# Patient Record
Sex: MALE | Race: WHITE | Employment: FULL TIME | URBAN - NONMETROPOLITAN AREA
[De-identification: names, ages, dates, MRNs, and addresses within clinical notes are randomized per-mention and may not be internally consistent; named-entity substitution may affect disease eponyms.]

---

## 2019-07-22 ENCOUNTER — HOSPITAL ENCOUNTER (OUTPATIENT)
Dept: GENERAL RADIOLOGY | Age: 49
Discharge: HOME | DRG: 188 | End: 2019-07-22

## 2019-07-22 ENCOUNTER — HOSPITAL ENCOUNTER (INPATIENT)
Age: 49
LOS: 1 days | Discharge: HOME | DRG: 188 | End: 2019-07-23
Attending: EMERGENCY MEDICINE | Admitting: INTERNAL MEDICINE
Payer: COMMERCIAL

## 2019-07-22 ENCOUNTER — APPOINTMENT (OUTPATIENT)
Dept: GENERAL RADIOLOGY | Age: 49
DRG: 188 | End: 2019-07-22

## 2019-07-22 ENCOUNTER — APPOINTMENT (OUTPATIENT)
Dept: CT IMAGING | Age: 49
DRG: 188 | End: 2019-07-22

## 2019-07-22 ENCOUNTER — APPOINTMENT (OUTPATIENT)
Dept: ULTRASOUND IMAGING | Age: 49
DRG: 188 | End: 2019-07-22

## 2019-07-22 ENCOUNTER — HOSPITAL ENCOUNTER (OUTPATIENT)
Age: 49
Discharge: HOME | DRG: 188 | End: 2019-07-22

## 2019-07-22 DIAGNOSIS — N28.89 RENAL MASS: Primary | ICD-10-CM

## 2019-07-22 DIAGNOSIS — J40 BRONCHITIS, NOT SPECIFIED AS ACUTE OR CHRONIC: ICD-10-CM

## 2019-07-22 DIAGNOSIS — J90 PLEURAL EFFUSION: ICD-10-CM

## 2019-07-22 PROBLEM — R31.9 HEMATURIA: Status: ACTIVE | Noted: 2019-07-22

## 2019-07-22 PROBLEM — R91.8 PULMONARY MASS: Status: ACTIVE | Noted: 2019-07-22

## 2019-07-22 LAB
ALBUMIN SERPL-MCNC: 4 G/DL (ref 3.5–5.1)
ALP BLD-CCNC: 71 U/L (ref 38–126)
ALT SERPL-CCNC: 58 U/L (ref 11–66)
ANION GAP SERPL CALCULATED.3IONS-SCNC: 14 MEQ/L (ref 8–16)
AST SERPL-CCNC: 23 U/L (ref 5–40)
AVERAGE GLUCOSE: 153 MG/DL (ref 70–126)
BACTERIA: ABNORMAL /HPF
BASOPHILS # BLD: 0.2 %
BASOPHILS ABSOLUTE: 0 THOU/MM3 (ref 0–0.1)
BILIRUB SERPL-MCNC: 1.4 MG/DL (ref 0.3–1.2)
BILIRUBIN URINE: NEGATIVE
BLOOD, URINE: ABNORMAL
BUN BLDV-MCNC: 16 MG/DL (ref 7–22)
CALCIUM SERPL-MCNC: 9.3 MG/DL (ref 8.5–10.5)
CASTS 2: ABNORMAL /LPF
CASTS UA: ABNORMAL /LPF
CHARACTER, URINE: CLEAR
CHLORIDE BLD-SCNC: 96 MEQ/L (ref 98–111)
CO2: 26 MEQ/L (ref 23–33)
COLOR: YELLOW
CREAT SERPL-MCNC: 0.8 MG/DL (ref 0.4–1.2)
CRYSTALS, UA: ABNORMAL
D-DIMER QUANTITATIVE: 1888 NG/ML FEU (ref 0–500)
EKG ATRIAL RATE: 97 BPM
EKG P AXIS: 49 DEGREES
EKG P-R INTERVAL: 130 MS
EKG Q-T INTERVAL: 340 MS
EKG QRS DURATION: 82 MS
EKG QTC CALCULATION (BAZETT): 431 MS
EKG R AXIS: 21 DEGREES
EKG T AXIS: 42 DEGREES
EKG VENTRICULAR RATE: 97 BPM
EOSINOPHIL # BLD: 1.3 %
EOSINOPHILS ABSOLUTE: 0.2 THOU/MM3 (ref 0–0.4)
EPITHELIAL CELLS, UA: ABNORMAL /HPF
ERYTHROCYTE [DISTWIDTH] IN BLOOD BY AUTOMATED COUNT: 13.2 % (ref 11.5–14.5)
ERYTHROCYTE [DISTWIDTH] IN BLOOD BY AUTOMATED COUNT: 44.8 FL (ref 35–45)
GFR SERPL CREATININE-BSD FRML MDRD: > 90 ML/MIN/1.73M2
GLUCOSE BLD-MCNC: 136 MG/DL (ref 70–108)
GLUCOSE BLD-MCNC: 179 MG/DL (ref 70–108)
GLUCOSE URINE: NEGATIVE MG/DL
GLUCOSE, FLUID: 154 MG/DL
HBA1C MFR BLD: 7.1 % (ref 4.4–6.4)
HCT VFR BLD CALC: 44.3 % (ref 42–52)
HEMOGLOBIN: 14.5 GM/DL (ref 14–18)
IMMATURE GRANS (ABS): 0.1 THOU/MM3 (ref 0–0.07)
IMMATURE GRANULOCYTES: 0.8 %
KETONES, URINE: 15
LD, FLUID: 295 U/L
LEUKOCYTE ESTERASE, URINE: NEGATIVE
LIPASE: 28.5 U/L (ref 5.6–51.3)
LYMPHOCYTES # BLD: 13.1 %
LYMPHOCYTES ABSOLUTE: 1.7 THOU/MM3 (ref 1–4.8)
MCH RBC QN AUTO: 30.5 PG (ref 26–33)
MCHC RBC AUTO-ENTMCNC: 32.7 GM/DL (ref 32.2–35.5)
MCV RBC AUTO: 93.1 FL (ref 80–94)
MISCELLANEOUS 2: ABNORMAL
MONOCYTES # BLD: 8.2 %
MONOCYTES ABSOLUTE: 1 THOU/MM3 (ref 0.4–1.3)
MUCUS: ABNORMAL
NITRITE, URINE: NEGATIVE
NUCLEATED RED BLOOD CELLS: 0 /100 WBC
OSMOLALITY CALCULATION: 275.2 MOSMOL/KG (ref 275–300)
PH FLUID: 7.55
PH UA: 5.5 (ref 5–9)
PLATELET # BLD: 200 THOU/MM3 (ref 130–400)
PMV BLD AUTO: 10.7 FL (ref 9.4–12.4)
POTASSIUM SERPL-SCNC: 4.6 MEQ/L (ref 3.5–5.2)
PRO-BNP: 145.4 PG/ML (ref 0–900)
PROTEIN FLUID: 3.7 GM/DL
PROTEIN UA: NEGATIVE
RBC # BLD: 4.76 MILL/MM3 (ref 4.7–6.1)
RBC URINE: ABNORMAL /HPF
RENAL EPITHELIAL, UA: ABNORMAL
SEG NEUTROPHILS: 76.4 %
SEGMENTED NEUTROPHILS ABSOLUTE COUNT: 9.6 THOU/MM3 (ref 1.8–7.7)
SODIUM BLD-SCNC: 136 MEQ/L (ref 135–145)
SPECIFIC GRAVITY, URINE: 1.02 (ref 1–1.03)
TOTAL PROTEIN: 7 G/DL (ref 6.1–8)
TROPONIN T: < 0.01 NG/ML
UROBILINOGEN, URINE: 0.2 EU/DL (ref 0–1)
WBC # BLD: 12.6 THOU/MM3 (ref 4.8–10.8)
WBC UA: ABNORMAL /HPF
YEAST: ABNORMAL

## 2019-07-22 PROCEDURE — 87070 CULTURE OTHR SPECIMN AEROBIC: CPT

## 2019-07-22 PROCEDURE — 83615 LACTATE (LD) (LDH) ENZYME: CPT

## 2019-07-22 PROCEDURE — 99223 1ST HOSP IP/OBS HIGH 75: CPT | Performed by: INTERNAL MEDICINE

## 2019-07-22 PROCEDURE — 32554 ASPIRATE PLEURA W/O IMAGING: CPT

## 2019-07-22 PROCEDURE — 93005 ELECTROCARDIOGRAM TRACING: CPT | Performed by: EMERGENCY MEDICINE

## 2019-07-22 PROCEDURE — 94640 AIRWAY INHALATION TREATMENT: CPT

## 2019-07-22 PROCEDURE — 74177 CT ABD & PELVIS W/CONTRAST: CPT

## 2019-07-22 PROCEDURE — 88305 TISSUE EXAM BY PATHOLOGIST: CPT

## 2019-07-22 PROCEDURE — 81001 URINALYSIS AUTO W/SCOPE: CPT

## 2019-07-22 PROCEDURE — 32555 ASPIRATE PLEURA W/ IMAGING: CPT

## 2019-07-22 PROCEDURE — 84484 ASSAY OF TROPONIN QUANT: CPT

## 2019-07-22 PROCEDURE — 6370000000 HC RX 637 (ALT 250 FOR IP): Performed by: INTERNAL MEDICINE

## 2019-07-22 PROCEDURE — 71275 CT ANGIOGRAPHY CHEST: CPT

## 2019-07-22 PROCEDURE — 2580000003 HC RX 258: Performed by: INTERNAL MEDICINE

## 2019-07-22 PROCEDURE — 83986 ASSAY PH BODY FLUID NOS: CPT

## 2019-07-22 PROCEDURE — 83036 HEMOGLOBIN GLYCOSYLATED A1C: CPT

## 2019-07-22 PROCEDURE — 2709999900 HC NON-CHARGEABLE SUPPLY

## 2019-07-22 PROCEDURE — 84157 ASSAY OF PROTEIN OTHER: CPT

## 2019-07-22 PROCEDURE — 0W9B3ZX DRAINAGE OF LEFT PLEURAL CAVITY, PERCUTANEOUS APPROACH, DIAGNOSTIC: ICD-10-PCS | Performed by: RADIOLOGY

## 2019-07-22 PROCEDURE — 1200000003 HC TELEMETRY R&B

## 2019-07-22 PROCEDURE — 80053 COMPREHEN METABOLIC PANEL: CPT

## 2019-07-22 PROCEDURE — 85379 FIBRIN DEGRADATION QUANT: CPT

## 2019-07-22 PROCEDURE — 83880 ASSAY OF NATRIURETIC PEPTIDE: CPT

## 2019-07-22 PROCEDURE — 71046 X-RAY EXAM CHEST 2 VIEWS: CPT

## 2019-07-22 PROCEDURE — 85025 COMPLETE CBC W/AUTO DIFF WBC: CPT

## 2019-07-22 PROCEDURE — 36415 COLL VENOUS BLD VENIPUNCTURE: CPT

## 2019-07-22 PROCEDURE — 87205 SMEAR GRAM STAIN: CPT

## 2019-07-22 PROCEDURE — 82948 REAGENT STRIP/BLOOD GLUCOSE: CPT

## 2019-07-22 PROCEDURE — 2580000003 HC RX 258: Performed by: PHYSICIAN ASSISTANT

## 2019-07-22 PROCEDURE — 89050 BODY FLUID CELL COUNT: CPT

## 2019-07-22 PROCEDURE — 87102 FUNGUS ISOLATION CULTURE: CPT

## 2019-07-22 PROCEDURE — 82945 GLUCOSE OTHER FLUID: CPT

## 2019-07-22 PROCEDURE — 6360000004 HC RX CONTRAST MEDICATION: Performed by: EMERGENCY MEDICINE

## 2019-07-22 PROCEDURE — 87116 MYCOBACTERIA CULTURE: CPT

## 2019-07-22 PROCEDURE — 71045 X-RAY EXAM CHEST 1 VIEW: CPT

## 2019-07-22 PROCEDURE — 83690 ASSAY OF LIPASE: CPT

## 2019-07-22 PROCEDURE — 88112 CYTOPATH CELL ENHANCE TECH: CPT

## 2019-07-22 PROCEDURE — 87075 CULTR BACTERIA EXCEPT BLOOD: CPT

## 2019-07-22 PROCEDURE — 99285 EMERGENCY DEPT VISIT HI MDM: CPT

## 2019-07-22 PROCEDURE — 6370000000 HC RX 637 (ALT 250 FOR IP): Performed by: PHYSICIAN ASSISTANT

## 2019-07-22 RX ORDER — IPRATROPIUM BROMIDE AND ALBUTEROL SULFATE 2.5; .5 MG/3ML; MG/3ML
1 SOLUTION RESPIRATORY (INHALATION) EVERY 4 HOURS PRN
Status: DISCONTINUED | OUTPATIENT
Start: 2019-07-22 | End: 2019-07-24 | Stop reason: HOSPADM

## 2019-07-22 RX ORDER — NICOTINE POLACRILEX 4 MG
15 LOZENGE BUCCAL PRN
Status: DISCONTINUED | OUTPATIENT
Start: 2019-07-22 | End: 2019-07-24 | Stop reason: HOSPADM

## 2019-07-22 RX ORDER — POLYETHYLENE GLYCOL 3350 17 G/17G
17 POWDER, FOR SOLUTION ORAL DAILY
Status: DISCONTINUED | OUTPATIENT
Start: 2019-07-22 | End: 2019-07-24 | Stop reason: HOSPADM

## 2019-07-22 RX ORDER — ONDANSETRON 2 MG/ML
4 INJECTION INTRAMUSCULAR; INTRAVENOUS EVERY 6 HOURS PRN
Status: DISCONTINUED | OUTPATIENT
Start: 2019-07-22 | End: 2019-07-24 | Stop reason: HOSPADM

## 2019-07-22 RX ORDER — 0.9 % SODIUM CHLORIDE 0.9 %
500 INTRAVENOUS SOLUTION INTRAVENOUS ONCE
Status: COMPLETED | OUTPATIENT
Start: 2019-07-22 | End: 2019-07-22

## 2019-07-22 RX ORDER — POTASSIUM CHLORIDE 7.45 MG/ML
10 INJECTION INTRAVENOUS PRN
Status: DISCONTINUED | OUTPATIENT
Start: 2019-07-22 | End: 2019-07-24 | Stop reason: HOSPADM

## 2019-07-22 RX ORDER — DEXTROSE MONOHYDRATE 50 MG/ML
100 INJECTION, SOLUTION INTRAVENOUS PRN
Status: DISCONTINUED | OUTPATIENT
Start: 2019-07-22 | End: 2019-07-24 | Stop reason: HOSPADM

## 2019-07-22 RX ORDER — POTASSIUM CHLORIDE 20 MEQ/1
40 TABLET, EXTENDED RELEASE ORAL PRN
Status: DISCONTINUED | OUTPATIENT
Start: 2019-07-22 | End: 2019-07-24 | Stop reason: HOSPADM

## 2019-07-22 RX ORDER — FAMOTIDINE 20 MG/1
20 TABLET, FILM COATED ORAL 2 TIMES DAILY
Status: DISCONTINUED | OUTPATIENT
Start: 2019-07-22 | End: 2019-07-24 | Stop reason: HOSPADM

## 2019-07-22 RX ORDER — IPRATROPIUM BROMIDE AND ALBUTEROL SULFATE 2.5; .5 MG/3ML; MG/3ML
1 SOLUTION RESPIRATORY (INHALATION) ONCE
Status: COMPLETED | OUTPATIENT
Start: 2019-07-22 | End: 2019-07-22

## 2019-07-22 RX ORDER — DEXTROSE MONOHYDRATE 25 G/50ML
12.5 INJECTION, SOLUTION INTRAVENOUS PRN
Status: DISCONTINUED | OUTPATIENT
Start: 2019-07-22 | End: 2019-07-24 | Stop reason: HOSPADM

## 2019-07-22 RX ORDER — SODIUM CHLORIDE 0.9 % (FLUSH) 0.9 %
10 SYRINGE (ML) INJECTION EVERY 12 HOURS SCHEDULED
Status: DISCONTINUED | OUTPATIENT
Start: 2019-07-22 | End: 2019-07-24 | Stop reason: HOSPADM

## 2019-07-22 RX ORDER — SODIUM CHLORIDE 0.9 % (FLUSH) 0.9 %
10 SYRINGE (ML) INJECTION PRN
Status: DISCONTINUED | OUTPATIENT
Start: 2019-07-22 | End: 2019-07-24 | Stop reason: HOSPADM

## 2019-07-22 RX ORDER — ACETAMINOPHEN 325 MG/1
650 TABLET ORAL EVERY 4 HOURS PRN
Status: DISCONTINUED | OUTPATIENT
Start: 2019-07-22 | End: 2019-07-24 | Stop reason: HOSPADM

## 2019-07-22 RX ADMIN — Medication 10 ML: at 20:09

## 2019-07-22 RX ADMIN — SODIUM CHLORIDE 500 ML: 9 INJECTION, SOLUTION INTRAVENOUS at 13:11

## 2019-07-22 RX ADMIN — POLYETHYLENE GLYCOL 3350 17 G: 17 POWDER, FOR SOLUTION ORAL at 20:17

## 2019-07-22 RX ADMIN — IOPAMIDOL 80 ML: 755 INJECTION, SOLUTION INTRAVENOUS at 14:00

## 2019-07-22 RX ADMIN — IPRATROPIUM BROMIDE AND ALBUTEROL SULFATE 1 AMPULE: .5; 3 SOLUTION RESPIRATORY (INHALATION) at 13:32

## 2019-07-22 ASSESSMENT — ENCOUNTER SYMPTOMS
VOMITING: 0
RHINORRHEA: 0
BACK PAIN: 0
WHEEZING: 0
BLOOD IN STOOL: 0
CONSTIPATION: 1
SHORTNESS OF BREATH: 1
DIARRHEA: 0
ABDOMINAL DISTENTION: 1
EYE DISCHARGE: 0
NAUSEA: 0
CHEST TIGHTNESS: 1
ABDOMINAL PAIN: 0
COUGH: 1
EYE REDNESS: 0
SORE THROAT: 0

## 2019-07-22 ASSESSMENT — PAIN SCALES - GENERAL
PAINLEVEL_OUTOF10: 0

## 2019-07-22 NOTE — H&P
History & Physical        Patient:  Hudson Ann  YOB: 1970    MRN: [id number]     Acct: [de-identified]    PCP: CAITY Robbins CNP    Date of Admission: 7/22/2019    Date of Service: Pt seen/examined on 7/22/2019 and Admitted to Inpatient with expected LOS greater than two midnights due to medical therapy. Chief Complaint:    Chief Complaint   Patient presents with    Shortness of Breath         History Of Present Illness:      46 y.o. male who presented to [facility] with \"evaluation of shortness of breath onset 3 weeks. Patient saw Babak Frances (PCP) today and got labs and a chest xray. Chest xray showed a near complete whiteout of the left lung field which be on the basis large pleural effusion, consolidation, atelectasis or other etiology. D-dimer was elevated at 1888.0. Patient was sent here for further evaluation. The patient states he was taking Robitussin 3 weeks ago but states it didn't alleviate his symptoms. He then went to urgent care where he had a breathing treatment which he stated provided him with minimal relief. Patient was also given a steroid shot and was sent home with steroids as well as Doxycycline. Patient denies getting imaging at that time. Patient states he was on a cruise last week and states his symptoms got worse. He states he gets both short of breath while stationary and with exertion. Patient also reports associated chest tightness and cough. He states he produces yellow phlegm when he coughs. The patient reports progressively decrease in bowel movements and increase belching without flatulence and persistent abdominal distension and bloating that doesn't improve with bowel clean outs with OTC medications. Patient states he used to have 2-3 bowel movements per day and states he has tried Miralax and milk of magnesium.  He denies chest pain, wheezing, fever, chills, lightheadedness, dizziness, syncope, leg swelling, leg pain, weight

## 2019-07-22 NOTE — ED TRIAGE NOTES
Pt reports to the ED with complaints of SOB that has been occurring for about 3 weeks. Had an X-ray of the chest today and was referred to the hospital due to SOB. Pt has abdominal distention that is nontender also believes this has been occurring for 3 weeks. Pt reports of bloating and tightness in the stomach. He believes he may be \"backed up. \" Pt reports also having a lot of burping. Pt went on a cruise recently but reports these symptoms occurred before the cruise.

## 2019-07-22 NOTE — ED PROVIDER NOTES
ATTENDING ATTESTATION  Evaluation of Orlando Dhaliwal. Patient seen and examined by me. Case discussed and care plan developed with physician assistant. I agree with the note and plan as documented by her, except if my documentation differs. I reviewed the medical, surgical, family and social history, medications and allergies. I have reviewed the nursing documentation. I have reviewed the patient's vital signs and are abnormal from Hypertension tachypnea per my interpretation. Pulsoxymetry is normal per my interpretation. Patient c/o worsening shortness of breath for the last 2 weeks, very mild at first, patient went to an urgent care and was prescribed steroids and has been taking Robitussin with little improvement. Patient went on a cruise ship for vacation and returned yesterday and was so short of breath he had to come to the emergency department. Physical exam is notable for well appearing, wheeze breath sounds on left chest.  Slightly distended but soft with ascitic fluid wave. MDM: Large pleural effusion, ascites, possible kidney neoplasm. Plan: IV line, labs, imaging, observation, admission. Please see the physician assistant completed note for final disposition except as documented on this attestation. Diagnosis, treatment and disposition plans were discussed and agreed upon by patient. This transcription was electronically signed. It was dictated by use of voice recognition software and electronically transcribed. The transcription may contain errors not detected in proofreading.        Electronically signed by Jakub Baldwin MD on 7/22/19 at 3:13 PM         Jakub Baldwin MD  07/22/19 [id number]
mood and affect. His behavior is normal. Judgment and thought content normal.   Nursing note and vitals reviewed. DIFFERENTIAL DIAGNOSIS:   Discussed with patient at bedside    DIAGNOSTIC RESULTS     EKG: All EKG's are interpreted by [facility] who either signs or Co-signs this chart in the absence of a cardiologist.    Vaibhav Colmenares. Rate: 91 bpm  NM interval: 730 ms  QRS duration: 82 ms  QTc: 431 ms  P-R-T axes: 49, 21, 42  Normal sinus rhythm  Low voltage QRS  No STEMI    RADIOLOGY: non-plain film images(s) such as CT, Ultrasound and MRI are read by theradiologist.    CTA Chest W WO Contrast   Final Result         1. Large left pleural effusion with near complete atelectasis of the left lung with mass effect and narrowing of the left mainstem bronchus and pulmonary vessels. 2. No central pulmonary embolus. 3. Multiple peripheral predominantly pleural-based nodules in the left lung and multiple right lung pulmonary nodules. Highly suspicious for malignancy. Further evaluation is advised. Enlarged adrenal glands bilaterally with nodularity and enhancement. These results were communicated directly with Ayaan Vera on 7/22/2019 2:35 PM,  [ ]   **This report has been created using voice recognition software. It may contain minor errors which are inherent in voice recognition technology. **      Final report electronically signed by Dr. Herbert Goodpasture on 7/22/2019 2:36 PM      CT ABDOMEN PELVIS W IV CONTRAST   Final Result      1. 6.9 x 6.2 cm enhancing right renal mass concerning for malignancy. 2. Bilateral adrenal gland nodularity and an abnormal enhancement concerning for neoplasm. 3. 2.2 cm right retroperitoneal possibly partially necrotic lymph node. 4. Partially imaged is large left pleural effusion with mass effect on the spleen and left upper quadrant abdominal organs. Multiple pulmonary nodules concerning for metastatic disease.          These results were communicated directly

## 2019-07-23 ENCOUNTER — APPOINTMENT (OUTPATIENT)
Dept: NUCLEAR MEDICINE | Age: 49
DRG: 188 | End: 2019-07-23

## 2019-07-23 ENCOUNTER — APPOINTMENT (OUTPATIENT)
Dept: MRI IMAGING | Age: 49
DRG: 188 | End: 2019-07-23

## 2019-07-23 ENCOUNTER — APPOINTMENT (OUTPATIENT)
Dept: GENERAL RADIOLOGY | Age: 49
DRG: 188 | End: 2019-07-23

## 2019-07-23 ENCOUNTER — APPOINTMENT (OUTPATIENT)
Dept: ULTRASOUND IMAGING | Age: 49
DRG: 188 | End: 2019-07-23

## 2019-07-23 VITALS
TEMPERATURE: 98.9 F | WEIGHT: 224 LBS | BODY MASS INDEX: 29.69 KG/M2 | RESPIRATION RATE: 18 BRPM | SYSTOLIC BLOOD PRESSURE: 123 MMHG | HEART RATE: 111 BPM | HEIGHT: 73 IN | DIASTOLIC BLOOD PRESSURE: 81 MMHG | OXYGEN SATURATION: 94 %

## 2019-07-23 LAB
ALBUMIN SERPL-MCNC: 3 G/DL (ref 3.5–5.1)
ALP BLD-CCNC: 56 U/L (ref 38–126)
ALT SERPL-CCNC: 36 U/L (ref 11–66)
ANION GAP SERPL CALCULATED.3IONS-SCNC: 13 MEQ/L (ref 8–16)
APTT: 27.4 SECONDS (ref 22–38)
AST SERPL-CCNC: 14 U/L (ref 5–40)
BASOPHILS # BLD: 0.3 %
BASOPHILS ABSOLUTE: 0 THOU/MM3 (ref 0–0.1)
BILIRUB SERPL-MCNC: 1.2 MG/DL (ref 0.3–1.2)
BILIRUBIN DIRECT: 0.3 MG/DL (ref 0–0.3)
BODY FLUID RBC: NORMAL /CUMM
BUN BLDV-MCNC: 14 MG/DL (ref 7–22)
CALCIUM SERPL-MCNC: 8.8 MG/DL (ref 8.5–10.5)
CHARACTER, BODY FLUID: NORMAL
CHLORIDE BLD-SCNC: 101 MEQ/L (ref 98–111)
CO2: 25 MEQ/L (ref 23–33)
COLOR: NORMAL
CREAT SERPL-MCNC: 1 MG/DL (ref 0.4–1.2)
EOSINOPHIL # BLD: 1.9 %
EOSINOPHILS ABSOLUTE: 0.2 THOU/MM3 (ref 0–0.4)
ERYTHROCYTE [DISTWIDTH] IN BLOOD BY AUTOMATED COUNT: 13.2 % (ref 11.5–14.5)
ERYTHROCYTE [DISTWIDTH] IN BLOOD BY AUTOMATED COUNT: 44.8 FL (ref 35–45)
GFR SERPL CREATININE-BSD FRML MDRD: 79 ML/MIN/1.73M2
GLUCOSE BLD-MCNC: 120 MG/DL (ref 70–108)
GLUCOSE BLD-MCNC: 126 MG/DL (ref 70–108)
GLUCOSE BLD-MCNC: 130 MG/DL (ref 70–108)
GLUCOSE BLD-MCNC: 140 MG/DL (ref 70–108)
HCT VFR BLD CALC: 40.4 % (ref 42–52)
HEMOGLOBIN: 13.1 GM/DL (ref 14–18)
IMMATURE GRANS (ABS): 0.06 THOU/MM3 (ref 0–0.07)
IMMATURE GRANULOCYTES: 0.7 %
INR BLD: 1.09 (ref 0.85–1.13)
LYMPHOCYTES # BLD: 16.8 %
LYMPHOCYTES ABSOLUTE: 1.5 THOU/MM3 (ref 1–4.8)
MAGNESIUM: 1.8 MG/DL (ref 1.6–2.4)
MCH RBC QN AUTO: 30 PG (ref 26–33)
MCHC RBC AUTO-ENTMCNC: 32.4 GM/DL (ref 32.2–35.5)
MCV RBC AUTO: 92.7 FL (ref 80–94)
MONOCYTES # BLD: 9 %
MONOCYTES ABSOLUTE: 0.8 THOU/MM3 (ref 0.4–1.3)
MONONUCLEAR CELLS BODY FLUID: 90.6 %
NUCLEATED RED BLOOD CELLS: 0 /100 WBC
PATHOLOGIST REVIEW: NORMAL
PLATELET # BLD: 161 THOU/MM3 (ref 130–400)
PMV BLD AUTO: 10.8 FL (ref 9.4–12.4)
POLYMORPHONUCLEAR CELLS BODY FLUID: 9.4 %
POTASSIUM REFLEX MAGNESIUM: 4.6 MEQ/L (ref 3.5–5.2)
RBC # BLD: 4.36 MILL/MM3 (ref 4.7–6.1)
SEG NEUTROPHILS: 71.3 %
SEGMENTED NEUTROPHILS ABSOLUTE COUNT: 6.5 THOU/MM3 (ref 1.8–7.7)
SODIUM BLD-SCNC: 139 MEQ/L (ref 135–145)
SPECIMEN: NORMAL
TOTAL NUCLEATED CELLS BODY FLUID: 476 /CUMM (ref 0–500)
TOTAL PROTEIN: 5.6 G/DL (ref 6.1–8)
TOTAL VOLUME RECEIVED BODY FLUID: 83 ML
WBC # BLD: 9.1 THOU/MM3 (ref 4.8–10.8)

## 2019-07-23 PROCEDURE — 93010 ELECTROCARDIOGRAM REPORT: CPT | Performed by: INTERNAL MEDICINE

## 2019-07-23 PROCEDURE — 3430000000 HC RX DIAGNOSTIC RADIOPHARMACEUTICAL: Performed by: NURSE PRACTITIONER

## 2019-07-23 PROCEDURE — 99254 IP/OBS CNSLTJ NEW/EST MOD 60: CPT | Performed by: NURSE PRACTITIONER

## 2019-07-23 PROCEDURE — 88112 CYTOPATH CELL ENHANCE TECH: CPT

## 2019-07-23 PROCEDURE — 2580000003 HC RX 258: Performed by: INTERNAL MEDICINE

## 2019-07-23 PROCEDURE — 78306 BONE IMAGING WHOLE BODY: CPT

## 2019-07-23 PROCEDURE — 88305 TISSUE EXAM BY PATHOLOGIST: CPT

## 2019-07-23 PROCEDURE — 0W9B3ZX DRAINAGE OF LEFT PLEURAL CAVITY, PERCUTANEOUS APPROACH, DIAGNOSTIC: ICD-10-PCS | Performed by: RADIOLOGY

## 2019-07-23 PROCEDURE — 94760 N-INVAS EAR/PLS OXIMETRY 1: CPT

## 2019-07-23 PROCEDURE — 6370000000 HC RX 637 (ALT 250 FOR IP): Performed by: INTERNAL MEDICINE

## 2019-07-23 PROCEDURE — 82948 REAGENT STRIP/BLOOD GLUCOSE: CPT

## 2019-07-23 PROCEDURE — 99239 HOSP IP/OBS DSCHRG MGMT >30: CPT | Performed by: FAMILY MEDICINE

## 2019-07-23 PROCEDURE — 80076 HEPATIC FUNCTION PANEL: CPT

## 2019-07-23 PROCEDURE — 85610 PROTHROMBIN TIME: CPT

## 2019-07-23 PROCEDURE — 70553 MRI BRAIN STEM W/O & W/DYE: CPT

## 2019-07-23 PROCEDURE — 83735 ASSAY OF MAGNESIUM: CPT

## 2019-07-23 PROCEDURE — 80048 BASIC METABOLIC PNL TOTAL CA: CPT

## 2019-07-23 PROCEDURE — 85730 THROMBOPLASTIN TIME PARTIAL: CPT

## 2019-07-23 PROCEDURE — 99223 1ST HOSP IP/OBS HIGH 75: CPT | Performed by: INTERNAL MEDICINE

## 2019-07-23 PROCEDURE — 32555 ASPIRATE PLEURA W/ IMAGING: CPT

## 2019-07-23 PROCEDURE — 85025 COMPLETE CBC W/AUTO DIFF WBC: CPT

## 2019-07-23 PROCEDURE — 99253 IP/OBS CNSLTJ NEW/EST LOW 45: CPT | Performed by: NURSE PRACTITIONER

## 2019-07-23 PROCEDURE — A9503 TC99M MEDRONATE: HCPCS | Performed by: NURSE PRACTITIONER

## 2019-07-23 PROCEDURE — 71045 X-RAY EXAM CHEST 1 VIEW: CPT

## 2019-07-23 PROCEDURE — 71046 X-RAY EXAM CHEST 2 VIEWS: CPT

## 2019-07-23 PROCEDURE — 6360000004 HC RX CONTRAST MEDICATION: Performed by: NURSE PRACTITIONER

## 2019-07-23 PROCEDURE — A9579 GAD-BASE MR CONTRAST NOS,1ML: HCPCS | Performed by: NURSE PRACTITIONER

## 2019-07-23 PROCEDURE — 36415 COLL VENOUS BLD VENIPUNCTURE: CPT

## 2019-07-23 RX ORDER — TC 99M MEDRONATE 20 MG/10ML
24.6 INJECTION, POWDER, LYOPHILIZED, FOR SOLUTION INTRAVENOUS
Status: COMPLETED | OUTPATIENT
Start: 2019-07-23 | End: 2019-07-23

## 2019-07-23 RX ADMIN — Medication 10 ML: at 08:19

## 2019-07-23 RX ADMIN — ACETAMINOPHEN 650 MG: 325 TABLET ORAL at 10:37

## 2019-07-23 RX ADMIN — GADOTERIDOL 20 ML: 279.3 INJECTION, SOLUTION INTRAVENOUS at 11:38

## 2019-07-23 RX ADMIN — TC 99M MEDRONATE 24.6 MILLICURIE: 20 INJECTION, POWDER, LYOPHILIZED, FOR SOLUTION INTRAVENOUS at 10:59

## 2019-07-23 ASSESSMENT — PAIN SCALES - GENERAL
PAINLEVEL_OUTOF10: 0
PAINLEVEL_OUTOF10: 1

## 2019-07-23 NOTE — PROGRESS NOTES
A Ultrasound guided left thoracentesis was performed. Patient had severe coughing at the end of the procedure and drainage was stopped. Please see PACS for full report.

## 2019-07-23 NOTE — CARE COORDINATION
7/23/19, 11:11 AM  Will Dhaliwal       Admitted from: ED 7/22/2019/ [facility] day: 1   Location: [facility]- Reason for admit: Renal mass [N28.89] Status: inpt  Admit order signed?: yes  PMH:  has a past medical history of Chronic kidney disease, unspecified, IDDM (insulin dependent diabetes mellitus) ([address] Sinusitis, and Unspecified essential hypertension. Medications:  Scheduled Meds:   sodium chloride flush  10 mL Intravenous 2 times per day    enoxaparin  40 mg Subcutaneous Daily    polyethylene glycol  17 g Oral Daily    famotidine  20 mg Oral BID    insulin lispro  0-6 Units Subcutaneous TID WC    insulin lispro  0-3 Units Subcutaneous Nightly     Continuous Infusions:   dextrose        Pertinent Info/Orders/Treatment Plan: Pt admitted with SOB, CXR showed large left pleural effusion. Last evening an US guided thoracentesis was done. CT abd shows right renal mass. Oncology, urology and pulmonology consulted. Diet: DIET CARB CONTROL;   Vital Signs: /77   Pulse 83   Temp 98.2 °F (36.8 °C) (Oral)   Resp 16   Ht 6' 1\" (1.854 m)   Wt 224 lb (101.6 kg)   SpO2 92%   BMI 29.55 kg/m²   PCP: Bucky Jeans, APRN - CNP  Readmission: no  Readmission Risk Score: 8%    Discharge Planning  Current Residence:  Private Residence  Living Arrangements:  Spouse/Significant Other, Children   Support Systems:  Parent, Spouse/Significant Other, Children, Family Members  Current Services PTA:     Potential Assistance Needed:  N/A  Potential Assistance Purchasing Medications:  No  Does patient want to participate in local refill/ meds to beds program?  No  Type of Home Care Services:  None  Patient expects to be discharged to:  home with [facility]  Expected Discharge date:  07/25/19  Follow Up Appointment: Best Day/ Time: Tuesday AM(has an appointment on 8/6 at 3pm)    Discharge Plan: Pt from home with S.O. Pt has no insurance, and financial counselor has seen. Pt denies any other discharge needs.

## 2019-07-23 NOTE — CONSULTS
HCO3, O2SAT  No results found for: IFIO2, MODE, SETTIDVOL, SETPEEP  CBC  Recent Labs     07/22/19  1214 07/23/19  0614   WBC 12.6* 9.1   RBC 4.76 4.36*   HGB 14.5 13.1*   HCT 44.3 40.4*   MCV 93.1 92.7   MCH 30.5 30.0   MCHC 32.7 32.4    161   MPV 10.7 10.8      BMP  Recent Labs     07/22/19  1230 07/23/19  0614    139   K 4.6 4.6   CL 96* 101   CO2 26 25   BUN 16 14   CREATININE 0.8 1.0   GLUCOSE 136* 126*   MG  --  1.8   CALCIUM 9.3 8.8     LFT  Recent Labs     07/22/19  1230 07/23/19  0614   AST 23 14   ALT 58 36   BILITOT 1.4* 1.2   ALKPHOS 71 56   LIPASE 28.5  --      TROP  Lab Results   Component Value Date    TROPONINT < 0.010 07/22/2019     BNP  Lab Results   Component Value Date    PROBNP 145.4 07/22/2019     D-Dimer  Lab Results   Component Value Date    DDIMER 1888.00 07/22/2019     Lactic Acid  No results for input(s): LACTA in the last 72 hours. INR  Recent Labs     07/23/19  0614   INR 1.09     PTT  Recent Labs     07/23/19  0614   APTT 27.4     Glucose  Recent Labs     07/22/19  1954 07/23/19  0838 07/23/19  1221   POCGLU 179* 130* 140*     UA   Recent Labs     07/22/19  1405 07/22/19  1720   PHUR 5.5  --    COLORU YELLOW ORANGE   MUCUS THREADS  --    PROTEINU NEGATIVE  --    BLOODU LARGE*  --    RBCUA   --    WBCUA 0-2  --    BACTERIA NONE  --    NITRU NEGATIVE  --    GLUCOSEU NEGATIVE  --    BILIRUBINUR NEGATIVE  --    UROBILINOGEN 0.2  --    KETUA 15*  --    . PFTs   N/A  Echo    N/A  Cultures    Procalcitonin  No results found for: [facility]     Radiology    CXR  7/23    FINDINGS:    There is persistent near complete opacification of the left hemithorax. There is no significant shift of midline structures.       The right lung remains clear.  No pneumothorax.       The cardiac silhouette and pulmonary vasculature are within normal limits.       There is no significant pleural effusion or pneumothorax.       Visualized portions of the upper abdomen are within normal limits.

## 2019-07-23 NOTE — CONSULTS
pain, constipation/diarrhea. Oncology History    New diagnosis  Meds    Current Medications    sodium chloride flush  10 mL Intravenous 2 times per day    enoxaparin  40 mg Subcutaneous Daily    polyethylene glycol  17 g Oral Daily    famotidine  20 mg Oral BID    insulin lispro  0-6 Units Subcutaneous TID WC    insulin lispro  0-3 Units Subcutaneous Nightly     sodium chloride flush, ondansetron, glucose, dextrose, glucagon (rDNA), dextrose, ipratropium-albuterol, magnesium sulfate, potassium chloride **OR** potassium alternative oral replacement **OR** potassium chloride, acetaminophen  IV Drips/Infusions   dextrose       Past Medical History         Diagnosis Date    Chronic kidney disease, unspecified     IDDM (insulin dependent diabetes mellitus) ([facility] [address] [facility]     Sinusitis     Unspecified essential hypertension       Past Surgical History           Procedure Laterality Date    BACK SURGERY      CHOLECYSTECTOMY       Diet    DIET CARB CONTROL; Allergies    Patient has no known allergies. Social History     Social History     Socioeconomic History    Marital status:      Spouse name: Not on file    Number of children: Not on file    Years of education: Not on file    Highest education level: Not on file   Occupational History    Not on file   Social Needs    Financial resource strain: Not on file    Food insecurity:     Worry: Not on file     Inability: Not on file    Transportation needs:     Medical: Not on file     Non-medical: Not on file   Tobacco Use    Smoking status: Never Smoker    Smokeless tobacco: Never Used   Substance and Sexual Activity    Alcohol use:  Yes     Alcohol/week: 1.0 standard drinks     Types: 1 Cans of beer per week    Drug use: Never    Sexual activity: Not on file   Lifestyle    Physical activity:     Days per week: Not on file     Minutes per session: Not on file    Stress: Not on file   Relationships    Social connections:     Talks on phone: Not on file     Gets together: Not on file     Attends Anabaptist service: Not on file     Active member of club or organization: Not on file     Attends meetings of clubs or organizations: Not on file     Relationship status: Not on file    Intimate partner violence:     Fear of current or ex partner: Not on file     Emotionally abused: Not on file     Physically abused: Not on file     Forced sexual activity: Not on file   Other Topics Concern    Not on file   Social History Narrative    Not on file     Family History    History reviewed. No pertinent family history. ROS     Review of Systems   Pertinent review of systems noted in HPI, all other ROS negative. Vitals     height is 6' 1\" (1.854 m) and weight is 224 lb (101.6 kg). His oral temperature is 98.2 °F (36.8 °C). His blood pressure is 129/77 and his pulse is 83. His respiration is 16 and oxygen saturation is 92%. O2 Flow Rate (L/min): 2 L/min    Exam   Physical Exam   General appearance: No apparent distress, tearful and cooperative. HEENT: Pupils equal, round, and reactive to light. Conjunctivae/corneas clear. Oral mucosa moist  Neck: Supple, with full range of motion. Trachea midline. Respiratory:  Normal respiratory effort. Clear to auscultation, bilaterally without Rales/Wheezes/Rhonchi. Oxygen sat 94% on RA  Cardiovascular: Regular rate and rhythm with normal S1/S2 without murmurs, rubs or gallops. CDI dressing over thoracentesis site. Abdomen: Soft, non-tender, distended with active bowel sounds. Musculoskeletal: No clubbing, cyanosis or edema bilaterally. Full range of motion without deformity. Skin: Skin color, texture, turgor normal.  No rashes or lesions. Neurologic:  Neurovascularly intact without any focal sensory/motor deficits.  Cranial nerves: II-XII intact, grossly non-focal.  Psychiatric: Alert and oriented, thought content appropriate, normal insight  Capillary Refill: Brisk,< 3 seconds   Peripheral Pulses: +2 palpable, equal complete whiteout of the left lung field which could be on the basis pleural effusion, atelectasis or other etiology. Trachea appears to be midline. No definite acute osseous findings. Chronic silhouette is obscured. Right costophrenic angle and right lung field appears to be clear. There is near complete whiteout of the left lung field which could be on the basis large pleural effusion, consolidation, atelectasis or other etiology. Correlation with prior imaging, clinical history and/or chest CT is advised for further evaluation. **This report has been created using voice recognition software. It may contain minor errors which are inherent in voice recognition technology. ** Final report electronically signed by Dr. Safia Elliott on 7/22/2019 12:11 PM    Cta Chest W Wo Contrast    Result Date: 7/22/2019  PROCEDURE: CTA CHEST W WO CONTRAST CLINICAL INFORMATION: SOB, elevated D-dimer. COMPARISON: No prior study. TECHNIQUE: 1.5 mm axial images were obtained through the chest after the administration of IV contrast.  A non-contrast localizer was obtained. 3D reconstructions were performed on the scanner to include sagittal and bilateral oblique images through the  chest. 80 mL Isovue-370 were administered intravenously. All CT scans at this facility use dose modulation, iterative reconstruction, and/or weight-based dosing when appropriate to reduce radiation dose to as low as reasonably achievable. FINDINGS: Possible left thyroid lobe nodule. There is a large left pleural effusion occupying nearly the entire left hemithorax. There are multiple nodular densities along the pleura and mediastinum with narrowing and near complete occlusion of the left bronchus as well as pulmonary arterial vessels. There is extremely tight atelectasis of the left lung. Multiple right lower lobe nodules ranging from 3 mm to 1.4 cm concerning for malignancy.  Upper abdominal images demonstrate enhancement and nodularity of the both adrenal glands. Fatty infiltration of the liver. Please see dedicated CT abdomen pelvis. No acute osseous findings. There is no cardiomegaly or pericardial effusion. There is shift of mediastinal structures towards the right due to large left pleural effusion. 1. Large left pleural effusion with near complete atelectasis of the left lung with mass effect and narrowing of the left mainstem bronchus and pulmonary vessels. 2. No central pulmonary embolus. 3. Multiple peripheral predominantly pleural-based nodules in the left lung and multiple right lung pulmonary nodules. Highly suspicious for malignancy. Further evaluation is advised. Enlarged adrenal glands bilaterally with nodularity and enhancement. These results were communicated directly with Samina Vera on 7/22/2019 2:35 PM,  [ ] **This report has been created using voice recognition software. It may contain minor errors which are inherent in voice recognition technology. ** Final report electronically signed by Dr. Anibal Pena on 7/22/2019 2:36 PM    Ct Abdomen Pelvis W Iv Contrast    Result Date: 7/22/2019  PROCEDURE: CT ABDOMEN PELVIS W IV CONTRAST CLINICAL INFORMATION: weight changes, bloating, belching . COMPARISON: None. TECHNIQUE: 5 mm axial CT images were obtained through the abdomen and pelvis after the administration of intravenous and oral contrast. Coronal and sagittal reconstructions were obtained. All CT scans at this facility use dose modulation, iterative reconstruction, and/or weight-based dosing when appropriate to reduce radiation dose to as low as reasonably achievable. FINDINGS: Large left pleural effusion and multiple pulmonary nodules as described on dedicated chest CT. Lobulated, enlarged and enhancing adrenal glands 3.6 cm on the right and 4.7 cm on the left. There is an abnormal retroperitoneal 2.2 cm lymph node with central hypoattenuation possible necrosis.  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 Cholecystectomy. Moderate scattered stool in the colon with diverticulosis No bowel wall thickening or evidence for obstruction. Normal appendix. Partially decompressed bladder. Normal caliber abdominal aorta with atherosclerotic changes. No acute osseous findings. Degenerative changes greatest at L5-S1 with mild retrolisthesis and disc space narrowing. 1. 6.9 x 6.2 cm enhancing right renal mass concerning for malignancy. 2. Bilateral adrenal gland nodularity and an abnormal enhancement concerning for neoplasm. 3. 2.2 cm right retroperitoneal possibly partially necrotic lymph node. 4. Partially imaged is large left pleural effusion with mass effect on the spleen and left upper quadrant abdominal organs. Multiple pulmonary nodules concerning for metastatic disease. These results were communicated directly with Emani Miranda [facility] on 7/22/2019 2:42 PM,  [ ] **This report has been created using voice recognition software. It may contain minor errors which are inherent in voice recognition technology. ** Final report electronically signed by Dr. Nick Guan on 7/22/2019 2:42 PM    Us Thoracentesis    Result Date: 7/22/2019  PROCEDURE: US THORACENTESIS CLINICAL INFORMATION: left pleural effusion, SOB . COMPARISON: CT scan 7/22/2019 PROCEDURE: The benefits and the risk of the procedure were explained to the patient. The patient was given an opportunity to ask questions. Signed consent was obtained. Limited ultrasound exam of the left chest showed  large  amount of pleural fluid. Using ultrasound guidance, a site was marked on the skin. The left side of the posterior chest was prepped and draped using the usual sterile technique and the skin was anesthetized with 2 percent lidocaine. A 5 Maltese one-step catheter was introduced to the left pleural space. 1.8 L of blood tinged fluid drained. The catheter was then removed.  The patient tolerated the procedure well with no abnormality is identified. No evidence to suggest intracranial metastatic disease. 2. There is a stable appearance of a multiloculated cystic structure within the right cerebellar hemisphere which contains thin septations and appears to communicate with the fourth ventricle. This may correspond to a neuroenteric or neuroglial cyst versus a developmental malformation such as a enmanuel cisterna magna versus a complex arachnoid cyst. **This report has been created using voice recognition software. It may contain minor errors which are inherent in voice recognition technology. ** Final report electronically signed by Dr. Suzanna Rinne on 7/23/2019 12:01 PM    Xr Chest Pa Inspiration 1 Vw    Result Date: 7/23/2019  PROCEDURE: XR CHEST PA INSPIRATION 1 VW CLINICAL INFORMATION: left thoracentesis. COMPARISON: 7/23/2019 TECHNIQUE: AP upright view of the chest. FINDINGS: Severe collapse of the left lung persists. Centimeters lessened from recent CT. There is now a large interface between the pleura and the chest wall measuring up to 8.3 cm. There is a large air-fluid level within the pleural space on the left. Minimal midline shift toward the right. It is significantly improved from the previous CT 7/22/2019. Large ex vacuo hydropneumothorax on the left. Results were phoned to Dr. Chester Navarro at 1600 hours 7/23/2019. **This report has been created using voice recognition software. It may contain minor errors which are inherent in voice recognition technology. ** Final report electronically signed by Dr. Flor Rae on 7/23/2019 4:04 PM    Xr Chest Pa Inspiration 1 Vw    Result Date: 7/22/2019  PROCEDURE: XR CHEST PA INSPIRATION 1 VW CLINICAL INFORMATION: left thoracentesis. COMPARISON: 7/22/2019 at 1133 hours TECHNIQUE: AP upright view of the chest. FINDINGS: There is still near complete opacification of the left hemithorax. Shift of the midline toward the right has nearly completely resolved however.  There is no

## 2019-07-23 NOTE — CONSULTS
Inpatient consult to Urology  Consult performed by: CAITY Bardales - CNP  Consult ordered by: Annabel Cardenas DO            [address] [address]  Dept: [phone]  Loc: [phone]  Visit Date: 7/22/2019    Urology Consult Note    Reason for Consult:  Renal mass  Requesting Physician:  Annabel Cardenas DO    History Obtained From:  patient, electronic medical record    Chief Complaint: trouble breathing    HISTORY OF PRESENT ILLNESS:                The patient is a 46 y.o. male with significant past medical history as listed below who presented with shortness of breath that he first noticed around 2-3 weeks ago and became progressively worse. He was on a cruise vacation a week ago and states he \"just couldn't keep up\" with breathing. He states this is a new problem that was constant and denies any previous trouble prior to this. In the ER, a left thoracentesis was performed with reported 1.8 liters of fluid drained which he states helped relieve his shortness of breath. He is a rather healthy individual despite Type2 diabetes history. He states he lost 80 lbs over the last few years trying to lose weight and since that time his blood glucose levels have been running normal around 115-130. Urology was asked to come evaluate the patient for a new renal mass incidentally found on CTA chest.  CTA chest revealed large left pleural effusion with near complete atelectasis of the left lung with mass effect and narrowing of the left mainstem bronchus and pulmonary vessels. Additionally, Multiple peripheral predominantly pleural-based nodules in the left lung and multiple right lung pulmonary nodules were noted. Highly suspicious for malignancy.     Past Medical History:        Diagnosis Date    Chronic kidney disease, unspecified     IDDM (insulin dependent diabetes mellitus) ([facility] [address]     Sinusitis     Unspecified essential achievable.       FINDINGS:   Possible left thyroid lobe nodule. There is a large left pleural effusion occupying nearly the entire left hemithorax. There are multiple nodular densities along the pleura and mediastinum with narrowing and near complete occlusion of the left bronchus    as well as pulmonary arterial vessels. There is extremely tight atelectasis of the left lung. Multiple right lower lobe nodules ranging from 3 mm to 1.4 cm concerning for malignancy. Upper abdominal images demonstrate enhancement and nodularity of the both adrenal glands. Fatty infiltration of the liver. Please see dedicated CT abdomen pelvis. No acute osseous findings. There is no cardiomegaly or pericardial effusion. There is shift of mediastinal structures towards the right due to large left pleural effusion.               Impression           1. Large left pleural effusion with near complete atelectasis of the left lung with mass effect and narrowing of the left mainstem bronchus and pulmonary vessels. 2. No central pulmonary embolus. 3. Multiple peripheral predominantly pleural-based nodules in the left lung and multiple right lung pulmonary nodules. Highly suspicious for malignancy. Further evaluation is advised. Enlarged adrenal glands bilaterally with nodularity and enhancement.       These results were communicated directly with FIDE Tapia on 7/22/2019 2:35 PM,  [ ]   **This report has been created using voice recognition software. It may contain minor errors which are inherent in voice recognition technology. **       Final report electronically signed by Dr. Celine Councilman on 7/22/2019 2:36 PM       IMPRESSION:     Right renal mass  Adrenal gland nodule  ?  Metastatic disease with lymph involvement  Bilateral lung nodules    Plan:      Further imaging for suspected malignant neoplasm and possible staging:  - Bone scan, whole body  - MRI brain w/contrast    Urine cytology to assess possible urothelial

## 2019-07-23 NOTE — DISCHARGE SUMMARY
Hospitalist Discharge Summary     Patient Identification:  Ashanti Molina  : 1970  MRN: [id number]   Account: [de-identified]     Admit date: 2019  Discharge date: 19  Attending provider: No att. providers found        Primary care provider: CAITY Kincaid - CNP     Discharge Diagnoses:   1. Dyspnea -- due to #2,3  2. Large exudative left pleural effusion suspect malignant  3. Multiple bilateral pleuro-parenchymal  soft tissue lung nodules  4. 6.9 x 6.2 cm right renal mass - concerning for malignancy  5. Large ex vacuo hydropneumothorax on the left s/p second thoracentesis on 2019  6. Horseshoe kidney  7. leukocytosis  8. Microscopic hematuria  9. Lobulated, enlarged and enhancing adrenal glands 3.6 cm on the right and 4.7 cm on the left. 10. 2.2 cm retroperitoneal lymph node with possible necrosis  11. Hyperbilirubinemia  12. Type 2 DM, uncontrolled  13. Essential hypertension  14. Diverticulosis  15. Complex cerebellar cystic lesion     Hospital Course: Ashanti Molina is a 46 y.o. male admitted to [facility] on 2019 for sob - found to have large left pleural effusion. See H&P by Rebecca Diehl DO on 19 for admitting details. Rebekah Shepherd had an outpatient chest x-ray done for progressive shortness of breath by his PCP. Chest x-ray showed a whiteout of the left lung thus was sent in for further evaluation. In ER he had CTA of the chest 19 which showed large left pleural effusion with near complete atelectasis of the left lung with mass-effect and narrowing of the left mainstem bronchus and pulmonary vessels, no PE, multiple peripheral predominantly pleural-based nodules in the left lung and multiple right lung pulmonary nodules highly suspicious for malignancy.   He also had a CT of the abdomen and pelvis 2019 which showed a 6.9 x 6.2 cm enhancing right renal mass concerning for malignancy, bilateral adrenal gland nodularity and 1613    General appearance - alert, well appearing, and in no distress and oriented to person, place, and time, non-toxic  Chest -diminished in the left base to mid with air movement to apex, right diminished in right base only, no wheeze, rales, rhonchi  Heart - slightly tachy, regular, no m/r/g  Abdomen - soft, nontender, nondistended, no masses or organomegaly  bowel sounds normal  Obese: No; Protuberant: No   Neurological - alert, oriented, normal speech, no focal findings or movement disorder noted, cranial nerves II through XII intact  Extremities - peripheral pulses normal, no pedal edema, no clubbing or cyanosis  Skin - normal coloration and turgor, no rashes, no suspicious skin lesions noted    Significant Diagnostics:   Radiology:   US THORACENTESIS   Final Result      Ultrasound-guided thoracentesis with  2.6 L of fluid drained. **This report has been created using voice recognition software. It may contain minor errors which are inherent in voice recognition technology. **      Final report electronically signed by Dr. Perri Rosa on 7/23/2019 4:30 PM      XR CHEST PA INSPIRATION 1 VW   Final Result   Large ex vacuo hydropneumothorax on the left. Results were phoned to Dr. Vamsi Goodwin at 1600 hours 7/23/2019. **This report has been created using voice recognition software. It may contain minor errors which are inherent in voice recognition technology. **      Final report electronically signed by Dr. Perri Rosa on 7/23/2019 4:04 PM      MRI C/ [address]   Final Result       1. No acute intracranial abnormality is identified. No evidence to suggest intracranial metastatic disease. 2. There is a stable appearance of a multiloculated cystic structure within the right cerebellar hemisphere which contains thin septations and appears to communicate with the fourth ventricle.  This may correspond to a neuroenteric or neuroglial cyst    versus a developmental malformation such 139 07/23/2019    K 4.6 07/23/2019     07/23/2019    CO2 25 07/23/2019    BUN 14 07/23/2019    CREATININE 1.0 07/23/2019    GLUCOSE 126 07/23/2019    CALCIUM 8.8 07/23/2019      Lab Results   Component Value Date    ALT 36 07/23/2019    AST 14 07/23/2019    ALKPHOS 56 07/23/2019    BILITOT 1.2 07/23/2019     Lab Results   Component Value Date    WBC 9.1 07/23/2019    HGB 13.1 (L) 07/23/2019    HCT 40.4 (L) 07/23/2019    MCV 92.7 07/23/2019     07/23/2019    LABLYMP 16.8 07/23/2019    RBC 4.36 (L) 07/23/2019    MCH 30.0 07/23/2019    MCHC 32.4 07/23/2019    RDW 13.7 02/02/2015               Discharge condition: stable  Disposition: [facility]  Time spent on discharge: 60 min spent coordinating care, evaluation of pt          Electronically signed by Shad Jackson MD on 7/23/2019 at 1:13 PM

## 2019-07-24 NOTE — PROGRESS NOTES
RN attempted to call [facility] for report on patient. RN spoke with Corrinne Hue in attempt to give report to [facility]. RN left number for them to call back.

## 2019-07-27 LAB
ANAEROBIC CULTURE: NORMAL
BODY FLUID CULTURE, STERILE: NORMAL
GRAM STAIN RESULT: NORMAL

## 2019-08-04 PROBLEM — J94.8 HYDROPNEUMOTHORAX: Status: ACTIVE | Noted: 2019-07-22

## 2019-08-10 ENCOUNTER — HOSPITAL ENCOUNTER (EMERGENCY)
Age: 49
Discharge: HOME | End: 2019-08-10
Attending: FAMILY MEDICINE
Payer: COMMERCIAL

## 2019-08-10 ENCOUNTER — APPOINTMENT (OUTPATIENT)
Dept: GENERAL RADIOLOGY | Age: 49
End: 2019-08-10
Payer: COMMERCIAL

## 2019-08-10 ENCOUNTER — APPOINTMENT (OUTPATIENT)
Dept: CT IMAGING | Age: 49
End: 2019-08-10
Payer: COMMERCIAL

## 2019-08-10 VITALS
WEIGHT: 224 LBS | HEIGHT: 73 IN | BODY MASS INDEX: 29.69 KG/M2 | TEMPERATURE: 98.2 F | OXYGEN SATURATION: 96 % | HEART RATE: 71 BPM | DIASTOLIC BLOOD PRESSURE: 82 MMHG | RESPIRATION RATE: 18 BRPM | SYSTOLIC BLOOD PRESSURE: 122 MMHG

## 2019-08-10 DIAGNOSIS — J90 PLEURAL EFFUSION, LEFT: Primary | ICD-10-CM

## 2019-08-10 LAB
ANION GAP SERPL CALCULATED.3IONS-SCNC: 14 MEQ/L (ref 8–16)
BASOPHILS # BLD: 0.8 %
BASOPHILS ABSOLUTE: 0.1 THOU/MM3 (ref 0–0.1)
BUN BLDV-MCNC: 13 MG/DL (ref 7–22)
CALCIUM SERPL-MCNC: 9.5 MG/DL (ref 8.5–10.5)
CHLORIDE BLD-SCNC: 99 MEQ/L (ref 98–111)
CO2: 26 MEQ/L (ref 23–33)
CREAT SERPL-MCNC: 0.9 MG/DL (ref 0.4–1.2)
EOSINOPHIL # BLD: 3.9 %
EOSINOPHILS ABSOLUTE: 0.3 THOU/MM3 (ref 0–0.4)
ERYTHROCYTE [DISTWIDTH] IN BLOOD BY AUTOMATED COUNT: 12.5 % (ref 11.5–14.5)
ERYTHROCYTE [DISTWIDTH] IN BLOOD BY AUTOMATED COUNT: 40.9 FL (ref 35–45)
GFR SERPL CREATININE-BSD FRML MDRD: 89 ML/MIN/1.73M2
GLUCOSE BLD-MCNC: 88 MG/DL (ref 70–108)
HCT VFR BLD CALC: 34.8 % (ref 42–52)
HEMOGLOBIN: 11.5 GM/DL (ref 14–18)
IMMATURE GRANS (ABS): 0.05 THOU/MM3 (ref 0–0.07)
IMMATURE GRANULOCYTES: 0.7 %
LYMPHOCYTES # BLD: 23.1 %
LYMPHOCYTES ABSOLUTE: 1.7 THOU/MM3 (ref 1–4.8)
MCH RBC QN AUTO: 29.4 PG (ref 26–33)
MCHC RBC AUTO-ENTMCNC: 33 GM/DL (ref 32.2–35.5)
MCV RBC AUTO: 89 FL (ref 80–94)
MONOCYTES # BLD: 13 %
MONOCYTES ABSOLUTE: 1 THOU/MM3 (ref 0.4–1.3)
NUCLEATED RED BLOOD CELLS: 0 /100 WBC
OSMOLALITY CALCULATION: 277.1 MOSMOL/KG (ref 275–300)
PLATELET # BLD: 351 THOU/MM3 (ref 130–400)
PMV BLD AUTO: 9.7 FL (ref 9.4–12.4)
POTASSIUM SERPL-SCNC: 4.4 MEQ/L (ref 3.5–5.2)
RBC # BLD: 3.91 MILL/MM3 (ref 4.7–6.1)
SEG NEUTROPHILS: 58.5 %
SEGMENTED NEUTROPHILS ABSOLUTE COUNT: 4.4 THOU/MM3 (ref 1.8–7.7)
SODIUM BLD-SCNC: 139 MEQ/L (ref 135–145)
WBC # BLD: 7.5 THOU/MM3 (ref 4.8–10.8)

## 2019-08-10 PROCEDURE — 36415 COLL VENOUS BLD VENIPUNCTURE: CPT

## 2019-08-10 PROCEDURE — 99284 EMERGENCY DEPT VISIT MOD MDM: CPT

## 2019-08-10 PROCEDURE — 6360000004 HC RX CONTRAST MEDICATION: Performed by: FAMILY MEDICINE

## 2019-08-10 PROCEDURE — 71046 X-RAY EXAM CHEST 2 VIEWS: CPT

## 2019-08-10 PROCEDURE — 80048 BASIC METABOLIC PNL TOTAL CA: CPT

## 2019-08-10 PROCEDURE — 71260 CT THORAX DX C+: CPT

## 2019-08-10 PROCEDURE — 2709999900 HC NON-CHARGEABLE SUPPLY

## 2019-08-10 PROCEDURE — 85025 COMPLETE CBC W/AUTO DIFF WBC: CPT

## 2019-08-10 RX ADMIN — IOPAMIDOL 80 ML: 755 INJECTION, SOLUTION INTRAVENOUS at 15:52

## 2019-08-10 ASSESSMENT — ENCOUNTER SYMPTOMS
VOICE CHANGE: 0
CHEST TIGHTNESS: 0
SHORTNESS OF BREATH: 1

## 2019-08-10 NOTE — ED PROVIDER NOTES
[facility]  eMERGENCY dEPARTMENT eNCOUnter          CHIEF COMPLAINT       Chief Complaint   Patient presents with    Other     pleurex tube clogged       Nurses Notes reviewed and I agree except as noted in the HPI. HISTORY OF PRESENT ILLNESS    Kolton Gonzalez is a 46 y.o. male who presents to the Emergency Department for the evaluation of pleurX catheter complications. Patient was admitted to [address] [place] [address] for shortness of breath on 07/22/2019 to 07/23/2019. Patient had a CTA of the chest 7/22/19 which showed large left pleural effusion with near complete atelectasis of the left lung with mass-effect and narrowing of the left mainstem bronchus and pulmonary vessels, no PE, multiple peripheral predominantly pleural-based nodules in the left lung and multiple right lung pulmonary nodules highly suspicious for malignancy. He also had a CT of the abdomen and pelvis 7/22/2019 which showed a 6.9 x 6.2 cm enhancing right renal mass concerning for malignancy, bilateral adrenal gland nodularity and abnormal enhancement concerning for neoplasm, 2.2 cm right retroperitoneal partially necrotic lymph node. Patient had a thoracentesis of the left pleural effusion on 07/22 which removed 1.8L of blood tinged fluid. He had another thoracentesis on 07/23 that removed 2.6L of fluid. A chest x-ray then showed a large hydropneumothorax on the left. Patient was sent to the [facility] where he had a biopsy and pleurX catheter placed. Patient reports that he drains the catheter every three days and has been getting 11 units of fluid. He called the [facility] who told him that he should be draining more fluid and due to the raspy sound of his voice and persistent shortness of breath sent him to the department. The [facility] advised the patient to get a chest x-ray before draining the catheter again. He has not drained the catheter yesterday or today. Patient denies pain or other symptoms.  He has a past oncologist/cardiothoracic clinic. I discussed all lab work and imaging results findings with the patient in addition to plan of care. Patient was comfortable with discharge home. He will attempt to drain the pleural fluid at home. He was also advised to monitor for fever, chills, worsening shortness of breath to come back immediately to the emergency. Patient verbalized understanding agrees to plan of care and was discharged home in stable condition to follow-up in few days with [facility] outpatient clinic. CRITICAL CARE:   none     CONSULTS:  Dr. Dorina Goodell:  none     FINAL IMPRESSION      1. Pleural effusion, left          DISPOSITION/PLAN   discharge    PATIENT REFERRED TO:  CAITY Banerjee - CNP  [address] [id number] [phone]    Schedule an appointment as soon as possible for a visit in 3 days        DISCHARGE MEDICATIONS:  Discharge Medication List as of 8/10/2019  5:04 PM          (Please note that portions of this note were completed with a voice recognition program.  Efforts were made to edit the dictations but occasionally words are mis-transcribed.)    Scribe:  Preston Andrea 8/10/19 1:24 PM Scribing for and in the presence of Letty Abbasi MD.    Signed by: Bill Corona, 08/10/19 6:32 PM    Provider:  I personally performed the services described in the documentation, reviewed and edited the documentation which was dictated to the scribe in my presence, and it accurately records my words and actions.     Letty Abbasi MD 8/10/19 6:32 PM        Letty Abbasi MD  08/10/19 [id number]

## 2019-08-10 NOTE — ED NOTES
Dr. Camryn Luo at bedside for update on chest xray results and POC.       Yeimy Whitaker, RN  08/10/19 [id number]

## 2019-08-26 LAB
FUNGUS IDENTIFIED: NORMAL
FUNGUS SMEAR: NORMAL

## 2019-08-29 ENCOUNTER — APPOINTMENT (OUTPATIENT)
Dept: GENERAL RADIOLOGY | Age: 49
End: 2019-08-29
Payer: COMMERCIAL

## 2019-08-29 ENCOUNTER — HOSPITAL ENCOUNTER (EMERGENCY)
Age: 49
Discharge: HOME | End: 2019-08-29
Attending: EMERGENCY MEDICINE
Payer: COMMERCIAL

## 2019-08-29 VITALS
BODY MASS INDEX: 27.09 KG/M2 | OXYGEN SATURATION: 96 % | HEIGHT: 72 IN | SYSTOLIC BLOOD PRESSURE: 149 MMHG | WEIGHT: 200 LBS | HEART RATE: 92 BPM | RESPIRATION RATE: 18 BRPM | DIASTOLIC BLOOD PRESSURE: 101 MMHG | TEMPERATURE: 98 F

## 2019-08-29 DIAGNOSIS — M10.00 IDIOPATHIC GOUT, UNSPECIFIED CHRONICITY, UNSPECIFIED SITE: Primary | ICD-10-CM

## 2019-08-29 LAB
ALBUMIN SERPL-MCNC: 3.8 G/DL (ref 3.5–5.1)
ALP BLD-CCNC: 73 U/L (ref 38–126)
ALT SERPL-CCNC: 16 U/L (ref 11–66)
ANION GAP SERPL CALCULATED.3IONS-SCNC: 16 MEQ/L (ref 8–16)
AST SERPL-CCNC: 24 U/L (ref 5–40)
BASOPHILS # BLD: 0.5 %
BASOPHILS ABSOLUTE: 0 THOU/MM3 (ref 0–0.1)
BILIRUB SERPL-MCNC: 0.4 MG/DL (ref 0.3–1.2)
BILIRUBIN DIRECT: < 0.2 MG/DL (ref 0–0.3)
BUN BLDV-MCNC: 15 MG/DL (ref 7–22)
CALCIUM SERPL-MCNC: 10 MG/DL (ref 8.5–10.5)
CHLORIDE BLD-SCNC: 100 MEQ/L (ref 98–111)
CO2: 22 MEQ/L (ref 23–33)
CREAT SERPL-MCNC: 0.9 MG/DL (ref 0.4–1.2)
EOSINOPHIL # BLD: 1.3 %
EOSINOPHILS ABSOLUTE: 0.1 THOU/MM3 (ref 0–0.4)
ERYTHROCYTE [DISTWIDTH] IN BLOOD BY AUTOMATED COUNT: 12.7 % (ref 11.5–14.5)
ERYTHROCYTE [DISTWIDTH] IN BLOOD BY AUTOMATED COUNT: 38.5 FL (ref 35–45)
GFR SERPL CREATININE-BSD FRML MDRD: 89 ML/MIN/1.73M2
GLUCOSE BLD-MCNC: 121 MG/DL (ref 70–108)
HCT VFR BLD CALC: 35.5 % (ref 42–52)
HEMOGLOBIN: 11.6 GM/DL (ref 14–18)
IMMATURE GRANS (ABS): 0.02 THOU/MM3 (ref 0–0.07)
IMMATURE GRANULOCYTES: 0 %
LIPASE: 30.4 U/L (ref 5.6–51.3)
LYMPHOCYTES # BLD: 14 %
LYMPHOCYTES ABSOLUTE: 1.2 THOU/MM3 (ref 1–4.8)
MAGNESIUM: 1.5 MG/DL (ref 1.6–2.4)
MCH RBC QN AUTO: 27.7 PG (ref 26–33)
MCHC RBC AUTO-ENTMCNC: 32.7 GM/DL (ref 32.2–35.5)
MCV RBC AUTO: 84.7 FL (ref 80–94)
MONOCYTES # BLD: 9.2 %
MONOCYTES ABSOLUTE: 0.8 THOU/MM3 (ref 0.4–1.3)
NUCLEATED RED BLOOD CELLS: 0 /100 WBC
OSMOLALITY CALCULATION: 277.8 MOSMOL/KG (ref 275–300)
PLATELET # BLD: 343 THOU/MM3 (ref 130–400)
PMV BLD AUTO: 9.9 FL (ref 9.4–12.4)
POTASSIUM SERPL-SCNC: 4.2 MEQ/L (ref 3.5–5.2)
RBC # BLD: 4.19 MILL/MM3 (ref 4.7–6.1)
SEG NEUTROPHILS: 74.8 %
SEGMENTED NEUTROPHILS ABSOLUTE COUNT: 6.2 THOU/MM3 (ref 1.8–7.7)
SODIUM BLD-SCNC: 138 MEQ/L (ref 135–145)
TOTAL PROTEIN: 7.7 G/DL (ref 6.1–8)
TROPONIN T: < 0.01 NG/ML
URIC ACID: 7.8 MG/DL (ref 3.7–7)
WBC # BLD: 8.3 THOU/MM3 (ref 4.8–10.8)

## 2019-08-29 PROCEDURE — 83690 ASSAY OF LIPASE: CPT

## 2019-08-29 PROCEDURE — 85025 COMPLETE CBC W/AUTO DIFF WBC: CPT

## 2019-08-29 PROCEDURE — 99284 EMERGENCY DEPT VISIT MOD MDM: CPT

## 2019-08-29 PROCEDURE — 83735 ASSAY OF MAGNESIUM: CPT

## 2019-08-29 PROCEDURE — 6360000002 HC RX W HCPCS: Performed by: EMERGENCY MEDICINE

## 2019-08-29 PROCEDURE — 82248 BILIRUBIN DIRECT: CPT

## 2019-08-29 PROCEDURE — 96374 THER/PROPH/DIAG INJ IV PUSH: CPT

## 2019-08-29 PROCEDURE — 84484 ASSAY OF TROPONIN QUANT: CPT

## 2019-08-29 PROCEDURE — 84550 ASSAY OF BLOOD/URIC ACID: CPT

## 2019-08-29 PROCEDURE — 6370000000 HC RX 637 (ALT 250 FOR IP): Performed by: EMERGENCY MEDICINE

## 2019-08-29 PROCEDURE — 80053 COMPREHEN METABOLIC PANEL: CPT

## 2019-08-29 PROCEDURE — 36415 COLL VENOUS BLD VENIPUNCTURE: CPT

## 2019-08-29 PROCEDURE — 73564 X-RAY EXAM KNEE 4 OR MORE: CPT

## 2019-08-29 RX ORDER — HYDROCODONE BITARTRATE AND ACETAMINOPHEN 5; 325 MG/1; MG/1
1 TABLET ORAL EVERY 6 HOURS PRN
Qty: 12 TABLET | Refills: 0 | Status: SHIPPED | OUTPATIENT
Start: 2019-08-29 | End: 2019-09-01

## 2019-08-29 RX ORDER — COLCHICINE 0.6 MG/1
1.2 TABLET ORAL ONCE
Status: COMPLETED | OUTPATIENT
Start: 2019-08-29 | End: 2019-08-29

## 2019-08-29 RX ORDER — OXYCODONE HYDROCHLORIDE AND ACETAMINOPHEN 5; 325 MG/1; MG/1
1 TABLET ORAL ONCE
Status: COMPLETED | OUTPATIENT
Start: 2019-08-29 | End: 2019-08-29

## 2019-08-29 RX ORDER — PREDNISONE 10 MG/1
20 TABLET ORAL DAILY
Qty: 20 TABLET | Refills: 0 | Status: SHIPPED | OUTPATIENT
Start: 2019-08-29 | End: 2019-09-08

## 2019-08-29 RX ORDER — COLCHICINE 0.6 MG/1
0.6 TABLET ORAL DAILY
Qty: 3 TABLET | Refills: 0 | Status: ON HOLD | OUTPATIENT
Start: 2019-08-29 | End: 2021-01-01 | Stop reason: HOSPADM

## 2019-08-29 RX ADMIN — OXYCODONE HYDROCHLORIDE AND ACETAMINOPHEN 1 TABLET: 5; 325 TABLET ORAL at 04:33

## 2019-08-29 RX ADMIN — COLCHICINE 1.2 MG: 0.6 TABLET, FILM COATED ORAL at 03:46

## 2019-08-29 RX ADMIN — HYDROMORPHONE HYDROCHLORIDE 1 MG: 1 INJECTION, SOLUTION INTRAMUSCULAR; INTRAVENOUS; SUBCUTANEOUS at 03:42

## 2019-08-29 ASSESSMENT — ENCOUNTER SYMPTOMS
BACK PAIN: 0
ABDOMINAL PAIN: 0
RHINORRHEA: 0
VOICE CHANGE: 0
CHEST TIGHTNESS: 0
EYE PAIN: 0
BLOOD IN STOOL: 0
WHEEZING: 0
NAUSEA: 0
SINUS PRESSURE: 0
CONSTIPATION: 0
TROUBLE SWALLOWING: 0
PHOTOPHOBIA: 0
COUGH: 0
EYE REDNESS: 0
VOMITING: 0
ABDOMINAL DISTENTION: 0
EYE ITCHING: 0
CHOKING: 0
EYE DISCHARGE: 0
DIARRHEA: 0
SORE THROAT: 0
SHORTNESS OF BREATH: 0

## 2019-08-29 ASSESSMENT — PAIN DESCRIPTION - ORIENTATION
ORIENTATION: LEFT
ORIENTATION: LEFT

## 2019-08-29 ASSESSMENT — PAIN DESCRIPTION - LOCATION
LOCATION: KNEE
LOCATION: KNEE

## 2019-08-29 ASSESSMENT — PAIN SCALES - GENERAL
PAINLEVEL_OUTOF10: 7
PAINLEVEL_OUTOF10: 5
PAINLEVEL_OUTOF10: 7
PAINLEVEL_OUTOF10: 1

## 2019-08-29 ASSESSMENT — PAIN DESCRIPTION - DESCRIPTORS: DESCRIPTORS: ACHING

## 2019-08-29 ASSESSMENT — PAIN DESCRIPTION - PAIN TYPE
TYPE: ACUTE PAIN
TYPE: ACUTE PAIN

## 2019-09-09 LAB — AFB CULTURE & SMEAR: NORMAL

## 2020-01-01 ENCOUNTER — HOSPITAL ENCOUNTER (OUTPATIENT)
Dept: CT IMAGING | Age: 50
Discharge: HOME | End: 2020-12-11
Payer: COMMERCIAL

## 2020-01-01 ENCOUNTER — HOSPITAL ENCOUNTER (OUTPATIENT)
Dept: CT IMAGING | Age: 50
Discharge: HOME | End: 2020-09-22
Payer: COMMERCIAL

## 2020-01-01 ENCOUNTER — HOSPITAL ENCOUNTER (OUTPATIENT)
Dept: CT IMAGING | Age: 50
Discharge: HOME | End: 2020-06-11
Payer: COMMERCIAL

## 2020-01-01 PROCEDURE — 74160 CT ABDOMEN W/CONTRAST: CPT

## 2020-01-01 PROCEDURE — 71260 CT THORAX DX C+: CPT

## 2020-01-01 PROCEDURE — 6360000004 HC RX CONTRAST MEDICATION: Performed by: SPECIALIST

## 2020-01-01 RX ADMIN — IOHEXOL 50 ML: 240 INJECTION, SOLUTION INTRATHECAL; INTRAVASCULAR; INTRAVENOUS; ORAL at 07:30

## 2020-01-01 RX ADMIN — IOPAMIDOL 85 ML: 755 INJECTION, SOLUTION INTRAVENOUS at 07:30

## 2020-01-01 RX ADMIN — IOPAMIDOL 85 ML: 755 INJECTION, SOLUTION INTRAVENOUS at 07:55

## 2020-01-01 RX ADMIN — IOPAMIDOL 85 ML: 755 INJECTION, SOLUTION INTRAVENOUS at 07:42

## 2020-01-01 RX ADMIN — IOHEXOL 50 ML: 240 INJECTION, SOLUTION INTRATHECAL; INTRAVASCULAR; INTRAVENOUS; ORAL at 07:42

## 2020-01-01 RX ADMIN — IOHEXOL 50 ML: 240 INJECTION, SOLUTION INTRATHECAL; INTRAVASCULAR; INTRAVENOUS; ORAL at 07:55

## 2020-02-28 ENCOUNTER — HOSPITAL ENCOUNTER (OUTPATIENT)
Dept: CT IMAGING | Age: 50
Discharge: HOME | End: 2020-02-28
Payer: COMMERCIAL

## 2020-02-28 PROCEDURE — 71260 CT THORAX DX C+: CPT

## 2020-02-28 PROCEDURE — 6360000004 HC RX CONTRAST MEDICATION: Performed by: SPECIALIST

## 2020-02-28 RX ADMIN — IOPAMIDOL 85 ML: 755 INJECTION, SOLUTION INTRAVENOUS at 14:28

## 2021-01-01 ENCOUNTER — APPOINTMENT (OUTPATIENT)
Dept: GENERAL RADIOLOGY | Age: 51
DRG: 871 | End: 2021-01-01
Payer: COMMERCIAL

## 2021-01-01 ENCOUNTER — HOSPITAL ENCOUNTER (INPATIENT)
Age: 51
LOS: 15 days | Discharge: HOSPICE | DRG: 374 | End: 2021-05-24
Attending: INTERNAL MEDICINE | Admitting: INTERNAL MEDICINE
Payer: COMMERCIAL

## 2021-01-01 ENCOUNTER — APPOINTMENT (OUTPATIENT)
Dept: CT IMAGING | Age: 51
DRG: 374 | End: 2021-01-01
Payer: COMMERCIAL

## 2021-01-01 ENCOUNTER — APPOINTMENT (OUTPATIENT)
Dept: ULTRASOUND IMAGING | Age: 51
DRG: 374 | End: 2021-01-01
Payer: COMMERCIAL

## 2021-01-01 ENCOUNTER — APPOINTMENT (OUTPATIENT)
Dept: CT IMAGING | Age: 51
End: 2021-01-01
Payer: COMMERCIAL

## 2021-01-01 ENCOUNTER — APPOINTMENT (OUTPATIENT)
Dept: GENERAL RADIOLOGY | Age: 51
DRG: 374 | End: 2021-01-01
Payer: COMMERCIAL

## 2021-01-01 ENCOUNTER — APPOINTMENT (OUTPATIENT)
Dept: ULTRASOUND IMAGING | Age: 51
DRG: 871 | End: 2021-01-01
Payer: COMMERCIAL

## 2021-01-01 ENCOUNTER — APPOINTMENT (OUTPATIENT)
Dept: CT IMAGING | Age: 51
DRG: 871 | End: 2021-01-01
Payer: COMMERCIAL

## 2021-01-01 ENCOUNTER — HOSPITAL ENCOUNTER (EMERGENCY)
Age: 51
Discharge: HOME | End: 2021-03-23
Payer: COMMERCIAL

## 2021-01-01 ENCOUNTER — HOSPITAL ENCOUNTER (OUTPATIENT)
Dept: CT IMAGING | Age: 51
Discharge: HOME | End: 2021-02-01
Payer: COMMERCIAL

## 2021-01-01 ENCOUNTER — HOSPITAL ENCOUNTER (OUTPATIENT)
Age: 51
Setting detail: SPECIMEN
Discharge: HOME | End: 2021-01-20
Payer: COMMERCIAL

## 2021-01-01 ENCOUNTER — HOSPITAL ENCOUNTER (INPATIENT)
Age: 51
LOS: 1 days | DRG: 951 | End: 2021-05-25
Attending: INTERNAL MEDICINE | Admitting: INTERNAL MEDICINE
Payer: COMMERCIAL

## 2021-01-01 ENCOUNTER — APPOINTMENT (OUTPATIENT)
Dept: GENERAL RADIOLOGY | Age: 51
End: 2021-01-01
Payer: COMMERCIAL

## 2021-01-01 ENCOUNTER — HOSPITAL ENCOUNTER (EMERGENCY)
Age: 51
Discharge: HOME | End: 2021-02-15
Attending: EMERGENCY MEDICINE
Payer: COMMERCIAL

## 2021-01-01 ENCOUNTER — CARE COORDINATION (OUTPATIENT)
Dept: CASE MANAGEMENT | Age: 51
End: 2021-01-01

## 2021-01-01 ENCOUNTER — APPOINTMENT (OUTPATIENT)
Dept: INTERVENTIONAL RADIOLOGY/VASCULAR | Age: 51
DRG: 871 | End: 2021-01-01
Payer: COMMERCIAL

## 2021-01-01 ENCOUNTER — APPOINTMENT (OUTPATIENT)
Dept: INTERVENTIONAL RADIOLOGY/VASCULAR | Age: 51
DRG: 374 | End: 2021-01-01
Payer: COMMERCIAL

## 2021-01-01 ENCOUNTER — HOSPITAL ENCOUNTER (INPATIENT)
Age: 51
LOS: 11 days | Discharge: HOME | DRG: 871 | End: 2021-04-24
Attending: FAMILY MEDICINE | Admitting: INTERNAL MEDICINE
Payer: COMMERCIAL

## 2021-01-01 VITALS
HEART RATE: 98 BPM | SYSTOLIC BLOOD PRESSURE: 115 MMHG | HEIGHT: 72 IN | RESPIRATION RATE: 17 BRPM | WEIGHT: 203.1 LBS | OXYGEN SATURATION: 98 % | BODY MASS INDEX: 27.51 KG/M2 | TEMPERATURE: 97.6 F | DIASTOLIC BLOOD PRESSURE: 76 MMHG

## 2021-01-01 VITALS
HEIGHT: 72 IN | HEART RATE: 81 BPM | OXYGEN SATURATION: 99 % | SYSTOLIC BLOOD PRESSURE: 94 MMHG | RESPIRATION RATE: 18 BRPM | TEMPERATURE: 98.1 F | DIASTOLIC BLOOD PRESSURE: 49 MMHG | WEIGHT: 202 LBS | BODY MASS INDEX: 27.36 KG/M2

## 2021-01-01 VITALS — RESPIRATION RATE: 12 BRPM | SYSTOLIC BLOOD PRESSURE: 33 MMHG | HEART RATE: 50 BPM | DIASTOLIC BLOOD PRESSURE: 17 MMHG

## 2021-01-01 VITALS
RESPIRATION RATE: 16 BRPM | SYSTOLIC BLOOD PRESSURE: 101 MMHG | BODY MASS INDEX: 26.06 KG/M2 | OXYGEN SATURATION: 99 % | DIASTOLIC BLOOD PRESSURE: 74 MMHG | HEART RATE: 95 BPM | HEIGHT: 72 IN | TEMPERATURE: 97.3 F | WEIGHT: 192.4 LBS

## 2021-01-01 VITALS
BODY MASS INDEX: 26.51 KG/M2 | HEART RATE: 106 BPM | OXYGEN SATURATION: 96 % | DIASTOLIC BLOOD PRESSURE: 91 MMHG | WEIGHT: 200 LBS | HEIGHT: 73 IN | RESPIRATION RATE: 26 BRPM | TEMPERATURE: 97.9 F | SYSTOLIC BLOOD PRESSURE: 132 MMHG

## 2021-01-01 DIAGNOSIS — C64.1 RENAL CELL CARCINOMA OF RIGHT KIDNEY METASTATIC TO OTHER SITE (HCC): ICD-10-CM

## 2021-01-01 DIAGNOSIS — R77.8 ELEVATED TROPONIN: ICD-10-CM

## 2021-01-01 DIAGNOSIS — R06.00 DYSPNEA AND RESPIRATORY ABNORMALITIES: ICD-10-CM

## 2021-01-01 DIAGNOSIS — R05.9 COUGH: ICD-10-CM

## 2021-01-01 DIAGNOSIS — J18.9 PNEUMONIA DUE TO ORGANISM: Primary | ICD-10-CM

## 2021-01-01 DIAGNOSIS — R06.89 DYSPNEA AND RESPIRATORY ABNORMALITIES: ICD-10-CM

## 2021-01-01 DIAGNOSIS — R10.9 RIGHT FLANK PAIN: Primary | ICD-10-CM

## 2021-01-01 DIAGNOSIS — J90 RECURRENT RIGHT PLEURAL EFFUSION: ICD-10-CM

## 2021-01-01 DIAGNOSIS — R09.02 HYPOXIA: ICD-10-CM

## 2021-01-01 DIAGNOSIS — D64.9 ANEMIA, UNSPECIFIED TYPE: ICD-10-CM

## 2021-01-01 DIAGNOSIS — C64.1 RENAL CANCER, RIGHT (HCC): ICD-10-CM

## 2021-01-01 DIAGNOSIS — C64.9 RENAL CELL CANCER, UNSPECIFIED LATERALITY (HCC): ICD-10-CM

## 2021-01-01 DIAGNOSIS — I10 ESSENTIAL HYPERTENSION: Primary | ICD-10-CM

## 2021-01-01 DIAGNOSIS — N18.9 CHRONIC KIDNEY DISEASE, UNSPECIFIED CKD STAGE: Primary | ICD-10-CM

## 2021-01-01 DIAGNOSIS — E87.5 HYPERKALEMIA: ICD-10-CM

## 2021-01-01 DIAGNOSIS — J90 PLEURAL EFFUSION ON RIGHT: Primary | ICD-10-CM

## 2021-01-01 LAB
ABO: NORMAL
ALBUMIN FLUID: 1.4 GM/DL
ALBUMIN SERPL-MCNC: 2.2 G/DL (ref 3.5–5.1)
ALBUMIN SERPL-MCNC: 2.8 G/DL (ref 3.5–5.1)
ALBUMIN SERPL-MCNC: 2.8 G/DL (ref 3.5–5.1)
ALBUMIN SERPL-MCNC: 3.1 G/DL (ref 3.5–5.1)
ALBUMIN SERPL-MCNC: 3.4 G/DL (ref 3.5–5.1)
ALBUMIN SERPL-MCNC: 3.9 G/DL (ref 3.5–5.1)
ALLEN TEST: POSITIVE
ALLEN TEST: POSITIVE
ALP BLD-CCNC: 44 U/L (ref 38–126)
ALP BLD-CCNC: 62 U/L (ref 38–126)
ALP BLD-CCNC: 71 U/L (ref 38–126)
ALP BLD-CCNC: 81 U/L (ref 38–126)
ALP BLD-CCNC: 85 U/L (ref 38–126)
ALT SERPL-CCNC: 13 U/L (ref 11–66)
ALT SERPL-CCNC: 14 U/L (ref 11–66)
ALT SERPL-CCNC: 8 U/L (ref 11–66)
AMORPHOUS: ABNORMAL
AMYLASE FLUID: 15 U/L
AMYLASE: 25 U/L (ref 20–104)
ANAEROBIC CULTURE: ABNORMAL
ANAEROBIC CULTURE: NORMAL
ANION GAP SERPL CALCULATED.3IONS-SCNC: 10 MEQ/L (ref 8–16)
ANION GAP SERPL CALCULATED.3IONS-SCNC: 11 MEQ/L (ref 8–16)
ANION GAP SERPL CALCULATED.3IONS-SCNC: 12 MEQ/L (ref 8–16)
ANION GAP SERPL CALCULATED.3IONS-SCNC: 12 MEQ/L (ref 8–16)
ANION GAP SERPL CALCULATED.3IONS-SCNC: 13 MEQ/L (ref 8–16)
ANION GAP SERPL CALCULATED.3IONS-SCNC: 14 MEQ/L (ref 8–16)
ANION GAP SERPL CALCULATED.3IONS-SCNC: 15 MEQ/L (ref 8–16)
ANION GAP SERPL CALCULATED.3IONS-SCNC: 16 MEQ/L (ref 8–16)
ANION GAP SERPL CALCULATED.3IONS-SCNC: 8 MEQ/L (ref 8–16)
ANION GAP SERPL CALCULATED.3IONS-SCNC: 9 MEQ/L (ref 8–16)
ANISOCYTOSIS: PRESENT
ANTIBODY SCREEN: NORMAL
AST SERPL-CCNC: 14 U/L (ref 5–40)
AST SERPL-CCNC: 17 U/L (ref 5–40)
AST SERPL-CCNC: 22 U/L (ref 5–40)
AST SERPL-CCNC: 27 U/L (ref 5–40)
AST SERPL-CCNC: 31 U/L (ref 5–40)
BACTERIA: ABNORMAL
BACTERIA: ABNORMAL
BACTERIA: NORMAL
BASE EXCESS (CALCULATED): -0.6 MMOL/L (ref -2.5–2.5)
BASE EXCESS (CALCULATED): -1.6 MMOL/L (ref -2.5–2.5)
BASOPHILS # BLD: 0.1 %
BASOPHILS # BLD: 0.2 %
BASOPHILS # BLD: 0.3 %
BASOPHILS # BLD: 0.6 %
BASOPHILS # BLD: 0.6 %
BASOPHILS # BLD: 0.7 %
BASOPHILS ABSOLUTE: 0 THOU/MM3 (ref 0–0.1)
BASOPHILS ABSOLUTE: 0.1 THOU/MM3 (ref 0–0.1)
BILIRUB SERPL-MCNC: 0.3 MG/DL (ref 0.3–1.2)
BILIRUB SERPL-MCNC: 0.4 MG/DL (ref 0.3–1.2)
BILIRUB SERPL-MCNC: 0.5 MG/DL (ref 0.3–1.2)
BILIRUBIN DIRECT: < 0.2 MG/DL (ref 0–0.3)
BILIRUBIN DIRECT: < 0.2 MG/DL (ref 0–0.3)
BILIRUBIN URINE: NEGATIVE
BLOOD CULTURE, ROUTINE: NORMAL
BLOOD CULTURE, ROUTINE: NORMAL
BLOOD, URINE: NEGATIVE
BODY FLUID CULTURE, STERILE: ABNORMAL
BODY FLUID CULTURE, STERILE: ABNORMAL
BODY FLUID CULTURE, STERILE: NORMAL
BODY FLUID RBC: 7000 /CUMM
BUN BLDV-MCNC: 106 MG/DL (ref 7–22)
BUN BLDV-MCNC: 11 MG/DL (ref 7–22)
BUN BLDV-MCNC: 117 MG/DL (ref 7–22)
BUN BLDV-MCNC: 15 MG/DL (ref 7–22)
BUN BLDV-MCNC: 16 MG/DL (ref 7–22)
BUN BLDV-MCNC: 17 MG/DL (ref 7–22)
BUN BLDV-MCNC: 18 MG/DL (ref 7–22)
BUN BLDV-MCNC: 19 MG/DL (ref 7–22)
BUN BLDV-MCNC: 21 MG/DL (ref 7–22)
BUN BLDV-MCNC: 26 MG/DL (ref 7–22)
BUN BLDV-MCNC: 27 MG/DL (ref 7–22)
BUN BLDV-MCNC: 29 MG/DL (ref 7–22)
BUN BLDV-MCNC: 35 MG/DL (ref 7–22)
BUN BLDV-MCNC: 43 MG/DL (ref 7–22)
BUN BLDV-MCNC: 46 MG/DL (ref 7–22)
BUN BLDV-MCNC: 51 MG/DL (ref 7–22)
BUN BLDV-MCNC: 51 MG/DL (ref 7–22)
BUN BLDV-MCNC: 52 MG/DL (ref 7–22)
BUN BLDV-MCNC: 53 MG/DL (ref 7–22)
BUN BLDV-MCNC: 56 MG/DL (ref 7–22)
BUN BLDV-MCNC: 58 MG/DL (ref 7–22)
BUN BLDV-MCNC: 61 MG/DL (ref 7–22)
BUN BLDV-MCNC: 61 MG/DL (ref 7–22)
BUN BLDV-MCNC: 68 MG/DL (ref 7–22)
BUN BLDV-MCNC: 70 MG/DL (ref 7–22)
BUN BLDV-MCNC: 71 MG/DL (ref 7–22)
BUN BLDV-MCNC: 71 MG/DL (ref 7–22)
BUN BLDV-MCNC: 77 MG/DL (ref 7–22)
BUN BLDV-MCNC: 77 MG/DL (ref 7–22)
BUN BLDV-MCNC: 81 MG/DL (ref 7–22)
BUN BLDV-MCNC: 9 MG/DL (ref 7–22)
BUN BLDV-MCNC: 92 MG/DL (ref 7–22)
BUN BLDV-MCNC: 94 MG/DL (ref 7–22)
CALCIUM IONIZED: 0.99 MMOL/L (ref 1.12–1.32)
CALCIUM SERPL-MCNC: 10 MG/DL (ref 8.5–10.5)
CALCIUM SERPL-MCNC: 6.8 MG/DL (ref 8.5–10.5)
CALCIUM SERPL-MCNC: 7.2 MG/DL (ref 8.5–10.5)
CALCIUM SERPL-MCNC: 7.4 MG/DL (ref 8.5–10.5)
CALCIUM SERPL-MCNC: 7.5 MG/DL (ref 8.5–10.5)
CALCIUM SERPL-MCNC: 7.6 MG/DL (ref 8.5–10.5)
CALCIUM SERPL-MCNC: 7.8 MG/DL (ref 8.5–10.5)
CALCIUM SERPL-MCNC: 7.9 MG/DL (ref 8.5–10.5)
CALCIUM SERPL-MCNC: 8.1 MG/DL (ref 8.5–10.5)
CALCIUM SERPL-MCNC: 8.3 MG/DL (ref 8.5–10.5)
CALCIUM SERPL-MCNC: 8.3 MG/DL (ref 8.5–10.5)
CALCIUM SERPL-MCNC: 8.4 MG/DL (ref 8.5–10.5)
CALCIUM SERPL-MCNC: 8.6 MG/DL (ref 8.5–10.5)
CALCIUM SERPL-MCNC: 8.7 MG/DL (ref 8.5–10.5)
CALCIUM SERPL-MCNC: 8.7 MG/DL (ref 8.5–10.5)
CALCIUM SERPL-MCNC: 8.8 MG/DL (ref 8.5–10.5)
CALCIUM SERPL-MCNC: 8.9 MG/DL (ref 8.5–10.5)
CALCIUM SERPL-MCNC: 9 MG/DL (ref 8.5–10.5)
CALCIUM SERPL-MCNC: 9 MG/DL (ref 8.5–10.5)
CALCIUM SERPL-MCNC: 9.1 MG/DL (ref 8.5–10.5)
CALCIUM SERPL-MCNC: 9.1 MG/DL (ref 8.5–10.5)
CALCIUM SERPL-MCNC: 9.2 MG/DL (ref 8.5–10.5)
CALCIUM SERPL-MCNC: 9.2 MG/DL (ref 8.5–10.5)
CALCIUM SERPL-MCNC: 9.3 MG/DL (ref 8.5–10.5)
CALCIUM SERPL-MCNC: 9.4 MG/DL (ref 8.5–10.5)
CALCIUM SERPL-MCNC: 9.6 MG/DL (ref 8.5–10.5)
CASTS: ABNORMAL /LPF
CASTS: NORMAL /LPF
CHARACTER, BODY FLUID: NORMAL
CHARACTER, URINE: CLEAR
CHLORIDE BLD-SCNC: 101 MEQ/L (ref 98–111)
CHLORIDE BLD-SCNC: 101 MEQ/L (ref 98–111)
CHLORIDE BLD-SCNC: 104 MEQ/L (ref 98–111)
CHLORIDE BLD-SCNC: 106 MEQ/L (ref 98–111)
CHLORIDE BLD-SCNC: 84 MEQ/L (ref 98–111)
CHLORIDE BLD-SCNC: 84 MEQ/L (ref 98–111)
CHLORIDE BLD-SCNC: 85 MEQ/L (ref 98–111)
CHLORIDE BLD-SCNC: 86 MEQ/L (ref 98–111)
CHLORIDE BLD-SCNC: 86 MEQ/L (ref 98–111)
CHLORIDE BLD-SCNC: 88 MEQ/L (ref 98–111)
CHLORIDE BLD-SCNC: 89 MEQ/L (ref 98–111)
CHLORIDE BLD-SCNC: 90 MEQ/L (ref 98–111)
CHLORIDE BLD-SCNC: 91 MEQ/L (ref 98–111)
CHLORIDE BLD-SCNC: 93 MEQ/L (ref 98–111)
CHLORIDE BLD-SCNC: 93 MEQ/L (ref 98–111)
CHLORIDE BLD-SCNC: 94 MEQ/L (ref 98–111)
CHLORIDE BLD-SCNC: 94 MEQ/L (ref 98–111)
CHLORIDE BLD-SCNC: 95 MEQ/L (ref 98–111)
CHLORIDE BLD-SCNC: 95 MEQ/L (ref 98–111)
CHLORIDE BLD-SCNC: 96 MEQ/L (ref 98–111)
CHLORIDE BLD-SCNC: 97 MEQ/L (ref 98–111)
CHLORIDE BLD-SCNC: 99 MEQ/L (ref 98–111)
CO2: 20 MEQ/L (ref 23–33)
CO2: 21 MEQ/L (ref 23–33)
CO2: 22 MEQ/L (ref 23–33)
CO2: 22 MEQ/L (ref 23–33)
CO2: 23 MEQ/L (ref 23–33)
CO2: 23 MEQ/L (ref 23–33)
CO2: 24 MEQ/L (ref 23–33)
CO2: 25 MEQ/L (ref 23–33)
CO2: 26 MEQ/L (ref 23–33)
CO2: 27 MEQ/L (ref 23–33)
CO2: 28 MEQ/L (ref 23–33)
CO2: 29 MEQ/L (ref 23–33)
CO2: 31 MEQ/L (ref 23–33)
CO2: 31 MEQ/L (ref 23–33)
CO2: 32 MEQ/L (ref 23–33)
CO2: 33 MEQ/L (ref 23–33)
COLLECTED BY:: ABNORMAL
COLLECTED BY:: NORMAL
COLOR: NORMAL
COLOR: YELLOW
CORTISOL COLLECTION INFO: NORMAL
CORTISOL: 12.74 UG/DL
CORTISOL: 15.46 UG/DL
CORTISOL: 17.83 UG/DL
CREAT SERPL-MCNC: 0.8 MG/DL (ref 0.4–1.2)
CREAT SERPL-MCNC: 0.8 MG/DL (ref 0.4–1.2)
CREAT SERPL-MCNC: 0.9 MG/DL (ref 0.4–1.2)
CREAT SERPL-MCNC: 1.1 MG/DL (ref 0.4–1.2)
CREAT SERPL-MCNC: 1.4 MG/DL (ref 0.4–1.2)
CREAT SERPL-MCNC: 1.5 MG/DL (ref 0.4–1.2)
CREAT SERPL-MCNC: 1.6 MG/DL (ref 0.4–1.2)
CREAT SERPL-MCNC: 1.7 MG/DL (ref 0.4–1.2)
CREAT SERPL-MCNC: 1.9 MG/DL (ref 0.4–1.2)
CREAT SERPL-MCNC: 1.9 MG/DL (ref 0.4–1.2)
CREAT SERPL-MCNC: 2 MG/DL (ref 0.4–1.2)
CREAT SERPL-MCNC: 2 MG/DL (ref 0.4–1.2)
CREAT SERPL-MCNC: 2.1 MG/DL (ref 0.4–1.2)
CREAT SERPL-MCNC: 2.1 MG/DL (ref 0.4–1.2)
CREAT SERPL-MCNC: 2.2 MG/DL (ref 0.4–1.2)
CREAT SERPL-MCNC: 2.3 MG/DL (ref 0.4–1.2)
CREAT SERPL-MCNC: 2.4 MG/DL (ref 0.4–1.2)
CREAT SERPL-MCNC: 2.5 MG/DL (ref 0.4–1.2)
CREAT SERPL-MCNC: 2.5 MG/DL (ref 0.4–1.2)
CREAT SERPL-MCNC: 2.6 MG/DL (ref 0.4–1.2)
CREAT SERPL-MCNC: 2.6 MG/DL (ref 0.4–1.2)
CREAT SERPL-MCNC: 2.7 MG/DL (ref 0.4–1.2)
CREAT SERPL-MCNC: 3.1 MG/DL (ref 0.4–1.2)
CREAT SERPL-MCNC: 3.2 MG/DL (ref 0.4–1.2)
CREAT SERPL-MCNC: 3.2 MG/DL (ref 0.4–1.2)
CREAT SERPL-MCNC: 3.5 MG/DL (ref 0.4–1.2)
CREATININE URINE: 153.5 MG/DL
CRYSTALS: ABNORMAL
CRYSTALS: ABNORMAL
D-DIMER QUANTITATIVE: ABNORMAL NG/ML FEU (ref 0–500)
DEVICE: ABNORMAL
DEVICE: NORMAL
EKG ATRIAL RATE: 110 BPM
EKG ATRIAL RATE: 119 BPM
EKG ATRIAL RATE: 129 BPM
EKG ATRIAL RATE: 87 BPM
EKG P AXIS: 34 DEGREES
EKG P AXIS: 36 DEGREES
EKG P AXIS: 51 DEGREES
EKG P-R INTERVAL: 122 MS
EKG P-R INTERVAL: 128 MS
EKG P-R INTERVAL: 132 MS
EKG P-R INTERVAL: 146 MS
EKG Q-T INTERVAL: 302 MS
EKG Q-T INTERVAL: 336 MS
EKG Q-T INTERVAL: 360 MS
EKG Q-T INTERVAL: 394 MS
EKG QRS DURATION: 78 MS
EKG QRS DURATION: 86 MS
EKG QRS DURATION: 90 MS
EKG QRS DURATION: 90 MS
EKG QTC CALCULATION (BAZETT): 424 MS
EKG QTC CALCULATION (BAZETT): 433 MS
EKG QTC CALCULATION (BAZETT): 454 MS
EKG QTC CALCULATION (BAZETT): 575 MS
EKG R AXIS: 21 DEGREES
EKG R AXIS: 29 DEGREES
EKG R AXIS: 36 DEGREES
EKG R AXIS: 44 DEGREES
EKG T AXIS: 40 DEGREES
EKG T AXIS: 42 DEGREES
EKG T AXIS: 48 DEGREES
EKG T AXIS: 51 DEGREES
EKG VENTRICULAR RATE: 110 BPM
EKG VENTRICULAR RATE: 119 BPM
EKG VENTRICULAR RATE: 128 BPM
EKG VENTRICULAR RATE: 87 BPM
EOSINOPHIL # BLD: 0 %
EOSINOPHIL # BLD: 0.1 %
EOSINOPHIL # BLD: 0.1 %
EOSINOPHIL # BLD: 1.5 %
EOSINOPHIL # BLD: 2.7 %
EOSINOPHIL # BLD: 3.5 %
EOSINOPHIL # BLD: 3.8 %
EOSINOPHIL # BLD: 3.9 %
EOSINOPHIL # BLD: 8.4 %
EOSINOPHIL SMEAR: NORMAL
EOSINOPHIL SMEAR: NORMAL
EOSINOPHILS ABSOLUTE: 0 THOU/MM3 (ref 0–0.4)
EOSINOPHILS ABSOLUTE: 0.2 THOU/MM3 (ref 0–0.4)
EOSINOPHILS ABSOLUTE: 0.3 THOU/MM3 (ref 0–0.4)
EPITHELIAL CELLS, UA: ABNORMAL /HPF
EPITHELIAL CELLS, UA: ABNORMAL /HPF
EPITHELIAL CELLS, UA: NORMAL /HPF
ERYTHROCYTE [DISTWIDTH] IN BLOOD BY AUTOMATED COUNT: 16.4 % (ref 11.5–14.5)
ERYTHROCYTE [DISTWIDTH] IN BLOOD BY AUTOMATED COUNT: 16.6 % (ref 11.5–14.5)
ERYTHROCYTE [DISTWIDTH] IN BLOOD BY AUTOMATED COUNT: 16.8 % (ref 11.5–14.5)
ERYTHROCYTE [DISTWIDTH] IN BLOOD BY AUTOMATED COUNT: 17.3 % (ref 11.5–14.5)
ERYTHROCYTE [DISTWIDTH] IN BLOOD BY AUTOMATED COUNT: 17.5 % (ref 11.5–14.5)
ERYTHROCYTE [DISTWIDTH] IN BLOOD BY AUTOMATED COUNT: 17.5 % (ref 11.5–14.5)
ERYTHROCYTE [DISTWIDTH] IN BLOOD BY AUTOMATED COUNT: 17.8 % (ref 11.5–14.5)
ERYTHROCYTE [DISTWIDTH] IN BLOOD BY AUTOMATED COUNT: 17.9 % (ref 11.5–14.5)
ERYTHROCYTE [DISTWIDTH] IN BLOOD BY AUTOMATED COUNT: 18 % (ref 11.5–14.5)
ERYTHROCYTE [DISTWIDTH] IN BLOOD BY AUTOMATED COUNT: 18 % (ref 11.5–14.5)
ERYTHROCYTE [DISTWIDTH] IN BLOOD BY AUTOMATED COUNT: 18.1 % (ref 11.5–14.5)
ERYTHROCYTE [DISTWIDTH] IN BLOOD BY AUTOMATED COUNT: 18.1 % (ref 11.5–14.5)
ERYTHROCYTE [DISTWIDTH] IN BLOOD BY AUTOMATED COUNT: 18.2 % (ref 11.5–14.5)
ERYTHROCYTE [DISTWIDTH] IN BLOOD BY AUTOMATED COUNT: 18.5 % (ref 11.5–14.5)
ERYTHROCYTE [DISTWIDTH] IN BLOOD BY AUTOMATED COUNT: 18.5 % (ref 11.5–14.5)
ERYTHROCYTE [DISTWIDTH] IN BLOOD BY AUTOMATED COUNT: 19.2 % (ref 11.5–14.5)
ERYTHROCYTE [DISTWIDTH] IN BLOOD BY AUTOMATED COUNT: 19.3 % (ref 11.5–14.5)
ERYTHROCYTE [DISTWIDTH] IN BLOOD BY AUTOMATED COUNT: 19.6 % (ref 11.5–14.5)
ERYTHROCYTE [DISTWIDTH] IN BLOOD BY AUTOMATED COUNT: 19.8 % (ref 11.5–14.5)
ERYTHROCYTE [DISTWIDTH] IN BLOOD BY AUTOMATED COUNT: 51.8 FL (ref 35–45)
ERYTHROCYTE [DISTWIDTH] IN BLOOD BY AUTOMATED COUNT: 54.3 FL (ref 35–45)
ERYTHROCYTE [DISTWIDTH] IN BLOOD BY AUTOMATED COUNT: 54.6 FL (ref 35–45)
ERYTHROCYTE [DISTWIDTH] IN BLOOD BY AUTOMATED COUNT: 55.1 FL (ref 35–45)
ERYTHROCYTE [DISTWIDTH] IN BLOOD BY AUTOMATED COUNT: 55.2 FL (ref 35–45)
ERYTHROCYTE [DISTWIDTH] IN BLOOD BY AUTOMATED COUNT: 55.3 FL (ref 35–45)
ERYTHROCYTE [DISTWIDTH] IN BLOOD BY AUTOMATED COUNT: 55.9 FL (ref 35–45)
ERYTHROCYTE [DISTWIDTH] IN BLOOD BY AUTOMATED COUNT: 56.3 FL (ref 35–45)
ERYTHROCYTE [DISTWIDTH] IN BLOOD BY AUTOMATED COUNT: 56.5 FL (ref 35–45)
ERYTHROCYTE [DISTWIDTH] IN BLOOD BY AUTOMATED COUNT: 56.6 FL (ref 35–45)
ERYTHROCYTE [DISTWIDTH] IN BLOOD BY AUTOMATED COUNT: 57.2 FL (ref 35–45)
ERYTHROCYTE [DISTWIDTH] IN BLOOD BY AUTOMATED COUNT: 57.4 FL (ref 35–45)
ERYTHROCYTE [DISTWIDTH] IN BLOOD BY AUTOMATED COUNT: 57.6 FL (ref 35–45)
ERYTHROCYTE [DISTWIDTH] IN BLOOD BY AUTOMATED COUNT: 58 FL (ref 35–45)
ERYTHROCYTE [DISTWIDTH] IN BLOOD BY AUTOMATED COUNT: 58.5 FL (ref 35–45)
ERYTHROCYTE [DISTWIDTH] IN BLOOD BY AUTOMATED COUNT: 61.1 FL (ref 35–45)
ERYTHROCYTE [DISTWIDTH] IN BLOOD BY AUTOMATED COUNT: 65.5 FL (ref 35–45)
ERYTHROCYTE [DISTWIDTH] IN BLOOD BY AUTOMATED COUNT: 66.5 FL (ref 35–45)
ERYTHROCYTE [DISTWIDTH] IN BLOOD BY AUTOMATED COUNT: 66.7 FL (ref 35–45)
FLU A ANTIGEN: NEGATIVE
FLU B ANTIGEN: NEGATIVE
GFR SERPL CREATININE-BSD FRML MDRD: 18 ML/MIN/1.73M2
GFR SERPL CREATININE-BSD FRML MDRD: 20 ML/MIN/1.73M2
GFR SERPL CREATININE-BSD FRML MDRD: 20 ML/MIN/1.73M2
GFR SERPL CREATININE-BSD FRML MDRD: 21 ML/MIN/1.73M2
GFR SERPL CREATININE-BSD FRML MDRD: 25 ML/MIN/1.73M2
GFR SERPL CREATININE-BSD FRML MDRD: 26 ML/MIN/1.73M2
GFR SERPL CREATININE-BSD FRML MDRD: 26 ML/MIN/1.73M2
GFR SERPL CREATININE-BSD FRML MDRD: 27 ML/MIN/1.73M2
GFR SERPL CREATININE-BSD FRML MDRD: 27 ML/MIN/1.73M2
GFR SERPL CREATININE-BSD FRML MDRD: 29 ML/MIN/1.73M2
GFR SERPL CREATININE-BSD FRML MDRD: 30 ML/MIN/1.73M2
GFR SERPL CREATININE-BSD FRML MDRD: 32 ML/MIN/1.73M2
GFR SERPL CREATININE-BSD FRML MDRD: 33 ML/MIN/1.73M2
GFR SERPL CREATININE-BSD FRML MDRD: 33 ML/MIN/1.73M2
GFR SERPL CREATININE-BSD FRML MDRD: 35 ML/MIN/1.73M2
GFR SERPL CREATININE-BSD FRML MDRD: 35 ML/MIN/1.73M2
GFR SERPL CREATININE-BSD FRML MDRD: 37 ML/MIN/1.73M2
GFR SERPL CREATININE-BSD FRML MDRD: 37 ML/MIN/1.73M2
GFR SERPL CREATININE-BSD FRML MDRD: 42 ML/MIN/1.73M2
GFR SERPL CREATININE-BSD FRML MDRD: 46 ML/MIN/1.73M2
GFR SERPL CREATININE-BSD FRML MDRD: 49 ML/MIN/1.73M2
GFR SERPL CREATININE-BSD FRML MDRD: 53 ML/MIN/1.73M2
GFR SERPL CREATININE-BSD FRML MDRD: 70 ML/MIN/1.73M2
GFR SERPL CREATININE-BSD FRML MDRD: 88 ML/MIN/1.73M2
GFR SERPL CREATININE-BSD FRML MDRD: > 90 ML/MIN/1.73M2
GFR SERPL CREATININE-BSD FRML MDRD: > 90 ML/MIN/1.73M2
GLUCOSE BLD-MCNC: 101 MG/DL (ref 70–108)
GLUCOSE BLD-MCNC: 104 MG/DL (ref 70–108)
GLUCOSE BLD-MCNC: 105 MG/DL (ref 70–108)
GLUCOSE BLD-MCNC: 107 MG/DL (ref 70–108)
GLUCOSE BLD-MCNC: 107 MG/DL (ref 70–108)
GLUCOSE BLD-MCNC: 112 MG/DL (ref 70–108)
GLUCOSE BLD-MCNC: 114 MG/DL (ref 70–108)
GLUCOSE BLD-MCNC: 115 MG/DL (ref 70–108)
GLUCOSE BLD-MCNC: 116 MG/DL (ref 70–108)
GLUCOSE BLD-MCNC: 117 MG/DL (ref 70–108)
GLUCOSE BLD-MCNC: 118 MG/DL (ref 70–108)
GLUCOSE BLD-MCNC: 118 MG/DL (ref 70–108)
GLUCOSE BLD-MCNC: 119 MG/DL (ref 70–108)
GLUCOSE BLD-MCNC: 120 MG/DL (ref 70–108)
GLUCOSE BLD-MCNC: 121 MG/DL (ref 70–108)
GLUCOSE BLD-MCNC: 125 MG/DL (ref 70–108)
GLUCOSE BLD-MCNC: 125 MG/DL (ref 70–108)
GLUCOSE BLD-MCNC: 126 MG/DL (ref 70–108)
GLUCOSE BLD-MCNC: 127 MG/DL (ref 70–108)
GLUCOSE BLD-MCNC: 128 MG/DL (ref 70–108)
GLUCOSE BLD-MCNC: 129 MG/DL (ref 70–108)
GLUCOSE BLD-MCNC: 131 MG/DL (ref 70–108)
GLUCOSE BLD-MCNC: 132 MG/DL (ref 70–108)
GLUCOSE BLD-MCNC: 133 MG/DL (ref 70–108)
GLUCOSE BLD-MCNC: 134 MG/DL (ref 70–108)
GLUCOSE BLD-MCNC: 134 MG/DL (ref 70–108)
GLUCOSE BLD-MCNC: 135 MG/DL (ref 70–108)
GLUCOSE BLD-MCNC: 137 MG/DL (ref 70–108)
GLUCOSE BLD-MCNC: 139 MG/DL (ref 70–108)
GLUCOSE BLD-MCNC: 140 MG/DL (ref 70–108)
GLUCOSE BLD-MCNC: 141 MG/DL (ref 70–108)
GLUCOSE BLD-MCNC: 141 MG/DL (ref 70–108)
GLUCOSE BLD-MCNC: 142 MG/DL (ref 70–108)
GLUCOSE BLD-MCNC: 143 MG/DL (ref 70–108)
GLUCOSE BLD-MCNC: 143 MG/DL (ref 70–108)
GLUCOSE BLD-MCNC: 145 MG/DL (ref 70–108)
GLUCOSE BLD-MCNC: 147 MG/DL (ref 70–108)
GLUCOSE BLD-MCNC: 148 MG/DL (ref 70–108)
GLUCOSE BLD-MCNC: 149 MG/DL (ref 70–108)
GLUCOSE BLD-MCNC: 151 MG/DL (ref 70–108)
GLUCOSE BLD-MCNC: 151 MG/DL (ref 70–108)
GLUCOSE BLD-MCNC: 152 MG/DL (ref 70–108)
GLUCOSE BLD-MCNC: 152 MG/DL (ref 70–108)
GLUCOSE BLD-MCNC: 157 MG/DL (ref 70–108)
GLUCOSE BLD-MCNC: 158 MG/DL (ref 70–108)
GLUCOSE BLD-MCNC: 159 MG/DL (ref 70–108)
GLUCOSE BLD-MCNC: 161 MG/DL (ref 70–108)
GLUCOSE BLD-MCNC: 161 MG/DL (ref 70–108)
GLUCOSE BLD-MCNC: 163 MG/DL (ref 70–108)
GLUCOSE BLD-MCNC: 164 MG/DL (ref 70–108)
GLUCOSE BLD-MCNC: 164 MG/DL (ref 70–108)
GLUCOSE BLD-MCNC: 165 MG/DL (ref 70–108)
GLUCOSE BLD-MCNC: 166 MG/DL (ref 70–108)
GLUCOSE BLD-MCNC: 171 MG/DL (ref 70–108)
GLUCOSE BLD-MCNC: 171 MG/DL (ref 70–108)
GLUCOSE BLD-MCNC: 174 MG/DL (ref 70–108)
GLUCOSE BLD-MCNC: 175 MG/DL (ref 70–108)
GLUCOSE BLD-MCNC: 175 MG/DL (ref 70–108)
GLUCOSE BLD-MCNC: 177 MG/DL (ref 70–108)
GLUCOSE BLD-MCNC: 177 MG/DL (ref 70–108)
GLUCOSE BLD-MCNC: 179 MG/DL (ref 70–108)
GLUCOSE BLD-MCNC: 179 MG/DL (ref 70–108)
GLUCOSE BLD-MCNC: 182 MG/DL (ref 70–108)
GLUCOSE BLD-MCNC: 183 MG/DL (ref 70–108)
GLUCOSE BLD-MCNC: 183 MG/DL (ref 70–108)
GLUCOSE BLD-MCNC: 187 MG/DL (ref 70–108)
GLUCOSE BLD-MCNC: 189 MG/DL (ref 70–108)
GLUCOSE BLD-MCNC: 197 MG/DL (ref 70–108)
GLUCOSE BLD-MCNC: 199 MG/DL (ref 70–108)
GLUCOSE BLD-MCNC: 199 MG/DL (ref 70–108)
GLUCOSE BLD-MCNC: 203 MG/DL (ref 70–108)
GLUCOSE BLD-MCNC: 206 MG/DL (ref 70–108)
GLUCOSE BLD-MCNC: 208 MG/DL (ref 70–108)
GLUCOSE BLD-MCNC: 209 MG/DL (ref 70–108)
GLUCOSE BLD-MCNC: 213 MG/DL (ref 70–108)
GLUCOSE BLD-MCNC: 219 MG/DL (ref 70–108)
GLUCOSE BLD-MCNC: 219 MG/DL (ref 70–108)
GLUCOSE BLD-MCNC: 221 MG/DL (ref 70–108)
GLUCOSE BLD-MCNC: 223 MG/DL (ref 70–108)
GLUCOSE BLD-MCNC: 224 MG/DL (ref 70–108)
GLUCOSE BLD-MCNC: 228 MG/DL (ref 70–108)
GLUCOSE BLD-MCNC: 249 MG/DL (ref 70–108)
GLUCOSE BLD-MCNC: 250 MG/DL (ref 70–108)
GLUCOSE BLD-MCNC: 257 MG/DL (ref 70–108)
GLUCOSE BLD-MCNC: 261 MG/DL (ref 70–108)
GLUCOSE BLD-MCNC: 263 MG/DL (ref 70–108)
GLUCOSE BLD-MCNC: 272 MG/DL (ref 70–108)
GLUCOSE BLD-MCNC: 294 MG/DL (ref 70–108)
GLUCOSE BLD-MCNC: 94 MG/DL (ref 70–108)
GLUCOSE BLD-MCNC: 96 MG/DL (ref 70–108)
GLUCOSE, URINE: NEGATIVE MG/DL
GRAM STAIN RESULT: ABNORMAL
GRAM STAIN RESULT: NORMAL
HCO3: 23 MMOL/L (ref 23–28)
HCO3: 25 MMOL/L (ref 23–28)
HCT VFR BLD CALC: 20.8 % (ref 42–52)
HCT VFR BLD CALC: 22.1 % (ref 42–52)
HCT VFR BLD CALC: 22.4 % (ref 42–52)
HCT VFR BLD CALC: 23.3 % (ref 42–52)
HCT VFR BLD CALC: 23.9 % (ref 42–52)
HCT VFR BLD CALC: 24 % (ref 42–52)
HCT VFR BLD CALC: 24.4 % (ref 42–52)
HCT VFR BLD CALC: 24.8 % (ref 42–52)
HCT VFR BLD CALC: 24.8 % (ref 42–52)
HCT VFR BLD CALC: 24.9 % (ref 42–52)
HCT VFR BLD CALC: 25.9 % (ref 42–52)
HCT VFR BLD CALC: 26.2 % (ref 42–52)
HCT VFR BLD CALC: 26.5 % (ref 42–52)
HCT VFR BLD CALC: 26.6 % (ref 42–52)
HCT VFR BLD CALC: 27 % (ref 42–52)
HCT VFR BLD CALC: 27.4 % (ref 42–52)
HCT VFR BLD CALC: 27.6 % (ref 42–52)
HCT VFR BLD CALC: 28.3 % (ref 42–52)
HCT VFR BLD CALC: 28.4 % (ref 42–52)
HCT VFR BLD CALC: 30 % (ref 42–52)
HCT VFR BLD CALC: 30.4 % (ref 42–52)
HCT VFR BLD CALC: 32.9 % (ref 42–52)
HCT VFR BLD CALC: 34.1 % (ref 42–52)
HCT VFR BLD CALC: 34.2 % (ref 42–52)
HCT VFR BLD CALC: 37 % (ref 42–52)
HCT VFR BLD CALC: 40.1 % (ref 42–52)
HCT VFR BLD CALC: 40.1 % (ref 42–52)
HEMOGLOBIN: 10.1 GM/DL (ref 14–18)
HEMOGLOBIN: 10.3 GM/DL (ref 14–18)
HEMOGLOBIN: 10.4 GM/DL (ref 14–18)
HEMOGLOBIN: 11.3 GM/DL (ref 14–18)
HEMOGLOBIN: 12.4 GM/DL (ref 14–18)
HEMOGLOBIN: 12.7 GM/DL (ref 14–18)
HEMOGLOBIN: 6.5 GM/DL (ref 14–18)
HEMOGLOBIN: 6.6 GM/DL (ref 14–18)
HEMOGLOBIN: 7.1 GM/DL (ref 14–18)
HEMOGLOBIN: 7.3 GM/DL (ref 14–18)
HEMOGLOBIN: 7.3 GM/DL (ref 14–18)
HEMOGLOBIN: 7.5 GM/DL (ref 14–18)
HEMOGLOBIN: 7.6 GM/DL (ref 14–18)
HEMOGLOBIN: 7.7 GM/DL (ref 14–18)
HEMOGLOBIN: 8 GM/DL (ref 14–18)
HEMOGLOBIN: 8 GM/DL (ref 14–18)
HEMOGLOBIN: 8.2 GM/DL (ref 14–18)
HEMOGLOBIN: 8.3 GM/DL (ref 14–18)
HEMOGLOBIN: 8.4 GM/DL (ref 14–18)
HEMOGLOBIN: 8.4 GM/DL (ref 14–18)
HEMOGLOBIN: 8.5 GM/DL (ref 14–18)
HEMOGLOBIN: 8.7 GM/DL (ref 14–18)
HEMOGLOBIN: 9 GM/DL (ref 14–18)
HEMOGLOBIN: 9.1 GM/DL (ref 14–18)
HEMOGLOBIN: 9.5 GM/DL (ref 14–18)
IFIO2: 5
IMMATURE GRANS (ABS): 0 THOU/MM3 (ref 0–0.07)
IMMATURE GRANS (ABS): 0.03 THOU/MM3 (ref 0–0.07)
IMMATURE GRANS (ABS): 0.05 THOU/MM3 (ref 0–0.07)
IMMATURE GRANS (ABS): 0.05 THOU/MM3 (ref 0–0.07)
IMMATURE GRANS (ABS): 0.06 THOU/MM3 (ref 0–0.07)
IMMATURE GRANS (ABS): 0.09 THOU/MM3 (ref 0–0.07)
IMMATURE GRANS (ABS): 0.11 THOU/MM3 (ref 0–0.07)
IMMATURE GRANS (ABS): 0.12 THOU/MM3 (ref 0–0.07)
IMMATURE GRANS (ABS): 0.12 THOU/MM3 (ref 0–0.07)
IMMATURE GRANS (ABS): 0.15 THOU/MM3 (ref 0–0.07)
IMMATURE GRANS (ABS): 0.16 THOU/MM3 (ref 0–0.07)
IMMATURE GRANS (ABS): 0.18 THOU/MM3 (ref 0–0.07)
IMMATURE GRANS (ABS): 0.21 THOU/MM3 (ref 0–0.07)
IMMATURE GRANS (ABS): 0.29 THOU/MM3 (ref 0–0.07)
IMMATURE GRANS (ABS): 0.41 THOU/MM3 (ref 0–0.07)
IMMATURE GRANS (ABS): 0.46 THOU/MM3 (ref 0–0.07)
IMMATURE GRANULOCYTES: 0 %
IMMATURE GRANULOCYTES: 0.5 %
IMMATURE GRANULOCYTES: 0.6 %
IMMATURE GRANULOCYTES: 0.8 %
IMMATURE GRANULOCYTES: 0.9 %
IMMATURE GRANULOCYTES: 1 %
IMMATURE GRANULOCYTES: 1.1 %
IMMATURE GRANULOCYTES: 1.6 %
IMMATURE GRANULOCYTES: 2.9 %
IMMATURE GRANULOCYTES: 3.2 %
INR BLD: 1.39 (ref 0.85–1.13)
KETONES, URINE: 80
KETONES, URINE: ABNORMAL
KETONES, URINE: NEGATIVE
LACTIC ACID, SEPSIS: 1 MMOL/L (ref 0.5–1.9)
LACTIC ACID, SEPSIS: 1.2 MMOL/L (ref 0.5–1.9)
LACTIC ACID, SEPSIS: 1.3 MMOL/L (ref 0.5–1.9)
LACTIC ACID, SEPSIS: 2.8 MMOL/L (ref 0.5–1.9)
LD: 222 U/L (ref 100–190)
LEGIONELLA PNEUMOPHILIA AG, URINE: NEGATIVE
LEGIONELLA PNEUMOPHILIA AG, URINE: NEGATIVE
LEUKOCYTE EST, POC: NEGATIVE
LEUKOCYTE ESTERASE, URINE: NEGATIVE
LEUKOCYTE ESTERASE, URINE: NEGATIVE
LIPASE: 19.4 U/L (ref 5.6–51.3)
LIPASE: 33.8 U/L (ref 5.6–51.3)
LV EF: 63 %
LVEF MODALITY: NORMAL
LYMPHOCYTES # BLD: 10.3 %
LYMPHOCYTES # BLD: 13.6 %
LYMPHOCYTES # BLD: 15.6 %
LYMPHOCYTES # BLD: 18.4 %
LYMPHOCYTES # BLD: 18.6 %
LYMPHOCYTES # BLD: 2.7 %
LYMPHOCYTES # BLD: 2.8 %
LYMPHOCYTES # BLD: 3.1 %
LYMPHOCYTES # BLD: 39.4 %
LYMPHOCYTES # BLD: 4.5 %
LYMPHOCYTES # BLD: 4.7 %
LYMPHOCYTES # BLD: 4.8 %
LYMPHOCYTES # BLD: 5.4 %
LYMPHOCYTES # BLD: 5.4 %
LYMPHOCYTES # BLD: 5.7 %
LYMPHOCYTES # BLD: 8.2 %
LYMPHOCYTES ABSOLUTE: 0.3 THOU/MM3 (ref 1–4.8)
LYMPHOCYTES ABSOLUTE: 0.4 THOU/MM3 (ref 1–4.8)
LYMPHOCYTES ABSOLUTE: 0.4 THOU/MM3 (ref 1–4.8)
LYMPHOCYTES ABSOLUTE: 0.5 THOU/MM3 (ref 1–4.8)
LYMPHOCYTES ABSOLUTE: 0.6 THOU/MM3 (ref 1–4.8)
LYMPHOCYTES ABSOLUTE: 0.8 THOU/MM3 (ref 1–4.8)
LYMPHOCYTES ABSOLUTE: 0.8 THOU/MM3 (ref 1–4.8)
LYMPHOCYTES ABSOLUTE: 1 THOU/MM3 (ref 1–4.8)
LYMPHOCYTES ABSOLUTE: 1.1 THOU/MM3 (ref 1–4.8)
LYMPHOCYTES ABSOLUTE: 1.2 THOU/MM3 (ref 1–4.8)
LYMPHOCYTES ABSOLUTE: 1.3 THOU/MM3 (ref 1–4.8)
LYMPHOCYTES ABSOLUTE: 1.6 THOU/MM3 (ref 1–4.8)
LYMPHOCYTES ABSOLUTE: 1.7 THOU/MM3 (ref 1–4.8)
MAGNESIUM: 2 MG/DL (ref 1.6–2.4)
MAGNESIUM: 2 MG/DL (ref 1.6–2.4)
MCH RBC QN AUTO: 25.8 PG (ref 26–33)
MCH RBC QN AUTO: 26.1 PG (ref 26–33)
MCH RBC QN AUTO: 26.2 PG (ref 26–33)
MCH RBC QN AUTO: 26.5 PG (ref 26–33)
MCH RBC QN AUTO: 26.6 PG (ref 26–33)
MCH RBC QN AUTO: 26.7 PG (ref 26–33)
MCH RBC QN AUTO: 26.8 PG (ref 26–33)
MCH RBC QN AUTO: 26.9 PG (ref 26–33)
MCH RBC QN AUTO: 27 PG (ref 26–33)
MCH RBC QN AUTO: 27.1 PG (ref 26–33)
MCH RBC QN AUTO: 27.2 PG (ref 26–33)
MCH RBC QN AUTO: 27.3 PG (ref 26–33)
MCH RBC QN AUTO: 27.3 PG (ref 26–33)
MCH RBC QN AUTO: 27.4 PG (ref 26–33)
MCH RBC QN AUTO: 27.4 PG (ref 26–33)
MCH RBC QN AUTO: 27.8 PG (ref 26–33)
MCH RBC QN AUTO: 28.2 PG (ref 26–33)
MCH RBC QN AUTO: 29.8 PG (ref 26–33)
MCHC RBC AUTO-ENTMCNC: 29.5 GM/DL (ref 32.2–35.5)
MCHC RBC AUTO-ENTMCNC: 29.6 GM/DL (ref 32.2–35.5)
MCHC RBC AUTO-ENTMCNC: 29.7 GM/DL (ref 32.2–35.5)
MCHC RBC AUTO-ENTMCNC: 29.9 GM/DL (ref 32.2–35.5)
MCHC RBC AUTO-ENTMCNC: 30 GM/DL (ref 32.2–35.5)
MCHC RBC AUTO-ENTMCNC: 30.1 GM/DL (ref 32.2–35.5)
MCHC RBC AUTO-ENTMCNC: 30.1 GM/DL (ref 32.2–35.5)
MCHC RBC AUTO-ENTMCNC: 30.5 GM/DL (ref 32.2–35.5)
MCHC RBC AUTO-ENTMCNC: 30.5 GM/DL (ref 32.2–35.5)
MCHC RBC AUTO-ENTMCNC: 30.7 GM/DL (ref 32.2–35.5)
MCHC RBC AUTO-ENTMCNC: 30.9 GM/DL (ref 32.2–35.5)
MCHC RBC AUTO-ENTMCNC: 30.9 GM/DL (ref 32.2–35.5)
MCHC RBC AUTO-ENTMCNC: 31 GM/DL (ref 32.2–35.5)
MCHC RBC AUTO-ENTMCNC: 31 GM/DL (ref 32.2–35.5)
MCHC RBC AUTO-ENTMCNC: 31.3 GM/DL (ref 32.2–35.5)
MCHC RBC AUTO-ENTMCNC: 31.5 GM/DL (ref 32.2–35.5)
MCHC RBC AUTO-ENTMCNC: 31.7 GM/DL (ref 32.2–35.5)
MCHC RBC AUTO-ENTMCNC: 31.7 GM/DL (ref 32.2–35.5)
MCHC RBC AUTO-ENTMCNC: 32 GM/DL (ref 32.2–35.5)
MCV RBC AUTO: 84 FL (ref 80–94)
MCV RBC AUTO: 84.4 FL (ref 80–94)
MCV RBC AUTO: 84.5 FL (ref 80–94)
MCV RBC AUTO: 85.7 FL (ref 80–94)
MCV RBC AUTO: 85.7 FL (ref 80–94)
MCV RBC AUTO: 86.6 FL (ref 80–94)
MCV RBC AUTO: 86.9 FL (ref 80–94)
MCV RBC AUTO: 87 FL (ref 80–94)
MCV RBC AUTO: 87 FL (ref 80–94)
MCV RBC AUTO: 87.4 FL (ref 80–94)
MCV RBC AUTO: 87.7 FL (ref 80–94)
MCV RBC AUTO: 88.2 FL (ref 80–94)
MCV RBC AUTO: 88.4 FL (ref 80–94)
MCV RBC AUTO: 88.9 FL (ref 80–94)
MCV RBC AUTO: 92.2 FL (ref 80–94)
MCV RBC AUTO: 92.4 FL (ref 80–94)
MCV RBC AUTO: 92.6 FL (ref 80–94)
MCV RBC AUTO: 92.8 FL (ref 80–94)
MCV RBC AUTO: 94.1 FL (ref 80–94)
MISC. #1 REFERENCE GROUP TEST: ABNORMAL
MISCELLANEOUS LAB TEST RESULT: ABNORMAL
MISCELLANEOUS LAB TEST RESULT: ABNORMAL
MONOCYTES # BLD: 12.1 %
MONOCYTES # BLD: 12.5 %
MONOCYTES # BLD: 12.7 %
MONOCYTES # BLD: 13.9 %
MONOCYTES # BLD: 14.9 %
MONOCYTES # BLD: 15.3 %
MONOCYTES # BLD: 2.6 %
MONOCYTES # BLD: 3.5 %
MONOCYTES # BLD: 3.7 %
MONOCYTES # BLD: 3.8 %
MONOCYTES # BLD: 3.9 %
MONOCYTES # BLD: 6.1 %
MONOCYTES # BLD: 7.3 %
MONOCYTES # BLD: 7.3 %
MONOCYTES # BLD: 7.4 %
MONOCYTES # BLD: 7.6 %
MONOCYTES ABSOLUTE: 0.1 THOU/MM3 (ref 0.4–1.3)
MONOCYTES ABSOLUTE: 0.4 THOU/MM3 (ref 0.4–1.3)
MONOCYTES ABSOLUTE: 0.5 THOU/MM3 (ref 0.4–1.3)
MONOCYTES ABSOLUTE: 0.6 THOU/MM3 (ref 0.4–1.3)
MONOCYTES ABSOLUTE: 0.8 THOU/MM3 (ref 0.4–1.3)
MONOCYTES ABSOLUTE: 0.8 THOU/MM3 (ref 0.4–1.3)
MONOCYTES ABSOLUTE: 1 THOU/MM3 (ref 0.4–1.3)
MONOCYTES ABSOLUTE: 1.3 THOU/MM3 (ref 0.4–1.3)
MONOCYTES ABSOLUTE: 1.4 THOU/MM3 (ref 0.4–1.3)
MONOCYTES ABSOLUTE: 1.6 THOU/MM3 (ref 0.4–1.3)
MONOCYTES ABSOLUTE: 2 THOU/MM3 (ref 0.4–1.3)
MONONUCLEAR CELLS BODY FLUID: 20 %
NITRITE, URINE: NEGATIVE
NUCLEATED RED BLOOD CELLS: 0 /100 WBC
O2 SATURATION: 94 %
O2 SATURATION: 97 %
ORGANISM: ABNORMAL
OSMOLALITY CALCULATION: 265.8 MOSMOL/KG (ref 275–300)
OSMOLALITY CALCULATION: 269.8 MOSMOL/KG (ref 275–300)
OSMOLALITY CALCULATION: 275.7 MOSMOL/KG (ref 275–300)
OSMOLALITY CALCULATION: 286.5 MOSMOL/KG (ref 275–300)
OSMOLALITY URINE: 796 MOSMOL/KG (ref 250–750)
OSMOLALITY: 280 MOSMOL/KG (ref 275–295)
PATHOLOGIST REVIEW: NORMAL
PCO2: 37 MMHG (ref 35–45)
PCO2: 41 MMHG (ref 35–45)
PH BLOOD GAS: 7.38 (ref 7.35–7.45)
PH BLOOD GAS: 7.4 (ref 7.35–7.45)
PH UA: 5 (ref 5–9)
PLATELET # BLD: 213 THOU/MM3 (ref 130–400)
PLATELET # BLD: 216 THOU/MM3 (ref 130–400)
PLATELET # BLD: 223 THOU/MM3 (ref 130–400)
PLATELET # BLD: 225 THOU/MM3 (ref 130–400)
PLATELET # BLD: 230 THOU/MM3 (ref 130–400)
PLATELET # BLD: 231 THOU/MM3 (ref 130–400)
PLATELET # BLD: 245 THOU/MM3 (ref 130–400)
PLATELET # BLD: 251 THOU/MM3 (ref 130–400)
PLATELET # BLD: 256 THOU/MM3 (ref 130–400)
PLATELET # BLD: 259 THOU/MM3 (ref 130–400)
PLATELET # BLD: 259 THOU/MM3 (ref 130–400)
PLATELET # BLD: 267 THOU/MM3 (ref 130–400)
PLATELET # BLD: 302 THOU/MM3 (ref 130–400)
PLATELET # BLD: 328 THOU/MM3 (ref 130–400)
PLATELET # BLD: 339 THOU/MM3 (ref 130–400)
PLATELET # BLD: 340 THOU/MM3 (ref 130–400)
PLATELET # BLD: 384 THOU/MM3 (ref 130–400)
PLATELET # BLD: 413 THOU/MM3 (ref 130–400)
PLATELET # BLD: 433 THOU/MM3 (ref 130–400)
PLATELET # BLD: 466 THOU/MM3 (ref 130–400)
PLATELET # BLD: 474 THOU/MM3 (ref 130–400)
PMV BLD AUTO: 10.1 FL (ref 9.4–12.4)
PMV BLD AUTO: 10.1 FL (ref 9.4–12.4)
PMV BLD AUTO: 10.5 FL (ref 9.4–12.4)
PMV BLD AUTO: 10.7 FL (ref 9.4–12.4)
PMV BLD AUTO: 10.9 FL (ref 9.4–12.4)
PMV BLD AUTO: 11 FL (ref 9.4–12.4)
PMV BLD AUTO: 11.2 FL (ref 9.4–12.4)
PMV BLD AUTO: 11.5 FL (ref 9.4–12.4)
PMV BLD AUTO: 11.9 FL (ref 9.4–12.4)
PMV BLD AUTO: 8.9 FL (ref 9.4–12.4)
PMV BLD AUTO: 9.1 FL (ref 9.4–12.4)
PMV BLD AUTO: 9.2 FL (ref 9.4–12.4)
PMV BLD AUTO: 9.2 FL (ref 9.4–12.4)
PMV BLD AUTO: 9.3 FL (ref 9.4–12.4)
PMV BLD AUTO: 9.4 FL (ref 9.4–12.4)
PMV BLD AUTO: 9.8 FL (ref 9.4–12.4)
PMV BLD AUTO: 9.8 FL (ref 9.4–12.4)
PMV BLD AUTO: 9.9 FL (ref 9.4–12.4)
PMV BLD AUTO: 9.9 FL (ref 9.4–12.4)
PO2: 70 MMHG (ref 71–104)
PO2: 94 MMHG (ref 71–104)
POLYMORPHONUCLEAR CELLS BODY FLUID: 80 %
POTASSIUM REFLEX MAGNESIUM: 4.2 MEQ/L (ref 3.5–5.2)
POTASSIUM REFLEX MAGNESIUM: 4.9 MEQ/L (ref 3.5–5.2)
POTASSIUM REFLEX MAGNESIUM: 5 MEQ/L (ref 3.5–5.2)
POTASSIUM REFLEX MAGNESIUM: 5.6 MEQ/L (ref 3.5–5.2)
POTASSIUM SERPL-SCNC: 3.7 MEQ/L (ref 3.5–5.2)
POTASSIUM SERPL-SCNC: 3.9 MEQ/L (ref 3.5–5.2)
POTASSIUM SERPL-SCNC: 4.1 MEQ/L (ref 3.5–5.2)
POTASSIUM SERPL-SCNC: 4.3 MEQ/L (ref 3.5–5.2)
POTASSIUM SERPL-SCNC: 4.5 MEQ/L (ref 3.5–5.2)
POTASSIUM SERPL-SCNC: 4.6 MEQ/L (ref 3.5–5.2)
POTASSIUM SERPL-SCNC: 4.6 MEQ/L (ref 3.5–5.2)
POTASSIUM SERPL-SCNC: 4.7 MEQ/L (ref 3.5–5.2)
POTASSIUM SERPL-SCNC: 5 MEQ/L (ref 3.5–5.2)
POTASSIUM SERPL-SCNC: 5 MEQ/L (ref 3.5–5.2)
POTASSIUM SERPL-SCNC: 5.1 MEQ/L (ref 3.5–5.2)
POTASSIUM SERPL-SCNC: 5.3 MEQ/L (ref 3.5–5.2)
POTASSIUM SERPL-SCNC: 5.4 MEQ/L (ref 3.5–5.2)
POTASSIUM SERPL-SCNC: 5.5 MEQ/L (ref 3.5–5.2)
POTASSIUM SERPL-SCNC: 5.5 MEQ/L (ref 3.5–5.2)
POTASSIUM SERPL-SCNC: 5.6 MEQ/L (ref 3.5–5.2)
POTASSIUM SERPL-SCNC: 5.7 MEQ/L (ref 3.5–5.2)
POTASSIUM SERPL-SCNC: 5.7 MEQ/L (ref 3.5–5.2)
POTASSIUM SERPL-SCNC: 5.8 MEQ/L (ref 3.5–5.2)
POTASSIUM SERPL-SCNC: 5.9 MEQ/L (ref 3.5–5.2)
POTASSIUM SERPL-SCNC: 6 MEQ/L (ref 3.5–5.2)
POTASSIUM SERPL-SCNC: 6.1 MEQ/L (ref 3.5–5.2)
POTASSIUM SERPL-SCNC: 6.1 MEQ/L (ref 3.5–5.2)
POTASSIUM SERPL-SCNC: 6.2 MEQ/L (ref 3.5–5.2)
PRO-BNP: 1182 PG/ML (ref 0–900)
PRO-BNP: 198.8 PG/ML (ref 0–900)
PRO-BNP: 416.1 PG/ML (ref 0–900)
PRO-BNP: 63.4 PG/ML (ref 0–900)
PROCALCITONIN: 0.49 NG/ML (ref 0.01–0.09)
PROCALCITONIN: 2.31 NG/ML (ref 0.01–0.09)
PROTEIN UA: 30 MG/DL
PROTEIN UA: ABNORMAL MG/DL
PROTEIN UA: NEGATIVE MG/DL
RBC # BLD: 2.38 MILL/MM3 (ref 4.7–6.1)
RBC # BLD: 2.43 MILL/MM3 (ref 4.7–6.1)
RBC # BLD: 2.63 MILL/MM3 (ref 4.7–6.1)
RBC # BLD: 2.72 MILL/MM3 (ref 4.7–6.1)
RBC # BLD: 2.85 MILL/MM3 (ref 4.7–6.1)
RBC # BLD: 2.88 MILL/MM3 (ref 4.7–6.1)
RBC # BLD: 2.93 MILL/MM3 (ref 4.7–6.1)
RBC # BLD: 2.95 MILL/MM3 (ref 4.7–6.1)
RBC # BLD: 2.98 MILL/MM3 (ref 4.7–6.1)
RBC # BLD: 3.07 MILL/MM3 (ref 4.7–6.1)
RBC # BLD: 3.1 MILL/MM3 (ref 4.7–6.1)
RBC # BLD: 3.13 MILL/MM3 (ref 4.7–6.1)
RBC # BLD: 3.24 MILL/MM3 (ref 4.7–6.1)
RBC # BLD: 3.36 MILL/MM3 (ref 4.7–6.1)
RBC # BLD: 3.62 MILL/MM3 (ref 4.7–6.1)
RBC # BLD: 3.8 MILL/MM3 (ref 4.7–6.1)
RBC # BLD: 3.89 MILL/MM3 (ref 4.7–6.1)
RBC # BLD: 3.99 MILL/MM3 (ref 4.7–6.1)
RBC # BLD: 4.26 MILL/MM3 (ref 4.7–6.1)
RBC # BLD: 4.26 MILL/MM3 (ref 4.7–6.1)
RBC # BLD: 4.61 MILL/MM3 (ref 4.7–6.1)
RBC URINE: ABNORMAL /HPF
RBC URINE: ABNORMAL /HPF
RBC URINE: NORMAL /HPF
REASON FOR REJECTION: NORMAL
REJECTED TEST: NORMAL
RENAL EPITHELIAL, UA: ABNORMAL
RENAL EPITHELIAL, UA: ABNORMAL
RH FACTOR: NORMAL
SARS-COV-2: DETECTED
SEG NEUTROPHILS: 39.1 %
SEG NEUTROPHILS: 64 %
SEG NEUTROPHILS: 64.2 %
SEG NEUTROPHILS: 64.6 %
SEG NEUTROPHILS: 68.4 %
SEG NEUTROPHILS: 71.7 %
SEG NEUTROPHILS: 85.2 %
SEG NEUTROPHILS: 85.8 %
SEG NEUTROPHILS: 87.2 %
SEG NEUTROPHILS: 87.7 %
SEG NEUTROPHILS: 87.9 %
SEG NEUTROPHILS: 88 %
SEG NEUTROPHILS: 88.6 %
SEG NEUTROPHILS: 90.2 %
SEG NEUTROPHILS: 90.6 %
SEG NEUTROPHILS: 92.5 %
SEGMENTED NEUTROPHILS ABSOLUTE COUNT: 1.3 THOU/MM3 (ref 1.8–7.7)
SEGMENTED NEUTROPHILS ABSOLUTE COUNT: 10.2 THOU/MM3 (ref 1.8–7.7)
SEGMENTED NEUTROPHILS ABSOLUTE COUNT: 10.5 THOU/MM3 (ref 1.8–7.7)
SEGMENTED NEUTROPHILS ABSOLUTE COUNT: 12.5 THOU/MM3 (ref 1.8–7.7)
SEGMENTED NEUTROPHILS ABSOLUTE COUNT: 12.8 THOU/MM3 (ref 1.8–7.7)
SEGMENTED NEUTROPHILS ABSOLUTE COUNT: 15.1 THOU/MM3 (ref 1.8–7.7)
SEGMENTED NEUTROPHILS ABSOLUTE COUNT: 16 THOU/MM3 (ref 1.8–7.7)
SEGMENTED NEUTROPHILS ABSOLUTE COUNT: 16 THOU/MM3 (ref 1.8–7.7)
SEGMENTED NEUTROPHILS ABSOLUTE COUNT: 18.8 THOU/MM3 (ref 1.8–7.7)
SEGMENTED NEUTROPHILS ABSOLUTE COUNT: 19.4 THOU/MM3 (ref 1.8–7.7)
SEGMENTED NEUTROPHILS ABSOLUTE COUNT: 3.3 THOU/MM3 (ref 1.8–7.7)
SEGMENTED NEUTROPHILS ABSOLUTE COUNT: 4.1 THOU/MM3 (ref 1.8–7.7)
SEGMENTED NEUTROPHILS ABSOLUTE COUNT: 4.7 THOU/MM3 (ref 1.8–7.7)
SEGMENTED NEUTROPHILS ABSOLUTE COUNT: 5.4 THOU/MM3 (ref 1.8–7.7)
SEGMENTED NEUTROPHILS ABSOLUTE COUNT: 7 THOU/MM3 (ref 1.8–7.7)
SEGMENTED NEUTROPHILS ABSOLUTE COUNT: 8.8 THOU/MM3 (ref 1.8–7.7)
SODIUM BLD-SCNC: 122 MEQ/L (ref 135–145)
SODIUM BLD-SCNC: 125 MEQ/L (ref 135–145)
SODIUM BLD-SCNC: 125 MEQ/L (ref 135–145)
SODIUM BLD-SCNC: 126 MEQ/L (ref 135–145)
SODIUM BLD-SCNC: 126 MEQ/L (ref 135–145)
SODIUM BLD-SCNC: 127 MEQ/L (ref 135–145)
SODIUM BLD-SCNC: 128 MEQ/L (ref 135–145)
SODIUM BLD-SCNC: 129 MEQ/L (ref 135–145)
SODIUM BLD-SCNC: 130 MEQ/L (ref 135–145)
SODIUM BLD-SCNC: 130 MEQ/L (ref 135–145)
SODIUM BLD-SCNC: 131 MEQ/L (ref 135–145)
SODIUM BLD-SCNC: 132 MEQ/L (ref 135–145)
SODIUM BLD-SCNC: 133 MEQ/L (ref 135–145)
SODIUM BLD-SCNC: 134 MEQ/L (ref 135–145)
SODIUM BLD-SCNC: 135 MEQ/L (ref 135–145)
SODIUM BLD-SCNC: 135 MEQ/L (ref 135–145)
SODIUM BLD-SCNC: 136 MEQ/L (ref 135–145)
SODIUM BLD-SCNC: 136 MEQ/L (ref 135–145)
SODIUM URINE: 29 MEQ/L
SODIUM URINE: 46 MEQ/L
SOURCE, BLOOD GAS: ABNORMAL
SOURCE, BLOOD GAS: NORMAL
SPECIFIC GRAVITY UA: 1.02 (ref 1–1.03)
SPECIFIC GRAVITY UA: 1.02 (ref 1–1.03)
SPECIFIC GRAVITY UA: > 1.03 (ref 1–1.03)
SPECIMEN: NORMAL
STREP PNEUMO AG, UR: NEGATIVE
STREP PNEUMO AG, UR: NEGATIVE
T4 FREE: 0.98 NG/DL (ref 0.93–1.76)
T4 FREE: 1.34 NG/DL (ref 0.93–1.76)
TOTAL CK: 41 U/L (ref 55–170)
TOTAL NUCLEATED CELLS BODY FLUID: 141 /CUMM (ref 0–500)
TOTAL PROTEIN: 4.1 G/DL (ref 6.1–8)
TOTAL PROTEIN: 5.6 G/DL (ref 6.1–8)
TOTAL PROTEIN: 5.9 G/DL (ref 6.1–8)
TOTAL PROTEIN: 6.5 G/DL (ref 6.1–8)
TOTAL PROTEIN: 6.8 G/DL (ref 6.1–8)
TOTAL PROTEIN: 7.1 G/DL (ref 6.1–8)
TOTAL VOLUME RECEIVED BODY FLUID: 20 ML
TROPONIN T: 0.01 NG/ML
TROPONIN T: 0.02 NG/ML
TROPONIN T: 0.04 NG/ML
TROPONIN T: < 0.01 NG/ML
TSH SERPL DL<=0.05 MIU/L-ACNC: 18.66 UIU/ML (ref 0.4–4.2)
TSH SERPL DL<=0.05 MIU/L-ACNC: 8.96 UIU/ML (ref 0.4–4.2)
URIC ACID: 6.6 MG/DL (ref 3.7–7)
UROBILINOGEN, URINE: 0.2 EU/DL (ref 0–1)
UROBILINOGEN, URINE: 0.2 EU/DL (ref 0–1)
UROBILINOGEN, URINE: 1 EU/DL (ref 0–1)
VANCOMYCIN RANDOM: 12.7 UG/ML (ref 0.1–39.9)
VANCOMYCIN RANDOM: 16.5 UG/ML (ref 0.1–39.9)
VANCOMYCIN TROUGH: 15.4 UG/ML (ref 10–20)
VANCOMYCIN TROUGH: 18 UG/ML (ref 10–20)
VANCOMYCIN TROUGH: 25.2 UG/ML (ref 10–20)
WBC # BLD: 11.3 THOU/MM3 (ref 4.8–10.8)
WBC # BLD: 11.3 THOU/MM3 (ref 4.8–10.8)
WBC # BLD: 12.8 THOU/MM3 (ref 4.8–10.8)
WBC # BLD: 14.2 THOU/MM3 (ref 4.8–10.8)
WBC # BLD: 14.2 THOU/MM3 (ref 4.8–10.8)
WBC # BLD: 15.8 THOU/MM3 (ref 4.8–10.8)
WBC # BLD: 17.7 THOU/MM3 (ref 4.8–10.8)
WBC # BLD: 18.1 THOU/MM3 (ref 4.8–10.8)
WBC # BLD: 18.6 THOU/MM3 (ref 4.8–10.8)
WBC # BLD: 21.4 THOU/MM3 (ref 4.8–10.8)
WBC # BLD: 22.3 THOU/MM3 (ref 4.8–10.8)
WBC # BLD: 22.8 THOU/MM3 (ref 4.8–10.8)
WBC # BLD: 3.4 THOU/MM3 (ref 4.8–10.8)
WBC # BLD: 5.2 THOU/MM3 (ref 4.8–10.8)
WBC # BLD: 5.4 THOU/MM3 (ref 4.8–10.8)
WBC # BLD: 5.5 THOU/MM3 (ref 4.8–10.8)
WBC # BLD: 6.4 THOU/MM3 (ref 4.8–10.8)
WBC # BLD: 7 THOU/MM3 (ref 4.8–10.8)
WBC # BLD: 8.4 THOU/MM3 (ref 4.8–10.8)
WBC # BLD: 9.6 THOU/MM3 (ref 4.8–10.8)
WBC # BLD: 9.7 THOU/MM3 (ref 4.8–10.8)
WBC UA: ABNORMAL /HPF
WBC UA: ABNORMAL /HPF
WBC UA: NORMAL /HPF
YEAST: ABNORMAL
YEAST: ABNORMAL

## 2021-01-01 PROCEDURE — 99232 SBSQ HOSP IP/OBS MODERATE 35: CPT | Performed by: INTERNAL MEDICINE

## 2021-01-01 PROCEDURE — 85025 COMPLETE CBC W/AUTO DIFF WBC: CPT

## 2021-01-01 PROCEDURE — 6360000002 HC RX W HCPCS: Performed by: REGISTERED NURSE

## 2021-01-01 PROCEDURE — 71045 X-RAY EXAM CHEST 1 VIEW: CPT

## 2021-01-01 PROCEDURE — 6370000000 HC RX 637 (ALT 250 FOR IP): Performed by: INTERNAL MEDICINE

## 2021-01-01 PROCEDURE — 6360000002 HC RX W HCPCS: Performed by: INTERNAL MEDICINE

## 2021-01-01 PROCEDURE — 97530 THERAPEUTIC ACTIVITIES: CPT

## 2021-01-01 PROCEDURE — 2709999900 IR REMOVAL OF TUNNELED PLEURAL CATH W CUFF

## 2021-01-01 PROCEDURE — 6370000000 HC RX 637 (ALT 250 FOR IP): Performed by: SPECIALIST

## 2021-01-01 PROCEDURE — 2580000003 HC RX 258: Performed by: REGISTERED NURSE

## 2021-01-01 PROCEDURE — 36600 WITHDRAWAL OF ARTERIAL BLOOD: CPT

## 2021-01-01 PROCEDURE — 51798 US URINE CAPACITY MEASURE: CPT

## 2021-01-01 PROCEDURE — 82803 BLOOD GASES ANY COMBINATION: CPT

## 2021-01-01 PROCEDURE — 85014 HEMATOCRIT: CPT

## 2021-01-01 PROCEDURE — 94760 N-INVAS EAR/PLS OXIMETRY 1: CPT

## 2021-01-01 PROCEDURE — 2060000000 HC ICU INTERMEDIATE R&B

## 2021-01-01 PROCEDURE — 97535 SELF CARE MNGMENT TRAINING: CPT

## 2021-01-01 PROCEDURE — 6370000000 HC RX 637 (ALT 250 FOR IP): Performed by: NURSE PRACTITIONER

## 2021-01-01 PROCEDURE — 99285 EMERGENCY DEPT VISIT HI MDM: CPT

## 2021-01-01 PROCEDURE — 71046 X-RAY EXAM CHEST 2 VIEWS: CPT

## 2021-01-01 PROCEDURE — 82948 REAGENT STRIP/BLOOD GLUCOSE: CPT

## 2021-01-01 PROCEDURE — 94640 AIRWAY INHALATION TREATMENT: CPT

## 2021-01-01 PROCEDURE — 83735 ASSAY OF MAGNESIUM: CPT

## 2021-01-01 PROCEDURE — 96375 TX/PRO/DX INJ NEW DRUG ADDON: CPT

## 2021-01-01 PROCEDURE — 88112 CYTOPATH CELL ENHANCE TECH: CPT

## 2021-01-01 PROCEDURE — 94669 MECHANICAL CHEST WALL OSCILL: CPT

## 2021-01-01 PROCEDURE — 6370000000 HC RX 637 (ALT 250 FOR IP): Performed by: FAMILY MEDICINE

## 2021-01-01 PROCEDURE — 2580000003 HC RX 258: Performed by: INTERNAL MEDICINE

## 2021-01-01 PROCEDURE — 82330 ASSAY OF CALCIUM: CPT

## 2021-01-01 PROCEDURE — 2500000003 HC RX 250 WO HCPCS: Performed by: INTERNAL MEDICINE

## 2021-01-01 PROCEDURE — 6370000000 HC RX 637 (ALT 250 FOR IP): Performed by: REGISTERED NURSE

## 2021-01-01 PROCEDURE — 36415 COLL VENOUS BLD VENIPUNCTURE: CPT

## 2021-01-01 PROCEDURE — 84295 ASSAY OF SERUM SODIUM: CPT

## 2021-01-01 PROCEDURE — 86900 BLOOD TYPING SEROLOGIC ABO: CPT

## 2021-01-01 PROCEDURE — 86923 COMPATIBILITY TEST ELECTRIC: CPT

## 2021-01-01 PROCEDURE — 93005 ELECTROCARDIOGRAM TRACING: CPT | Performed by: INTERNAL MEDICINE

## 2021-01-01 PROCEDURE — 84132 ASSAY OF SERUM POTASSIUM: CPT

## 2021-01-01 PROCEDURE — 6360000002 HC RX W HCPCS: Performed by: PHYSICIAN ASSISTANT

## 2021-01-01 PROCEDURE — 93306 TTE W/DOPPLER COMPLETE: CPT

## 2021-01-01 PROCEDURE — 2580000003 HC RX 258: Performed by: THORACIC SURGERY (CARDIOTHORACIC VASCULAR SURGERY)

## 2021-01-01 PROCEDURE — 1200000003 HC TELEMETRY R&B

## 2021-01-01 PROCEDURE — 0WP930Z REMOVAL OF DRAINAGE DEVICE FROM RIGHT PLEURAL CAVITY, PERCUTANEOUS APPROACH: ICD-10-PCS | Performed by: INTERNAL MEDICINE

## 2021-01-01 PROCEDURE — 80202 ASSAY OF VANCOMYCIN: CPT

## 2021-01-01 PROCEDURE — 2700000000 HC OXYGEN THERAPY PER DAY

## 2021-01-01 PROCEDURE — 6360000002 HC RX W HCPCS: Performed by: NURSE PRACTITIONER

## 2021-01-01 PROCEDURE — 96361 HYDRATE IV INFUSION ADD-ON: CPT

## 2021-01-01 PROCEDURE — APPSS30 APP SPLIT SHARED TIME 16-30 MINUTES: Performed by: PHYSICIAN ASSISTANT

## 2021-01-01 PROCEDURE — 97162 PT EVAL MOD COMPLEX 30 MIN: CPT

## 2021-01-01 PROCEDURE — 32555 ASPIRATE PLEURA W/ IMAGING: CPT

## 2021-01-01 PROCEDURE — 84484 ASSAY OF TROPONIN QUANT: CPT

## 2021-01-01 PROCEDURE — 0W993ZZ DRAINAGE OF RIGHT PLEURAL CAVITY, PERCUTANEOUS APPROACH: ICD-10-PCS | Performed by: INTERNAL MEDICINE

## 2021-01-01 PROCEDURE — 99254 IP/OBS CNSLTJ NEW/EST MOD 60: CPT | Performed by: INTERNAL MEDICINE

## 2021-01-01 PROCEDURE — P9016 RBC LEUKOCYTES REDUCED: HCPCS

## 2021-01-01 PROCEDURE — 80053 COMPREHEN METABOLIC PANEL: CPT

## 2021-01-01 PROCEDURE — 87449 NOS EACH ORGANISM AG IA: CPT

## 2021-01-01 PROCEDURE — 70450 CT HEAD/BRAIN W/O DYE: CPT

## 2021-01-01 PROCEDURE — 86850 RBC ANTIBODY SCREEN: CPT

## 2021-01-01 PROCEDURE — 80048 BASIC METABOLIC PNL TOTAL CA: CPT

## 2021-01-01 PROCEDURE — 84145 PROCALCITONIN (PCT): CPT

## 2021-01-01 PROCEDURE — 99232 SBSQ HOSP IP/OBS MODERATE 35: CPT | Performed by: NURSE PRACTITIONER

## 2021-01-01 PROCEDURE — 96374 THER/PROPH/DIAG INJ IV PUSH: CPT

## 2021-01-01 PROCEDURE — 6360000002 HC RX W HCPCS: Performed by: FAMILY MEDICINE

## 2021-01-01 PROCEDURE — 2500000003 HC RX 250 WO HCPCS

## 2021-01-01 PROCEDURE — 87899 AGENT NOS ASSAY W/OPTIC: CPT

## 2021-01-01 PROCEDURE — 6360000004 HC RX CONTRAST MEDICATION: Performed by: REGISTERED NURSE

## 2021-01-01 PROCEDURE — U0003 INFECTIOUS AGENT DETECTION BY NUCLEIC ACID (DNA OR RNA); SEVERE ACUTE RESPIRATORY SYNDROME CORONAVIRUS 2 (SARS-COV-2) (CORONAVIRUS DISEASE [COVID-19]), AMPLIFIED PROBE TECHNIQUE, MAKING USE OF HIGH THROUGHPUT TECHNOLOGIES AS DESCRIBED BY CMS-2020-01-R: HCPCS

## 2021-01-01 PROCEDURE — 81001 URINALYSIS AUTO W/SCOPE: CPT

## 2021-01-01 PROCEDURE — 93010 ELECTROCARDIOGRAM REPORT: CPT | Performed by: NUCLEAR MEDICINE

## 2021-01-01 PROCEDURE — 85018 HEMOGLOBIN: CPT

## 2021-01-01 PROCEDURE — 89190 NASAL SMEAR FOR EOSINOPHILS: CPT

## 2021-01-01 PROCEDURE — 93005 ELECTROCARDIOGRAM TRACING: CPT | Performed by: NURSE PRACTITIONER

## 2021-01-01 PROCEDURE — 49083 ABD PARACENTESIS W/IMAGING: CPT

## 2021-01-01 PROCEDURE — 36430 TRANSFUSION BLD/BLD COMPNT: CPT

## 2021-01-01 PROCEDURE — 0W9G3ZZ DRAINAGE OF PERITONEAL CAVITY, PERCUTANEOUS APPROACH: ICD-10-PCS | Performed by: INTERNAL MEDICINE

## 2021-01-01 PROCEDURE — 94761 N-INVAS EAR/PLS OXIMETRY MLT: CPT

## 2021-01-01 PROCEDURE — 82248 BILIRUBIN DIRECT: CPT

## 2021-01-01 PROCEDURE — 74177 CT ABD & PELVIS W/CONTRAST: CPT

## 2021-01-01 PROCEDURE — 6360000002 HC RX W HCPCS: Performed by: SPECIALIST

## 2021-01-01 PROCEDURE — 84439 ASSAY OF FREE THYROXINE: CPT

## 2021-01-01 PROCEDURE — 85730 THROMBOPLASTIN TIME PARTIAL: CPT

## 2021-01-01 PROCEDURE — 84300 ASSAY OF URINE SODIUM: CPT

## 2021-01-01 PROCEDURE — 99232 SBSQ HOSP IP/OBS MODERATE 35: CPT | Performed by: THORACIC SURGERY (CARDIOTHORACIC VASCULAR SURGERY)

## 2021-01-01 PROCEDURE — 71260 CT THORAX DX C+: CPT

## 2021-01-01 PROCEDURE — 83615 LACTATE (LD) (LDH) ENZYME: CPT

## 2021-01-01 PROCEDURE — 93307 TTE W/O DOPPLER COMPLETE: CPT

## 2021-01-01 PROCEDURE — 85027 COMPLETE CBC AUTOMATED: CPT

## 2021-01-01 PROCEDURE — APPSS30 APP SPLIT SHARED TIME 16-30 MINUTES: Performed by: NURSE PRACTITIONER

## 2021-01-01 PROCEDURE — 84550 ASSAY OF BLOOD/URIC ACID: CPT

## 2021-01-01 PROCEDURE — 82570 ASSAY OF URINE CREATININE: CPT

## 2021-01-01 PROCEDURE — 93005 ELECTROCARDIOGRAM TRACING: CPT | Performed by: FAMILY MEDICINE

## 2021-01-01 PROCEDURE — 82550 ASSAY OF CK (CPK): CPT

## 2021-01-01 PROCEDURE — 82040 ASSAY OF SERUM ALBUMIN: CPT

## 2021-01-01 PROCEDURE — 97110 THERAPEUTIC EXERCISES: CPT

## 2021-01-01 PROCEDURE — 71275 CT ANGIOGRAPHY CHEST: CPT

## 2021-01-01 PROCEDURE — 83880 ASSAY OF NATRIURETIC PEPTIDE: CPT

## 2021-01-01 PROCEDURE — 2580000003 HC RX 258: Performed by: NURSE PRACTITIONER

## 2021-01-01 PROCEDURE — 88305 TISSUE EXAM BY PATHOLOGIST: CPT

## 2021-01-01 PROCEDURE — 86901 BLOOD TYPING SEROLOGIC RH(D): CPT

## 2021-01-01 PROCEDURE — 02HV33Z INSERTION OF INFUSION DEVICE INTO SUPERIOR VENA CAVA, PERCUTANEOUS APPROACH: ICD-10-PCS | Performed by: RADIOLOGY

## 2021-01-01 PROCEDURE — 85379 FIBRIN DEGRADATION QUANT: CPT

## 2021-01-01 PROCEDURE — 83605 ASSAY OF LACTIC ACID: CPT

## 2021-01-01 PROCEDURE — 32552 REMOVE LUNG CATHETER: CPT | Performed by: RADIOLOGY

## 2021-01-01 PROCEDURE — 6360000002 HC RX W HCPCS

## 2021-01-01 PROCEDURE — 6360000002 HC RX W HCPCS: Performed by: THORACIC SURGERY (CARDIOTHORACIC VASCULAR SURGERY)

## 2021-01-01 PROCEDURE — 6370000000 HC RX 637 (ALT 250 FOR IP): Performed by: PHYSICIAN ASSISTANT

## 2021-01-01 PROCEDURE — 87075 CULTR BACTERIA EXCEPT BLOOD: CPT

## 2021-01-01 PROCEDURE — 97166 OT EVAL MOD COMPLEX 45 MIN: CPT

## 2021-01-01 PROCEDURE — 6360000002 HC RX W HCPCS: Performed by: RADIOLOGY

## 2021-01-01 PROCEDURE — 76937 US GUIDE VASCULAR ACCESS: CPT

## 2021-01-01 PROCEDURE — 83690 ASSAY OF LIPASE: CPT

## 2021-01-01 PROCEDURE — 74176 CT ABD & PELVIS W/O CONTRAST: CPT

## 2021-01-01 PROCEDURE — 99284 EMERGENCY DEPT VISIT MOD MDM: CPT

## 2021-01-01 PROCEDURE — 0WP9X0Z REMOVAL OF DRAINAGE DEVICE FROM RIGHT PLEURAL CAVITY, EXTERNAL APPROACH: ICD-10-PCS | Performed by: RADIOLOGY

## 2021-01-01 PROCEDURE — 99231 SBSQ HOSP IP/OBS SF/LOW 25: CPT | Performed by: NURSE PRACTITIONER

## 2021-01-01 PROCEDURE — 6360000004 HC RX CONTRAST MEDICATION: Performed by: NURSE PRACTITIONER

## 2021-01-01 PROCEDURE — 80076 HEPATIC FUNCTION PANEL: CPT

## 2021-01-01 PROCEDURE — 87804 INFLUENZA ASSAY W/OPTIC: CPT

## 2021-01-01 PROCEDURE — 99221 1ST HOSP IP/OBS SF/LOW 40: CPT | Performed by: INTERNAL MEDICINE

## 2021-01-01 PROCEDURE — 93010 ELECTROCARDIOGRAM REPORT: CPT | Performed by: INTERNAL MEDICINE

## 2021-01-01 PROCEDURE — 74160 CT ABDOMEN W/CONTRAST: CPT

## 2021-01-01 PROCEDURE — 6370000000 HC RX 637 (ALT 250 FOR IP)

## 2021-01-01 PROCEDURE — 84155 ASSAY OF PROTEIN SERUM: CPT

## 2021-01-01 PROCEDURE — 83930 ASSAY OF BLOOD OSMOLALITY: CPT

## 2021-01-01 PROCEDURE — 82533 TOTAL CORTISOL: CPT

## 2021-01-01 PROCEDURE — 89050 BODY FLUID CELL COUNT: CPT

## 2021-01-01 PROCEDURE — 2580000003 HC RX 258: Performed by: SPECIALIST

## 2021-01-01 PROCEDURE — 77001 FLUOROGUIDE FOR VEIN DEVICE: CPT

## 2021-01-01 PROCEDURE — 85610 PROTHROMBIN TIME: CPT

## 2021-01-01 PROCEDURE — 3E0L3GC INTRODUCTION OF OTHER THERAPEUTIC SUBSTANCE INTO PLEURAL CAVITY, PERCUTANEOUS APPROACH: ICD-10-PCS | Performed by: RADIOLOGY

## 2021-01-01 PROCEDURE — 2580000003 HC RX 258: Performed by: PHYSICIAN ASSISTANT

## 2021-01-01 PROCEDURE — 87205 SMEAR GRAM STAIN: CPT

## 2021-01-01 PROCEDURE — 71250 CT THORAX DX C-: CPT

## 2021-01-01 PROCEDURE — 99254 IP/OBS CNSLTJ NEW/EST MOD 60: CPT | Performed by: THORACIC SURGERY (CARDIOTHORACIC VASCULAR SURGERY)

## 2021-01-01 PROCEDURE — 84443 ASSAY THYROID STIM HORMONE: CPT

## 2021-01-01 PROCEDURE — 93005 ELECTROCARDIOGRAM TRACING: CPT | Performed by: EMERGENCY MEDICINE

## 2021-01-01 PROCEDURE — 97116 GAIT TRAINING THERAPY: CPT

## 2021-01-01 PROCEDURE — 99253 IP/OBS CNSLTJ NEW/EST LOW 45: CPT | Performed by: NURSE PRACTITIONER

## 2021-01-01 PROCEDURE — 1200000000 HC SEMI PRIVATE

## 2021-01-01 PROCEDURE — 2709999900 IR FLUORO GUIDED CVA DEVICE PLMT/REPLACE/REMOVAL

## 2021-01-01 PROCEDURE — 87040 BLOOD CULTURE FOR BACTERIA: CPT

## 2021-01-01 PROCEDURE — 87077 CULTURE AEROBIC IDENTIFY: CPT

## 2021-01-01 PROCEDURE — 82150 ASSAY OF AMYLASE: CPT

## 2021-01-01 PROCEDURE — 87186 SC STD MICRODIL/AGAR DIL: CPT

## 2021-01-01 PROCEDURE — 6360000004 HC RX CONTRAST MEDICATION: Performed by: EMERGENCY MEDICINE

## 2021-01-01 PROCEDURE — 83935 ASSAY OF URINE OSMOLALITY: CPT

## 2021-01-01 PROCEDURE — 87070 CULTURE OTHR SPECIMN AEROBIC: CPT

## 2021-01-01 PROCEDURE — APPSS60 APP SPLIT SHARED TIME 46-60 MINUTES: Performed by: NURSE PRACTITIONER

## 2021-01-01 PROCEDURE — 6360000004 HC RX CONTRAST MEDICATION: Performed by: SPECIALIST

## 2021-01-01 PROCEDURE — 6360000004 HC RX CONTRAST MEDICATION: Performed by: STUDENT IN AN ORGANIZED HEALTH CARE EDUCATION/TRAINING PROGRAM

## 2021-01-01 PROCEDURE — 85732 THROMBOPLASTIN TIME PARTIAL: CPT

## 2021-01-01 PROCEDURE — 36592 COLLECT BLOOD FROM PICC: CPT

## 2021-01-01 PROCEDURE — 82042 OTHER SOURCE ALBUMIN QUAN EA: CPT

## 2021-01-01 PROCEDURE — 2580000003 HC RX 258: Performed by: FAMILY MEDICINE

## 2021-01-01 PROCEDURE — 76775 US EXAM ABDO BACK WALL LIM: CPT

## 2021-01-01 PROCEDURE — 36556 INSERT NON-TUNNEL CV CATH: CPT

## 2021-01-01 RX ORDER — DEXTROSE MONOHYDRATE 25 G/50ML
12.5 INJECTION, SOLUTION INTRAVENOUS PRN
Status: DISCONTINUED | OUTPATIENT
Start: 2021-01-01 | End: 2021-01-01

## 2021-01-01 RX ORDER — PREDNISONE 20 MG/1
40 TABLET ORAL DAILY
Status: DISCONTINUED | OUTPATIENT
Start: 2021-01-01 | End: 2021-01-01

## 2021-01-01 RX ORDER — FAMOTIDINE 20 MG/1
20 TABLET, FILM COATED ORAL DAILY
Qty: 30 TABLET | Refills: 0 | Status: SHIPPED | OUTPATIENT
Start: 2021-01-01 | End: 2021-01-01

## 2021-01-01 RX ORDER — DEXTROSE MONOHYDRATE 50 MG/ML
100 INJECTION, SOLUTION INTRAVENOUS PRN
Status: DISCONTINUED | OUTPATIENT
Start: 2021-01-01 | End: 2021-01-01 | Stop reason: HOSPADM

## 2021-01-01 RX ORDER — PANTOPRAZOLE SODIUM 40 MG/1
40 TABLET, DELAYED RELEASE ORAL DAILY
Status: DISCONTINUED | OUTPATIENT
Start: 2021-01-01 | End: 2021-01-01

## 2021-01-01 RX ORDER — LORAZEPAM 2 MG/ML
0.5 INJECTION INTRAMUSCULAR
Status: CANCELLED | OUTPATIENT
Start: 2021-01-01

## 2021-01-01 RX ORDER — OXYCODONE HYDROCHLORIDE AND ACETAMINOPHEN 5; 325 MG/1; MG/1
1 TABLET ORAL EVERY 4 HOURS PRN
Status: DISCONTINUED | OUTPATIENT
Start: 2021-01-01 | End: 2021-01-01 | Stop reason: HOSPADM

## 2021-01-01 RX ORDER — ACETAMINOPHEN 650 MG/1
650 SUPPOSITORY RECTAL EVERY 6 HOURS PRN
Status: DISCONTINUED | OUTPATIENT
Start: 2021-01-01 | End: 2021-01-01 | Stop reason: HOSPADM

## 2021-01-01 RX ORDER — PREDNISONE 10 MG/1
10 TABLET ORAL DAILY
Status: DISCONTINUED | OUTPATIENT
Start: 2021-01-01 | End: 2021-01-01

## 2021-01-01 RX ORDER — POLYETHYLENE GLYCOL 3350 17 G/17G
17 POWDER, FOR SOLUTION ORAL DAILY PRN
Status: DISCONTINUED | OUTPATIENT
Start: 2021-01-01 | End: 2021-01-01 | Stop reason: HOSPADM

## 2021-01-01 RX ORDER — PREDNISONE 20 MG/1
20 TABLET ORAL DAILY
Status: DISCONTINUED | OUTPATIENT
Start: 2021-01-01 | End: 2021-01-01

## 2021-01-01 RX ORDER — LEVOTHYROXINE SODIUM 0.03 MG/1
25 TABLET ORAL DAILY
COMMUNITY

## 2021-01-01 RX ORDER — DOCUSATE SODIUM 100 MG/1
100 CAPSULE, LIQUID FILLED ORAL 2 TIMES DAILY
Status: DISCONTINUED | OUTPATIENT
Start: 2021-01-01 | End: 2021-01-01 | Stop reason: HOSPADM

## 2021-01-01 RX ORDER — IPRATROPIUM BROMIDE AND ALBUTEROL SULFATE 2.5; .5 MG/3ML; MG/3ML
3 SOLUTION RESPIRATORY (INHALATION) EVERY 4 HOURS PRN
Qty: 360 ML | Refills: 0 | Status: SHIPPED | OUTPATIENT
Start: 2021-01-01

## 2021-01-01 RX ORDER — CEPHALEXIN 500 MG/1
500 CAPSULE ORAL EVERY 8 HOURS
Status: DISCONTINUED | OUTPATIENT
Start: 2021-01-01 | End: 2021-01-01

## 2021-01-01 RX ORDER — DEXTROSE MONOHYDRATE 25 G/50ML
25 INJECTION, SOLUTION INTRAVENOUS PRN
Status: DISCONTINUED | OUTPATIENT
Start: 2021-01-01 | End: 2021-01-01 | Stop reason: HOSPADM

## 2021-01-01 RX ORDER — BUMETANIDE 0.25 MG/ML
1 INJECTION, SOLUTION INTRAMUSCULAR; INTRAVENOUS 2 TIMES DAILY
Status: COMPLETED | OUTPATIENT
Start: 2021-01-01 | End: 2021-01-01

## 2021-01-01 RX ORDER — MORPHINE SULFATE/0.9% NACL/PF 1 MG/ML
SYRINGE (ML) INJECTION CONTINUOUS
Status: CANCELLED | OUTPATIENT
Start: 2021-01-01

## 2021-01-01 RX ORDER — SENNA PLUS 8.6 MG/1
2 TABLET ORAL NIGHTLY
Status: DISCONTINUED | OUTPATIENT
Start: 2021-01-01 | End: 2021-01-01 | Stop reason: HOSPADM

## 2021-01-01 RX ORDER — LEVOTHYROXINE SODIUM 0.12 MG/1
62.5 TABLET ORAL DAILY
Status: DISCONTINUED | OUTPATIENT
Start: 2021-01-01 | End: 2021-01-01 | Stop reason: HOSPADM

## 2021-01-01 RX ORDER — MORPHINE SULFATE 2 MG/ML
1 INJECTION, SOLUTION INTRAMUSCULAR; INTRAVENOUS ONCE
Status: COMPLETED | OUTPATIENT
Start: 2021-01-01 | End: 2021-01-01

## 2021-01-01 RX ORDER — PROMETHAZINE HYDROCHLORIDE 25 MG/1
25 TABLET ORAL EVERY 6 HOURS PRN
Status: DISCONTINUED | OUTPATIENT
Start: 2021-01-01 | End: 2021-01-01 | Stop reason: HOSPADM

## 2021-01-01 RX ORDER — FUROSEMIDE 10 MG/ML
40 INJECTION INTRAMUSCULAR; INTRAVENOUS ONCE
Status: COMPLETED | OUTPATIENT
Start: 2021-01-01 | End: 2021-01-01

## 2021-01-01 RX ORDER — MORPHINE SULFATE/0.9% NACL/PF 1 MG/ML
SYRINGE (ML) INJECTION CONTINUOUS
Status: DISCONTINUED | OUTPATIENT
Start: 2021-01-01 | End: 2021-01-01 | Stop reason: HOSPADM

## 2021-01-01 RX ORDER — TRAMADOL HYDROCHLORIDE 50 MG/1
50 TABLET ORAL EVERY 6 HOURS PRN
Status: DISCONTINUED | OUTPATIENT
Start: 2021-01-01 | End: 2021-01-01

## 2021-01-01 RX ORDER — MORPHINE SULFATE 15 MG/1
30 TABLET, FILM COATED, EXTENDED RELEASE ORAL EVERY 8 HOURS
Status: DISCONTINUED | OUTPATIENT
Start: 2021-01-01 | End: 2021-01-01 | Stop reason: HOSPADM

## 2021-01-01 RX ORDER — IPRATROPIUM BROMIDE AND ALBUTEROL SULFATE 2.5; .5 MG/3ML; MG/3ML
1 SOLUTION RESPIRATORY (INHALATION) EVERY 4 HOURS
Status: DISCONTINUED | OUTPATIENT
Start: 2021-01-01 | End: 2021-01-01 | Stop reason: HOSPADM

## 2021-01-01 RX ORDER — DRONABINOL 2.5 MG/1
2.5 CAPSULE ORAL 2 TIMES DAILY
Status: DISCONTINUED | OUTPATIENT
Start: 2021-01-01 | End: 2021-01-01 | Stop reason: HOSPADM

## 2021-01-01 RX ORDER — LORAZEPAM 2 MG/ML
1.5 INJECTION INTRAMUSCULAR
Status: DISCONTINUED | OUTPATIENT
Start: 2021-01-01 | End: 2021-01-01 | Stop reason: HOSPADM

## 2021-01-01 RX ORDER — LEVOTHYROXINE SODIUM 0.03 MG/1
25 TABLET ORAL DAILY
Status: DISCONTINUED | OUTPATIENT
Start: 2021-01-01 | End: 2021-01-01

## 2021-01-01 RX ORDER — ALLOPURINOL 100 MG/1
100 TABLET ORAL 2 TIMES DAILY
Status: DISCONTINUED | OUTPATIENT
Start: 2021-01-01 | End: 2021-01-01 | Stop reason: HOSPADM

## 2021-01-01 RX ORDER — MORPHINE SULFATE 2 MG/ML
2 INJECTION, SOLUTION INTRAMUSCULAR; INTRAVENOUS ONCE
Status: COMPLETED | OUTPATIENT
Start: 2021-01-01 | End: 2021-01-01

## 2021-01-01 RX ORDER — SODIUM CHLORIDE 9 MG/ML
INJECTION, SOLUTION INTRAVENOUS CONTINUOUS
Status: DISCONTINUED | OUTPATIENT
Start: 2021-01-01 | End: 2021-01-01 | Stop reason: HOSPADM

## 2021-01-01 RX ORDER — LORATADINE 10 MG/1
10 CAPSULE, LIQUID FILLED ORAL DAILY
Status: ON HOLD | COMMUNITY
End: 2021-01-01 | Stop reason: HOSPADM

## 2021-01-01 RX ORDER — DEXTROSE MONOHYDRATE 50 MG/ML
INJECTION, SOLUTION INTRAVENOUS CONTINUOUS
Status: CANCELLED | OUTPATIENT
Start: 2021-01-01

## 2021-01-01 RX ORDER — IPRATROPIUM BROMIDE AND ALBUTEROL SULFATE 2.5; .5 MG/3ML; MG/3ML
1 SOLUTION RESPIRATORY (INHALATION)
Status: DISCONTINUED | OUTPATIENT
Start: 2021-01-01 | End: 2021-01-01

## 2021-01-01 RX ORDER — ALPRAZOLAM 0.25 MG/1
0.25 TABLET ORAL EVERY 8 HOURS PRN
Status: DISCONTINUED | OUTPATIENT
Start: 2021-01-01 | End: 2021-01-01 | Stop reason: HOSPADM

## 2021-01-01 RX ORDER — DEXTROSE MONOHYDRATE 25 G/50ML
25 INJECTION, SOLUTION INTRAVENOUS ONCE
Status: COMPLETED | OUTPATIENT
Start: 2021-01-01 | End: 2021-01-01

## 2021-01-01 RX ORDER — LIDOCAINE 4 G/G
1 PATCH TOPICAL DAILY
Status: DISCONTINUED | OUTPATIENT
Start: 2021-01-01 | End: 2021-01-01

## 2021-01-01 RX ORDER — LORAZEPAM 2 MG/ML
0.5 INJECTION INTRAMUSCULAR
Status: DISCONTINUED | OUTPATIENT
Start: 2021-01-01 | End: 2021-01-01 | Stop reason: HOSPADM

## 2021-01-01 RX ORDER — SODIUM CHLORIDE 9 MG/ML
INJECTION, SOLUTION INTRAVENOUS CONTINUOUS
Status: DISCONTINUED | OUTPATIENT
Start: 2021-01-01 | End: 2021-01-01

## 2021-01-01 RX ORDER — BUMETANIDE 1 MG/1
1 TABLET ORAL DAILY
Status: DISCONTINUED | OUTPATIENT
Start: 2021-01-01 | End: 2021-01-01

## 2021-01-01 RX ORDER — TRAMADOL HYDROCHLORIDE 50 MG/1
50 TABLET ORAL ONCE
Status: COMPLETED | OUTPATIENT
Start: 2021-01-01 | End: 2021-01-01

## 2021-01-01 RX ORDER — LEVOTHYROXINE SODIUM 0.05 MG/1
50 TABLET ORAL DAILY
Status: DISCONTINUED | OUTPATIENT
Start: 2021-01-01 | End: 2021-01-01

## 2021-01-01 RX ORDER — LORAZEPAM 2 MG/ML
1 INJECTION INTRAMUSCULAR
Status: DISCONTINUED | OUTPATIENT
Start: 2021-01-01 | End: 2021-01-01 | Stop reason: HOSPADM

## 2021-01-01 RX ORDER — SODIUM CHLORIDE 9 MG/ML
INJECTION, SOLUTION INTRAVENOUS PRN
Status: DISCONTINUED | OUTPATIENT
Start: 2021-01-01 | End: 2021-01-01 | Stop reason: HOSPADM

## 2021-01-01 RX ORDER — ONDANSETRON 2 MG/ML
8 INJECTION INTRAMUSCULAR; INTRAVENOUS ONCE
Status: COMPLETED | OUTPATIENT
Start: 2021-01-01 | End: 2021-01-01

## 2021-01-01 RX ORDER — BUMETANIDE 0.25 MG/ML
1 INJECTION, SOLUTION INTRAMUSCULAR; INTRAVENOUS ONCE
Status: COMPLETED | OUTPATIENT
Start: 2021-01-01 | End: 2021-01-01

## 2021-01-01 RX ORDER — NICOTINE POLACRILEX 4 MG
15 LOZENGE BUCCAL PRN
Status: DISCONTINUED | OUTPATIENT
Start: 2021-01-01 | End: 2021-01-01 | Stop reason: HOSPADM

## 2021-01-01 RX ORDER — LEVOFLOXACIN 500 MG/1
500 TABLET, FILM COATED ORAL DAILY
Qty: 10 TABLET | Refills: 0 | Status: SHIPPED | OUTPATIENT
Start: 2021-01-01 | End: 2021-01-01

## 2021-01-01 RX ORDER — ONDANSETRON 2 MG/ML
4 INJECTION INTRAMUSCULAR; INTRAVENOUS EVERY 6 HOURS PRN
Status: DISCONTINUED | OUTPATIENT
Start: 2021-01-01 | End: 2021-01-01 | Stop reason: HOSPADM

## 2021-01-01 RX ORDER — LEVOTHYROXINE SODIUM 0.03 MG/1
25 TABLET ORAL DAILY
Status: DISCONTINUED | OUTPATIENT
Start: 2021-01-01 | End: 2021-01-01 | Stop reason: HOSPADM

## 2021-01-01 RX ORDER — AMLODIPINE BESYLATE 5 MG/1
5 TABLET ORAL DAILY
Status: ON HOLD | COMMUNITY
End: 2021-01-01 | Stop reason: HOSPADM

## 2021-01-01 RX ORDER — MIDODRINE HYDROCHLORIDE 2.5 MG/1
2.5 TABLET ORAL
Qty: 90 TABLET | Refills: 0 | Status: SHIPPED | OUTPATIENT
Start: 2021-01-01 | End: 2021-01-01

## 2021-01-01 RX ORDER — FAMOTIDINE 20 MG/1
20 TABLET, FILM COATED ORAL DAILY
Status: DISCONTINUED | OUTPATIENT
Start: 2021-01-01 | End: 2021-01-01 | Stop reason: HOSPADM

## 2021-01-01 RX ORDER — MIDODRINE HYDROCHLORIDE 2.5 MG/1
2.5 TABLET ORAL
Status: DISCONTINUED | OUTPATIENT
Start: 2021-01-01 | End: 2021-01-01

## 2021-01-01 RX ORDER — SODIUM CHLORIDE 0.9 % (FLUSH) 0.9 %
5-40 SYRINGE (ML) INJECTION PRN
Status: DISCONTINUED | OUTPATIENT
Start: 2021-01-01 | End: 2021-01-01 | Stop reason: HOSPADM

## 2021-01-01 RX ORDER — FAMOTIDINE 20 MG/1
20 TABLET, FILM COATED ORAL DAILY
Qty: 30 TABLET | Refills: 0 | Status: SHIPPED | OUTPATIENT
Start: 2021-01-01

## 2021-01-01 RX ORDER — SODIUM CHLORIDE 9 MG/ML
INJECTION, SOLUTION INTRAVENOUS PRN
Status: COMPLETED | OUTPATIENT
Start: 2021-01-01 | End: 2021-01-01

## 2021-01-01 RX ORDER — LORAZEPAM 2 MG/ML
2 INJECTION INTRAMUSCULAR
Status: CANCELLED | OUTPATIENT
Start: 2021-01-01

## 2021-01-01 RX ORDER — HALOPERIDOL 5 MG/ML
1 INJECTION INTRAMUSCULAR EVERY 4 HOURS PRN
Status: DISCONTINUED | OUTPATIENT
Start: 2021-01-01 | End: 2021-01-01 | Stop reason: HOSPADM

## 2021-01-01 RX ORDER — ACETAMINOPHEN 325 MG/1
650 TABLET ORAL EVERY 4 HOURS PRN
Status: DISCONTINUED | OUTPATIENT
Start: 2021-01-01 | End: 2021-01-01 | Stop reason: HOSPADM

## 2021-01-01 RX ORDER — SODIUM POLYSTYRENE SULFONATE 15 G/60ML
15 SUSPENSION ORAL; RECTAL ONCE
Status: DISCONTINUED | OUTPATIENT
Start: 2021-01-01 | End: 2021-01-01

## 2021-01-01 RX ORDER — LIDOCAINE 4 G/G
2 PATCH TOPICAL DAILY
Status: DISCONTINUED | OUTPATIENT
Start: 2021-01-01 | End: 2021-01-01 | Stop reason: HOSPADM

## 2021-01-01 RX ORDER — TRAMADOL HYDROCHLORIDE 50 MG/1
50 TABLET ORAL EVERY 6 HOURS PRN
Status: DISCONTINUED | OUTPATIENT
Start: 2021-01-01 | End: 2021-01-01 | Stop reason: HOSPADM

## 2021-01-01 RX ORDER — DEXTROSE MONOHYDRATE 50 MG/ML
100 INJECTION, SOLUTION INTRAVENOUS PRN
Status: DISCONTINUED | OUTPATIENT
Start: 2021-01-01 | End: 2021-01-01

## 2021-01-01 RX ORDER — SODIUM POLYSTYRENE SULFONATE 15 G/60ML
30 SUSPENSION ORAL; RECTAL ONCE
Status: COMPLETED | OUTPATIENT
Start: 2021-01-01 | End: 2021-01-01

## 2021-01-01 RX ORDER — DIPHENHYDRAMINE HYDROCHLORIDE 50 MG/ML
25 INJECTION INTRAMUSCULAR; INTRAVENOUS EVERY 6 HOURS PRN
Status: DISCONTINUED | OUTPATIENT
Start: 2021-01-01 | End: 2021-01-01 | Stop reason: HOSPADM

## 2021-01-01 RX ORDER — PREDNISONE 20 MG/1
60 TABLET ORAL DAILY
Status: DISCONTINUED | OUTPATIENT
Start: 2021-01-01 | End: 2021-01-01 | Stop reason: HOSPADM

## 2021-01-01 RX ORDER — SODIUM CHLORIDE 9 MG/ML
1000 INJECTION, SOLUTION INTRAVENOUS CONTINUOUS
Status: DISCONTINUED | OUTPATIENT
Start: 2021-01-01 | End: 2021-01-01

## 2021-01-01 RX ORDER — DEXAMETHASONE SODIUM PHOSPHATE 4 MG/ML
10 INJECTION, SOLUTION INTRA-ARTICULAR; INTRALESIONAL; INTRAMUSCULAR; INTRAVENOUS; SOFT TISSUE ONCE
Status: CANCELLED | OUTPATIENT
Start: 2021-01-01

## 2021-01-01 RX ORDER — MIDODRINE HYDROCHLORIDE 10 MG/1
10 TABLET ORAL
Status: DISCONTINUED | OUTPATIENT
Start: 2021-01-01 | End: 2021-01-01 | Stop reason: HOSPADM

## 2021-01-01 RX ORDER — PROMETHAZINE HYDROCHLORIDE 25 MG/1
12.5 TABLET ORAL EVERY 6 HOURS PRN
Status: DISCONTINUED | OUTPATIENT
Start: 2021-01-01 | End: 2021-01-01 | Stop reason: HOSPADM

## 2021-01-01 RX ORDER — ONDANSETRON 2 MG/ML
4 INJECTION INTRAMUSCULAR; INTRAVENOUS ONCE
Status: COMPLETED | OUTPATIENT
Start: 2021-01-01 | End: 2021-01-01

## 2021-01-01 RX ORDER — 0.9 % SODIUM CHLORIDE 0.9 %
500 INTRAVENOUS SOLUTION INTRAVENOUS ONCE
Status: COMPLETED | OUTPATIENT
Start: 2021-01-01 | End: 2021-01-01

## 2021-01-01 RX ORDER — FENTANYL CITRATE 50 UG/ML
50 INJECTION, SOLUTION INTRAMUSCULAR; INTRAVENOUS ONCE
Status: COMPLETED | OUTPATIENT
Start: 2021-01-01 | End: 2021-01-01

## 2021-01-01 RX ORDER — POLYETHYLENE GLYCOL 3350 17 G/17G
17 POWDER, FOR SOLUTION ORAL DAILY
Status: DISCONTINUED | OUTPATIENT
Start: 2021-01-01 | End: 2021-01-01 | Stop reason: HOSPADM

## 2021-01-01 RX ORDER — BUMETANIDE 0.25 MG/ML
1 INJECTION, SOLUTION INTRAMUSCULAR; INTRAVENOUS 2 TIMES DAILY
Status: DISCONTINUED | OUTPATIENT
Start: 2021-01-01 | End: 2021-01-01

## 2021-01-01 RX ORDER — CABOZANTINIB 40 MG/1
TABLET ORAL DAILY
Status: ON HOLD | COMMUNITY
End: 2021-01-01 | Stop reason: HOSPADM

## 2021-01-01 RX ORDER — LEVOTHYROXINE SODIUM 0.1 MG/1
25 TABLET ORAL DAILY
Status: ON HOLD | COMMUNITY
End: 2021-01-01 | Stop reason: HOSPADM

## 2021-01-01 RX ORDER — SODIUM CHLORIDE 0.9 % (FLUSH) 0.9 %
5-40 SYRINGE (ML) INJECTION EVERY 12 HOURS SCHEDULED
Status: DISCONTINUED | OUTPATIENT
Start: 2021-01-01 | End: 2021-01-01 | Stop reason: HOSPADM

## 2021-01-01 RX ORDER — CEPHALEXIN 500 MG/1
500 CAPSULE ORAL EVERY 6 HOURS
Status: DISPENSED | OUTPATIENT
Start: 2021-01-01 | End: 2021-01-01

## 2021-01-01 RX ORDER — CEPHALEXIN 500 MG/1
500 CAPSULE ORAL EVERY 6 HOURS
Status: DISCONTINUED | OUTPATIENT
Start: 2021-01-01 | End: 2021-01-01

## 2021-01-01 RX ORDER — MORPHINE SULFATE 4 MG/ML
4 INJECTION, SOLUTION INTRAMUSCULAR; INTRAVENOUS ONCE
Status: COMPLETED | OUTPATIENT
Start: 2021-01-01 | End: 2021-01-01

## 2021-01-01 RX ORDER — 0.9 % SODIUM CHLORIDE 0.9 %
1000 INTRAVENOUS SOLUTION INTRAVENOUS ONCE
Status: COMPLETED | OUTPATIENT
Start: 2021-01-01 | End: 2021-01-01

## 2021-01-01 RX ORDER — KETOROLAC TROMETHAMINE 30 MG/ML
30 INJECTION, SOLUTION INTRAMUSCULAR; INTRAVENOUS ONCE
Status: COMPLETED | OUTPATIENT
Start: 2021-01-01 | End: 2021-01-01

## 2021-01-01 RX ORDER — PREDNISONE 20 MG/1
40 TABLET ORAL DAILY
Qty: 60 TABLET | Refills: 0 | Status: SHIPPED | OUTPATIENT
Start: 2021-01-01

## 2021-01-01 RX ORDER — IPRATROPIUM BROMIDE AND ALBUTEROL SULFATE 2.5; .5 MG/3ML; MG/3ML
1 SOLUTION RESPIRATORY (INHALATION) EVERY 4 HOURS PRN
Status: DISCONTINUED | OUTPATIENT
Start: 2021-01-01 | End: 2021-01-01 | Stop reason: HOSPADM

## 2021-01-01 RX ORDER — CALCIUM GLUCONATE 94 MG/ML
1000 INJECTION, SOLUTION INTRAVENOUS ONCE
Status: COMPLETED | OUTPATIENT
Start: 2021-01-01 | End: 2021-01-01

## 2021-01-01 RX ORDER — DEXAMETHASONE SODIUM PHOSPHATE 4 MG/ML
10 INJECTION, SOLUTION INTRA-ARTICULAR; INTRALESIONAL; INTRAMUSCULAR; INTRAVENOUS; SOFT TISSUE ONCE
Status: COMPLETED | OUTPATIENT
Start: 2021-01-01 | End: 2021-01-01

## 2021-01-01 RX ORDER — MIDODRINE HYDROCHLORIDE 5 MG/1
5 TABLET ORAL
Status: DISCONTINUED | OUTPATIENT
Start: 2021-01-01 | End: 2021-01-01

## 2021-01-01 RX ORDER — MORPHINE SULFATE 2 MG/ML
2 INJECTION, SOLUTION INTRAMUSCULAR; INTRAVENOUS 2 TIMES DAILY PRN
Status: DISCONTINUED | OUTPATIENT
Start: 2021-01-01 | End: 2021-01-01

## 2021-01-01 RX ORDER — TRAZODONE HYDROCHLORIDE 50 MG/1
50 TABLET ORAL NIGHTLY
COMMUNITY

## 2021-01-01 RX ORDER — SODIUM CHLORIDE 9 MG/ML
25 INJECTION, SOLUTION INTRAVENOUS PRN
Status: DISCONTINUED | OUTPATIENT
Start: 2021-01-01 | End: 2021-01-01 | Stop reason: SDUPTHER

## 2021-01-01 RX ORDER — TRAMADOL HYDROCHLORIDE 50 MG/1
50 TABLET ORAL EVERY 6 HOURS PRN
COMMUNITY

## 2021-01-01 RX ORDER — DEXTROSE MONOHYDRATE 50 MG/ML
INJECTION, SOLUTION INTRAVENOUS CONTINUOUS
Status: DISCONTINUED | OUTPATIENT
Start: 2021-01-01 | End: 2021-01-01 | Stop reason: HOSPADM

## 2021-01-01 RX ORDER — LEVOTHYROXINE SODIUM 0.1 MG/1
100 TABLET ORAL DAILY
Status: DISCONTINUED | OUTPATIENT
Start: 2021-01-01 | End: 2021-01-01

## 2021-01-01 RX ORDER — SPIRONOLACTONE 25 MG/1
25 TABLET ORAL 2 TIMES DAILY
Status: DISCONTINUED | OUTPATIENT
Start: 2021-01-01 | End: 2021-01-01

## 2021-01-01 RX ORDER — MORPHINE SULFATE 15 MG/1
15 TABLET, FILM COATED, EXTENDED RELEASE ORAL EVERY 8 HOURS
Status: DISCONTINUED | OUTPATIENT
Start: 2021-01-01 | End: 2021-01-01

## 2021-01-01 RX ORDER — MORPHINE SULFATE 15 MG/1
15 TABLET, FILM COATED, EXTENDED RELEASE ORAL EVERY 12 HOURS SCHEDULED
Status: DISCONTINUED | OUTPATIENT
Start: 2021-01-01 | End: 2021-01-01

## 2021-01-01 RX ORDER — SODIUM CHLORIDE 9 MG/ML
1000 INJECTION, SOLUTION INTRAVENOUS CONTINUOUS
Status: DISCONTINUED | OUTPATIENT
Start: 2021-01-01 | End: 2021-01-01 | Stop reason: HOSPADM

## 2021-01-01 RX ORDER — ACETAMINOPHEN 325 MG/1
650 TABLET ORAL EVERY 6 HOURS PRN
Status: DISCONTINUED | OUTPATIENT
Start: 2021-01-01 | End: 2021-01-01 | Stop reason: HOSPADM

## 2021-01-01 RX ORDER — LORAZEPAM 2 MG/ML
2 INJECTION INTRAMUSCULAR
Status: DISCONTINUED | OUTPATIENT
Start: 2021-01-01 | End: 2021-01-01 | Stop reason: HOSPADM

## 2021-01-01 RX ORDER — ALLOPURINOL 100 MG/1
100 TABLET ORAL NIGHTLY
Status: DISCONTINUED | OUTPATIENT
Start: 2021-01-01 | End: 2021-01-01

## 2021-01-01 RX ORDER — SODIUM POLYSTYRENE SULFONATE 15 G/60ML
SUSPENSION ORAL; RECTAL
Status: COMPLETED
Start: 2021-01-01 | End: 2021-01-01

## 2021-01-01 RX ORDER — OXYCODONE AND ACETAMINOPHEN 7.5; 325 MG/1; MG/1
1 TABLET ORAL EVERY 4 HOURS PRN
Status: DISCONTINUED | OUTPATIENT
Start: 2021-01-01 | End: 2021-01-01 | Stop reason: HOSPADM

## 2021-01-01 RX ORDER — LEVOTHYROXINE SODIUM 0.03 MG/1
25 TABLET ORAL DAILY
Status: ON HOLD | COMMUNITY
End: 2021-01-01 | Stop reason: HOSPADM

## 2021-01-01 RX ORDER — IPRATROPIUM BROMIDE AND ALBUTEROL SULFATE 2.5; .5 MG/3ML; MG/3ML
1 SOLUTION RESPIRATORY (INHALATION) EVERY 4 HOURS PRN
Status: CANCELLED | OUTPATIENT
Start: 2021-01-01

## 2021-01-01 RX ORDER — SODIUM POLYSTYRENE SULFONATE 15 G/60ML
30 SUSPENSION ORAL; RECTAL ONCE
Status: DISCONTINUED | OUTPATIENT
Start: 2021-01-01 | End: 2021-01-01 | Stop reason: HOSPADM

## 2021-01-01 RX ORDER — MIDODRINE HYDROCHLORIDE 2.5 MG/1
2.5 TABLET ORAL
Qty: 90 TABLET | Refills: 0 | Status: SHIPPED | OUTPATIENT
Start: 2021-01-01

## 2021-01-01 RX ORDER — MIDODRINE HYDROCHLORIDE 2.5 MG/1
2.5 TABLET ORAL
Status: DISCONTINUED | OUTPATIENT
Start: 2021-01-01 | End: 2021-01-01 | Stop reason: HOSPADM

## 2021-01-01 RX ORDER — FAMOTIDINE 20 MG/1
20 TABLET, FILM COATED ORAL 2 TIMES DAILY
Status: DISCONTINUED | OUTPATIENT
Start: 2021-01-01 | End: 2021-01-01

## 2021-01-01 RX ORDER — GLYCOPYRROLATE 0.2 MG/ML
0.2 INJECTION INTRAMUSCULAR; INTRAVENOUS EVERY 4 HOURS PRN
Status: CANCELLED | OUTPATIENT
Start: 2021-01-01

## 2021-01-01 RX ORDER — HEPARIN SODIUM 5000 [USP'U]/ML
5000 INJECTION, SOLUTION INTRAVENOUS; SUBCUTANEOUS EVERY 8 HOURS SCHEDULED
Status: DISCONTINUED | OUTPATIENT
Start: 2021-01-01 | End: 2021-01-01 | Stop reason: HOSPADM

## 2021-01-01 RX ORDER — MORPHINE SULFATE 2 MG/ML
1 INJECTION, SOLUTION INTRAMUSCULAR; INTRAVENOUS
Status: DISCONTINUED | OUTPATIENT
Start: 2021-01-01 | End: 2021-01-01

## 2021-01-01 RX ORDER — OXYCODONE HYDROCHLORIDE AND ACETAMINOPHEN 5; 325 MG/1; MG/1
1 TABLET ORAL EVERY 6 HOURS PRN
Status: DISCONTINUED | OUTPATIENT
Start: 2021-01-01 | End: 2021-01-01

## 2021-01-01 RX ORDER — PREDNISONE 20 MG/1
40 TABLET ORAL DAILY
Qty: 60 TABLET | Refills: 0 | Status: SHIPPED | OUTPATIENT
Start: 2021-01-01 | End: 2021-01-01

## 2021-01-01 RX ORDER — INDOMETHACIN 25 MG/1
25 CAPSULE ORAL 3 TIMES DAILY PRN
Status: DISCONTINUED | OUTPATIENT
Start: 2021-01-01 | End: 2021-01-01

## 2021-01-01 RX ORDER — LORAZEPAM 2 MG/ML
1 INJECTION INTRAMUSCULAR
Status: CANCELLED | OUTPATIENT
Start: 2021-01-01

## 2021-01-01 RX ORDER — OXYCODONE HYDROCHLORIDE AND ACETAMINOPHEN 5; 325 MG/1; MG/1
1 TABLET ORAL EVERY 4 HOURS PRN
Status: DISCONTINUED | OUTPATIENT
Start: 2021-01-01 | End: 2021-01-01

## 2021-01-01 RX ORDER — SPIRONOLACTONE 25 MG/1
50 TABLET ORAL 2 TIMES DAILY
Status: DISCONTINUED | OUTPATIENT
Start: 2021-01-01 | End: 2021-01-01

## 2021-01-01 RX ORDER — TRAZODONE HYDROCHLORIDE 50 MG/1
50 TABLET ORAL NIGHTLY
Status: DISCONTINUED | OUTPATIENT
Start: 2021-01-01 | End: 2021-01-01 | Stop reason: HOSPADM

## 2021-01-01 RX ORDER — MIDODRINE HYDROCHLORIDE 10 MG/1
10 TABLET ORAL
Status: DISCONTINUED | OUTPATIENT
Start: 2021-01-01 | End: 2021-01-01

## 2021-01-01 RX ORDER — ACETAMINOPHEN 650 MG/1
650 SUPPOSITORY RECTAL EVERY 6 HOURS PRN
Status: CANCELLED | OUTPATIENT
Start: 2021-01-01

## 2021-01-01 RX ORDER — DEXTROSE MONOHYDRATE 25 G/50ML
12.5 INJECTION, SOLUTION INTRAVENOUS PRN
Status: DISCONTINUED | OUTPATIENT
Start: 2021-01-01 | End: 2021-01-01 | Stop reason: HOSPADM

## 2021-01-01 RX ORDER — IPRATROPIUM BROMIDE AND ALBUTEROL SULFATE 2.5; .5 MG/3ML; MG/3ML
3 SOLUTION RESPIRATORY (INHALATION) EVERY 4 HOURS PRN
Qty: 360 ML | Refills: 0 | Status: SHIPPED | OUTPATIENT
Start: 2021-01-01 | End: 2021-01-01

## 2021-01-01 RX ORDER — OXYCODONE HYDROCHLORIDE AND ACETAMINOPHEN 5; 325 MG/1; MG/1
1 TABLET ORAL EVERY 6 HOURS PRN
COMMUNITY

## 2021-01-01 RX ORDER — MORPHINE SULFATE 2 MG/ML
2 INJECTION, SOLUTION INTRAMUSCULAR; INTRAVENOUS EVERY 4 HOURS PRN
Status: DISCONTINUED | OUTPATIENT
Start: 2021-01-01 | End: 2021-01-01 | Stop reason: HOSPADM

## 2021-01-01 RX ORDER — POLYETHYLENE GLYCOL 3350 17 G/17G
17 POWDER, FOR SOLUTION ORAL DAILY PRN
Qty: 527 G | Refills: 1 | Status: SHIPPED | OUTPATIENT
Start: 2021-01-01 | End: 2021-01-01

## 2021-01-01 RX ORDER — COSYNTROPIN 0.25 MG/ML
250 INJECTION, POWDER, FOR SOLUTION INTRAMUSCULAR; INTRAVENOUS ONCE
Status: COMPLETED | OUTPATIENT
Start: 2021-01-01 | End: 2021-01-01

## 2021-01-01 RX ORDER — IPRATROPIUM BROMIDE AND ALBUTEROL SULFATE 2.5; .5 MG/3ML; MG/3ML
3 SOLUTION RESPIRATORY (INHALATION) EVERY 4 HOURS PRN
Status: DISCONTINUED | OUTPATIENT
Start: 2021-01-01 | End: 2021-01-01 | Stop reason: HOSPADM

## 2021-01-01 RX ORDER — INDOMETHACIN 25 MG/1
25 CAPSULE ORAL PRN
Status: ON HOLD | COMMUNITY
End: 2021-01-01 | Stop reason: HOSPADM

## 2021-01-01 RX ORDER — NICOTINE POLACRILEX 4 MG
15 LOZENGE BUCCAL PRN
Status: DISCONTINUED | OUTPATIENT
Start: 2021-01-01 | End: 2021-01-01

## 2021-01-01 RX ORDER — LORAZEPAM 2 MG/ML
1.5 INJECTION INTRAMUSCULAR
Status: CANCELLED | OUTPATIENT
Start: 2021-01-01

## 2021-01-01 RX ORDER — LEVOTHYROXINE SODIUM 0.12 MG/1
62.5 TABLET ORAL DAILY
Qty: 30 TABLET | Refills: 0 | Status: ON HOLD | OUTPATIENT
Start: 2021-01-01 | End: 2021-01-01

## 2021-01-01 RX ORDER — GLYCOPYRROLATE 0.2 MG/ML
0.2 INJECTION INTRAMUSCULAR; INTRAVENOUS EVERY 4 HOURS PRN
Status: DISCONTINUED | OUTPATIENT
Start: 2021-01-01 | End: 2021-01-01 | Stop reason: HOSPADM

## 2021-01-01 RX ORDER — OMEPRAZOLE 40 MG/1
40 CAPSULE, DELAYED RELEASE ORAL DAILY
Status: ON HOLD | COMMUNITY
End: 2021-01-01 | Stop reason: HOSPADM

## 2021-01-01 RX ORDER — POLYETHYLENE GLYCOL 3350 17 G/17G
17 POWDER, FOR SOLUTION ORAL DAILY PRN
Qty: 527 G | Refills: 0 | Status: SHIPPED | OUTPATIENT
Start: 2021-01-01 | End: 2021-01-01

## 2021-01-01 RX ORDER — MORPHINE SULFATE 2 MG/ML
2 INJECTION, SOLUTION INTRAMUSCULAR; INTRAVENOUS
Status: DISCONTINUED | OUTPATIENT
Start: 2021-01-01 | End: 2021-01-01 | Stop reason: HOSPADM

## 2021-01-01 RX ORDER — PREDNISONE 20 MG/1
40 TABLET ORAL DAILY
Status: DISCONTINUED | OUTPATIENT
Start: 2021-01-01 | End: 2021-01-01 | Stop reason: HOSPADM

## 2021-01-01 RX ORDER — SODIUM POLYSTYRENE SULFONATE 15 G/60ML
30 SUSPENSION ORAL; RECTAL ONCE
Status: DISCONTINUED | OUTPATIENT
Start: 2021-01-01 | End: 2021-01-01

## 2021-01-01 RX ORDER — METHYLPREDNISOLONE SODIUM SUCCINATE 125 MG/2ML
250 INJECTION, POWDER, LYOPHILIZED, FOR SOLUTION INTRAMUSCULAR; INTRAVENOUS DAILY
Status: DISCONTINUED | OUTPATIENT
Start: 2021-01-01 | End: 2021-01-01 | Stop reason: SDUPTHER

## 2021-01-01 RX ORDER — HALOPERIDOL 5 MG/ML
1 INJECTION INTRAMUSCULAR EVERY 4 HOURS PRN
Status: CANCELLED | OUTPATIENT
Start: 2021-01-01

## 2021-01-01 RX ADMIN — ALLOPURINOL 100 MG: 100 TABLET ORAL at 07:40

## 2021-01-01 RX ADMIN — LEVOTHYROXINE SODIUM 62.5 MCG: 0.12 TABLET ORAL at 07:04

## 2021-01-01 RX ADMIN — TRAZODONE HYDROCHLORIDE 50 MG: 50 TABLET ORAL at 20:59

## 2021-01-01 RX ADMIN — LEVOTHYROXINE SODIUM 62.5 MCG: 0.12 TABLET ORAL at 06:07

## 2021-01-01 RX ADMIN — IPRATROPIUM BROMIDE AND ALBUTEROL SULFATE 1 AMPULE: .5; 3 SOLUTION RESPIRATORY (INHALATION) at 03:39

## 2021-01-01 RX ADMIN — BUMETANIDE 1 MG: 0.25 INJECTION, SOLUTION INTRAMUSCULAR; INTRAVENOUS at 21:23

## 2021-01-01 RX ADMIN — INSULIN LISPRO 1 UNITS: 100 INJECTION, SOLUTION INTRAVENOUS; SUBCUTANEOUS at 07:58

## 2021-01-01 RX ADMIN — PROMETHAZINE HYDROCHLORIDE 12.5 MG: 25 TABLET ORAL at 09:03

## 2021-01-01 RX ADMIN — CEPHALEXIN 500 MG: 500 CAPSULE ORAL at 03:37

## 2021-01-01 RX ADMIN — ALTEPLASE 6 MG: 2.2 INJECTION, POWDER, LYOPHILIZED, FOR SOLUTION INTRAVENOUS at 10:22

## 2021-01-01 RX ADMIN — IPRATROPIUM BROMIDE AND ALBUTEROL SULFATE 3 ML: .5; 3 SOLUTION RESPIRATORY (INHALATION) at 12:58

## 2021-01-01 RX ADMIN — INSULIN LISPRO 1 UNITS: 100 INJECTION, SOLUTION INTRAVENOUS; SUBCUTANEOUS at 12:01

## 2021-01-01 RX ADMIN — DIPHENHYDRAMINE HYDROCHLORIDE 25 MG: 50 INJECTION, SOLUTION INTRAMUSCULAR; INTRAVENOUS at 04:42

## 2021-01-01 RX ADMIN — ALPRAZOLAM 0.25 MG: 0.25 TABLET ORAL at 08:15

## 2021-01-01 RX ADMIN — PROMETHAZINE HYDROCHLORIDE 12.5 MG: 25 TABLET ORAL at 06:57

## 2021-01-01 RX ADMIN — SODIUM CHLORIDE 1000 ML: 9 INJECTION, SOLUTION INTRAVENOUS at 19:33

## 2021-01-01 RX ADMIN — CEFEPIME HYDROCHLORIDE 2000 MG: 2 INJECTION, POWDER, FOR SOLUTION INTRAVENOUS at 03:47

## 2021-01-01 RX ADMIN — CEFEPIME HYDROCHLORIDE 2000 MG: 2 INJECTION, POWDER, FOR SOLUTION INTRAVENOUS at 02:15

## 2021-01-01 RX ADMIN — ENOXAPARIN SODIUM 40 MG: 40 INJECTION SUBCUTANEOUS at 17:19

## 2021-01-01 RX ADMIN — FAMOTIDINE 20 MG: 20 TABLET, FILM COATED ORAL at 08:15

## 2021-01-01 RX ADMIN — IPRATROPIUM BROMIDE AND ALBUTEROL SULFATE 1 AMPULE: .5; 3 SOLUTION RESPIRATORY (INHALATION) at 04:05

## 2021-01-01 RX ADMIN — SPIRONOLACTONE 50 MG: 25 TABLET ORAL at 08:49

## 2021-01-01 RX ADMIN — CEPHALEXIN 500 MG: 500 CAPSULE ORAL at 20:16

## 2021-01-01 RX ADMIN — CEFEPIME HYDROCHLORIDE 2000 MG: 2 INJECTION, POWDER, FOR SOLUTION INTRAVENOUS at 21:31

## 2021-01-01 RX ADMIN — SODIUM CHLORIDE, PRESERVATIVE FREE 10 ML: 5 INJECTION INTRAVENOUS at 19:41

## 2021-01-01 RX ADMIN — FAMOTIDINE 20 MG: 20 TABLET ORAL at 11:02

## 2021-01-01 RX ADMIN — PREDNISONE 60 MG: 20 TABLET ORAL at 07:52

## 2021-01-01 RX ADMIN — ACETAMINOPHEN 650 MG: 325 TABLET ORAL at 10:15

## 2021-01-01 RX ADMIN — IPRATROPIUM BROMIDE AND ALBUTEROL SULFATE 1 AMPULE: .5; 3 SOLUTION RESPIRATORY (INHALATION) at 04:47

## 2021-01-01 RX ADMIN — MORPHINE SULFATE 15 MG: 15 TABLET, FILM COATED, EXTENDED RELEASE ORAL at 03:15

## 2021-01-01 RX ADMIN — DRONABINOL 2.5 MG: 2.5 CAPSULE ORAL at 09:34

## 2021-01-01 RX ADMIN — IPRATROPIUM BROMIDE AND ALBUTEROL SULFATE 1 AMPULE: .5; 3 SOLUTION RESPIRATORY (INHALATION) at 20:13

## 2021-01-01 RX ADMIN — CEPHALEXIN 500 MG: 500 CAPSULE ORAL at 08:23

## 2021-01-01 RX ADMIN — TRAMADOL HYDROCHLORIDE 50 MG: 50 TABLET, COATED ORAL at 18:46

## 2021-01-01 RX ADMIN — IPRATROPIUM BROMIDE AND ALBUTEROL SULFATE 1 AMPULE: .5; 3 SOLUTION RESPIRATORY (INHALATION) at 15:46

## 2021-01-01 RX ADMIN — LEVOTHYROXINE SODIUM 25 MCG: 0.03 TABLET ORAL at 05:40

## 2021-01-01 RX ADMIN — MORPHINE SULFATE 30 MG: 1 INJECTION INTRAVENOUS at 22:36

## 2021-01-01 RX ADMIN — IPRATROPIUM BROMIDE AND ALBUTEROL SULFATE 1 AMPULE: .5; 3 SOLUTION RESPIRATORY (INHALATION) at 00:17

## 2021-01-01 RX ADMIN — MIDODRINE HYDROCHLORIDE 2.5 MG: 2.5 TABLET ORAL at 18:46

## 2021-01-01 RX ADMIN — ALPRAZOLAM 0.25 MG: 0.25 TABLET ORAL at 02:25

## 2021-01-01 RX ADMIN — MIDODRINE HYDROCHLORIDE 10 MG: 10 TABLET ORAL at 07:40

## 2021-01-01 RX ADMIN — CALCIUM GLUCONATE 1000 MG: 98 INJECTION, SOLUTION INTRAVENOUS at 09:09

## 2021-01-01 RX ADMIN — SENNOSIDES 17.2 MG: 8.6 TABLET, FILM COATED ORAL at 20:16

## 2021-01-01 RX ADMIN — MORPHINE SULFATE 15 MG: 15 TABLET, FILM COATED, EXTENDED RELEASE ORAL at 04:59

## 2021-01-01 RX ADMIN — ONDANSETRON 4 MG: 2 INJECTION INTRAMUSCULAR; INTRAVENOUS at 06:14

## 2021-01-01 RX ADMIN — DRONABINOL 2.5 MG: 2.5 CAPSULE ORAL at 21:39

## 2021-01-01 RX ADMIN — FAMOTIDINE 20 MG: 20 TABLET ORAL at 10:07

## 2021-01-01 RX ADMIN — ONDANSETRON 4 MG: 2 INJECTION INTRAMUSCULAR; INTRAVENOUS at 10:04

## 2021-01-01 RX ADMIN — IPRATROPIUM BROMIDE AND ALBUTEROL SULFATE 3 ML: .5; 3 SOLUTION RESPIRATORY (INHALATION) at 09:24

## 2021-01-01 RX ADMIN — CEPHALEXIN 500 MG: 500 CAPSULE ORAL at 03:02

## 2021-01-01 RX ADMIN — IPRATROPIUM BROMIDE AND ALBUTEROL SULFATE 1 AMPULE: .5; 3 SOLUTION RESPIRATORY (INHALATION) at 01:54

## 2021-01-01 RX ADMIN — ACETAMINOPHEN 650 MG: 325 TABLET ORAL at 18:26

## 2021-01-01 RX ADMIN — PREDNISONE 40 MG: 20 TABLET ORAL at 10:15

## 2021-01-01 RX ADMIN — INSULIN LISPRO 3 UNITS: 100 INJECTION, SOLUTION INTRAVENOUS; SUBCUTANEOUS at 17:06

## 2021-01-01 RX ADMIN — IPRATROPIUM BROMIDE AND ALBUTEROL SULFATE 1 AMPULE: .5; 3 SOLUTION RESPIRATORY (INHALATION) at 05:03

## 2021-01-01 RX ADMIN — ONDANSETRON 4 MG: 2 INJECTION INTRAMUSCULAR; INTRAVENOUS at 18:55

## 2021-01-01 RX ADMIN — EPOETIN ALFA-EPBX 10000 UNITS: 10000 INJECTION, SOLUTION INTRAVENOUS; SUBCUTANEOUS at 08:40

## 2021-01-01 RX ADMIN — MORPHINE SULFATE 15 MG: 15 TABLET, FILM COATED, EXTENDED RELEASE ORAL at 04:58

## 2021-01-01 RX ADMIN — CEPHALEXIN 500 MG: 500 CAPSULE ORAL at 09:40

## 2021-01-01 RX ADMIN — DIPHENHYDRAMINE HYDROCHLORIDE 25 MG: 50 INJECTION, SOLUTION INTRAMUSCULAR; INTRAVENOUS at 20:26

## 2021-01-01 RX ADMIN — IPRATROPIUM BROMIDE AND ALBUTEROL SULFATE 1 AMPULE: .5; 3 SOLUTION RESPIRATORY (INHALATION) at 08:38

## 2021-01-01 RX ADMIN — ONDANSETRON 4 MG: 2 INJECTION INTRAMUSCULAR; INTRAVENOUS at 08:01

## 2021-01-01 RX ADMIN — MIDODRINE HYDROCHLORIDE 2.5 MG: 2.5 TABLET ORAL at 16:27

## 2021-01-01 RX ADMIN — IPRATROPIUM BROMIDE AND ALBUTEROL SULFATE 1 AMPULE: .5; 3 SOLUTION RESPIRATORY (INHALATION) at 08:35

## 2021-01-01 RX ADMIN — MIDODRINE HYDROCHLORIDE 5 MG: 5 TABLET ORAL at 09:35

## 2021-01-01 RX ADMIN — SODIUM CHLORIDE 250 MG: 9 INJECTION, SOLUTION INTRAVENOUS at 07:54

## 2021-01-01 RX ADMIN — MORPHINE SULFATE 15 MG: 15 TABLET, FILM COATED, EXTENDED RELEASE ORAL at 04:04

## 2021-01-01 RX ADMIN — NALOXEGOL OXALATE 12.5 MG: 12.5 TABLET, FILM COATED ORAL at 08:06

## 2021-01-01 RX ADMIN — SODIUM CHLORIDE, PRESERVATIVE FREE 10 ML: 5 INJECTION INTRAVENOUS at 20:26

## 2021-01-01 RX ADMIN — MIDODRINE HYDROCHLORIDE 2.5 MG: 2.5 TABLET ORAL at 09:39

## 2021-01-01 RX ADMIN — MORPHINE SULFATE 1 MG: 2 INJECTION, SOLUTION INTRAMUSCULAR; INTRAVENOUS at 21:32

## 2021-01-01 RX ADMIN — PREDNISONE 60 MG: 20 TABLET ORAL at 09:26

## 2021-01-01 RX ADMIN — ALPRAZOLAM 0.25 MG: 0.25 TABLET ORAL at 06:29

## 2021-01-01 RX ADMIN — IPRATROPIUM BROMIDE AND ALBUTEROL SULFATE 1 AMPULE: .5; 3 SOLUTION RESPIRATORY (INHALATION) at 12:30

## 2021-01-01 RX ADMIN — ACETAMINOPHEN 650 MG: 325 TABLET ORAL at 01:33

## 2021-01-01 RX ADMIN — TRAZODONE HYDROCHLORIDE 50 MG: 50 TABLET ORAL at 23:20

## 2021-01-01 RX ADMIN — PANTOPRAZOLE SODIUM 40 MG: 40 TABLET, DELAYED RELEASE ORAL at 08:48

## 2021-01-01 RX ADMIN — CEPHALEXIN 500 MG: 500 CAPSULE ORAL at 15:21

## 2021-01-01 RX ADMIN — OXYCODONE HYDROCHLORIDE AND ACETAMINOPHEN 1 TABLET: 5; 325 TABLET ORAL at 12:45

## 2021-01-01 RX ADMIN — OXYCODONE HYDROCHLORIDE AND ACETAMINOPHEN 1 TABLET: 5; 325 TABLET ORAL at 00:01

## 2021-01-01 RX ADMIN — ALPRAZOLAM 0.25 MG: 0.25 TABLET ORAL at 11:33

## 2021-01-01 RX ADMIN — PROMETHAZINE HYDROCHLORIDE 25 MG: 25 TABLET ORAL at 17:09

## 2021-01-01 RX ADMIN — DRONABINOL 2.5 MG: 2.5 CAPSULE ORAL at 09:09

## 2021-01-01 RX ADMIN — OXYCODONE HYDROCHLORIDE AND ACETAMINOPHEN 1 TABLET: 5; 325 TABLET ORAL at 04:05

## 2021-01-01 RX ADMIN — MORPHINE SULFATE 15 MG: 15 TABLET, FILM COATED, EXTENDED RELEASE ORAL at 16:10

## 2021-01-01 RX ADMIN — IPRATROPIUM BROMIDE AND ALBUTEROL SULFATE 1 AMPULE: .5; 3 SOLUTION RESPIRATORY (INHALATION) at 04:33

## 2021-01-01 RX ADMIN — PROMETHAZINE HYDROCHLORIDE 25 MG: 25 TABLET ORAL at 16:34

## 2021-01-01 RX ADMIN — EPOETIN ALFA-EPBX 10000 UNITS: 10000 INJECTION, SOLUTION INTRAVENOUS; SUBCUTANEOUS at 15:20

## 2021-01-01 RX ADMIN — PROMETHAZINE HYDROCHLORIDE 25 MG: 25 TABLET ORAL at 08:43

## 2021-01-01 RX ADMIN — OXYCODONE HYDROCHLORIDE AND ACETAMINOPHEN 1 TABLET: 7.5; 325 TABLET ORAL at 15:50

## 2021-01-01 RX ADMIN — LEVOTHYROXINE SODIUM 25 MCG: 0.03 TABLET ORAL at 04:43

## 2021-01-01 RX ADMIN — ONDANSETRON 4 MG: 2 INJECTION INTRAMUSCULAR; INTRAVENOUS at 09:47

## 2021-01-01 RX ADMIN — IPRATROPIUM BROMIDE AND ALBUTEROL SULFATE 1 AMPULE: .5; 3 SOLUTION RESPIRATORY (INHALATION) at 07:51

## 2021-01-01 RX ADMIN — PROMETHAZINE HYDROCHLORIDE 12.5 MG: 25 TABLET ORAL at 07:58

## 2021-01-01 RX ADMIN — MORPHINE SULFATE 15 MG: 15 TABLET, FILM COATED, EXTENDED RELEASE ORAL at 04:22

## 2021-01-01 RX ADMIN — FAMOTIDINE 20 MG: 20 TABLET ORAL at 07:49

## 2021-01-01 RX ADMIN — PREDNISONE 10 MG: 20 TABLET ORAL at 08:36

## 2021-01-01 RX ADMIN — OXYCODONE HYDROCHLORIDE AND ACETAMINOPHEN 1 TABLET: 5; 325 TABLET ORAL at 07:36

## 2021-01-01 RX ADMIN — IPRATROPIUM BROMIDE AND ALBUTEROL SULFATE 1 AMPULE: .5; 3 SOLUTION RESPIRATORY (INHALATION) at 16:38

## 2021-01-01 RX ADMIN — IPRATROPIUM BROMIDE AND ALBUTEROL SULFATE 1 AMPULE: .5; 3 SOLUTION RESPIRATORY (INHALATION) at 21:36

## 2021-01-01 RX ADMIN — SODIUM ZIRCONIUM CYCLOSILICATE 10 G: 5 POWDER, FOR SUSPENSION ORAL at 08:23

## 2021-01-01 RX ADMIN — SODIUM CHLORIDE: 9 INJECTION, SOLUTION INTRAVENOUS at 08:45

## 2021-01-01 RX ADMIN — DRONABINOL 2.5 MG: 2.5 CAPSULE ORAL at 11:33

## 2021-01-01 RX ADMIN — SODIUM ZIRCONIUM CYCLOSILICATE 10 G: 5 POWDER, FOR SUSPENSION ORAL at 11:10

## 2021-01-01 RX ADMIN — POLYETHYLENE GLYCOL 3350 17 G: 17 POWDER, FOR SOLUTION ORAL at 07:48

## 2021-01-01 RX ADMIN — SODIUM ZIRCONIUM CYCLOSILICATE 10 G: 5 POWDER, FOR SUSPENSION ORAL at 09:18

## 2021-01-01 RX ADMIN — LEVOTHYROXINE SODIUM 62.5 MCG: 0.12 TABLET ORAL at 08:29

## 2021-01-01 RX ADMIN — EPOETIN ALFA-EPBX 10000 UNITS: 10000 INJECTION, SOLUTION INTRAVENOUS; SUBCUTANEOUS at 10:53

## 2021-01-01 RX ADMIN — IPRATROPIUM BROMIDE AND ALBUTEROL SULFATE 1 AMPULE: .5; 3 SOLUTION RESPIRATORY (INHALATION) at 12:21

## 2021-01-01 RX ADMIN — SODIUM POLYSTYRENE SULFONATE 30 G: 15 SUSPENSION ORAL; RECTAL at 20:53

## 2021-01-01 RX ADMIN — SODIUM ZIRCONIUM CYCLOSILICATE 10 G: 5 POWDER, FOR SUSPENSION ORAL at 11:33

## 2021-01-01 RX ADMIN — EPOETIN ALFA-EPBX 10000 UNITS: 10000 INJECTION, SOLUTION INTRAVENOUS; SUBCUTANEOUS at 11:27

## 2021-01-01 RX ADMIN — DRONABINOL 2.5 MG: 2.5 CAPSULE ORAL at 09:39

## 2021-01-01 RX ADMIN — OXYCODONE HYDROCHLORIDE AND ACETAMINOPHEN 1 TABLET: 7.5; 325 TABLET ORAL at 22:57

## 2021-01-01 RX ADMIN — DOCUSATE SODIUM 100 MG: 100 CAPSULE ORAL at 08:22

## 2021-01-01 RX ADMIN — TRAZODONE HYDROCHLORIDE 50 MG: 50 TABLET ORAL at 20:19

## 2021-01-01 RX ADMIN — IOPAMIDOL 80 ML: 755 INJECTION, SOLUTION INTRAVENOUS at 20:32

## 2021-01-01 RX ADMIN — ONDANSETRON 4 MG: 2 INJECTION INTRAMUSCULAR; INTRAVENOUS at 19:11

## 2021-01-01 RX ADMIN — MORPHINE SULFATE 1 MG: 2 INJECTION, SOLUTION INTRAMUSCULAR; INTRAVENOUS at 05:46

## 2021-01-01 RX ADMIN — IPRATROPIUM BROMIDE AND ALBUTEROL SULFATE 1 AMPULE: .5; 3 SOLUTION RESPIRATORY (INHALATION) at 12:25

## 2021-01-01 RX ADMIN — OXYCODONE HYDROCHLORIDE AND ACETAMINOPHEN 1 TABLET: 7.5; 325 TABLET ORAL at 05:38

## 2021-01-01 RX ADMIN — BUMETANIDE 1 MG: 0.25 INJECTION INTRAMUSCULAR; INTRAVENOUS at 10:48

## 2021-01-01 RX ADMIN — ONDANSETRON 4 MG: 2 INJECTION INTRAMUSCULAR; INTRAVENOUS at 18:25

## 2021-01-01 RX ADMIN — BUMETANIDE 1 MG: 0.25 INJECTION INTRAMUSCULAR; INTRAVENOUS at 08:24

## 2021-01-01 RX ADMIN — TRAMADOL HYDROCHLORIDE 50 MG: 50 TABLET, FILM COATED ORAL at 13:00

## 2021-01-01 RX ADMIN — INSULIN LISPRO 1 UNITS: 100 INJECTION, SOLUTION INTRAVENOUS; SUBCUTANEOUS at 17:23

## 2021-01-01 RX ADMIN — DRONABINOL 2.5 MG: 2.5 CAPSULE ORAL at 21:05

## 2021-01-01 RX ADMIN — HEPARIN SODIUM 5000 UNITS: 5000 INJECTION INTRAVENOUS; SUBCUTANEOUS at 05:35

## 2021-01-01 RX ADMIN — DOCUSATE SODIUM 100 MG: 100 CAPSULE ORAL at 07:49

## 2021-01-01 RX ADMIN — MIDODRINE HYDROCHLORIDE 10 MG: 10 TABLET ORAL at 08:21

## 2021-01-01 RX ADMIN — MIDODRINE HYDROCHLORIDE 10 MG: 10 TABLET ORAL at 16:55

## 2021-01-01 RX ADMIN — ONDANSETRON 4 MG: 2 INJECTION INTRAMUSCULAR; INTRAVENOUS at 21:21

## 2021-01-01 RX ADMIN — CEFEPIME HYDROCHLORIDE 2000 MG: 2 INJECTION, POWDER, FOR SOLUTION INTRAVENOUS at 17:10

## 2021-01-01 RX ADMIN — TRAZODONE HYDROCHLORIDE 50 MG: 50 TABLET ORAL at 21:31

## 2021-01-01 RX ADMIN — OXYCODONE HYDROCHLORIDE AND ACETAMINOPHEN 1 TABLET: 7.5; 325 TABLET ORAL at 02:58

## 2021-01-01 RX ADMIN — MORPHINE SULFATE 15 MG: 15 TABLET, FILM COATED, EXTENDED RELEASE ORAL at 10:07

## 2021-01-01 RX ADMIN — OXYCODONE HYDROCHLORIDE AND ACETAMINOPHEN 1 TABLET: 5; 325 TABLET ORAL at 07:26

## 2021-01-01 RX ADMIN — OXYCODONE HYDROCHLORIDE AND ACETAMINOPHEN 1 TABLET: 5; 325 TABLET ORAL at 21:30

## 2021-01-01 RX ADMIN — MORPHINE SULFATE 15 MG: 15 TABLET, FILM COATED, EXTENDED RELEASE ORAL at 04:17

## 2021-01-01 RX ADMIN — DRONABINOL 2.5 MG: 2.5 CAPSULE ORAL at 21:44

## 2021-01-01 RX ADMIN — FAMOTIDINE 20 MG: 20 TABLET, FILM COATED ORAL at 08:28

## 2021-01-01 RX ADMIN — CEPHALEXIN 500 MG: 500 CAPSULE ORAL at 15:34

## 2021-01-01 RX ADMIN — MORPHINE SULFATE 15 MG: 15 TABLET, FILM COATED, EXTENDED RELEASE ORAL at 16:13

## 2021-01-01 RX ADMIN — SENNOSIDES 17.2 MG: 8.6 TABLET, FILM COATED ORAL at 21:12

## 2021-01-01 RX ADMIN — VANCOMYCIN HYDROCHLORIDE 1500 MG: 5 INJECTION, POWDER, LYOPHILIZED, FOR SOLUTION INTRAVENOUS at 08:14

## 2021-01-01 RX ADMIN — CEPHALEXIN 500 MG: 500 CAPSULE ORAL at 14:21

## 2021-01-01 RX ADMIN — INSULIN HUMAN 10 UNITS: 100 INJECTION, SOLUTION PARENTERAL at 08:26

## 2021-01-01 RX ADMIN — IPRATROPIUM BROMIDE AND ALBUTEROL SULFATE 3 ML: .5; 3 SOLUTION RESPIRATORY (INHALATION) at 22:43

## 2021-01-01 RX ADMIN — IPRATROPIUM BROMIDE AND ALBUTEROL SULFATE 3 ML: .5; 3 SOLUTION RESPIRATORY (INHALATION) at 05:40

## 2021-01-01 RX ADMIN — DRONABINOL 2.5 MG: 2.5 CAPSULE ORAL at 20:49

## 2021-01-01 RX ADMIN — CEPHALEXIN 500 MG: 500 CAPSULE ORAL at 20:58

## 2021-01-01 RX ADMIN — LEVOTHYROXINE SODIUM 62.5 MCG: 0.12 TABLET ORAL at 05:26

## 2021-01-01 RX ADMIN — IPRATROPIUM BROMIDE AND ALBUTEROL SULFATE 1 AMPULE: .5; 3 SOLUTION RESPIRATORY (INHALATION) at 16:01

## 2021-01-01 RX ADMIN — IPRATROPIUM BROMIDE AND ALBUTEROL SULFATE 3 ML: .5; 3 SOLUTION RESPIRATORY (INHALATION) at 07:55

## 2021-01-01 RX ADMIN — CEPHALEXIN 500 MG: 500 CAPSULE ORAL at 04:17

## 2021-01-01 RX ADMIN — EPOETIN ALFA-EPBX 10000 UNITS: 10000 INJECTION, SOLUTION INTRAVENOUS; SUBCUTANEOUS at 10:04

## 2021-01-01 RX ADMIN — CEFEPIME HYDROCHLORIDE 2000 MG: 2 INJECTION, POWDER, FOR SOLUTION INTRAVENOUS at 16:27

## 2021-01-01 RX ADMIN — MIDODRINE HYDROCHLORIDE 2.5 MG: 2.5 TABLET ORAL at 14:19

## 2021-01-01 RX ADMIN — TRAZODONE HYDROCHLORIDE 50 MG: 50 TABLET ORAL at 21:02

## 2021-01-01 RX ADMIN — ACETAMINOPHEN 650 MG: 325 TABLET ORAL at 05:34

## 2021-01-01 RX ADMIN — IPRATROPIUM BROMIDE AND ALBUTEROL SULFATE 1 AMPULE: .5; 3 SOLUTION RESPIRATORY (INHALATION) at 01:08

## 2021-01-01 RX ADMIN — MIDODRINE HYDROCHLORIDE 5 MG: 5 TABLET ORAL at 12:09

## 2021-01-01 RX ADMIN — TRAMADOL HYDROCHLORIDE 50 MG: 50 TABLET, FILM COATED ORAL at 04:10

## 2021-01-01 RX ADMIN — OXYCODONE HYDROCHLORIDE AND ACETAMINOPHEN 1 TABLET: 7.5; 325 TABLET ORAL at 12:45

## 2021-01-01 RX ADMIN — DOCUSATE SODIUM 100 MG: 100 CAPSULE ORAL at 09:40

## 2021-01-01 RX ADMIN — SPIRONOLACTONE 50 MG: 25 TABLET ORAL at 17:32

## 2021-01-01 RX ADMIN — MORPHINE SULFATE 2 MG: 2 INJECTION, SOLUTION INTRAMUSCULAR; INTRAVENOUS at 06:29

## 2021-01-01 RX ADMIN — ONDANSETRON 4 MG: 2 INJECTION INTRAMUSCULAR; INTRAVENOUS at 10:05

## 2021-01-01 RX ADMIN — ONDANSETRON 4 MG: 2 INJECTION INTRAMUSCULAR; INTRAVENOUS at 23:19

## 2021-01-01 RX ADMIN — ALPRAZOLAM 0.25 MG: 0.25 TABLET ORAL at 18:25

## 2021-01-01 RX ADMIN — OXYCODONE HYDROCHLORIDE AND ACETAMINOPHEN 1 TABLET: 7.5; 325 TABLET ORAL at 07:21

## 2021-01-01 RX ADMIN — FAMOTIDINE 20 MG: 20 TABLET, FILM COATED ORAL at 08:10

## 2021-01-01 RX ADMIN — CEFEPIME HYDROCHLORIDE 2000 MG: 2 INJECTION, POWDER, FOR SOLUTION INTRAVENOUS at 07:54

## 2021-01-01 RX ADMIN — MORPHINE SULFATE 15 MG: 15 TABLET, FILM COATED, EXTENDED RELEASE ORAL at 17:55

## 2021-01-01 RX ADMIN — MORPHINE SULFATE 15 MG: 15 TABLET, FILM COATED, EXTENDED RELEASE ORAL at 17:01

## 2021-01-01 RX ADMIN — ALPRAZOLAM 0.25 MG: 0.25 TABLET ORAL at 14:01

## 2021-01-01 RX ADMIN — ONDANSETRON 4 MG: 2 INJECTION INTRAMUSCULAR; INTRAVENOUS at 10:18

## 2021-01-01 RX ADMIN — MORPHINE SULFATE 15 MG: 15 TABLET, FILM COATED, EXTENDED RELEASE ORAL at 05:04

## 2021-01-01 RX ADMIN — MORPHINE SULFATE 1 MG: 2 INJECTION, SOLUTION INTRAMUSCULAR; INTRAVENOUS at 09:07

## 2021-01-01 RX ADMIN — SODIUM CHLORIDE: 9 INJECTION, SOLUTION INTRAVENOUS at 14:55

## 2021-01-01 RX ADMIN — CEFEPIME HYDROCHLORIDE 2000 MG: 2 INJECTION, POWDER, FOR SOLUTION INTRAVENOUS at 01:38

## 2021-01-01 RX ADMIN — EPOETIN ALFA-EPBX 10000 UNITS: 10000 INJECTION, SOLUTION INTRAVENOUS; SUBCUTANEOUS at 14:55

## 2021-01-01 RX ADMIN — NALOXEGOL OXALATE 12.5 MG: 12.5 TABLET, FILM COATED ORAL at 11:02

## 2021-01-01 RX ADMIN — PROMETHAZINE HYDROCHLORIDE 12.5 MG: 25 TABLET ORAL at 06:30

## 2021-01-01 RX ADMIN — LEVOTHYROXINE SODIUM 25 MCG: 0.03 TABLET ORAL at 06:45

## 2021-01-01 RX ADMIN — CEFEPIME HYDROCHLORIDE 2000 MG: 2 INJECTION, POWDER, FOR SOLUTION INTRAVENOUS at 19:41

## 2021-01-01 RX ADMIN — EPOETIN ALFA-EPBX 10000 UNITS: 10000 INJECTION, SOLUTION INTRAVENOUS; SUBCUTANEOUS at 10:44

## 2021-01-01 RX ADMIN — OXYCODONE HYDROCHLORIDE AND ACETAMINOPHEN 1 TABLET: 5; 325 TABLET ORAL at 09:19

## 2021-01-01 RX ADMIN — LEVOTHYROXINE SODIUM 62.5 MCG: 0.12 TABLET ORAL at 05:48

## 2021-01-01 RX ADMIN — MIDODRINE HYDROCHLORIDE 10 MG: 10 TABLET ORAL at 13:18

## 2021-01-01 RX ADMIN — CALCIUM GLUCONATE 1000 MG: 98 INJECTION, SOLUTION INTRAVENOUS at 06:45

## 2021-01-01 RX ADMIN — FAMOTIDINE 20 MG: 20 TABLET, FILM COATED ORAL at 09:03

## 2021-01-01 RX ADMIN — MORPHINE SULFATE 15 MG: 15 TABLET, FILM COATED, EXTENDED RELEASE ORAL at 15:36

## 2021-01-01 RX ADMIN — IPRATROPIUM BROMIDE AND ALBUTEROL SULFATE 1 AMPULE: .5; 3 SOLUTION RESPIRATORY (INHALATION) at 01:13

## 2021-01-01 RX ADMIN — HEPARIN SODIUM 5000 UNITS: 5000 INJECTION INTRAVENOUS; SUBCUTANEOUS at 22:30

## 2021-01-01 RX ADMIN — CEFEPIME HYDROCHLORIDE 2000 MG: 2 INJECTION, POWDER, FOR SOLUTION INTRAVENOUS at 04:06

## 2021-01-01 RX ADMIN — PANTOPRAZOLE SODIUM 40 MG: 40 TABLET, DELAYED RELEASE ORAL at 08:17

## 2021-01-01 RX ADMIN — VANCOMYCIN HYDROCHLORIDE 1500 MG: 5 INJECTION, POWDER, LYOPHILIZED, FOR SOLUTION INTRAVENOUS at 22:24

## 2021-01-01 RX ADMIN — POLYETHYLENE GLYCOL 3350 17 G: 17 POWDER, FOR SOLUTION ORAL at 10:08

## 2021-01-01 RX ADMIN — OXYCODONE HYDROCHLORIDE AND ACETAMINOPHEN 1 TABLET: 5; 325 TABLET ORAL at 08:22

## 2021-01-01 RX ADMIN — MORPHINE SULFATE 15 MG: 15 TABLET, FILM COATED, EXTENDED RELEASE ORAL at 17:15

## 2021-01-01 RX ADMIN — TRAMADOL HYDROCHLORIDE 50 MG: 50 TABLET, FILM COATED ORAL at 15:39

## 2021-01-01 RX ADMIN — DEXTROSE MONOHYDRATE 25 G: 25 INJECTION, SOLUTION INTRAVENOUS at 15:41

## 2021-01-01 RX ADMIN — ALPRAZOLAM 0.25 MG: 0.25 TABLET ORAL at 01:00

## 2021-01-01 RX ADMIN — INSULIN LISPRO 1 UNITS: 100 INJECTION, SOLUTION INTRAVENOUS; SUBCUTANEOUS at 08:45

## 2021-01-01 RX ADMIN — OXYCODONE HYDROCHLORIDE AND ACETAMINOPHEN 1 TABLET: 7.5; 325 TABLET ORAL at 01:43

## 2021-01-01 RX ADMIN — MORPHINE SULFATE 2 MG: 2 INJECTION, SOLUTION INTRAMUSCULAR; INTRAVENOUS at 15:19

## 2021-01-01 RX ADMIN — VANCOMYCIN HYDROCHLORIDE 1500 MG: 5 INJECTION, POWDER, LYOPHILIZED, FOR SOLUTION INTRAVENOUS at 10:04

## 2021-01-01 RX ADMIN — SODIUM CHLORIDE, PRESERVATIVE FREE 10 ML: 5 INJECTION INTRAVENOUS at 20:19

## 2021-01-01 RX ADMIN — CEFEPIME HYDROCHLORIDE 2000 MG: 2 INJECTION, POWDER, FOR SOLUTION INTRAVENOUS at 21:13

## 2021-01-01 RX ADMIN — IPRATROPIUM BROMIDE AND ALBUTEROL SULFATE 1 AMPULE: .5; 3 SOLUTION RESPIRATORY (INHALATION) at 15:29

## 2021-01-01 RX ADMIN — Medication 1250 MG: at 13:13

## 2021-01-01 RX ADMIN — SODIUM POLYSTYRENE SULFONATE 30 G: 15 SUSPENSION ORAL; RECTAL at 15:41

## 2021-01-01 RX ADMIN — SODIUM POLYSTYRENE SULFONATE 30 G: 15 SUSPENSION ORAL; RECTAL at 08:26

## 2021-01-01 RX ADMIN — SODIUM CHLORIDE 240 MG: 9 INJECTION, SOLUTION INTRAVENOUS at 16:49

## 2021-01-01 RX ADMIN — MORPHINE SULFATE 15 MG: 15 TABLET, FILM COATED, EXTENDED RELEASE ORAL at 00:45

## 2021-01-01 RX ADMIN — FAMOTIDINE 20 MG: 20 TABLET ORAL at 08:13

## 2021-01-01 RX ADMIN — MIDODRINE HYDROCHLORIDE 2.5 MG: 2.5 TABLET ORAL at 12:07

## 2021-01-01 RX ADMIN — OXYCODONE HYDROCHLORIDE AND ACETAMINOPHEN 1 TABLET: 5; 325 TABLET ORAL at 09:08

## 2021-01-01 RX ADMIN — TRAMADOL HYDROCHLORIDE 50 MG: 50 TABLET, COATED ORAL at 07:51

## 2021-01-01 RX ADMIN — PROMETHAZINE HYDROCHLORIDE 25 MG: 25 TABLET ORAL at 18:50

## 2021-01-01 RX ADMIN — MORPHINE SULFATE 2 MG: 2 INJECTION, SOLUTION INTRAMUSCULAR; INTRAVENOUS at 13:54

## 2021-01-01 RX ADMIN — EPOETIN ALFA-EPBX 10000 UNITS: 10000 INJECTION, SOLUTION INTRAVENOUS; SUBCUTANEOUS at 08:04

## 2021-01-01 RX ADMIN — SODIUM CHLORIDE 1000 ML: 9 INJECTION, SOLUTION INTRAVENOUS at 12:38

## 2021-01-01 RX ADMIN — OXYCODONE HYDROCHLORIDE AND ACETAMINOPHEN 1 TABLET: 5; 325 TABLET ORAL at 21:28

## 2021-01-01 RX ADMIN — IPRATROPIUM BROMIDE AND ALBUTEROL SULFATE 1 AMPULE: .5; 3 SOLUTION RESPIRATORY (INHALATION) at 00:27

## 2021-01-01 RX ADMIN — IPRATROPIUM BROMIDE AND ALBUTEROL SULFATE 1 AMPULE: .5; 3 SOLUTION RESPIRATORY (INHALATION) at 04:46

## 2021-01-01 RX ADMIN — PREDNISONE 60 MG: 20 TABLET ORAL at 08:23

## 2021-01-01 RX ADMIN — PREDNISONE 40 MG: 20 TABLET ORAL at 08:28

## 2021-01-01 RX ADMIN — IPRATROPIUM BROMIDE AND ALBUTEROL SULFATE 1 AMPULE: .5; 3 SOLUTION RESPIRATORY (INHALATION) at 08:25

## 2021-01-01 RX ADMIN — PROMETHAZINE HYDROCHLORIDE 25 MG: 25 TABLET ORAL at 15:35

## 2021-01-01 RX ADMIN — SODIUM CHLORIDE: 9 INJECTION, SOLUTION INTRAVENOUS at 21:31

## 2021-01-01 RX ADMIN — CEFEPIME HYDROCHLORIDE 2000 MG: 2 INJECTION, POWDER, FOR SOLUTION INTRAVENOUS at 08:45

## 2021-01-01 RX ADMIN — MIDODRINE HYDROCHLORIDE 5 MG: 5 TABLET ORAL at 09:40

## 2021-01-01 RX ADMIN — ONDANSETRON 4 MG: 2 INJECTION INTRAMUSCULAR; INTRAVENOUS at 09:12

## 2021-01-01 RX ADMIN — SODIUM ZIRCONIUM CYCLOSILICATE 10 G: 5 POWDER, FOR SUSPENSION ORAL at 22:40

## 2021-01-01 RX ADMIN — ONDANSETRON 8 MG: 2 INJECTION INTRAMUSCULAR; INTRAVENOUS at 09:30

## 2021-01-01 RX ADMIN — ONDANSETRON 4 MG: 2 INJECTION INTRAMUSCULAR; INTRAVENOUS at 08:10

## 2021-01-01 RX ADMIN — PROMETHAZINE HYDROCHLORIDE 12.5 MG: 25 TABLET ORAL at 09:50

## 2021-01-01 RX ADMIN — ALTEPLASE 6 MG: 2.2 INJECTION, POWDER, LYOPHILIZED, FOR SOLUTION INTRAVENOUS at 12:25

## 2021-01-01 RX ADMIN — MIDODRINE HYDROCHLORIDE 5 MG: 5 TABLET ORAL at 10:07

## 2021-01-01 RX ADMIN — MIDODRINE HYDROCHLORIDE 5 MG: 5 TABLET ORAL at 11:50

## 2021-01-01 RX ADMIN — CEPHALEXIN 500 MG: 500 CAPSULE ORAL at 08:27

## 2021-01-01 RX ADMIN — IPRATROPIUM BROMIDE AND ALBUTEROL SULFATE 1 AMPULE: .5; 3 SOLUTION RESPIRATORY (INHALATION) at 08:28

## 2021-01-01 RX ADMIN — MIDODRINE HYDROCHLORIDE 5 MG: 5 TABLET ORAL at 17:29

## 2021-01-01 RX ADMIN — MORPHINE SULFATE 30 MG: 1 INJECTION INTRAVENOUS at 08:22

## 2021-01-01 RX ADMIN — PANTOPRAZOLE SODIUM 40 MG: 40 TABLET, DELAYED RELEASE ORAL at 08:15

## 2021-01-01 RX ADMIN — FENTANYL CITRATE 50 MCG: 50 INJECTION, SOLUTION INTRAMUSCULAR; INTRAVENOUS at 21:27

## 2021-01-01 RX ADMIN — CEFEPIME HYDROCHLORIDE 2000 MG: 2 INJECTION, POWDER, FOR SOLUTION INTRAVENOUS at 07:40

## 2021-01-01 RX ADMIN — OXYCODONE HYDROCHLORIDE AND ACETAMINOPHEN 1 TABLET: 5; 325 TABLET ORAL at 03:37

## 2021-01-01 RX ADMIN — INDOMETHACIN 25 MG: 25 CAPSULE ORAL at 17:19

## 2021-01-01 RX ADMIN — CEFEPIME HYDROCHLORIDE 2000 MG: 2 INJECTION, POWDER, FOR SOLUTION INTRAVENOUS at 13:30

## 2021-01-01 RX ADMIN — MIDODRINE HYDROCHLORIDE 2.5 MG: 2.5 TABLET ORAL at 08:29

## 2021-01-01 RX ADMIN — IPRATROPIUM BROMIDE AND ALBUTEROL SULFATE 1 AMPULE: .5; 3 SOLUTION RESPIRATORY (INHALATION) at 01:11

## 2021-01-01 RX ADMIN — ONDANSETRON 4 MG: 2 INJECTION INTRAMUSCULAR; INTRAVENOUS at 09:45

## 2021-01-01 RX ADMIN — ONDANSETRON 4 MG: 2 INJECTION INTRAMUSCULAR; INTRAVENOUS at 19:34

## 2021-01-01 RX ADMIN — LEVOTHYROXINE SODIUM 25 MCG: 0.03 TABLET ORAL at 04:22

## 2021-01-01 RX ADMIN — OXYCODONE HYDROCHLORIDE AND ACETAMINOPHEN 1 TABLET: 5; 325 TABLET ORAL at 04:42

## 2021-01-01 RX ADMIN — SPIRONOLACTONE 50 MG: 25 TABLET ORAL at 08:17

## 2021-01-01 RX ADMIN — CEPHALEXIN 500 MG: 500 CAPSULE ORAL at 20:14

## 2021-01-01 RX ADMIN — IPRATROPIUM BROMIDE AND ALBUTEROL SULFATE 1 AMPULE: .5; 3 SOLUTION RESPIRATORY (INHALATION) at 15:28

## 2021-01-01 RX ADMIN — INSULIN HUMAN 10 UNITS: 100 INJECTION, SOLUTION PARENTERAL at 06:36

## 2021-01-01 RX ADMIN — TRAZODONE HYDROCHLORIDE 50 MG: 50 TABLET ORAL at 22:41

## 2021-01-01 RX ADMIN — DRONABINOL 2.5 MG: 2.5 CAPSULE ORAL at 11:20

## 2021-01-01 RX ADMIN — OXYCODONE HYDROCHLORIDE AND ACETAMINOPHEN 1 TABLET: 5; 325 TABLET ORAL at 03:20

## 2021-01-01 RX ADMIN — CEFEPIME HYDROCHLORIDE 2000 MG: 2 INJECTION, POWDER, FOR SOLUTION INTRAVENOUS at 17:30

## 2021-01-01 RX ADMIN — ONDANSETRON 4 MG: 2 INJECTION INTRAMUSCULAR; INTRAVENOUS at 05:29

## 2021-01-01 RX ADMIN — OXYCODONE HYDROCHLORIDE AND ACETAMINOPHEN 1 TABLET: 5; 325 TABLET ORAL at 17:26

## 2021-01-01 RX ADMIN — LORAZEPAM 0.5 MG: 2 INJECTION INTRAMUSCULAR; INTRAVENOUS at 14:07

## 2021-01-01 RX ADMIN — DEXTROSE MONOHYDRATE: 50 INJECTION, SOLUTION INTRAVENOUS at 11:47

## 2021-01-01 RX ADMIN — DEXTROSE MONOHYDRATE 25 G: 25 INJECTION, SOLUTION INTRAVENOUS at 21:50

## 2021-01-01 RX ADMIN — IPRATROPIUM BROMIDE AND ALBUTEROL SULFATE 3 ML: .5; 3 SOLUTION RESPIRATORY (INHALATION) at 13:30

## 2021-01-01 RX ADMIN — DRONABINOL 2.5 MG: 2.5 CAPSULE ORAL at 22:14

## 2021-01-01 RX ADMIN — OXYCODONE HYDROCHLORIDE AND ACETAMINOPHEN 1 TABLET: 5; 325 TABLET ORAL at 21:23

## 2021-01-01 RX ADMIN — SODIUM CHLORIDE: 9 INJECTION, SOLUTION INTRAVENOUS at 08:25

## 2021-01-01 RX ADMIN — ALLOPURINOL 100 MG: 100 TABLET ORAL at 10:00

## 2021-01-01 RX ADMIN — MORPHINE SULFATE 30 MG: 1 INJECTION INTRAVENOUS at 10:04

## 2021-01-01 RX ADMIN — TRAZODONE HYDROCHLORIDE 50 MG: 50 TABLET ORAL at 21:20

## 2021-01-01 RX ADMIN — ALPRAZOLAM 0.25 MG: 0.25 TABLET ORAL at 05:48

## 2021-01-01 RX ADMIN — MIDODRINE HYDROCHLORIDE 5 MG: 5 TABLET ORAL at 17:04

## 2021-01-01 RX ADMIN — IPRATROPIUM BROMIDE AND ALBUTEROL SULFATE 1 AMPULE: .5; 3 SOLUTION RESPIRATORY (INHALATION) at 04:41

## 2021-01-01 RX ADMIN — MIDODRINE HYDROCHLORIDE 5 MG: 5 TABLET ORAL at 08:23

## 2021-01-01 RX ADMIN — OXYCODONE HYDROCHLORIDE AND ACETAMINOPHEN 1 TABLET: 5; 325 TABLET ORAL at 08:25

## 2021-01-01 RX ADMIN — SODIUM CHLORIDE: 9 INJECTION, SOLUTION INTRAVENOUS at 15:54

## 2021-01-01 RX ADMIN — CEPHALEXIN 500 MG: 500 CAPSULE ORAL at 00:35

## 2021-01-01 RX ADMIN — IPRATROPIUM BROMIDE AND ALBUTEROL SULFATE 1 AMPULE: .5; 3 SOLUTION RESPIRATORY (INHALATION) at 12:19

## 2021-01-01 RX ADMIN — SODIUM CHLORIDE: 9 INJECTION, SOLUTION INTRAVENOUS at 23:30

## 2021-01-01 RX ADMIN — ONDANSETRON 4 MG: 2 INJECTION INTRAMUSCULAR; INTRAVENOUS at 05:01

## 2021-01-01 RX ADMIN — INSULIN LISPRO 1 UNITS: 100 INJECTION, SOLUTION INTRAVENOUS; SUBCUTANEOUS at 08:10

## 2021-01-01 RX ADMIN — ALPRAZOLAM 0.25 MG: 0.25 TABLET ORAL at 08:04

## 2021-01-01 RX ADMIN — SODIUM CHLORIDE, PRESERVATIVE FREE 10 ML: 5 INJECTION INTRAVENOUS at 21:13

## 2021-01-01 RX ADMIN — MORPHINE SULFATE 30 MG: 1 INJECTION INTRAVENOUS at 03:23

## 2021-01-01 RX ADMIN — LEVOTHYROXINE SODIUM 62.5 MCG: 0.12 TABLET ORAL at 05:52

## 2021-01-01 RX ADMIN — SODIUM CHLORIDE 250 MG: 9 INJECTION, SOLUTION INTRAVENOUS at 11:07

## 2021-01-01 RX ADMIN — MIDODRINE HYDROCHLORIDE 5 MG: 5 TABLET ORAL at 09:05

## 2021-01-01 RX ADMIN — CEPHALEXIN 500 MG: 500 CAPSULE ORAL at 04:42

## 2021-01-01 RX ADMIN — DIPHENHYDRAMINE HYDROCHLORIDE 25 MG: 50 INJECTION, SOLUTION INTRAMUSCULAR; INTRAVENOUS at 19:45

## 2021-01-01 RX ADMIN — LEVOTHYROXINE SODIUM 25 MCG: 0.03 TABLET ORAL at 06:32

## 2021-01-01 RX ADMIN — MIDODRINE HYDROCHLORIDE 10 MG: 10 TABLET ORAL at 17:03

## 2021-01-01 RX ADMIN — MORPHINE SULFATE 15 MG: 15 TABLET, FILM COATED, EXTENDED RELEASE ORAL at 16:27

## 2021-01-01 RX ADMIN — SODIUM CHLORIDE: 9 INJECTION, SOLUTION INTRAVENOUS at 18:07

## 2021-01-01 RX ADMIN — MIDODRINE HYDROCHLORIDE 5 MG: 5 TABLET ORAL at 16:10

## 2021-01-01 RX ADMIN — FUROSEMIDE 40 MG: 10 INJECTION, SOLUTION INTRAVENOUS at 10:08

## 2021-01-01 RX ADMIN — OXYCODONE HYDROCHLORIDE AND ACETAMINOPHEN 1 TABLET: 5; 325 TABLET ORAL at 17:04

## 2021-01-01 RX ADMIN — ONDANSETRON 4 MG: 2 INJECTION INTRAMUSCULAR; INTRAVENOUS at 21:30

## 2021-01-01 RX ADMIN — ACETAMINOPHEN 650 MG: 325 TABLET ORAL at 03:32

## 2021-01-01 RX ADMIN — MIDODRINE HYDROCHLORIDE 2.5 MG: 2.5 TABLET ORAL at 17:17

## 2021-01-01 RX ADMIN — ALPRAZOLAM 0.25 MG: 0.25 TABLET ORAL at 00:39

## 2021-01-01 RX ADMIN — SODIUM CHLORIDE, PRESERVATIVE FREE 10 ML: 5 INJECTION INTRAVENOUS at 08:14

## 2021-01-01 RX ADMIN — MORPHINE SULFATE: 1 INJECTION INTRAVENOUS at 17:14

## 2021-01-01 RX ADMIN — ONDANSETRON 4 MG: 2 INJECTION INTRAMUSCULAR; INTRAVENOUS at 03:15

## 2021-01-01 RX ADMIN — OXYCODONE HYDROCHLORIDE AND ACETAMINOPHEN 1 TABLET: 7.5; 325 TABLET ORAL at 14:14

## 2021-01-01 RX ADMIN — MIDODRINE HYDROCHLORIDE 5 MG: 5 TABLET ORAL at 16:56

## 2021-01-01 RX ADMIN — IOPAMIDOL 80 ML: 755 INJECTION, SOLUTION INTRAVENOUS at 14:18

## 2021-01-01 RX ADMIN — MIDODRINE HYDROCHLORIDE 5 MG: 5 TABLET ORAL at 11:02

## 2021-01-01 RX ADMIN — IOPAMIDOL 80 ML: 755 INJECTION, SOLUTION INTRAVENOUS at 14:53

## 2021-01-01 RX ADMIN — MORPHINE SULFATE 2 MG: 2 INJECTION, SOLUTION INTRAMUSCULAR; INTRAVENOUS at 10:01

## 2021-01-01 RX ADMIN — OXYCODONE HYDROCHLORIDE AND ACETAMINOPHEN 1 TABLET: 5; 325 TABLET ORAL at 22:29

## 2021-01-01 RX ADMIN — ALPRAZOLAM 0.25 MG: 0.25 TABLET ORAL at 04:42

## 2021-01-01 RX ADMIN — ENOXAPARIN SODIUM 40 MG: 40 INJECTION SUBCUTANEOUS at 17:55

## 2021-01-01 RX ADMIN — INDOMETHACIN 25 MG: 25 CAPSULE ORAL at 17:55

## 2021-01-01 RX ADMIN — MORPHINE SULFATE 4 MG: 4 INJECTION, SOLUTION INTRAMUSCULAR; INTRAVENOUS at 10:40

## 2021-01-01 RX ADMIN — DORNASE ALFA 5 MG: 1 SOLUTION RESPIRATORY (INHALATION) at 10:22

## 2021-01-01 RX ADMIN — OXYCODONE HYDROCHLORIDE AND ACETAMINOPHEN 1 TABLET: 5; 325 TABLET ORAL at 08:43

## 2021-01-01 RX ADMIN — BUMETANIDE 1 MG: 1 TABLET ORAL at 08:27

## 2021-01-01 RX ADMIN — INSULIN LISPRO 1 UNITS: 100 INJECTION, SOLUTION INTRAVENOUS; SUBCUTANEOUS at 12:15

## 2021-01-01 RX ADMIN — ALLOPURINOL 100 MG: 100 TABLET ORAL at 21:02

## 2021-01-01 RX ADMIN — INSULIN LISPRO 2 UNITS: 100 INJECTION, SOLUTION INTRAVENOUS; SUBCUTANEOUS at 13:18

## 2021-01-01 RX ADMIN — ALPRAZOLAM 0.25 MG: 0.25 TABLET ORAL at 08:22

## 2021-01-01 RX ADMIN — ACETAMINOPHEN 650 MG: 325 TABLET ORAL at 09:03

## 2021-01-01 RX ADMIN — FAMOTIDINE 20 MG: 20 TABLET, FILM COATED ORAL at 07:58

## 2021-01-01 RX ADMIN — DEXTROSE MONOHYDRATE 25 G: 25 INJECTION, SOLUTION INTRAVENOUS at 06:33

## 2021-01-01 RX ADMIN — MORPHINE SULFATE 1 MG: 2 INJECTION, SOLUTION INTRAMUSCULAR; INTRAVENOUS at 06:32

## 2021-01-01 RX ADMIN — FAMOTIDINE 20 MG: 20 TABLET ORAL at 08:22

## 2021-01-01 RX ADMIN — FAMOTIDINE 20 MG: 20 TABLET ORAL at 08:06

## 2021-01-01 RX ADMIN — OXYCODONE HYDROCHLORIDE AND ACETAMINOPHEN 1 TABLET: 5; 325 TABLET ORAL at 04:31

## 2021-01-01 RX ADMIN — FAMOTIDINE 20 MG: 20 TABLET ORAL at 08:36

## 2021-01-01 RX ADMIN — ALPRAZOLAM 0.25 MG: 0.25 TABLET ORAL at 20:16

## 2021-01-01 RX ADMIN — TRAMADOL HYDROCHLORIDE 50 MG: 50 TABLET, COATED ORAL at 12:05

## 2021-01-01 RX ADMIN — SODIUM CHLORIDE: 9 INJECTION, SOLUTION INTRAVENOUS at 14:19

## 2021-01-01 RX ADMIN — LEVOTHYROXINE SODIUM 62.5 MCG: 0.12 TABLET ORAL at 05:35

## 2021-01-01 RX ADMIN — BUMETANIDE 1 MG: 1 TABLET ORAL at 09:33

## 2021-01-01 RX ADMIN — OXYCODONE HYDROCHLORIDE AND ACETAMINOPHEN 1 TABLET: 5; 325 TABLET ORAL at 04:17

## 2021-01-01 RX ADMIN — LEVOTHYROXINE SODIUM 25 MCG: 0.03 TABLET ORAL at 06:26

## 2021-01-01 RX ADMIN — ONDANSETRON 4 MG: 2 INJECTION INTRAMUSCULAR; INTRAVENOUS at 11:07

## 2021-01-01 RX ADMIN — ALPRAZOLAM 0.25 MG: 0.25 TABLET ORAL at 16:53

## 2021-01-01 RX ADMIN — CEFEPIME HYDROCHLORIDE 2000 MG: 2 INJECTION, POWDER, FOR SOLUTION INTRAVENOUS at 01:24

## 2021-01-01 RX ADMIN — LEVOTHYROXINE SODIUM 62.5 MCG: 0.12 TABLET ORAL at 06:30

## 2021-01-01 RX ADMIN — CEFEPIME HYDROCHLORIDE 2000 MG: 2 INJECTION, POWDER, FOR SOLUTION INTRAVENOUS at 20:32

## 2021-01-01 RX ADMIN — Medication 10 UNITS: at 21:50

## 2021-01-01 RX ADMIN — LEVOTHYROXINE SODIUM 25 MCG: 0.03 TABLET ORAL at 10:07

## 2021-01-01 RX ADMIN — OXYCODONE HYDROCHLORIDE AND ACETAMINOPHEN 1 TABLET: 5; 325 TABLET ORAL at 22:34

## 2021-01-01 RX ADMIN — ACETAMINOPHEN 325 MG: 325 TABLET ORAL at 19:05

## 2021-01-01 RX ADMIN — MORPHINE SULFATE 15 MG: 15 TABLET, FILM COATED, EXTENDED RELEASE ORAL at 20:08

## 2021-01-01 RX ADMIN — INSULIN LISPRO 3 UNITS: 100 INJECTION, SOLUTION INTRAVENOUS; SUBCUTANEOUS at 17:03

## 2021-01-01 RX ADMIN — MORPHINE SULFATE 2 MG: 2 INJECTION, SOLUTION INTRAMUSCULAR; INTRAVENOUS at 09:40

## 2021-01-01 RX ADMIN — PROMETHAZINE HYDROCHLORIDE 25 MG: 25 TABLET ORAL at 01:30

## 2021-01-01 RX ADMIN — MIDODRINE HYDROCHLORIDE 5 MG: 5 TABLET ORAL at 13:28

## 2021-01-01 RX ADMIN — MIDODRINE HYDROCHLORIDE 10 MG: 10 TABLET ORAL at 07:54

## 2021-01-01 RX ADMIN — OXYCODONE HYDROCHLORIDE AND ACETAMINOPHEN 1 TABLET: 5; 325 TABLET ORAL at 14:20

## 2021-01-01 RX ADMIN — MIDODRINE HYDROCHLORIDE 5 MG: 5 TABLET ORAL at 16:25

## 2021-01-01 RX ADMIN — DIPHENHYDRAMINE HYDROCHLORIDE 25 MG: 50 INJECTION, SOLUTION INTRAMUSCULAR; INTRAVENOUS at 13:33

## 2021-01-01 RX ADMIN — MORPHINE SULFATE 1 MG: 2 INJECTION, SOLUTION INTRAMUSCULAR; INTRAVENOUS at 08:13

## 2021-01-01 RX ADMIN — ALPRAZOLAM 0.25 MG: 0.25 TABLET ORAL at 11:02

## 2021-01-01 RX ADMIN — IPRATROPIUM BROMIDE AND ALBUTEROL SULFATE 1 AMPULE: .5; 3 SOLUTION RESPIRATORY (INHALATION) at 11:35

## 2021-01-01 RX ADMIN — MIDODRINE HYDROCHLORIDE 2.5 MG: 2.5 TABLET ORAL at 13:20

## 2021-01-01 RX ADMIN — TRAMADOL HYDROCHLORIDE 50 MG: 50 TABLET, FILM COATED ORAL at 14:50

## 2021-01-01 RX ADMIN — TRAMADOL HYDROCHLORIDE 50 MG: 50 TABLET, COATED ORAL at 01:48

## 2021-01-01 RX ADMIN — DOCUSATE SODIUM 100 MG: 100 CAPSULE ORAL at 21:11

## 2021-01-01 RX ADMIN — ACETAMINOPHEN 650 MG: 325 TABLET ORAL at 19:19

## 2021-01-01 RX ADMIN — CEFEPIME HYDROCHLORIDE 2000 MG: 2 INJECTION, POWDER, FOR SOLUTION INTRAVENOUS at 08:15

## 2021-01-01 RX ADMIN — DOCUSATE SODIUM 100 MG: 100 CAPSULE ORAL at 10:07

## 2021-01-01 RX ADMIN — ONDANSETRON 4 MG: 2 INJECTION INTRAMUSCULAR; INTRAVENOUS at 08:23

## 2021-01-01 RX ADMIN — ALLOPURINOL 100 MG: 100 TABLET ORAL at 20:26

## 2021-01-01 RX ADMIN — INSULIN LISPRO 1 UNITS: 100 INJECTION, SOLUTION INTRAVENOUS; SUBCUTANEOUS at 13:13

## 2021-01-01 RX ADMIN — OXYCODONE HYDROCHLORIDE AND ACETAMINOPHEN 1 TABLET: 5; 325 TABLET ORAL at 02:29

## 2021-01-01 RX ADMIN — PREDNISONE 40 MG: 20 TABLET ORAL at 08:09

## 2021-01-01 RX ADMIN — SODIUM CHLORIDE, PRESERVATIVE FREE 10 ML: 5 INJECTION INTRAVENOUS at 19:54

## 2021-01-01 RX ADMIN — DORNASE ALFA 5 MG: 1 SOLUTION RESPIRATORY (INHALATION) at 12:25

## 2021-01-01 RX ADMIN — CEPHALEXIN 500 MG: 500 CAPSULE ORAL at 19:57

## 2021-01-01 RX ADMIN — OXYCODONE HYDROCHLORIDE AND ACETAMINOPHEN 1 TABLET: 5; 325 TABLET ORAL at 00:18

## 2021-01-01 RX ADMIN — MIDODRINE HYDROCHLORIDE 10 MG: 10 TABLET ORAL at 11:33

## 2021-01-01 RX ADMIN — PROMETHAZINE HYDROCHLORIDE 25 MG: 25 TABLET ORAL at 00:39

## 2021-01-01 RX ADMIN — SODIUM CHLORIDE 250 MG: 9 INJECTION, SOLUTION INTRAVENOUS at 09:49

## 2021-01-01 RX ADMIN — INSULIN LISPRO 2 UNITS: 100 INJECTION, SOLUTION INTRAVENOUS; SUBCUTANEOUS at 17:24

## 2021-01-01 RX ADMIN — ACETAMINOPHEN 650 MG: 325 TABLET ORAL at 08:11

## 2021-01-01 RX ADMIN — ACETAMINOPHEN 650 MG: 325 TABLET ORAL at 13:28

## 2021-01-01 RX ADMIN — DRONABINOL 2.5 MG: 2.5 CAPSULE ORAL at 22:53

## 2021-01-01 RX ADMIN — HEPARIN SODIUM 5000 UNITS: 5000 INJECTION INTRAVENOUS; SUBCUTANEOUS at 06:45

## 2021-01-01 RX ADMIN — CEPHALEXIN 500 MG: 500 CAPSULE ORAL at 21:00

## 2021-01-01 RX ADMIN — OXYCODONE HYDROCHLORIDE AND ACETAMINOPHEN 1 TABLET: 5; 325 TABLET ORAL at 07:50

## 2021-01-01 RX ADMIN — TRAMADOL HYDROCHLORIDE 50 MG: 50 TABLET, COATED ORAL at 07:45

## 2021-01-01 RX ADMIN — ALLOPURINOL 100 MG: 100 TABLET ORAL at 20:12

## 2021-01-01 RX ADMIN — CEFEPIME HYDROCHLORIDE 2000 MG: 2 INJECTION, POWDER, FOR SOLUTION INTRAVENOUS at 08:11

## 2021-01-01 RX ADMIN — CEFEPIME HYDROCHLORIDE 2000 MG: 2 INJECTION, POWDER, FOR SOLUTION INTRAVENOUS at 12:34

## 2021-01-01 RX ADMIN — INSULIN LISPRO 1 UNITS: 100 INJECTION, SOLUTION INTRAVENOUS; SUBCUTANEOUS at 08:29

## 2021-01-01 RX ADMIN — PREDNISONE 60 MG: 20 TABLET ORAL at 08:27

## 2021-01-01 RX ADMIN — CEPHALEXIN 500 MG: 500 CAPSULE ORAL at 03:40

## 2021-01-01 RX ADMIN — ALPRAZOLAM 0.25 MG: 0.25 TABLET ORAL at 09:07

## 2021-01-01 RX ADMIN — IPRATROPIUM BROMIDE AND ALBUTEROL SULFATE 1 AMPULE: .5; 3 SOLUTION RESPIRATORY (INHALATION) at 00:33

## 2021-01-01 RX ADMIN — SODIUM CHLORIDE, PRESERVATIVE FREE 10 ML: 5 INJECTION INTRAVENOUS at 09:04

## 2021-01-01 RX ADMIN — SENNOSIDES 17.2 MG: 8.6 TABLET, FILM COATED ORAL at 21:22

## 2021-01-01 RX ADMIN — DEXTROSE MONOHYDRATE 25 G: 25 INJECTION, SOLUTION INTRAVENOUS at 08:26

## 2021-01-01 RX ADMIN — SPIRONOLACTONE 50 MG: 25 TABLET ORAL at 17:26

## 2021-01-01 RX ADMIN — MORPHINE SULFATE 15 MG: 15 TABLET, FILM COATED, EXTENDED RELEASE ORAL at 17:07

## 2021-01-01 RX ADMIN — DRONABINOL 2.5 MG: 2.5 CAPSULE ORAL at 08:28

## 2021-01-01 RX ADMIN — OXYCODONE HYDROCHLORIDE AND ACETAMINOPHEN 1 TABLET: 7.5; 325 TABLET ORAL at 18:44

## 2021-01-01 RX ADMIN — CEPHALEXIN 500 MG: 500 CAPSULE ORAL at 16:13

## 2021-01-01 RX ADMIN — OXYCODONE HYDROCHLORIDE AND ACETAMINOPHEN 1 TABLET: 5; 325 TABLET ORAL at 06:42

## 2021-01-01 RX ADMIN — IPRATROPIUM BROMIDE AND ALBUTEROL SULFATE 3 ML: .5; 3 SOLUTION RESPIRATORY (INHALATION) at 01:53

## 2021-01-01 RX ADMIN — ONDANSETRON 4 MG: 2 INJECTION INTRAMUSCULAR; INTRAVENOUS at 21:53

## 2021-01-01 RX ADMIN — OXYCODONE HYDROCHLORIDE AND ACETAMINOPHEN 1 TABLET: 5; 325 TABLET ORAL at 10:55

## 2021-01-01 RX ADMIN — MIDODRINE HYDROCHLORIDE 5 MG: 5 TABLET ORAL at 07:52

## 2021-01-01 RX ADMIN — ALPRAZOLAM 0.25 MG: 0.25 TABLET ORAL at 03:27

## 2021-01-01 RX ADMIN — ALPRAZOLAM 0.25 MG: 0.25 TABLET ORAL at 17:33

## 2021-01-01 RX ADMIN — LEVOTHYROXINE SODIUM 25 MCG: 0.03 TABLET ORAL at 08:06

## 2021-01-01 RX ADMIN — ALLOPURINOL 100 MG: 100 TABLET ORAL at 08:09

## 2021-01-01 RX ADMIN — MIDODRINE HYDROCHLORIDE 2.5 MG: 2.5 TABLET ORAL at 13:17

## 2021-01-01 RX ADMIN — IPRATROPIUM BROMIDE AND ALBUTEROL SULFATE 1 AMPULE: .5; 3 SOLUTION RESPIRATORY (INHALATION) at 04:48

## 2021-01-01 RX ADMIN — OXYCODONE HYDROCHLORIDE AND ACETAMINOPHEN 1 TABLET: 7.5; 325 TABLET ORAL at 10:24

## 2021-01-01 RX ADMIN — INSULIN LISPRO 1 UNITS: 100 INJECTION, SOLUTION INTRAVENOUS; SUBCUTANEOUS at 16:27

## 2021-01-01 RX ADMIN — OXYCODONE HYDROCHLORIDE AND ACETAMINOPHEN 1 TABLET: 5; 325 TABLET ORAL at 11:43

## 2021-01-01 RX ADMIN — LEVOTHYROXINE SODIUM 25 MCG: 0.03 TABLET ORAL at 04:31

## 2021-01-01 RX ADMIN — TRAMADOL HYDROCHLORIDE 50 MG: 50 TABLET, FILM COATED ORAL at 12:41

## 2021-01-01 RX ADMIN — IPRATROPIUM BROMIDE AND ALBUTEROL SULFATE 3 ML: .5; 3 SOLUTION RESPIRATORY (INHALATION) at 04:54

## 2021-01-01 RX ADMIN — MIDODRINE HYDROCHLORIDE 10 MG: 10 TABLET ORAL at 16:56

## 2021-01-01 RX ADMIN — OXYCODONE HYDROCHLORIDE AND ACETAMINOPHEN 1 TABLET: 5; 325 TABLET ORAL at 18:50

## 2021-01-01 RX ADMIN — MIDODRINE HYDROCHLORIDE 2.5 MG: 2.5 TABLET ORAL at 17:22

## 2021-01-01 RX ADMIN — ACETAMINOPHEN 650 MG: 325 TABLET ORAL at 21:16

## 2021-01-01 RX ADMIN — SODIUM CHLORIDE, PRESERVATIVE FREE 10 ML: 5 INJECTION INTRAVENOUS at 20:34

## 2021-01-01 RX ADMIN — CEPHALEXIN 500 MG: 500 CAPSULE ORAL at 02:25

## 2021-01-01 RX ADMIN — FAMOTIDINE 20 MG: 20 TABLET, FILM COATED ORAL at 07:40

## 2021-01-01 RX ADMIN — ONDANSETRON 4 MG: 2 INJECTION INTRAMUSCULAR; INTRAVENOUS at 14:25

## 2021-01-01 RX ADMIN — MORPHINE SULFATE 2 MG: 2 INJECTION, SOLUTION INTRAMUSCULAR; INTRAVENOUS at 23:51

## 2021-01-01 RX ADMIN — OXYCODONE HYDROCHLORIDE AND ACETAMINOPHEN 1 TABLET: 5; 325 TABLET ORAL at 20:16

## 2021-01-01 RX ADMIN — SODIUM CHLORIDE: 9 INJECTION, SOLUTION INTRAVENOUS at 04:43

## 2021-01-01 RX ADMIN — CEFEPIME HYDROCHLORIDE 2000 MG: 2 INJECTION, POWDER, FOR SOLUTION INTRAVENOUS at 12:36

## 2021-01-01 RX ADMIN — IPRATROPIUM BROMIDE AND ALBUTEROL SULFATE 3 ML: .5; 3 SOLUTION RESPIRATORY (INHALATION) at 21:13

## 2021-01-01 RX ADMIN — MORPHINE SULFATE 2 MG: 2 INJECTION, SOLUTION INTRAMUSCULAR; INTRAVENOUS at 10:07

## 2021-01-01 RX ADMIN — IPRATROPIUM BROMIDE AND ALBUTEROL SULFATE 1 AMPULE: .5; 3 SOLUTION RESPIRATORY (INHALATION) at 16:36

## 2021-01-01 RX ADMIN — OXYCODONE HYDROCHLORIDE AND ACETAMINOPHEN 1 TABLET: 5; 325 TABLET ORAL at 11:25

## 2021-01-01 RX ADMIN — MIDODRINE HYDROCHLORIDE 2.5 MG: 2.5 TABLET ORAL at 12:01

## 2021-01-01 RX ADMIN — IPRATROPIUM BROMIDE AND ALBUTEROL SULFATE 1 AMPULE: .5; 3 SOLUTION RESPIRATORY (INHALATION) at 16:02

## 2021-01-01 RX ADMIN — PREDNISONE 40 MG: 20 TABLET ORAL at 09:04

## 2021-01-01 RX ADMIN — FAMOTIDINE 20 MG: 20 TABLET, FILM COATED ORAL at 08:12

## 2021-01-01 RX ADMIN — LORAZEPAM 0.5 MG: 2 INJECTION INTRAMUSCULAR; INTRAVENOUS at 11:07

## 2021-01-01 RX ADMIN — IPRATROPIUM BROMIDE AND ALBUTEROL SULFATE 1 AMPULE: .5; 3 SOLUTION RESPIRATORY (INHALATION) at 19:31

## 2021-01-01 RX ADMIN — CEPHALEXIN 500 MG: 500 CAPSULE ORAL at 21:24

## 2021-01-01 RX ADMIN — VANCOMYCIN HYDROCHLORIDE 1500 MG: 5 INJECTION, POWDER, LYOPHILIZED, FOR SOLUTION INTRAVENOUS at 11:03

## 2021-01-01 RX ADMIN — CEPHALEXIN 500 MG: 500 CAPSULE ORAL at 15:51

## 2021-01-01 RX ADMIN — DOCUSATE SODIUM 100 MG: 100 CAPSULE ORAL at 21:22

## 2021-01-01 RX ADMIN — ONDANSETRON 4 MG: 2 INJECTION INTRAMUSCULAR; INTRAVENOUS at 16:39

## 2021-01-01 RX ADMIN — PREDNISONE 60 MG: 20 TABLET ORAL at 11:02

## 2021-01-01 RX ADMIN — CEPHALEXIN 500 MG: 500 CAPSULE ORAL at 03:23

## 2021-01-01 RX ADMIN — OXYCODONE HYDROCHLORIDE AND ACETAMINOPHEN 1 TABLET: 5; 325 TABLET ORAL at 16:07

## 2021-01-01 RX ADMIN — OXYCODONE HYDROCHLORIDE AND ACETAMINOPHEN 1 TABLET: 7.5; 325 TABLET ORAL at 16:56

## 2021-01-01 RX ADMIN — SODIUM ZIRCONIUM CYCLOSILICATE 10 G: 5 POWDER, FOR SUSPENSION ORAL at 10:30

## 2021-01-01 RX ADMIN — IPRATROPIUM BROMIDE AND ALBUTEROL SULFATE 1 AMPULE: .5; 3 SOLUTION RESPIRATORY (INHALATION) at 21:30

## 2021-01-01 RX ADMIN — MIDODRINE HYDROCHLORIDE 5 MG: 5 TABLET ORAL at 17:07

## 2021-01-01 RX ADMIN — MIDODRINE HYDROCHLORIDE 2.5 MG: 2.5 TABLET ORAL at 17:40

## 2021-01-01 RX ADMIN — OXYCODONE HYDROCHLORIDE AND ACETAMINOPHEN 1 TABLET: 5; 325 TABLET ORAL at 21:11

## 2021-01-01 RX ADMIN — PANTOPRAZOLE SODIUM 40 MG: 40 TABLET, DELAYED RELEASE ORAL at 10:04

## 2021-01-01 RX ADMIN — ALPRAZOLAM 0.25 MG: 0.25 TABLET ORAL at 03:37

## 2021-01-01 RX ADMIN — IPRATROPIUM BROMIDE AND ALBUTEROL SULFATE 1 AMPULE: .5; 3 SOLUTION RESPIRATORY (INHALATION) at 05:27

## 2021-01-01 RX ADMIN — TRAMADOL HYDROCHLORIDE 50 MG: 50 TABLET, COATED ORAL at 15:54

## 2021-01-01 RX ADMIN — CEFEPIME HYDROCHLORIDE 2000 MG: 2 INJECTION, POWDER, FOR SOLUTION INTRAVENOUS at 21:01

## 2021-01-01 RX ADMIN — FAMOTIDINE 20 MG: 20 TABLET ORAL at 09:39

## 2021-01-01 RX ADMIN — MORPHINE SULFATE 15 MG: 15 TABLET, FILM COATED, EXTENDED RELEASE ORAL at 13:07

## 2021-01-01 RX ADMIN — MORPHINE SULFATE 15 MG: 15 TABLET, FILM COATED, EXTENDED RELEASE ORAL at 01:03

## 2021-01-01 RX ADMIN — ALLOPURINOL 100 MG: 100 TABLET ORAL at 19:41

## 2021-01-01 RX ADMIN — IPRATROPIUM BROMIDE AND ALBUTEROL SULFATE 1 AMPULE: .5; 3 SOLUTION RESPIRATORY (INHALATION) at 20:02

## 2021-01-01 RX ADMIN — TRAZODONE HYDROCHLORIDE 50 MG: 50 TABLET ORAL at 21:13

## 2021-01-01 RX ADMIN — IPRATROPIUM BROMIDE AND ALBUTEROL SULFATE 3 ML: .5; 3 SOLUTION RESPIRATORY (INHALATION) at 22:39

## 2021-01-01 RX ADMIN — IPRATROPIUM BROMIDE AND ALBUTEROL SULFATE 1 AMPULE: .5; 3 SOLUTION RESPIRATORY (INHALATION) at 10:29

## 2021-01-01 RX ADMIN — ONDANSETRON 4 MG: 2 INJECTION INTRAMUSCULAR; INTRAVENOUS at 11:18

## 2021-01-01 RX ADMIN — MORPHINE SULFATE 15 MG: 15 TABLET, FILM COATED, EXTENDED RELEASE ORAL at 03:23

## 2021-01-01 RX ADMIN — MIDODRINE HYDROCHLORIDE 2.5 MG: 2.5 TABLET ORAL at 08:25

## 2021-01-01 RX ADMIN — TRAZODONE HYDROCHLORIDE 50 MG: 50 TABLET ORAL at 21:07

## 2021-01-01 RX ADMIN — SODIUM CHLORIDE 240 MG: 9 INJECTION, SOLUTION INTRAVENOUS at 15:33

## 2021-01-01 RX ADMIN — SODIUM CHLORIDE, PRESERVATIVE FREE 10 ML: 5 INJECTION INTRAVENOUS at 08:16

## 2021-01-01 RX ADMIN — IPRATROPIUM BROMIDE AND ALBUTEROL SULFATE 1 AMPULE: .5; 3 SOLUTION RESPIRATORY (INHALATION) at 08:27

## 2021-01-01 RX ADMIN — CALCIUM GLUCONATE 1000 MG: 98 INJECTION, SOLUTION INTRAVENOUS at 21:51

## 2021-01-01 RX ADMIN — INSULIN LISPRO 3 UNITS: 100 INJECTION, SOLUTION INTRAVENOUS; SUBCUTANEOUS at 17:13

## 2021-01-01 RX ADMIN — OXYCODONE HYDROCHLORIDE AND ACETAMINOPHEN 1 TABLET: 5; 325 TABLET ORAL at 21:21

## 2021-01-01 RX ADMIN — DRONABINOL 2.5 MG: 2.5 CAPSULE ORAL at 08:22

## 2021-01-01 RX ADMIN — OXYCODONE HYDROCHLORIDE AND ACETAMINOPHEN 1 TABLET: 7.5; 325 TABLET ORAL at 21:23

## 2021-01-01 RX ADMIN — SODIUM CHLORIDE 1000 ML: 9 INJECTION, SOLUTION INTRAVENOUS at 11:18

## 2021-01-01 RX ADMIN — SODIUM CHLORIDE, PRESERVATIVE FREE 10 ML: 5 INJECTION INTRAVENOUS at 20:12

## 2021-01-01 RX ADMIN — ACETAMINOPHEN 650 MG: 325 TABLET ORAL at 14:49

## 2021-01-01 RX ADMIN — INSULIN LISPRO 1 UNITS: 100 INJECTION, SOLUTION INTRAVENOUS; SUBCUTANEOUS at 17:03

## 2021-01-01 RX ADMIN — PREDNISONE 60 MG: 20 TABLET ORAL at 09:34

## 2021-01-01 RX ADMIN — VANCOMYCIN HYDROCHLORIDE 1500 MG: 5 INJECTION, POWDER, LYOPHILIZED, FOR SOLUTION INTRAVENOUS at 13:04

## 2021-01-01 RX ADMIN — SODIUM BICARBONATE 50 MEQ: 84 INJECTION, SOLUTION INTRAVENOUS at 06:39

## 2021-01-01 RX ADMIN — CEPHALEXIN 500 MG: 500 CAPSULE ORAL at 16:56

## 2021-01-01 RX ADMIN — CEPHALEXIN 500 MG: 500 CAPSULE ORAL at 21:39

## 2021-01-01 RX ADMIN — TRAMADOL HYDROCHLORIDE 50 MG: 50 TABLET, COATED ORAL at 00:49

## 2021-01-01 RX ADMIN — DRONABINOL 2.5 MG: 2.5 CAPSULE ORAL at 11:26

## 2021-01-01 RX ADMIN — OXYCODONE HYDROCHLORIDE AND ACETAMINOPHEN 1 TABLET: 5; 325 TABLET ORAL at 15:28

## 2021-01-01 RX ADMIN — FAMOTIDINE 20 MG: 20 TABLET ORAL at 08:24

## 2021-01-01 RX ADMIN — OXYCODONE HYDROCHLORIDE AND ACETAMINOPHEN 1 TABLET: 5; 325 TABLET ORAL at 11:38

## 2021-01-01 RX ADMIN — VANCOMYCIN HYDROCHLORIDE 1500 MG: 5 INJECTION, POWDER, LYOPHILIZED, FOR SOLUTION INTRAVENOUS at 21:09

## 2021-01-01 RX ADMIN — IPRATROPIUM BROMIDE AND ALBUTEROL SULFATE 1 AMPULE: .5; 3 SOLUTION RESPIRATORY (INHALATION) at 20:22

## 2021-01-01 RX ADMIN — CEFEPIME HYDROCHLORIDE 2000 MG: 2 INJECTION, POWDER, FOR SOLUTION INTRAVENOUS at 20:58

## 2021-01-01 RX ADMIN — ACETAMINOPHEN 650 MG: 325 TABLET ORAL at 09:39

## 2021-01-01 RX ADMIN — POLYETHYLENE GLYCOL 3350 17 G: 17 POWDER, FOR SOLUTION ORAL at 17:16

## 2021-01-01 RX ADMIN — IPRATROPIUM BROMIDE AND ALBUTEROL SULFATE 1 AMPULE: .5; 3 SOLUTION RESPIRATORY (INHALATION) at 01:41

## 2021-01-01 RX ADMIN — MIDODRINE HYDROCHLORIDE 5 MG: 5 TABLET ORAL at 17:33

## 2021-01-01 RX ADMIN — COSYNTROPIN 250 MCG: 0.25 INJECTION, POWDER, LYOPHILIZED, FOR SOLUTION INTRAMUSCULAR; INTRAVENOUS at 08:16

## 2021-01-01 RX ADMIN — CEFEPIME HYDROCHLORIDE 2000 MG: 2 INJECTION, POWDER, FOR SOLUTION INTRAVENOUS at 08:09

## 2021-01-01 RX ADMIN — INDOMETHACIN 25 MG: 25 CAPSULE ORAL at 10:10

## 2021-01-01 RX ADMIN — MIDODRINE HYDROCHLORIDE 5 MG: 5 TABLET ORAL at 11:08

## 2021-01-01 RX ADMIN — ALPRAZOLAM 0.25 MG: 0.25 TABLET ORAL at 22:44

## 2021-01-01 RX ADMIN — PROMETHAZINE HYDROCHLORIDE 25 MG: 25 TABLET ORAL at 15:19

## 2021-01-01 RX ADMIN — CEPHALEXIN 500 MG: 500 CAPSULE ORAL at 14:48

## 2021-01-01 RX ADMIN — ONDANSETRON 4 MG: 2 INJECTION INTRAMUSCULAR; INTRAVENOUS at 20:49

## 2021-01-01 RX ADMIN — MIDODRINE HYDROCHLORIDE 2.5 MG: 2.5 TABLET ORAL at 08:13

## 2021-01-01 RX ADMIN — KETOROLAC TROMETHAMINE 30 MG: 30 INJECTION, SOLUTION INTRAMUSCULAR; INTRAVENOUS at 16:19

## 2021-01-01 RX ADMIN — SODIUM CHLORIDE, PRESERVATIVE FREE 10 ML: 5 INJECTION INTRAVENOUS at 07:40

## 2021-01-01 RX ADMIN — ALPRAZOLAM 0.25 MG: 0.25 TABLET ORAL at 14:25

## 2021-01-01 RX ADMIN — OXYCODONE HYDROCHLORIDE AND ACETAMINOPHEN 1 TABLET: 5; 325 TABLET ORAL at 14:55

## 2021-01-01 RX ADMIN — CEPHALEXIN 500 MG: 500 CAPSULE ORAL at 10:07

## 2021-01-01 RX ADMIN — PREDNISONE 60 MG: 20 TABLET ORAL at 10:07

## 2021-01-01 RX ADMIN — OXYCODONE HYDROCHLORIDE AND ACETAMINOPHEN 1 TABLET: 5; 325 TABLET ORAL at 19:57

## 2021-01-01 RX ADMIN — CEPHALEXIN 500 MG: 500 CAPSULE ORAL at 08:25

## 2021-01-01 RX ADMIN — ACETAMINOPHEN 650 MG: 325 TABLET ORAL at 13:09

## 2021-01-01 RX ADMIN — LEVOTHYROXINE SODIUM 25 MCG: 0.03 TABLET ORAL at 05:48

## 2021-01-01 RX ADMIN — MORPHINE SULFATE 15 MG: 15 TABLET, FILM COATED, EXTENDED RELEASE ORAL at 11:02

## 2021-01-01 RX ADMIN — ALLOPURINOL 100 MG: 100 TABLET ORAL at 08:19

## 2021-01-01 RX ADMIN — ALPRAZOLAM 0.25 MG: 0.25 TABLET ORAL at 22:07

## 2021-01-01 RX ADMIN — MIDODRINE HYDROCHLORIDE 10 MG: 10 TABLET ORAL at 12:16

## 2021-01-01 RX ADMIN — IPRATROPIUM BROMIDE AND ALBUTEROL SULFATE 1 AMPULE: .5; 3 SOLUTION RESPIRATORY (INHALATION) at 08:47

## 2021-01-01 RX ADMIN — SODIUM CHLORIDE: 9 INJECTION, SOLUTION INTRAVENOUS at 04:10

## 2021-01-01 RX ADMIN — IPRATROPIUM BROMIDE AND ALBUTEROL SULFATE 1 AMPULE: .5; 3 SOLUTION RESPIRATORY (INHALATION) at 13:17

## 2021-01-01 RX ADMIN — PREDNISONE 40 MG: 20 TABLET ORAL at 08:25

## 2021-01-01 RX ADMIN — CEPHALEXIN 500 MG: 500 CAPSULE ORAL at 17:07

## 2021-01-01 RX ADMIN — ALLOPURINOL 100 MG: 100 TABLET ORAL at 09:04

## 2021-01-01 RX ADMIN — INSULIN LISPRO 1 UNITS: 100 INJECTION, SOLUTION INTRAVENOUS; SUBCUTANEOUS at 11:34

## 2021-01-01 RX ADMIN — IPRATROPIUM BROMIDE AND ALBUTEROL SULFATE 1 AMPULE: .5; 3 SOLUTION RESPIRATORY (INHALATION) at 00:38

## 2021-01-01 RX ADMIN — ONDANSETRON 4 MG: 2 INJECTION INTRAMUSCULAR; INTRAVENOUS at 17:39

## 2021-01-01 RX ADMIN — ALLOPURINOL 100 MG: 100 TABLET ORAL at 19:53

## 2021-01-01 RX ADMIN — IPRATROPIUM BROMIDE AND ALBUTEROL SULFATE 1 AMPULE: .5; 3 SOLUTION RESPIRATORY (INHALATION) at 08:50

## 2021-01-01 RX ADMIN — ALPRAZOLAM 0.25 MG: 0.25 TABLET ORAL at 17:26

## 2021-01-01 RX ADMIN — FAMOTIDINE 20 MG: 20 TABLET ORAL at 08:25

## 2021-01-01 RX ADMIN — DOCUSATE SODIUM 100 MG: 100 CAPSULE ORAL at 11:02

## 2021-01-01 RX ADMIN — OXYCODONE HYDROCHLORIDE AND ACETAMINOPHEN 1 TABLET: 5; 325 TABLET ORAL at 23:00

## 2021-01-01 RX ADMIN — INSULIN LISPRO 2 UNITS: 100 INJECTION, SOLUTION INTRAVENOUS; SUBCUTANEOUS at 16:58

## 2021-01-01 RX ADMIN — LEVOTHYROXINE SODIUM 25 MCG: 0.03 TABLET ORAL at 04:20

## 2021-01-01 RX ADMIN — OXYCODONE HYDROCHLORIDE AND ACETAMINOPHEN 1 TABLET: 5; 325 TABLET ORAL at 13:31

## 2021-01-01 RX ADMIN — ENOXAPARIN SODIUM 40 MG: 40 INJECTION SUBCUTANEOUS at 17:21

## 2021-01-01 RX ADMIN — FAMOTIDINE 20 MG: 20 TABLET ORAL at 09:20

## 2021-01-01 RX ADMIN — ALPRAZOLAM 0.25 MG: 0.25 TABLET ORAL at 17:04

## 2021-01-01 RX ADMIN — CEPHALEXIN 500 MG: 500 CAPSULE ORAL at 08:06

## 2021-01-01 RX ADMIN — PROMETHAZINE HYDROCHLORIDE 12.5 MG: 25 TABLET ORAL at 08:10

## 2021-01-01 RX ADMIN — BUMETANIDE 1 MG: 0.25 INJECTION, SOLUTION INTRAMUSCULAR; INTRAVENOUS at 20:12

## 2021-01-01 RX ADMIN — OXYCODONE HYDROCHLORIDE AND ACETAMINOPHEN 1 TABLET: 5; 325 TABLET ORAL at 00:20

## 2021-01-01 RX ADMIN — SODIUM ZIRCONIUM CYCLOSILICATE 10 G: 5 POWDER, FOR SUSPENSION ORAL at 09:19

## 2021-01-01 RX ADMIN — MORPHINE SULFATE 15 MG: 15 TABLET, FILM COATED, EXTENDED RELEASE ORAL at 20:57

## 2021-01-01 RX ADMIN — OXYCODONE HYDROCHLORIDE AND ACETAMINOPHEN 1 TABLET: 5; 325 TABLET ORAL at 21:49

## 2021-01-01 RX ADMIN — MIDODRINE HYDROCHLORIDE 10 MG: 10 TABLET ORAL at 16:58

## 2021-01-01 RX ADMIN — FAMOTIDINE 20 MG: 20 TABLET ORAL at 08:28

## 2021-01-01 RX ADMIN — IPRATROPIUM BROMIDE AND ALBUTEROL SULFATE 3 ML: .5; 3 SOLUTION RESPIRATORY (INHALATION) at 19:22

## 2021-01-01 RX ADMIN — PREDNISONE 10 MG: 20 TABLET ORAL at 09:39

## 2021-01-01 RX ADMIN — SODIUM POLYSTYRENE SULFONATE 30 G: 15 SUSPENSION ORAL; RECTAL at 12:47

## 2021-01-01 RX ADMIN — ALLOPURINOL 100 MG: 100 TABLET ORAL at 08:28

## 2021-01-01 RX ADMIN — ONDANSETRON 4 MG: 2 INJECTION INTRAMUSCULAR; INTRAVENOUS at 04:42

## 2021-01-01 RX ADMIN — MORPHINE SULFATE 15 MG: 15 TABLET, FILM COATED, EXTENDED RELEASE ORAL at 11:31

## 2021-01-01 RX ADMIN — IPRATROPIUM BROMIDE AND ALBUTEROL SULFATE 1 AMPULE: .5; 3 SOLUTION RESPIRATORY (INHALATION) at 16:12

## 2021-01-01 RX ADMIN — NALOXEGOL OXALATE 12.5 MG: 12.5 TABLET, FILM COATED ORAL at 08:27

## 2021-01-01 RX ADMIN — PREDNISONE 60 MG: 20 TABLET ORAL at 09:39

## 2021-01-01 RX ADMIN — MIDODRINE HYDROCHLORIDE 5 MG: 5 TABLET ORAL at 08:25

## 2021-01-01 RX ADMIN — ALPRAZOLAM 0.25 MG: 0.25 TABLET ORAL at 21:27

## 2021-01-01 RX ADMIN — SODIUM CHLORIDE, PRESERVATIVE FREE 10 ML: 5 INJECTION INTRAVENOUS at 08:29

## 2021-01-01 RX ADMIN — ALLOPURINOL 100 MG: 100 TABLET ORAL at 21:23

## 2021-01-01 RX ADMIN — DEXAMETHASONE SODIUM PHOSPHATE 10 MG: 4 INJECTION, SOLUTION INTRA-ARTICULAR; INTRALESIONAL; INTRAMUSCULAR; INTRAVENOUS; SOFT TISSUE at 12:53

## 2021-01-01 RX ADMIN — PREDNISONE 10 MG: 20 TABLET ORAL at 08:14

## 2021-01-01 RX ADMIN — MIDODRINE HYDROCHLORIDE 2.5 MG: 2.5 TABLET ORAL at 08:36

## 2021-01-01 RX ADMIN — MORPHINE SULFATE 2 MG: 2 INJECTION, SOLUTION INTRAMUSCULAR; INTRAVENOUS at 19:07

## 2021-01-01 RX ADMIN — SODIUM CHLORIDE, PRESERVATIVE FREE 10 ML: 5 INJECTION INTRAVENOUS at 08:10

## 2021-01-01 RX ADMIN — CEPHALEXIN 500 MG: 500 CAPSULE ORAL at 03:15

## 2021-01-01 RX ADMIN — IPRATROPIUM BROMIDE AND ALBUTEROL SULFATE 1 AMPULE: .5; 3 SOLUTION RESPIRATORY (INHALATION) at 00:56

## 2021-01-01 RX ADMIN — CEPHALEXIN 500 MG: 500 CAPSULE ORAL at 15:28

## 2021-01-01 RX ADMIN — LEVOTHYROXINE SODIUM 62.5 MCG: 0.12 TABLET ORAL at 06:10

## 2021-01-01 RX ADMIN — IOPAMIDOL 80 ML: 755 INJECTION, SOLUTION INTRAVENOUS at 06:14

## 2021-01-01 RX ADMIN — MORPHINE SULFATE 1 MG: 2 INJECTION, SOLUTION INTRAMUSCULAR; INTRAVENOUS at 04:07

## 2021-01-01 RX ADMIN — ALLOPURINOL 100 MG: 100 TABLET ORAL at 20:19

## 2021-01-01 RX ADMIN — VANCOMYCIN HYDROCHLORIDE 1500 MG: 5 INJECTION, POWDER, LYOPHILIZED, FOR SOLUTION INTRAVENOUS at 00:31

## 2021-01-01 RX ADMIN — SODIUM ZIRCONIUM CYCLOSILICATE 10 G: 5 POWDER, FOR SUSPENSION ORAL at 16:54

## 2021-01-01 RX ADMIN — INSULIN LISPRO 3 UNITS: 100 INJECTION, SOLUTION INTRAVENOUS; SUBCUTANEOUS at 16:55

## 2021-01-01 RX ADMIN — FUROSEMIDE 40 MG: 10 INJECTION, SOLUTION INTRAVENOUS at 14:25

## 2021-01-01 RX ADMIN — IPRATROPIUM BROMIDE AND ALBUTEROL SULFATE 1 AMPULE: .5; 3 SOLUTION RESPIRATORY (INHALATION) at 09:13

## 2021-01-01 RX ADMIN — SODIUM CHLORIDE: 9 INJECTION, SOLUTION INTRAVENOUS at 05:35

## 2021-01-01 RX ADMIN — SODIUM CHLORIDE 500 ML: 9 INJECTION, SOLUTION INTRAVENOUS at 19:34

## 2021-01-01 RX ADMIN — IPRATROPIUM BROMIDE AND ALBUTEROL SULFATE 1 AMPULE: .5; 3 SOLUTION RESPIRATORY (INHALATION) at 20:41

## 2021-01-01 RX ADMIN — INSULIN HUMAN 10 UNITS: 100 INJECTION, SOLUTION PARENTERAL at 15:41

## 2021-01-01 RX ADMIN — DOCUSATE SODIUM 100 MG: 100 CAPSULE ORAL at 09:20

## 2021-01-01 RX ADMIN — MORPHINE SULFATE 1 MG: 2 INJECTION, SOLUTION INTRAMUSCULAR; INTRAVENOUS at 08:04

## 2021-01-01 RX ADMIN — CEPHALEXIN 500 MG: 500 CAPSULE ORAL at 09:26

## 2021-01-01 RX ADMIN — ACETAMINOPHEN 650 MG: 325 TABLET ORAL at 01:29

## 2021-01-01 RX ADMIN — SODIUM CHLORIDE: 9 INJECTION, SOLUTION INTRAVENOUS at 03:21

## 2021-01-01 RX ADMIN — VANCOMYCIN HYDROCHLORIDE 1500 MG: 5 INJECTION, POWDER, LYOPHILIZED, FOR SOLUTION INTRAVENOUS at 20:59

## 2021-01-01 RX ADMIN — IPRATROPIUM BROMIDE AND ALBUTEROL SULFATE 1 AMPULE: .5; 3 SOLUTION RESPIRATORY (INHALATION) at 09:07

## 2021-01-01 RX ADMIN — MORPHINE SULFATE 15 MG: 15 TABLET, FILM COATED, EXTENDED RELEASE ORAL at 17:31

## 2021-01-01 RX ADMIN — ACETAMINOPHEN 650 MG: 325 TABLET ORAL at 00:33

## 2021-01-01 RX ADMIN — PREDNISONE 60 MG: 20 TABLET ORAL at 08:25

## 2021-01-01 RX ADMIN — INDOMETHACIN 25 MG: 25 CAPSULE ORAL at 08:49

## 2021-01-01 RX ADMIN — IPRATROPIUM BROMIDE AND ALBUTEROL SULFATE 1 AMPULE: .5; 3 SOLUTION RESPIRATORY (INHALATION) at 20:23

## 2021-01-01 RX ADMIN — MORPHINE SULFATE 15 MG: 15 TABLET, FILM COATED, EXTENDED RELEASE ORAL at 03:40

## 2021-01-01 RX ADMIN — TRAMADOL HYDROCHLORIDE 50 MG: 50 TABLET, FILM COATED ORAL at 21:57

## 2021-01-01 RX ADMIN — ONDANSETRON 4 MG: 2 INJECTION INTRAMUSCULAR; INTRAVENOUS at 14:20

## 2021-01-01 RX ADMIN — OXYCODONE HYDROCHLORIDE AND ACETAMINOPHEN 1 TABLET: 7.5; 325 TABLET ORAL at 10:58

## 2021-01-01 RX ADMIN — MIDODRINE HYDROCHLORIDE 5 MG: 5 TABLET ORAL at 08:06

## 2021-01-01 RX ADMIN — ALPRAZOLAM 0.25 MG: 0.25 TABLET ORAL at 08:43

## 2021-01-01 RX ADMIN — SODIUM CHLORIDE, PRESERVATIVE FREE 10 ML: 5 INJECTION INTRAVENOUS at 08:44

## 2021-01-01 RX ADMIN — MIDODRINE HYDROCHLORIDE 5 MG: 5 TABLET ORAL at 11:58

## 2021-01-01 RX ADMIN — PROMETHAZINE HYDROCHLORIDE 12.5 MG: 25 TABLET ORAL at 12:32

## 2021-01-01 RX ADMIN — IPRATROPIUM BROMIDE AND ALBUTEROL SULFATE 1 AMPULE: .5; 3 SOLUTION RESPIRATORY (INHALATION) at 21:20

## 2021-01-01 RX ADMIN — MIDODRINE HYDROCHLORIDE 5 MG: 5 TABLET ORAL at 08:27

## 2021-01-01 RX ADMIN — DOCUSATE SODIUM 100 MG: 100 CAPSULE ORAL at 20:16

## 2021-01-01 RX ADMIN — TRAZODONE HYDROCHLORIDE 50 MG: 50 TABLET ORAL at 21:05

## 2021-01-01 RX ADMIN — MORPHINE SULFATE 15 MG: 15 TABLET, FILM COATED, EXTENDED RELEASE ORAL at 08:30

## 2021-01-01 RX ADMIN — ALLOPURINOL 100 MG: 100 TABLET ORAL at 21:13

## 2021-01-01 RX ADMIN — DRONABINOL 2.5 MG: 2.5 CAPSULE ORAL at 21:11

## 2021-01-01 RX ADMIN — DOCUSATE SODIUM 100 MG: 100 CAPSULE ORAL at 08:07

## 2021-01-01 RX ADMIN — IOPAMIDOL 80 ML: 755 INJECTION, SOLUTION INTRAVENOUS at 14:39

## 2021-01-01 RX ADMIN — BUMETANIDE 1 MG: 0.25 INJECTION INTRAMUSCULAR; INTRAVENOUS at 09:04

## 2021-01-01 RX ADMIN — IPRATROPIUM BROMIDE AND ALBUTEROL SULFATE 3 ML: .5; 3 SOLUTION RESPIRATORY (INHALATION) at 17:05

## 2021-01-01 RX ADMIN — MIDODRINE HYDROCHLORIDE 5 MG: 5 TABLET ORAL at 13:08

## 2021-01-01 RX ADMIN — MIDODRINE HYDROCHLORIDE 5 MG: 5 TABLET ORAL at 16:29

## 2021-01-01 RX ADMIN — LEVOTHYROXINE SODIUM 62.5 MCG: 0.12 TABLET ORAL at 06:57

## 2021-01-01 RX ADMIN — ACETAMINOPHEN 650 MG: 325 TABLET ORAL at 03:19

## 2021-01-01 RX ADMIN — IPRATROPIUM BROMIDE AND ALBUTEROL SULFATE 1 AMPULE: .5; 3 SOLUTION RESPIRATORY (INHALATION) at 12:36

## 2021-01-01 RX ADMIN — INSULIN LISPRO 2 UNITS: 100 INJECTION, SOLUTION INTRAVENOUS; SUBCUTANEOUS at 17:34

## 2021-01-01 RX ADMIN — PREDNISONE 60 MG: 20 TABLET ORAL at 08:06

## 2021-01-01 RX ADMIN — IPRATROPIUM BROMIDE AND ALBUTEROL SULFATE 1 AMPULE: .5; 3 SOLUTION RESPIRATORY (INHALATION) at 05:31

## 2021-01-01 RX ADMIN — ONDANSETRON 4 MG: 2 INJECTION INTRAMUSCULAR; INTRAVENOUS at 21:11

## 2021-01-01 RX ADMIN — LEVOTHYROXINE SODIUM 62.5 MCG: 0.12 TABLET ORAL at 06:37

## 2021-01-01 RX ADMIN — FAMOTIDINE 20 MG: 20 TABLET, FILM COATED ORAL at 20:19

## 2021-01-01 RX ADMIN — IPRATROPIUM BROMIDE AND ALBUTEROL SULFATE 3 ML: .5; 3 SOLUTION RESPIRATORY (INHALATION) at 02:27

## 2021-01-01 RX ADMIN — CEPHALEXIN 500 MG: 500 CAPSULE ORAL at 11:02

## 2021-01-01 RX ADMIN — MORPHINE SULFATE 15 MG: 15 TABLET, FILM COATED, EXTENDED RELEASE ORAL at 02:17

## 2021-01-01 RX ADMIN — IPRATROPIUM BROMIDE AND ALBUTEROL SULFATE 1 AMPULE: .5; 3 SOLUTION RESPIRATORY (INHALATION) at 12:12

## 2021-01-01 RX ADMIN — MORPHINE SULFATE 15 MG: 15 TABLET, FILM COATED, EXTENDED RELEASE ORAL at 19:23

## 2021-01-01 RX ADMIN — LEVOTHYROXINE SODIUM 25 MCG: 0.03 TABLET ORAL at 06:42

## 2021-01-01 RX ADMIN — CEFEPIME HYDROCHLORIDE 2000 MG: 2 INJECTION, POWDER, FOR SOLUTION INTRAVENOUS at 01:35

## 2021-01-01 RX ADMIN — BUMETANIDE 1 MG: 0.25 INJECTION, SOLUTION INTRAMUSCULAR; INTRAVENOUS at 09:36

## 2021-01-01 RX ADMIN — IPRATROPIUM BROMIDE AND ALBUTEROL SULFATE 1 AMPULE: .5; 3 SOLUTION RESPIRATORY (INHALATION) at 16:06

## 2021-01-01 RX ADMIN — SODIUM POLYSTYRENE SULFONATE 30 G: 15 SUSPENSION ORAL; RECTAL at 14:01

## 2021-01-01 RX ADMIN — MIDODRINE HYDROCHLORIDE 2.5 MG: 2.5 TABLET ORAL at 09:20

## 2021-01-01 RX ADMIN — SODIUM CHLORIDE, PRESERVATIVE FREE 10 ML: 5 INJECTION INTRAVENOUS at 21:29

## 2021-01-01 ASSESSMENT — PAIN DESCRIPTION - PROGRESSION
CLINICAL_PROGRESSION: NOT CHANGED
CLINICAL_PROGRESSION: RAPIDLY WORSENING
CLINICAL_PROGRESSION: RAPIDLY WORSENING
CLINICAL_PROGRESSION: GRADUALLY WORSENING
CLINICAL_PROGRESSION: RAPIDLY WORSENING
CLINICAL_PROGRESSION: GRADUALLY IMPROVING
CLINICAL_PROGRESSION: NOT CHANGED
CLINICAL_PROGRESSION: RAPIDLY WORSENING
CLINICAL_PROGRESSION: NOT CHANGED
CLINICAL_PROGRESSION: NOT CHANGED
CLINICAL_PROGRESSION: RAPIDLY WORSENING
CLINICAL_PROGRESSION: RAPIDLY WORSENING
CLINICAL_PROGRESSION: GRADUALLY IMPROVING
CLINICAL_PROGRESSION: NOT CHANGED
CLINICAL_PROGRESSION: GRADUALLY IMPROVING
CLINICAL_PROGRESSION: GRADUALLY IMPROVING
CLINICAL_PROGRESSION: GRADUALLY WORSENING
CLINICAL_PROGRESSION: GRADUALLY IMPROVING
CLINICAL_PROGRESSION: NOT CHANGED
CLINICAL_PROGRESSION: GRADUALLY IMPROVING
CLINICAL_PROGRESSION: NOT CHANGED
CLINICAL_PROGRESSION: NOT CHANGED
CLINICAL_PROGRESSION: GRADUALLY IMPROVING
CLINICAL_PROGRESSION: RAPIDLY WORSENING
CLINICAL_PROGRESSION: NOT CHANGED
CLINICAL_PROGRESSION: RAPIDLY WORSENING
CLINICAL_PROGRESSION: RAPIDLY WORSENING
CLINICAL_PROGRESSION: GRADUALLY WORSENING
CLINICAL_PROGRESSION: RAPIDLY WORSENING
CLINICAL_PROGRESSION: GRADUALLY IMPROVING
CLINICAL_PROGRESSION: NOT CHANGED
CLINICAL_PROGRESSION: NOT CHANGED
CLINICAL_PROGRESSION: GRADUALLY IMPROVING
CLINICAL_PROGRESSION: NOT CHANGED
CLINICAL_PROGRESSION: RAPIDLY WORSENING
CLINICAL_PROGRESSION: GRADUALLY IMPROVING
CLINICAL_PROGRESSION: RAPIDLY WORSENING
CLINICAL_PROGRESSION: GRADUALLY IMPROVING
CLINICAL_PROGRESSION: RAPIDLY WORSENING
CLINICAL_PROGRESSION: GRADUALLY IMPROVING
CLINICAL_PROGRESSION: GRADUALLY WORSENING
CLINICAL_PROGRESSION: GRADUALLY IMPROVING
CLINICAL_PROGRESSION: NOT CHANGED
CLINICAL_PROGRESSION: RAPIDLY WORSENING
CLINICAL_PROGRESSION: GRADUALLY WORSENING
CLINICAL_PROGRESSION: RAPIDLY WORSENING
CLINICAL_PROGRESSION: NOT CHANGED
CLINICAL_PROGRESSION: RAPIDLY WORSENING
CLINICAL_PROGRESSION: NOT CHANGED
CLINICAL_PROGRESSION: GRADUALLY IMPROVING
CLINICAL_PROGRESSION: NOT CHANGED
CLINICAL_PROGRESSION: RAPIDLY WORSENING
CLINICAL_PROGRESSION: GRADUALLY IMPROVING
CLINICAL_PROGRESSION: NOT CHANGED
CLINICAL_PROGRESSION: RAPIDLY WORSENING
CLINICAL_PROGRESSION: RAPIDLY WORSENING
CLINICAL_PROGRESSION: GRADUALLY IMPROVING
CLINICAL_PROGRESSION: RAPIDLY WORSENING
CLINICAL_PROGRESSION: GRADUALLY IMPROVING
CLINICAL_PROGRESSION: GRADUALLY IMPROVING
CLINICAL_PROGRESSION: GRADUALLY WORSENING
CLINICAL_PROGRESSION: GRADUALLY WORSENING
CLINICAL_PROGRESSION: NOT CHANGED
CLINICAL_PROGRESSION: NOT CHANGED
CLINICAL_PROGRESSION: GRADUALLY WORSENING
CLINICAL_PROGRESSION: NOT CHANGED
CLINICAL_PROGRESSION: GRADUALLY IMPROVING
CLINICAL_PROGRESSION: RAPIDLY WORSENING
CLINICAL_PROGRESSION: GRADUALLY IMPROVING
CLINICAL_PROGRESSION: GRADUALLY WORSENING
CLINICAL_PROGRESSION: NOT CHANGED
CLINICAL_PROGRESSION: RAPIDLY WORSENING
CLINICAL_PROGRESSION: GRADUALLY IMPROVING
CLINICAL_PROGRESSION: GRADUALLY IMPROVING
CLINICAL_PROGRESSION: NOT CHANGED
CLINICAL_PROGRESSION: GRADUALLY WORSENING
CLINICAL_PROGRESSION: GRADUALLY IMPROVING
CLINICAL_PROGRESSION: NOT CHANGED
CLINICAL_PROGRESSION: GRADUALLY WORSENING
CLINICAL_PROGRESSION: NOT CHANGED
CLINICAL_PROGRESSION: NOT CHANGED
CLINICAL_PROGRESSION: GRADUALLY IMPROVING
CLINICAL_PROGRESSION: NOT CHANGED
CLINICAL_PROGRESSION: GRADUALLY IMPROVING
CLINICAL_PROGRESSION: GRADUALLY WORSENING
CLINICAL_PROGRESSION: NOT CHANGED
CLINICAL_PROGRESSION: NOT CHANGED
CLINICAL_PROGRESSION: RAPIDLY WORSENING

## 2021-01-01 ASSESSMENT — PAIN DESCRIPTION - LOCATION
LOCATION: CHEST
LOCATION: GENERALIZED
LOCATION: BACK;FLANK
LOCATION: CHEST
LOCATION: BACK
LOCATION: CHEST
LOCATION: GENERALIZED
LOCATION: CHEST
LOCATION: BACK
LOCATION: ABDOMEN
LOCATION: CHEST
LOCATION: GENERALIZED
LOCATION: BACK
LOCATION: BACK;FLANK
LOCATION: CHEST
LOCATION: BACK
LOCATION: BACK
LOCATION: GENERALIZED
LOCATION: BACK;CHEST
LOCATION: BACK;FLANK
LOCATION: ABDOMEN;BACK
LOCATION: BACK;FLANK
LOCATION: CHEST
LOCATION: CHEST
LOCATION: BACK;FLANK

## 2021-01-01 ASSESSMENT — PAIN DESCRIPTION - DESCRIPTORS
DESCRIPTORS: ACHING
DESCRIPTORS: ACHING
DESCRIPTORS: ACHING;SORE;DISCOMFORT
DESCRIPTORS: ACHING;SORE
DESCRIPTORS: ACHING;SORE;DISCOMFORT
DESCRIPTORS: DISCOMFORT;CRAMPING
DESCRIPTORS: ACHING;SORE;SHARP
DESCRIPTORS: ACHING;SORE
DESCRIPTORS: SHARP;SPASM
DESCRIPTORS: ACHING;SORE
DESCRIPTORS: ACHING;SORE
DESCRIPTORS: ACHING;SHARP;SORE
DESCRIPTORS: ACHING
DESCRIPTORS: ACHING;CRAMPING
DESCRIPTORS: ACHING;SORE
DESCRIPTORS: ACHING
DESCRIPTORS: ACHING;SORE
DESCRIPTORS: ACHING;DISCOMFORT;SORE;SHARP
DESCRIPTORS: ACHING;SORE;SHARP
DESCRIPTORS: ACHING
DESCRIPTORS: ACHING;SORE
DESCRIPTORS: ACHING;SORE
DESCRIPTORS: ACHING;DISCOMFORT
DESCRIPTORS: ACHING;CRAMPING

## 2021-01-01 ASSESSMENT — PAIN - FUNCTIONAL ASSESSMENT
PAIN_FUNCTIONAL_ASSESSMENT: PREVENTS OR INTERFERES SOME ACTIVE ACTIVITIES AND ADLS
PAIN_FUNCTIONAL_ASSESSMENT: PREVENTS OR INTERFERES SOME ACTIVE ACTIVITIES AND ADLS
PAIN_FUNCTIONAL_ASSESSMENT: PREVENTS OR INTERFERES WITH MANY ACTIVE NOT PASSIVE ACTIVITIES
PAIN_FUNCTIONAL_ASSESSMENT: PREVENTS OR INTERFERES WITH MANY ACTIVE NOT PASSIVE ACTIVITIES
PAIN_FUNCTIONAL_ASSESSMENT: PREVENTS OR INTERFERES SOME ACTIVE ACTIVITIES AND ADLS
PAIN_FUNCTIONAL_ASSESSMENT: ACTIVITIES ARE NOT PREVENTED
PAIN_FUNCTIONAL_ASSESSMENT: PREVENTS OR INTERFERES WITH MANY ACTIVE NOT PASSIVE ACTIVITIES
PAIN_FUNCTIONAL_ASSESSMENT: ACTIVITIES ARE NOT PREVENTED
PAIN_FUNCTIONAL_ASSESSMENT: PREVENTS OR INTERFERES SOME ACTIVE ACTIVITIES AND ADLS
PAIN_FUNCTIONAL_ASSESSMENT: ACTIVITIES ARE NOT PREVENTED
PAIN_FUNCTIONAL_ASSESSMENT: PREVENTS OR INTERFERES SOME ACTIVE ACTIVITIES AND ADLS
PAIN_FUNCTIONAL_ASSESSMENT: PREVENTS OR INTERFERES WITH ALL ACTIVE AND SOME PASSIVE ACTIVITIES

## 2021-01-01 ASSESSMENT — PAIN SCALES - GENERAL
PAINLEVEL_OUTOF10: 6
PAINLEVEL_OUTOF10: 3
PAINLEVEL_OUTOF10: 9
PAINLEVEL_OUTOF10: 8
PAINLEVEL_OUTOF10: 8
PAINLEVEL_OUTOF10: 3
PAINLEVEL_OUTOF10: 7
PAINLEVEL_OUTOF10: 4
PAINLEVEL_OUTOF10: 2
PAINLEVEL_OUTOF10: 4
PAINLEVEL_OUTOF10: 0
PAINLEVEL_OUTOF10: 6
PAINLEVEL_OUTOF10: 4
PAINLEVEL_OUTOF10: 0
PAINLEVEL_OUTOF10: 4
PAINLEVEL_OUTOF10: 8
PAINLEVEL_OUTOF10: 8
PAINLEVEL_OUTOF10: 1
PAINLEVEL_OUTOF10: 6
PAINLEVEL_OUTOF10: 4
PAINLEVEL_OUTOF10: 6
PAINLEVEL_OUTOF10: 0
PAINLEVEL_OUTOF10: 4
PAINLEVEL_OUTOF10: 0
PAINLEVEL_OUTOF10: 2
PAINLEVEL_OUTOF10: 5
PAINLEVEL_OUTOF10: 0
PAINLEVEL_OUTOF10: 8
PAINLEVEL_OUTOF10: 3
PAINLEVEL_OUTOF10: 6
PAINLEVEL_OUTOF10: 6
PAINLEVEL_OUTOF10: 5
PAINLEVEL_OUTOF10: 6
PAINLEVEL_OUTOF10: 7
PAINLEVEL_OUTOF10: 0
PAINLEVEL_OUTOF10: 6
PAINLEVEL_OUTOF10: 7
PAINLEVEL_OUTOF10: 4
PAINLEVEL_OUTOF10: 5
PAINLEVEL_OUTOF10: 5
PAINLEVEL_OUTOF10: 6
PAINLEVEL_OUTOF10: 4
PAINLEVEL_OUTOF10: 7
PAINLEVEL_OUTOF10: 5
PAINLEVEL_OUTOF10: 1
PAINLEVEL_OUTOF10: 6
PAINLEVEL_OUTOF10: 4
PAINLEVEL_OUTOF10: 3
PAINLEVEL_OUTOF10: 2
PAINLEVEL_OUTOF10: 3
PAINLEVEL_OUTOF10: 6
PAINLEVEL_OUTOF10: 5
PAINLEVEL_OUTOF10: 5
PAINLEVEL_OUTOF10: 10
PAINLEVEL_OUTOF10: 4
PAINLEVEL_OUTOF10: 6
PAINLEVEL_OUTOF10: 4
PAINLEVEL_OUTOF10: 6
PAINLEVEL_OUTOF10: 6
PAINLEVEL_OUTOF10: 0
PAINLEVEL_OUTOF10: 7
PAINLEVEL_OUTOF10: 6
PAINLEVEL_OUTOF10: 5
PAINLEVEL_OUTOF10: 0
PAINLEVEL_OUTOF10: 7
PAINLEVEL_OUTOF10: 5
PAINLEVEL_OUTOF10: 3
PAINLEVEL_OUTOF10: 7
PAINLEVEL_OUTOF10: 8
PAINLEVEL_OUTOF10: 8
PAINLEVEL_OUTOF10: 0
PAINLEVEL_OUTOF10: 3
PAINLEVEL_OUTOF10: 0
PAINLEVEL_OUTOF10: 3
PAINLEVEL_OUTOF10: 4
PAINLEVEL_OUTOF10: 6
PAINLEVEL_OUTOF10: 6
PAINLEVEL_OUTOF10: 3
PAINLEVEL_OUTOF10: 10
PAINLEVEL_OUTOF10: 4
PAINLEVEL_OUTOF10: 3
PAINLEVEL_OUTOF10: 8
PAINLEVEL_OUTOF10: 9
PAINLEVEL_OUTOF10: 3
PAINLEVEL_OUTOF10: 5
PAINLEVEL_OUTOF10: 5
PAINLEVEL_OUTOF10: 4
PAINLEVEL_OUTOF10: 5
PAINLEVEL_OUTOF10: 0
PAINLEVEL_OUTOF10: 4
PAINLEVEL_OUTOF10: 7
PAINLEVEL_OUTOF10: 4
PAINLEVEL_OUTOF10: 8
PAINLEVEL_OUTOF10: 5
PAINLEVEL_OUTOF10: 3
PAINLEVEL_OUTOF10: 2
PAINLEVEL_OUTOF10: 8
PAINLEVEL_OUTOF10: 5
PAINLEVEL_OUTOF10: 7
PAINLEVEL_OUTOF10: 3
PAINLEVEL_OUTOF10: 5
PAINLEVEL_OUTOF10: 8
PAINLEVEL_OUTOF10: 6
PAINLEVEL_OUTOF10: 8
PAINLEVEL_OUTOF10: 0
PAINLEVEL_OUTOF10: 8
PAINLEVEL_OUTOF10: 8
PAINLEVEL_OUTOF10: 6

## 2021-01-01 ASSESSMENT — ENCOUNTER SYMPTOMS
ABDOMINAL DISTENTION: 0
SHORTNESS OF BREATH: 1
EYE ITCHING: 0
ABDOMINAL PAIN: 1
SINUS PRESSURE: 0
EYE ITCHING: 0
SINUS PRESSURE: 0
BACK PAIN: 0
DIARRHEA: 0
VOICE CHANGE: 0
DIARRHEA: 0
COLOR CHANGE: 0
COLOR CHANGE: 1
SINUS PRESSURE: 0
WHEEZING: 0
VOMITING: 1
PHOTOPHOBIA: 0
SINUS PAIN: 0
NAUSEA: 1
SHORTNESS OF BREATH: 1
WHEEZING: 0
VOMITING: 0
SORE THROAT: 0
BACK PAIN: 0
PHOTOPHOBIA: 0
NAUSEA: 0
ABDOMINAL PAIN: 0
APNEA: 0
EYE PAIN: 0
EYE DISCHARGE: 0
COUGH: 0
RHINORRHEA: 0
COUGH: 1
RHINORRHEA: 0
CHEST TIGHTNESS: 0
SHORTNESS OF BREATH: 1
FACIAL SWELLING: 0
SHORTNESS OF BREATH: 1
COUGH: 1
CHEST TIGHTNESS: 0
NAUSEA: 1
COUGH: 0
SHORTNESS OF BREATH: 1
TROUBLE SWALLOWING: 0
SHORTNESS OF BREATH: 1
SHORTNESS OF BREATH: 1
ABDOMINAL DISTENTION: 1
CONSTIPATION: 0
NAUSEA: 0
PHOTOPHOBIA: 0
COLOR CHANGE: 0
SORE THROAT: 0
RHINORRHEA: 0
TROUBLE SWALLOWING: 0
BACK PAIN: 0
BLOOD IN STOOL: 0
ABDOMINAL PAIN: 1
RHINORRHEA: 0
CHOKING: 0
ABDOMINAL PAIN: 0
ABDOMINAL DISTENTION: 0
CONSTIPATION: 0
VOMITING: 0
SINUS PAIN: 0
DIARRHEA: 0
EYE REDNESS: 0
SORE THROAT: 0
CHEST TIGHTNESS: 0

## 2021-01-01 ASSESSMENT — PAIN DESCRIPTION - FREQUENCY
FREQUENCY: CONTINUOUS
FREQUENCY: INTERMITTENT

## 2021-01-01 ASSESSMENT — PAIN DESCRIPTION - ORIENTATION
ORIENTATION: RIGHT
ORIENTATION: LEFT
ORIENTATION: RIGHT;LEFT
ORIENTATION: LEFT
ORIENTATION: LEFT;LOWER
ORIENTATION: LEFT
ORIENTATION: POSTERIOR
ORIENTATION: RIGHT
ORIENTATION: LEFT
ORIENTATION: MID;LEFT
ORIENTATION: LEFT

## 2021-01-01 ASSESSMENT — PAIN DESCRIPTION - PAIN TYPE
TYPE: CHRONIC PAIN
TYPE: ACUTE PAIN
TYPE: CHRONIC PAIN
TYPE: ACUTE PAIN
TYPE: CHRONIC PAIN
TYPE: ACUTE PAIN
TYPE: CHRONIC PAIN
TYPE: ACUTE PAIN
TYPE: ACUTE PAIN
TYPE: CHRONIC PAIN
TYPE: CHRONIC PAIN
TYPE: ACUTE PAIN
TYPE: ACUTE PAIN
TYPE: CHRONIC PAIN
TYPE: ACUTE PAIN
TYPE: CHRONIC PAIN
TYPE: ACUTE PAIN
TYPE: CHRONIC PAIN
TYPE: CHRONIC PAIN
TYPE: ACUTE PAIN
TYPE: CHRONIC PAIN
TYPE: ACUTE PAIN
TYPE: CHRONIC PAIN
TYPE: ACUTE PAIN
TYPE: CHRONIC PAIN
TYPE: ACUTE PAIN
TYPE: CHRONIC PAIN

## 2021-01-01 ASSESSMENT — PAIN DESCRIPTION - ONSET
ONSET: ON-GOING
ONSET: GRADUAL
ONSET: ON-GOING
ONSET: ON-GOING
ONSET: AWAKENED FROM SLEEP
ONSET: ON-GOING
ONSET: GRADUAL
ONSET: ON-GOING

## 2021-02-15 NOTE — ED PROVIDER NOTES
[facility]  eMERGENCY dEPARTMENT eNCOUnter          CHIEF COMPLAINT       Chief Complaint   Patient presents with    Shortness of Breath       Nurses Notes reviewed and I agree except as noted in the HPI. HISTORY OF PRESENT ILLNESS    Mo Luciano is a 44 y.o. male who presents shortness of breath. Patient has a extensive history. He has what appears to be left renal cell carcinoma. He had a pleural effusion which was drained twice resulting in a hydropneumothorax at [facility]. Patient states today that he feels the same shortness of breath. Patient has not initiated chemotherapy nor has he initiated radiation treatment as of yet. Patient states that tonight the shortness of breath got so bad that he decided to come in for evaluation and treatment. Currently the patient is 89% on room air. He is taking short breaths. But otherwise is not in any severe distress. Patient displays no conversational dyspnea. Patient sees Dr. Gregg Courser for oncology at [facility]. Patient denies any fevers chills or sweats at home. Patient had Covid approximately 1 month ago but had since recovered from this. REVIEW OF SYSTEMS     Review of Systems   Constitutional: Negative for activity change, appetite change, diaphoresis, fatigue and unexpected weight change. HENT: Negative for congestion, ear discharge, ear pain, hearing loss, rhinorrhea, sinus pressure, sore throat, trouble swallowing and voice change. Eyes: Negative for photophobia, pain, discharge, redness and itching. Respiratory: Positive for cough and shortness of breath. Negative for choking, chest tightness and wheezing. Cardiovascular: Negative for chest pain, palpitations and leg swelling. Gastrointestinal: Negative for abdominal distention, abdominal pain, blood in stool, constipation, diarrhea, nausea and vomiting. Endocrine: Negative for polydipsia, polyphagia and polyuria. Genitourinary: Negative for decreased urine volume, difficulty urinating, dysuria, enuresis, frequency, hematuria and urgency. Musculoskeletal: Negative for arthralgias, back pain, gait problem, myalgias, neck pain and neck stiffness. Skin: Negative for pallor and rash. Allergic/Immunologic: Negative for immunocompromised state. Neurological: Negative for dizziness, tremors, seizures, syncope, facial asymmetry, weakness, light-headedness, numbness and headaches. Hematological: Negative for adenopathy. Does not bruise/bleed easily. Psychiatric/Behavioral: Negative for agitation, hallucinations and suicidal ideas. The patient is not nervous/anxious. PAST MEDICAL HISTORY    has a past medical history of Chronic kidney disease, unspecified, Collapsed lung, IDDM (insulin dependent diabetes mellitus), Kidney mass, Lung nodules, Sinusitis, and Unspecified essential hypertension. SURGICAL HISTORY      has a past surgical history that includes Cholecystectomy and back surgery. CURRENT MEDICATIONS       Previous Medications    ALLOPURINOL PO    Take by mouth nightly    AMLODIPINE (NORVASC) 5 MG TABLET    Take 5 mg by mouth daily    COLCHICINE (COLCRYS) 0.6 MG TABLET    Take 1 tablet by mouth daily for 3 days    LEVOTHYROXINE (SYNTHROID) 100 MCG TABLET    Take 25 mcg by mouth Daily        ALLERGIES     has No Known Allergies. FAMILY HISTORY     has no family status information on file. family history is not on file. SOCIAL HISTORY      reports that he has never smoked. He has never used smokeless tobacco. He reports current alcohol use of about 1.0 standard drinks of alcohol per week. He reports that he does not use drugs. PHYSICAL EXAM     INITIAL VITALS:  height is 6' 1\" (1.854 m) and weight is 200 lb (90.7 kg). His oral temperature is 97.9 °F (36.6 °C). His blood pressure is 132/91 (abnormal) and his pulse is 106. His respiration is 26 and oxygen saturation is 96%.     Physical Exam Vitals signs and nursing note reviewed. Constitutional:       General: He is not in acute distress. Appearance: He is well-developed. He is not diaphoretic. HENT:      Head: Normocephalic and atraumatic. Right Ear: External ear normal.      Left Ear: External ear normal.      Nose: Nose normal.   Eyes:      General: Lids are normal. No scleral icterus. Right eye: No discharge. Left eye: No discharge. Conjunctiva/sclera: Conjunctivae normal.      Right eye: No exudate. Left eye: No exudate. Pupils: Pupils are equal, round, and reactive to light. Neck:      Musculoskeletal: Normal range of motion and neck supple. Normal range of motion. Thyroid: No thyromegaly. Vascular: No JVD. Trachea: No tracheal deviation. Cardiovascular:      Rate and Rhythm: Normal rate and regular rhythm. Pulses: Normal pulses. Carotid pulses are 2+ on the right side and 2+ on the left side. Radial pulses are 2+ on the right side and 2+ on the left side. Femoral pulses are 2+ on the right side and 2+ on the left side. Popliteal pulses are 2+ on the right side and 2+ on the left side. Dorsalis pedis pulses are 2+ on the right side and 2+ on the left side. Posterior tibial pulses are 2+ on the right side and 2+ on the left side. Heart sounds: Normal heart sounds, S1 normal and S2 normal. No murmur. No friction rub. No gallop. Pulmonary:      Effort: Pulmonary effort is normal. No respiratory distress. Breath sounds: No stridor. Examination of the right-middle field reveals decreased breath sounds. Examination of the right-lower field reveals decreased breath sounds. Examination of the left-lower field reveals decreased breath sounds. Decreased breath sounds present. No wheezing, rhonchi or rales. Chest:      Chest wall: No lacerations, deformity, swelling, tenderness, crepitus or edema. There is no dullness to percussion. Abdominal:      General: Bowel sounds are normal. There is no distension. Palpations: Abdomen is soft. There is no mass. Tenderness: There is no abdominal tenderness. There is no guarding or rebound. Musculoskeletal: Normal range of motion. General: No tenderness. Right lower leg: No edema. Left lower leg: No edema. Lymphadenopathy:      Cervical: No cervical adenopathy. Skin:     General: Skin is warm and dry. Findings: No bruising, ecchymosis, lesion or rash. Neurological:      Mental Status: He is alert and oriented to person, place, and time. Cranial Nerves: No cranial nerve deficit. Sensory: No sensory deficit. Coordination: Coordination normal.      Deep Tendon Reflexes: Reflexes are normal and symmetric. Psychiatric:         Speech: Speech normal.         Behavior: Behavior normal.         Thought Content: Thought content normal.         Judgment: Judgment normal.           DIFFERENTIAL DIAGNOSIS:   Pleural effusion pneumothorax PE pneumonia bronchitis    DIAGNOSTIC RESULTS     EKG: All EKG's are interpreted by the Emergency Department Physician who either signs or Co-signs this chart in the absence of a cardiologist.  EKG revealed sinus tachycardia, normal axis, ventricular rate of 110 [place] interval of 132 QRS duration of 86 QT interval 336 QTC of 454. When compared with EKG dated July 22, 2019 no significant changes appreciated. RADIOLOGY: non-plain film images(s) such as CT, Ultrasound and MRI are read by the radiologist.  CTA CHEST W WO CONTRAST   Final Result   Impression:   1. No CT evidence of pulmonary embolus. 2.  Normal caliber thoracic aorta. .   3.  Large right layering pleural effusion with compressive atelectasis. Small left lower lobe pleural effusion and loculated pleural effusion left    upper lobe with extensive hematogenous pulmonary metastases and pleural    metastases as described. Subcarinal lymphadenopathy. Gerldine Prost This document has been electronically signed by: Tyrone Watkins MD on    02/15/2021 06:49 AM      All CTs at this facility use dose modulation techniques and iterative    reconstructions, and/or weight-based dosing   when appropriate to reduce radiation to a low as reasonably achievable. XR CHEST PORTABLE   Final Result   Mild bibasilar atelectasis. This document has been electronically signed by:  Justice Patel MD on    02/15/2021 05:30 AM          LABS:   Labs Reviewed   CBC WITH AUTO DIFFERENTIAL - Abnormal; Notable for the following components:       Result Value    RBC 4.26 (*)     Hemoglobin 12.7 (*)     Hematocrit 40.1 (*)     MCV 94.1 (*)     MCHC 31.7 (*)     RDW-CV 16.4 (*)     RDW-SD 55.2 (*)     All other components within normal limits   BASIC METABOLIC PANEL - Abnormal; Notable for the following components:    Glucose 171 (*)     All other components within normal limits   GLOMERULAR FILTRATION RATE, ESTIMATED - Abnormal; Notable for the following components:    Est, Glom Filt Rate 88 (*)     All other components within normal limits   BRAIN NATRIURETIC PEPTIDE   HEPATIC FUNCTION PANEL   LIPASE   TROPONIN   MAGNESIUM   ANION GAP   OSMOLALITY       EMERGENCY DEPARTMENT COURSE:   Vitals:    Vitals:    02/15/21 0443 02/15/21 0448 02/15/21 0546   BP: 131/85  (!) 132/91   Pulse: 112  106   Resp: 25  26   Temp: 97.9 °F (36.6 °C)     TempSrc: Oral     SpO2: (!) 89% 97% 96%   Weight: 200 lb (90.7 kg)     Height: 6' 1\" (1.854 m) Patient was assessed at bedside appropriate labs and imaging were ordered. Patient is slightly hypoxic here at bedside however he has no conversational dyspnea he is able to get up and walk around without any difficulty. Patient states that he had a CT examination approximately 14 days ago and it was clean. He is scheduled to have pinpoint radiation performed on a lesion on the back of his brain on Wednesday. Here today the patient's heart failure markers are within normal limits. He has no white blood cell count. There is no fevers. His hemoglobin and hematocrit appear to be within side is normal range. BMP and hepatic profiles are both within normal limits. Chest x-ray showed to me which appeared to be a large right pleural effusion. I then ordered a CTA of his chest.  There is no PE, however he has a large right-sided pleural effusion. I went back to bedside and discussed case with the patient we discussed transfer to [facility]. Patient states that he feels fine and would rather drive himself. He drove himself here from [employer]. I did discuss with him the risks and benefits of driving himself. I tried to persuade him to have us transfer him however he was adamant and was ready to sign out [address] At this point the patient did, and has been driving himself for the last several days with this increasing shortness of breath he was not severely hypoxic. He is able to walk on his own without difficulty. I called [facility] transfer center and let them know that he would be coming, I called CAT scan and had them push the CT examination and x-rays to [facility]. I went back in and discussed this again with the patient he is still adamant and wishes to drive himself. At this point I will discharge the patient with instructions to go immediately to [facility], and if he should have any difficulty whatsoever he should either stop the car and call 911 or drive to the nearest emergency room.   Patient understood and agreed with the plan. Patient is subsequently discharged home in stable condition. Patient has what appears to be a large right-sided pleural effusion. Patient is instructed to go straight to [facility] to his cancer doctor. He is instructed to take all medications as prescribed. He is instructed to return to the nearest emergency room immediately for any new or worsening complaints. CRITICAL CARE:   None    CONSULTS:  [facility] transfer center    PROCEDURES:  None    FINAL IMPRESSION      1.  Pleural effusion on right          DISPOSITION/PLAN   Discharge    PATIENT REFERRED TO:  Vic   [address] [facility] [address] [phone]    Go today  Go directly to [facility] emergency room      DISCHARGE MEDICATIONS:  New Prescriptions    No medications on file       (Please note that portions of this note were completed with a voice recognition program.  Efforts were made to edit the dictations but occasionally words are mis-transcribed.)    Debra Leslie, [address], DO  02/15/21 8298

## 2021-02-15 NOTE — ED NOTES
Pt sitting upright in bed at this time. VSS, respirations even and unlabored. Pt denies needs. Pt updated on POC. Side rails up x2.  Call light within reach     [facility], RN  02/15/21 8507

## 2021-02-15 NOTE — ED TRIAGE NOTES
Pt presents to the ED via ED lobby with c/o SOB. Pt states symptoms have been ongoing for a week and worsened today. Pt states he is unable to lie down to sleep. pt reports that he had COVID-19 one month ago. Pt also reports that he had a collapsed lung one year ago and current symptoms feel similar. Upon arrival pt presents with dyspnea and is talking in one word sentences with shallow breaths. VSS, respirations even and unalbored. Breath sounds equal bilaterally.

## 2021-03-23 NOTE — ED NOTES
Patient resting on cot, respirations are easy and unlabored. Updated on POC. Patient denies of any needs or concerns at this time.       Tere Leigh RN  03/23/21 5599

## 2021-03-23 NOTE — ED NOTES
Patient resting on cot, respirations are easy and unlabored. Updated on POC. Patient denies any needs or concerns at this time.       Huey Scales RN  03/23/21 4423

## 2021-03-26 NOTE — ED PROVIDER NOTES
[facility] COMPLAINT       Chief Complaint   Patient presents with    Abdominal Pain    Flank Pain       Nurses Notes reviewed and I agree except as noted in the HPI. HISTORY OF PRESENT ILLNESS    Elder Dsouza is a 44 y.o. male who presents to the Emergency Department for the evaluation of right flank pain and right upper quadrant abdominal pain that radiates under its. He has a history of renal carcinoma with metastasis to the lung. Has a small chest and noted that the drainage was darker than usual for him. States that he has had some increased pain in his right flank and lower abdomen today. He has taken Tylenol for the pain today without relief. He denies nausea or vomiting. [facility] for a checkup. The HPI was provided by the patient. REVIEW OF SYSTEMS     Review of Systems   Constitutional: Positive for fatigue. Negative for chills, diaphoresis and fever. HENT: Negative for congestion, ear pain, facial swelling, rhinorrhea, sinus pressure, sinus pain, sneezing, sore throat and trouble swallowing. Eyes: Negative for photophobia. Respiratory: Positive for shortness of breath (chronic, wears O2 at home). Negative for apnea, cough, chest tightness and wheezing. Cardiovascular: Positive for chest pain. Negative for palpitations. Gastrointestinal: Positive for abdominal pain. Negative for abdominal distention, constipation, diarrhea, nausea and vomiting. Endocrine: Negative for polydipsia, polyphagia and polyuria. Genitourinary: Positive for flank pain. Negative for decreased urine volume, dysuria, frequency and urgency. Musculoskeletal: Negative for arthralgias, back pain, joint swelling, myalgias, neck pain and neck stiffness. Skin: Negative for color change and wound. Neurological: Positive for weakness.  Negative for dizziness, tremors, light-headedness, numbness and headaches. PAST MEDICAL HISTORY    has a past medical history of Cancer ([facility] [address] Chronic kidney disease, unspecified, Collapsed lung, IDDM (insulin dependent diabetes mellitus), Kidney mass, Lung nodules, Sinusitis, and Unspecified essential hypertension. SURGICAL HISTORY      has a past surgical history that includes Cholecystectomy and back surgery. CURRENT MEDICATIONS       Discharge Medication List as of 3/23/2021  3:52 PM      CONTINUE these medications which have NOT CHANGED    Details   traMADol (ULTRAM) 50 MG tablet Take 50 mg by mouth every 6 hours as needed for Pain. Historical Med      traZODone (DESYREL) 50 MG tablet Take 50 mg by mouth nightlyHistorical Med      loratadine (CLARITIN) 10 MG capsule Take 10 mg by mouth dailyHistorical Med      Cabozantinib S-Malate (CABOMETYX) 40 MG TABS Take by mouth dailyHistorical Med      indomethacin (INDOCIN) 25 MG capsule Take 25 mg by mouth as needed for PainHistorical Med      !! levothyroxine (SYNTHROID) 25 MCG tablet Take 25 mcg by mouth DailyHistorical Med      omeprazole (PRILOSEC) 40 MG delayed release capsule Take 40 mg by mouth dailyHistorical Med      amLODIPine (NORVASC) 5 MG tablet Take 5 mg by mouth dailyHistorical Med      ALLOPURINOL PO Take 100 mg by mouth nightly Historical Med      !! levothyroxine (SYNTHROID) 100 MCG tablet Take 25 mcg by mouth Daily Historical Med      colchicine (COLCRYS) 0.6 MG tablet Take 1 tablet by mouth daily for 3 days, Disp-3 tablet, R-0Print       !! - Potential duplicate medications found. Please discuss with provider. ALLERGIES     has No Known Allergies. FAMILY HISTORY     has no family status information on file. family history is not on file. SOCIAL HISTORY      reports that he has never smoked. He has never used smokeless tobacco. He reports current alcohol use of about 1.0 standard drinks of alcohol per week. He reports that he does not use drugs. PHYSICAL EXAM     INITIAL VITALS:  height is 6' (1.829 m) and weight is 192 lb 6.4 oz (87.3 kg). His oral temperature is 97.3 °F (36.3 °C). His blood pressure is 101/74 and his pulse is 95. His respiration is 16 and oxygen saturation is 99%. Physical Exam  Constitutional:       Appearance: He is ill-appearing. HENT:      Head: Normocephalic and atraumatic. Cardiovascular:      Rate and Rhythm: Normal rate and regular rhythm. Heart sounds: Normal heart sounds. Pulmonary:      Effort: No accessory muscle usage, respiratory distress or retractions. Breath sounds: No stridor. Examination of the right-lower field reveals decreased breath sounds. Examination of the left-lower field reveals decreased breath sounds. Decreased breath sounds present. Chest:      Chest wall: Tenderness present. No mass, lacerations or swelling. Abdominal:      General: Abdomen is flat. Bowel sounds are normal. There is no distension. There are no signs of injury. Tenderness: There is abdominal tenderness in the right upper quadrant. There is right CVA tenderness. Skin:     Coloration: Skin is pale. Neurological:      Mental Status: He is alert. DIFFERENTIAL DIAGNOSIS:   Pneumonia, pleural effusion, metastatic disease, anemia, pneumothorax, hemothorax    DIAGNOSTIC RESULTS     EKG: All EKG's are interpreted by the Emergency Department Physician who either signs or Co-signs this chart in the absence of a cardiologist.    Normal sinus rhythm with rate of 87, ME of 146, QRS of 90, QTc of 433. Compared with EKG from April 15, 2021 with no significant changes    EKG interpreted by ED physician    RADIOLOGY: non-plainfilm images(s) such as CT, Ultrasound and MRI are read by the radiologist.    CT ABDOMEN PELVIS W IV CONTRAST Additional Contrast? None   Final Result   1. Trace fluid free fluid in the abdomen with small amount of free fluid collected in the dependent pelvis.  Otherwise stable abdomen pelvis 2. Interval development of bilateral pleural effusions which appear loculated with right Pleurx catheter in place. 3. Atelectasis and/or mass in the right lower lobe. **This report has been created using voice recognition software. It may contain minor errors which are inherent in voice recognition technology. **      Final report electronically signed by Dr. Danyel Sandoval on 3/23/2021 3:30 PM      CT CHEST W CONTRAST   Final Result   1. Small caliber chest tube right side. Both of the large effusion seen on prior study and right side has been evacuated. Persistent moderate pleural thickening both sides of chest versus residual effusions. Findings worse on the right. 3. Mediastinal and right hilar adenopathy. Multiple metastatic nodules in both lungs as well as in the mediastinal adipose tissue anterior to the cardiac silhouette. **This report has been created using voice recognition software. It may contain minor errors which are inherent in voice recognition technology. **      Final report electronically signed by Dr. Bradley Storey on 3/23/2021 3:17 PM      XR CHEST (2 VW)   Final Result   1. Suboptimal inflation of lungs. Borderline heart size. 2. Moderate bibasilar atelectasis/pneumonia. 3. Small bilateral pleural effusions. 4. Small chest tube right side. No pneumothorax. 5. Overall appearance of chest slightly worse than prior. **This report has been created using voice recognition software. It may contain minor errors which are inherent in voice recognition technology. **      Final report electronically signed by Dr. Bradley Storey on 3/23/2021 1:01 PM          LABS:     Labs Reviewed   CBC WITH AUTO DIFFERENTIAL - Abnormal; Notable for the following components:       Result Value    WBC 3.4 (*)     RBC 4.61 (*)     Hemoglobin 12.4 (*)     Hematocrit 40.1 (*)     MCHC 30.9 (*)     RDW-CV 16.6 (*)     RDW-SD 51.8 (*)     Segs Absolute 1.3 (*)     All other

## 2021-04-13 PROBLEM — J96.21 ACUTE ON CHRONIC RESPIRATORY FAILURE WITH HYPOXEMIA (HCC): Status: ACTIVE | Noted: 2021-01-01

## 2021-04-13 PROBLEM — J18.9 PNEUMONIA: Status: ACTIVE | Noted: 2021-01-01

## 2021-04-13 NOTE — PROGRESS NOTES
Pharmacy Note  Vancomycin Consult    Saul Fletcher is a 44 y.o. male started on Vancomycin for Pneumonia; consult received from Dr. Jarrod Gallo to manage therapy. Also receiving the following antibiotics: cefepime. 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 Temp max: 99    Recent Labs     04/13/21  1109   BUN 21   CREATININE 1.1   WBC 6.4     No intake or output data in the 24 hours ending 04/13/21 1811  Culture Date      Source                       Results  N/A    Ht Readings from Last 1 Encounters:   04/13/21 6' (1.829 m)        Wt Readings from Last 1 Encounters:   04/13/21 207 lb (93.9 kg)       Body mass index is 28.07 kg/m². Estimated Creatinine Clearance: 93 mL/min (based on SCr of 1.1 mg/dL). Scr up slightly to 1.1. Will watch closely    Goal Trough Level: 15-20 mcg/mL    Assessment/Plan:  Patient received a dose of vancomycin 1250mg in ER 4/13/21 at 1313. Will initiate Vancomycin 1500 mg IV every 12 hours. Timing of trough level will be determined based on culture results, renal function, and clinical response. Will plan prior to the 4th overall dose of vancomycin. Not ordered at this time. Thank you for the consult. Will continue to follow and monitor closely.    Fatuma Temple, PharmD 4/13/2021 6:13 PM

## 2021-04-13 NOTE — ED NOTES
Patient had coughing episode lasting approximately 2 minutes. Patient O2 saturation dropped to 70%. O2 increased to 6L. Once coughing ceased patient saturation returned to 96%. Dr. Edy Mccarty notified.       Vivienne Briones RN  04/13/21 2301

## 2021-04-13 NOTE — ED PROVIDER NOTES
Never Smoker    Smokeless tobacco: Never Used   Substance and Sexual Activity    Alcohol use: Not Currently     Alcohol/week: 1.0 standard drinks     Types: 1 Cans of beer per week    Drug use: Never    Sexual activity: None   Lifestyle    Physical activity     Days per week: None     Minutes per session: None    Stress: None   Relationships    Social connections     Talks on phone: None     Gets together: None     Attends Gnosticism service: None     Active member of club or organization: None     Attends meetings of clubs or organizations: None     Relationship status: None    Intimate partner violence     Fear of current or ex partner: None     Emotionally abused: None     Physically abused: None     Forced sexual activity: None   Other Topics Concern    None   Social History Narrative    None      History reviewed. No pertinent family history. PHYSICAL EXAM   VITAL SIGNS: /63   Pulse 120   Temp 99 °F (37.2 °C) (Oral)   Resp 26   Ht 6' (1.829 m)   Wt 207 lb (93.9 kg)   SpO2 95%   BMI 28.07 kg/m²    Constitutional: Well developed, well nourished, moderate acute distress   HENT: Atraumatic, dry mucus membranes   Neck: supple, no JVD   Respiratory: Lungs showed decreased bibasilar breath sounds, mild subcostal retractions with  Moderate tachypnea  Cardiovascular: Normal rhythm, tachycardic rate, no murmurs   Vascular: Radial pulses 2+ equal bilaterally   GI: Soft, nontender, normal bowel sounds   Musculoskeletal: No edema, no deformities   Integument: Skin warm and cool, cap refill 4 sec, no petechiae   Neurologic: Alert & oriented, normal speech   Psych: Pleasant affect, no hallucinations     EKG (interpreted by me) 4/13/21 11:21  Rhythm: Sinus   Rate: 128 BPM   Axis: normal   Conduction: normal   ST/T Segments: nonacute     RADIOLOGY/PROCEDURES   XR CHEST PORTABLE   Final Result   1. Small to moderate-sized bilateral pleural effusions with adjacent atelectasis/infiltrate.    2. New density at the right lung apex, likely focal infiltrate. **This report has been created using voice recognition software. It may contain minor errors which are inherent in voice recognition technology. **      Final report electronically signed by Dr. Lindy Mclean on 4/13/2021 11:28 AM      CT ABDOMEN PELVIS W IV CONTRAST Additional Contrast? None    (Results Pending)   XR CHEST PORTABLE    (Results Pending)        LABS   Labs Reviewed   CBC WITH AUTO DIFFERENTIAL - Abnormal; Notable for the following components:       Result Value    RBC 4.26 (*)     Hemoglobin 11.3 (*)     Hematocrit 37.0 (*)     MCHC 30.5 (*)     RDW-CV 17.8 (*)     RDW-SD 56.6 (*)     All other components within normal limits   COMPREHENSIVE METABOLIC PANEL W/ REFLEX TO MG FOR LOW K - Abnormal; Notable for the following components:    Sodium 131 (*)     Chloride 95 (*)     Albumin 2.8 (*)     ALT 8 (*)     All other components within normal limits   BLOOD GAS, ARTERIAL - Abnormal; Notable for the following components:    PO2 70 (*)     All other components within normal limits   LACTATE, SEPSIS - Abnormal; Notable for the following components:    Lactic Acid, Sepsis 2.8 (*)     All other components within normal limits   CK - Abnormal; Notable for the following components:     Total CK 41 (*)     All other components within normal limits   OSMOLALITY - Abnormal; Notable for the following components:    Osmolality Calc 265.8 (*)     All other components within normal limits   GLOMERULAR FILTRATION RATE, ESTIMATED - Abnormal; Notable for the following components:    Est, Glom Filt Rate 70 (*)     All other components within normal limits   TSH WITH REFLEX - Abnormal; Notable for the following components:    TSH 18.660 (*)     All other components within normal limits   LEGIONELLA ANTIGEN, URINE   STREP PNEUMONIAE ANTIGEN   TROPONIN   BRAIN NATRIURETIC PEPTIDE   LACTATE, SEPSIS   URIC ACID   ANION GAP   LACTATE, SEPSIS   LACTATE, SEPSIS sepsis. 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 It appeared pt has a new RUL infiltrate, consistent with healthcare associated pneumonia. Due to his recent admission at [facility], we need to upgrade his atb to cefepime + vancomycin. I discussed case with the [facility] (MARCELLE Wu), who facilitated the admission of patient to [address] [place] [address] for further inpatient management.     Electronically signed by: Gay Lainez MD, 4/13/2021 6:22 PM   (This note was completed with a voice recognition program)         Gisella Thomas MD  04/13/21 Mary Wilks

## 2021-04-13 NOTE — ED NOTES
Patient resting quietly in cot showing no signs of distress at this time. Medicated per MAR. Rates pain 5/10 and is requesting Tramadol. Dr. Mirian Kirkland notified. Will continue to monitor.       Warden Beverly RN  04/13/21 3705

## 2021-04-13 NOTE — ED NOTES
ED to inpatient nurses report    No chief complaint on file. Present to ED from home  LOC: alert and orientated to name, place, date  Vital signs   Vitals:    04/13/21 1045 04/13/21 1053 04/13/21 1129 04/13/21 1233   BP: (!) 113/90 92/82  115/76   Pulse: 133   128   Resp: 26   26   Temp: 98.1 °F (36.7 °C)      TempSrc: Oral      SpO2: 96%  95% 96%   Weight: 207 lb (93.9 kg)      Height: 6' (1.829 m)         Oxygen Baseline 4L NC    Current needs required none at this time   Bipap/Cpap No  LDAs:   Peripheral IV 04/13/21 Left Antecubital (Active)   Site Assessment Clean;Dry; Intact 04/13/21 1233   Line Status Normal saline locked; Infusing 04/13/21 1233   Dressing Status Clean;Dry; Intact 04/13/21 1233   Dressing Intervention New 04/13/21 1106     Mobility: Requires assistance * 1  Pending ED orders: none  Present condition: stable      Electronically signed by Karen Solis RN on 4/13/2021 at 1:05 PM       Karen Solis RN  04/13/21 1306

## 2021-04-13 NOTE — ED NOTES
Patient presents to ED for palliative care consult due to cancer with mets. Reports increased SOB. States he was seen by oncology today and had low O2 saturation and hypotension and was instructed to come here for evaluation. Complains of generalized pain, 3/10. Shows no signs of distress at this time. Skin is warm and dry. Respirations 26/minute though work of breathing is not labored at this time.       Raza Muniz RN  04/13/21 9062

## 2021-04-13 NOTE — PROGRESS NOTES
Consults placed for Pulmonology, Oncology, Infectious Disease and Palliative. Palliative- voicemail left for on-call tomorrow, others placed via perfect-serve secure messaging.

## 2021-04-13 NOTE — ED NOTES
Patient transferred to [facility] room 026. Nurse informed.       Jennifer Poe  04/13/21 6080

## 2021-04-13 NOTE — H&P
Internal Medicine Specialties  H&P  4/13/2021  12:58 PM    Patient:  Camryn Smith  YOB: 1970    MRN: [id number]   Acct:  [de-identified]   18/018A  Primary Care Physician: CAITY Rothman - CNP    Chief Complaint:  SOB    History of Present Illness: The patient is a 44 y.o. male with pmhx of metastatic renal carcinoma with mets to lungs and brain followed by [facility], HTN, COVID pneumonia in Jan 2021, new dx of gout, HTN and cholecystectomy who presents with increasing SOB. He presented to his PCP this AM for a refill on his gout medication but was visibly dyspneic and SPO2 was 86% on his usual 2L NC and BP 80/40. Ly Crawford His O2 was increased to 4L and he was instructed to proceed to the emergency department. Pt reports that he was seen at [address] [place] [address] last week for pain in his right elbow and was dx with gout. Since he came home he has been having increased SOB which has gotten worse in the last day. He has a right chest tube in place which was placed in Feb 2021 d/t large pleural effusions and he has a home health RN that comes to his house to drain it every few days. He gets about 200-250ml out every other day and he reports the drainage is clear. He states that he has gained 15ib since coming home from [address] [place] [address] and his appetite has been decreasing. No chest pain, abdominal pain or diarrhea. He has been vomiting on and off, last instance was this AM. He reports chills also that started today but no fever. He does not wish to go back to [facility] at this time. In the emergency department EKG shows sinus tachycardia at 133, /90, RR 30, lactate elevated to 2.8, Na 131, trops negative, ABG WNL, , no leukocytosis. CXR shows small to moderate BL effusions with adjacent infiltrates. NS started at 125/hr and 1 dose of Vanc and Cefepime given in ED. Patient admitted for Pneumonia meeting sepsis criteria.        Past Medical History:        Diagnosis Date    Cancer [facility])     kidney    Chronic kidney disease, unspecified     Collapsed lung     IDDM (insulin dependent diabetes mellitus)     Kidney mass     Lung nodules     Sinusitis     Unspecified essential hypertension        Past Surgical History:        Procedure Laterality Date    BACK SURGERY      CHOLECYSTECTOMY         Home Medications: Not in a hospital admission. Allergies:  Patient has no known allergies. Social History:    reports that he has never smoked. He has never used smokeless tobacco. He reports current alcohol use of about 1.0 standard drinks of alcohol per week. He reports that he does not use drugs. Family History:   History reviewed. No pertinent family history.     Review of Systems:    General ROS: negative  Psychological ROS: negative  Hematological and Lymphatic ROS: negative  Endocrine ROS: negative  Vision:negative  ENT ROS: Denies  Respiratory ROS: Positive for shortness of breath, no cough  Cardiovascular ROS: Positive for dyspnea on exertion, no chest pain  Gastrointestinal ROS: reports nausea/vomiting on and off, abdominal swelling  Genito-Urinary ROS: no dysuria, trouble voiding, or hematuria  Musculoskeletal ROS: negative  Neurological ROS: no TIA or stroke symptoms  Dermatological ROS: negative  Weight: gained 15ib in 1 week  Appetite: decreased      Physical Exam:    Vitals:  Patient Vitals for the past 24 hrs:   BP Temp Temp src Pulse Resp SpO2 Height Weight   04/13/21 1233 115/76 -- -- 128 26 96 % -- --   04/13/21 1129 -- -- -- -- -- 95 % -- --   04/13/21 1053 92/82 -- -- -- -- -- -- --   04/13/21 1045 (!) 113/90 98.1 °F (36.7 °C) Oral 133 26 96 % 6' (1.829 m) 207 lb (93.9 kg)     Weight: Weight: 207 lb (93.9 kg)     24 hour intake/output:No intake or output data in the 24 hours ending 04/13/21 1258    General appearance - alert, ill appearing and in moderate distress  Eyes - pupils equal and reactive, extraocular eye movements intact  Ears - bilateral TM's and external ear canals normal  Nose - normal and patent, no erythema, discharge or polyps  Mouth - mucous membranes moist, pharynx normal without lesions  Neck - supple, no significant adenopathy  Lymphatics - no palpable lymphadenopathy, no hepatosplenomegaly  Chest - Decreased breath sounds, chest tube right side in place under dressing  Distant Breath Sounds: No  Heart - normal rate, regular rhythm, normal S1, S2, no murmurs, rubs, clicks or gallops  Abdomen - soft, nontender, slightly edematous  Obese: No; Protuberant: Yes  Neurological - alert, oriented, normal speech, no focal findings or movement disorder noted  Musculoskeletal - right elbow swollen and painful to touch  Extremities - peripheral pulses normal, no pedal edema, no clubbing or cyanosis  Skin - normal coloration and turgor, no rashes, no suspicious skin lesions noted    Review of Labs and Diagnostic Testing:    CBC:   Recent Labs     04/13/21  1109   WBC 6.4   HGB 11.3*   HCT 37.0*   MCV 86.9        BMP:   Recent Labs     04/13/21  1109   *   K 4.9   CL 95*   CO2 23   BUN 21   CREATININE 1.1   CALCIUM 9.3   GLUCOSE 101     PT/INR: No results for input(s): PROTIME, INR in the last 72 hours. APTT: No results for input(s): APTT in the last 72 hours. Lipids:   Recent Labs     04/13/21  1109   ALKPHOS 81   ALT 8*   AST 22   BILITOT 0.4   LABALBU 2.8*     Troponin:   Recent Labs     04/13/21  1109   TROPONINT < 0.010        Imaging:  Xr Chest (2 Vw)    Result Date: 3/23/2021  PROCEDURE: XR CHEST (2 VW) CLINICAL INFORMATION: Pain along right flank and right ribs, right chest drain for effusion COMPARISON: 2/15/2021 TECHNIQUE: AP upright and lateral views of the chest were obtained. 1. Suboptimal inflation of lungs. Borderline heart size. 2. Moderate bibasilar atelectasis/pneumonia. 3. Small bilateral pleural effusions. 4. Small chest tube right side. No pneumothorax. 5. Overall appearance of chest slightly worse than prior.  **This report has been created using voice recognition software. It may contain minor errors which are inherent in voice recognition technology. ** Final report electronically signed by Dr. Andrew Berman on 3/23/2021 1:01 PM    Ct Chest W Contrast    Result Date: 3/23/2021  PROCEDURE: CT CHEST W CONTRAST CLINICAL INFORMATION: SHortness of breath, RUQ abdominal pain, renal CA, chest drain COMPARISON: 2/15/2021 TECHNIQUE: Multiple axial 5 millimeter images of the thorax and upper abdomen were obtained following the administration of intravenous contrast material (ISOVUE). ALL CT SCANS AT THIS FACILITY use dose modulation, iterative reconstruction, and/or weight-based dosing when appropriate to reduce radiation dose to as low as reasonably achievable. FINDINGS: Upper abdomen: Prior cholecystectomy. Mediastinum and breanne: There are several relatively small lymph nodes in mediastinum but a few mildly enlarged lymph nodes in the right hilum and a few small lymph nodes in the left hilum. These could be postinflammatory or metastatic. A small pericardial  effusion is present. Note that the mediastinum is no longer shifted to the left as it was on the prior study. Abdomen several nodular foci are seen in the mediastinal adipose tissue, anterior to the cardiac silhouette, consistent with metastatic disease. Similar findings seen previously. Bones: No evidence for acute fracture or bone destruction. Lungs: There is abnormal thickening of the pleura around the periphery of both lungs. It is impossible to know how much of this is due to pleural fluid and how much is due to pleural thickening. A small caliber chest tube is present in the pleural space right side. Most of the large effusion seen on the recent prior study has been evacuated. Multiple small noncalcified nodules are scattered in both lungs, consistent with metastatic disease.  Is also a lobulated masslike structure in the lingula and some additional nodular foci in the right lung base, also suspicious for metastatic or central mesenteric lymphadenopathy by size criteria. There is no hydronephrosis. There is trace ascites in the abdomen. There is small amount of fluid in the pelvis. Urinary bladder is unremarkable. Hernia mesh is present left inguinal region. No evidence of bowel obstruction. No pelvic or inguinal lymphadenopathy. No suspicious bone  lesions. Single sclerotic lesion L3 is likely a benign cortical bone island. Postsurgical changes right hemilaminectomy L5.     1. Trace fluid free fluid in the abdomen with small amount of free fluid collected in the dependent pelvis. Otherwise stable abdomen pelvis 2. Interval development of bilateral pleural effusions which appear loculated with right Pleurx catheter in place. 3. Atelectasis and/or mass in the right lower lobe. **This report has been created using voice recognition software. It may contain minor errors which are inherent in voice recognition technology. ** Final report electronically signed by Dr. Hermilo Baker on 3/23/2021 3:30 PM    Xr Chest Portable    Result Date: 4/13/2021  PROCEDURE: XR CHEST PORTABLE CLINICAL INFORMATION: Shortness of breath TECHNIQUE: Mobile AP chest radiograph. COMPARISON: AP and lateral chest radiographs 20/3/2020 FINDINGS: Cardiac silhouette is obscured by opacities at the bilateral lung bases. The bilateral costophrenic angles are blunted. Hazy and reticular opacities are present in the mid and lower lungs. There is a new density at the right lung apex, likely a  focal infiltrate. Multiple nodules seen on CT of the chest from 3/23/2021 are not clearly visible on the current study. Bilateral pleural thickening is noted. Degenerative changes in the thoracic spine are poorly visualized. 1. Small to moderate-sized bilateral pleural effusions with adjacent atelectasis/infiltrate. 2. New density at the right lung apex, likely focal infiltrate. **This report has been created using voice recognition software.  It may contain minor errors which are inherent in voice recognition technology. ** Final report electronically signed by Dr. Jessica Latham on 4/13/2021 11:28 AM      Assessment/Plan:    1. Pneumonia with sepsis criteria  a. IV Cefepime  b. ID and pulmonary consult  c. Legionella + Strep penumoniae antigen tests  2. Pleural effusions  a. Pulmonary consult  b. Trend chest X rays  3. Stage 4 metastatic renal carcinoma  a. Palliative care consult  4. Gout   a. Cont home meds   5. Ascites  a. CT abdomen  b. Echocardiogram complete   6. Hypothyroid  a. Cont home med   b. Check TSH  7. Hyponatremia  a. Careful hydration with NS at 70/hr  b. Re-check Na this evening  c. Check serum osmolality, urine osmolality, urine Na  8. Elevated lactate  a. Trend  b. Hydration  9. Weak/Fatigued  a. PT/OT to work with pt while inpatient  10. DVT prophylaxis   a. Lovenox      Assessment and plan of care discussed with supervising physician, Dr Ki Weinstein. Electronically signed by CAITY Stanton CNP on 4/13/2021 at 12:58 PM         Copy: Primary Care Physician: CAITY Orantes CNP    Addendum/attestation by Parag Yip MD:  I have seen and examined the patient independently. Face to face evaluation and examination was performed. The above evaluation and note has been reviewed. Laboratory and radiological data were reviewed. I Have discussed with Adam Trammell CNP about this patient in detail. The above assessment and plan has been reviewed. Please see my modifications mentioned below. My modifications:  Consult Oncology services. Patient with acute on chronic respiratory with hypoxia - POA, pleural effusions likely from metastatic disease.   Treat for HAP as he was just released from [address]

## 2021-04-13 NOTE — PROGRESS NOTES
Pt admitted to  5K26 via via cart/stretcher from ED. Complaints: Shortness of breath. IV Vancomycin infusing into the antecubital left, condition patent and no redness, site free of s/s of infection or infiltration. Vital signs obtained. Assessment and data collection initiated. Two nurse skin assessment performed by Deidra Chou and Amy Forman RN. Oriented to room. Policies and procedures for  explained. All questions answered with no further questions at this time. Fall prevention and safety brochure discussed with patient. Bed alarm on. Call light in reach. Aleyda Giordano RN 4/13/2021 3:35 PM     Explained patients right to have family, representative or physician notified of their admission. Patient has Declined for physician to be notified. Patient has Declined for family/representative to be notified.

## 2021-04-14 PROBLEM — E44.0 MODERATE MALNUTRITION (HCC): Chronic | Status: ACTIVE | Noted: 2021-01-01

## 2021-04-14 NOTE — CARE COORDINATION
4/14/21, 8:14 AM EDT  DISCHARGE PLANNING EVALUATION:    Brittany Arriola       Admitted: 4/13/2021/ [facility]  day: 1   Location: [address] [place] [address]-A Reason for admit: Pneumonia [J18.9]   PMH:  has a past medical history of Cancer ([address] Chronic kidney disease, unspecified, Collapsed lung, IDDM (insulin dependent diabetes mellitus), Kidney mass, Lung nodules, Sinusitis, and Unspecified essential hypertension. Procedure:   04/13 CXR:Small to moderate-sized bilateral pleural effusions with adjacent atelectasis/infiltrate. New density at the right lung apex, likely focal infiltrate. CT abd pelvis: 1. Walled off, peripherally enhancing multiloculated bilateral pleural   collections and numerous peripherally enhancing low-density masses   involving the bilateral pleural spaces. Extension into the mediastinum,  Multifocal consolidation and volume loss within the lungs. Right-sided chest tube tip within the right major fissure. A 5.1 cm solid mass within the right kidney, compatible with neoplasm   without significant change. Moderate abdominal and pelvic free fluid with interval worsening. Multifocal peritoneal thickening and enhancement compatible with   malignant involvement. Soft tissue infiltration of the omentum of the proximal sigmoid colon,   likely on the basis of malignant involvement. Bilateral adrenal gland nodules, compatible with metastatic disease. Large lytic lesion involving the right iliac bone compatible with   metastatic disease, without significant change. Extensive severe edema of the soft tissues with worsening. 04/14  CXR: Loculated bilateral pleural collections without change. Bilateral pulmonary infiltrates and atelectasis without change. Sandrita Blue presented to his PCP this AM for a refill on his gout medication but was visibly dyspneic and SPO2 was 86% on his usual 2L NC and BP 80/40.   His O2 was increased to 4L and he was instructed to proceed to the emergency department. Barriers to Discharge: Oncology/ pulmonary and ID consulted, telemetry, PT/OT evals, IVF, Maxipime IV, Lovenox, Indocin for gout pain, med nebs, PPI, IV Vancomycin, current with right pleurx chest tube, dyspnea on exertion to bathroom, sats dropped to 81% with therapy, oxygen at 4L/min sat 94% at rest.        PCP: CAITY Garland CNP  Readmission Risk Score: 12%    Patient Goals/Plan/Treatment Preferences: Met with Nohemi Wilder; he lives home with spouse. Daughter Toyin Nascimento here with patient and they are requesting RW, W/C, and BSC. Patient current with home O2 at 2L/min ATC through [facility] [employer]. Jayda from therapy assisted and called Natanael to find out what patient OOP expenses would be for DME. She updated patient and daughter on costs incurred. SW saw planning Interim Palliative care and assist with DME; patient is home during day alone since wife works. Daughter also shared a friend who is nurse comes to home every other night and drains the Pleurx chest tube; was getting 100 - 200 ml each time. CM to follow: family are trying to borrow DME; [facility] [employer] requires scripts for DME if plan is to get RW, W/C, BSC through them. Transportation/Food Security/Housekeeping Addressed:  No issues identified.

## 2021-04-14 NOTE — PROGRESS NOTES
Reported off to [facility] about shift and leaving the floor.   Electronically signed by Jabier Najera on 4/14/2021 at 1:10 PM

## 2021-04-14 NOTE — PROGRESS NOTES
Nutrition Diagnosis:   · Moderate malnutrition related to catabolic illness, inadequate protein-energy intake as evidenced by poor intake prior to admission, moderate loss of subcutaneous fat, moderate muscle loss      Nutrition Interventions:   Food and/or Nutrient Delivery:  Continue Current Diet, Start Oral Nutrition Supplement  Nutrition Education/Counseling:  Education initiated(4/14 Encouraged po, ONS intake at best efforts.)   Coordination of Nutrition Care:  Continue to monitor while inpatient    Goals:  Pt. will consume 75% or more of meals during LOS. Nutrition Monitoring and Evaluation:   Behavioral-Environmental Outcomes:  None Identified   Food/Nutrient Intake Outcomes:  Diet Advancement/Tolerance, Food and Nutrient Intake, Supplement Intake  Physical Signs/Symptoms Outcomes:  Biochemical Data, Chewing or Swallowing, GI Status, Fluid Status or Edema, Nutrition Focused Physical Findings, Skin, Weight     Discharge Planning:     Too soon to determine     Electronically signed by Calleen Jeans, RD, LD on 4/14/21 at 3:42 PM EDT    Contact: [phone]

## 2021-04-14 NOTE — PROGRESS NOTES
[facility]  INPATIENT PHYSICAL THERAPY  EVALUATION  [facility] ONC MED 5K - [id number]    Time In: 6484  Time Out: 3092  Timed Code Treatment Minutes: 17 Minutes  Minutes: 32          Date: 2021  Patient Name: Dayan Hickey,  Gender:  male        MRN: [id number]  : 1970  (44 y.o.)      Referring Practitioner: CAITY Phillips CNP  Diagnosis: Pneumonia  Additional Pertinent Hx: Per ED notes, pt \"is a 44 y.o. male who was diagnosed with Stage 4 RCC since 2019, who presents with shortness of breath and worsening chills, poor appetite, onset was this past week or two. He was taken off his oral chemo pills by [facility] due to side effects. He normally uses 2 liters of oxygen, but that has been ramped up to 4L today. The duration has been constant. He also has mets to his brain, bones and his breathing is worse. He also felt chilled. No fever reported, but he was told to come here to be admitted for palliative care and further management. He also gained 10 lbs over past week or two, with possible new onset ascites. \"     Restrictions/Precautions:  Restrictions/Precautions: General Precautions, Fall Risk  Position Activity Restriction  Other position/activity restrictions: : O2 at 4.5 L/min    Subjective:  Chart Reviewed: Yes  Patient assessed for rehabilitation services?: Yes  Subjective: Pt resting in bed and agrees to therapy.     General:  Overall Orientation Status: Within Normal Limits    Vision: Impaired  Vision Exceptions: Wears glasses at all times    Hearing: Within functional limits         Pain: 2/10: chest due to cough    Vitals: Vitals not assessed per clinical judgement, see nursing flowsheet    Social/Functional History:    Lives With: Spouse  Type of Home: House  Home Layout: One level  Home Access: Stairs to enter without rails  Entrance Stairs - Number of Steps: 1 step with a rail  Home Equipment: Oxygen(2 L/min)                   Ambulation Assistance: Independent  Transfer Assistance: Independent    Active : Yes          OBJECTIVE:  Range of Motion:  Bilateral Lower Extremity: WFL    Strength:  Bilateral Lower Extremity: grossly 4/5    Balance:  Static Sitting Balance:  Supervision  Dynamic Sitting Balance: Supervision  Static Standing Balance: Stand By Assistance  Dynamic Standing Balance: Stand By Assistance    Bed Mobility:  Supine to Sit: Stand By Assistance, with head of bed raised, with increased time for completion   Transfers:  Sit to Stand: Contact Guard Assistance  Stand to Sit:Stand By Assistance    Ambulation:  Stand By Assistance, Contact Guard Assistance  Distance: 12 ft  Surface: Level Tile  Device:No Device  Gait Deviations:  Decreased Step Length Bilaterally, Decreased Gait Speed and on 4.5 L/min with mild increased Resp Rate noted. Exercise:  Patient was guided in 1 set(s) 5 reps of exercise to both lower extremities. Ankle pumps, Seated marches and Long arc quads. Exercises were completed for increased independence with functional mobility. Functional Outcome Measures: Completed  AM-PAC Inpatient Mobility Raw Score : 18  AM-PAC Inpatient T-Scale Score : 43.63    ASSESSMENT:  Activity Tolerance:  Patient tolerance of  treatment: fair plus. Requiring frequent rest breaks. Treatment Initiated: Treatment and education initiated within context of evaluation. Evaluation time included review of current medical information, gathering information related to past medical, social and functional history, completion of standardized testing, formal and informal observation of tasks, assessment of data and development of plan of care and goals. Treatment time included skilled education and facilitation of tasks to increase safety and independence with functional mobility for improved independence and quality of life. Assessment:   Body structures, Functions, Activity limitations: Decreased functional mobility , Decreased endurance, Increased pain, Decreased strength  Assessment: Pt is a 45 yo male that is deconditioned and normally requires 2 L/min O2 and was generally Mod I in PLOF. Pt is currently on 4.5 L/min and is at CGA to close SBA. Pt would benefit from continued skilled PT to address endurance building, strengthening, and functional mobility. Prognosis: Good    REQUIRES PT FOLLOW UP: Yes    Discharge Recommendations:  Discharge Recommendations: Continue to assess pending progress, Patient would benefit from continued therapy after discharge    Patient Education:  PT Education: Goals, Plan of Care, PT Role, Home Exercise Program    Equipment Recommendations: Other: monitor for needs    Plan:  Times per week: 4-5x GM  Times per day: Daily  Current Treatment Recommendations: Strengthening, Home Exercise Program, Balance Training, Endurance Training, Functional Mobility Training, Transfer Training, Gait Training, Patient/Caregiver Education & Training, Safety Education & Training    Goals:  Patient goals : go home  Short term goals  Time Frame for Short term goals: at discharge  Short term goal 1: Pt to be Mod I for supine <> sit from flat bed with no rails to get in/out of bed  Short term goal 2: Pt to be Mod I for sit <> stand to get up to ambulate  Short term goal 3: Pt to ambulate > 100 ft with no AD with O2 with Mod I for household distances  Long term goals  Time Frame for Long term goals : not set due to short ELOS    Following session, patient left in safe position with all fall risk precautions in place. Suellen Cohen.  Leigh Ann Nickerson [place] 8

## 2021-04-14 NOTE — CONSULTS
[address] [place] [address]                                  CONSULTATION    PATIENT NAME: Ralph Alvares                :        1970  MED REC NO:   [id number]                           ROOM:       0026  ACCOUNT NO:   [de-identified]                           ADMIT DATE: 2021  PROVIDER:     HERIBERTO Stoddard Melvin:  2021    REASON FOR CONSULTATION:  Shortness of breath, suspected to have  pneumonia. HISTORY OF PRESENT ILLNESS:  He is a very unfortunate 61-year-old male  patient who has metastatic renal cell carcinoma. He follows at [facility]. He  was recently seen at [facility] and he left the hospital on  despite the  advice to stay in the hospital, and he was noted to be very short of  breath at home and was brought here. This patient has metastasis to the  lung. He had recurrent right-sided pleural effusion for which he had  PleurX catheter. He had also metastasis to the brain, to the ribs, and  during this hospitalization, he was also noted to have extensive  metastasis to the peritoneal cavity and he had also to the mediastinum  and pericardium. He is very weak, short of breath. He has very poor  appetite, and according to his daughter, he has been progressively  getting worse. There was not any report of fever; however, his CT scan  showed pleural effusion and might have pneumonia. He has been  progressively getting more swelling in his abdomen. He had a second  opinion at the [facility], and he was not a good candidate for  clinical trial due to his decline in his condition. PAST MEDICAL HISTORY:  Includes renal cell cancer, chronic kidney  disease, diabetes, history of metastatic disease as noted above. PAST SURGICAL HISTORY:  Includes back surgery and cholecystectomy.     CURRENT MEDICATIONS:  Include Tylenol, allopurinol, cefepime,  diphenhydramine, Lovenox, indomethacin, ipratropium/albuterol,  levothyroxine, Zofran, Protonix, polyethylene glycol, trazodone, and  vancomycin. ALLERGIES:  He has no known drug allergy. REVIEW OF SYSTEMS:  As noted above. PHYSICAL EXAMINATION:  GENERAL:  He looks chronically sick, in mild distress, appears to be  very ill. VITAL SIGNS:  Temperature 98.2, respirations 23, pulse 116, blood  pressure 110/70. HEENT:  He has slightly pale conjunctivae. Anicteric sclerae. CHEST:  Bilateral air entry. Diminished breath sounds. He has  right-sided PleurX catheter. CARDIOVASCULAR SYSTEM:  Regular. ABDOMEN:  Edematous abdominal wall. Nontender. He has flank fullness  with pitting edema. CNS:  He is awake and oriented. Chronically ill appearing patient. DIAGNOSTICS:  WBC 5.2, hemoglobin 10.4, hematocrit 34.1, platelets of  125. Sodium 131, potassium 5, chloride 97, bicarb 25, BUN 18,  creatinine 0.9. IMAGING:  CT scan of the abdomen and pelvis shows old peripheral  enhancing multiloculated bilateral pleural collections and numerous  peripheral enhancing low-density masses involving the bilateral pleural  spaces, extension to the mediastinum, multiple intercostal spaces,  involvement of the pericardium, _____ on multiple ribs, interval  progression of the metastasis since his last x-ray studies. Multifocal  consolidation and loss of lung, heterogeneous lung parenchyma raising  the possibility of underlying _____ with neoplasm, right-sided chest 5.1  cm mass, multifocal peritoneal thickening, omental infiltrate, adrenal  nodules, lytic lesions of the right iliac bone, and extensive edema. IMPRESSION:  He is a 58-year-old male patient admitted with shortness of  breath due to progression of his renal cell carcinoma. At the present  time, he has metastasis in multiple places. He has poor functional  class, and he was not sure whether he wants to go back to [facility].   I had a lengthy discussion with the patient about his overall condition, progression of his disease. I explained to the  patient to have limited option on his treatment. If he wants to  continue treatment and wants to be full code, I advised him to go to  [facility] where he has been following. If he however wants  to remain here, I feel that we have nothing much to offer in terms of  chemotherapy. He may need to consider to change his code status. Again, his overall prognosis is very poor with his extensive metastatic  disease. We will continue current IV antibiotic. We will continue to  follow the patient.         Cinda Dooley M.D.    D: 04/14/2021 14:33:35       T: 04/14/2021 16:52:16     [email or web address]  Job#: [id number]     Doc#: [id number]    CC:

## 2021-04-14 NOTE — PROGRESS NOTES
- Number of Steps: 1 step with a rail  Home Equipment: Oxygen   Bathroom Shower/Tub: Tub/Shower unit  Bathroom Toilet: Standard  Bathroom Accessibility: Accessible  IADL Comments: Pt has to complete simple meal prep while spouse is at work       ADL Assistance: Independent  Homemaking Assistance: Independent  Ambulation Assistance: Independent  Transfer Assistance: Independent    Active : Yes     Additional Comments: Pt stating he is indep with all ADL tasks and did not use AD prior. Pt on 2L of O2 at baseline. Spouse works during day of hours 6-230 or 6-430. Daughter lives approx 1 hour away. Daughter concerned regarding pt being home alone for that long of period. VISION:Corrected    HEARING:  WNL    COGNITION: WNL    RANGE OF MOTION:  Bilateral Upper Extremity:  WNL    STRENGTH:  Bilateral Upper Extremity:  bilaeral UE general weakness and deconditioning     SENSATION:   WFL    ADL:   Toileting: Contact Guard Assistance. during clothing management  Toilet Transfer: [address] with cues for safety. BALANCE:  Sitting Balance:  Stand By Assistance. at EOB d/t increased SOB after ambulating back from bathroom  Standing Balance: [address] during clothign management    BED MOBILITY:  Sit to Supine: Stand By Assistance      TRANSFERS:  Sit to Stand:  Contact Guard Assistance. from bedside chair  Stand to Sit: [address] onto EOB     FUNCTIONAL MOBILITY:  Assistive Device: Rolling Walker  Assist Level:  Contact Guard Assistance. Distance: To and from bathroom  Slow pace with 4L of O2. O2 sats decreased after mobility requiring cues for breathing tech of fpursed lip breathing      Exercise:  Pt and daughter educated on  adaptive equipment pt would benefit from at home to increase his endurance and indep wtih ADL atskls. Educated on how to obtain RW , wheelchair and BSC.  This therapist contacted pt [employer] company regarding price of equeipment after daughter requesting information. All pricing provided to daughter. DAughter also educated on how to obtain equipemtn through [employer] as well. Daughter very apprciative of information. Activity Tolerance:  Patient tolerance of  treatment: fair. Decreased in O2 sa5ts with mobility while on 4L       Assessment:  Assessment: Pt dmeo decreased endurance and strength for ADL tasks requiring increased assistance snd need for use of O2 at 4L this date. Pt requiring further skilled OT services to increasae his endurance, educate on pursed lip breathing and energy conservation tech and to increase his indep with ADL tasks. Performance deficits / Impairments: Decreased safe awareness, Decreased balance, Decreased ADL status, Decreased endurance, Decreased strength  Prognosis: Guarded  REQUIRES OT FOLLOW UP: Yes  Decision Making: Medium Complexity    Treatment Initiated: Treatment and education initiated within context of evaluation. Evaluation time included review of current medical information, gathering information related to past medical, social and functional history, completion of standardized testing, formal and informal observation of tasks, assessment of data and development of plan of care and goals. Treatment time included skilled education and facilitation of tasks to increase safety and independence with ADL's for improved functional independence and quality of life. Discharge Recommendations:  Patient would benefit from continued therapy after discharge, Continue to assess pending progress(Pt not safe to return home alone at this time.  Inita[facility] with pt stating they have been talking about it.)    Patient Education:  OT Education: OT Role, Plan of Care  Patient Education: obtaining needed AE for home use, breathing tech    Equipment Recommendations:  Equipment Needed: Yes  Other: rolling walker, BSC and wheelchair    Plan:  Times per week: 5x  Current Treatment Recommendations: Endurance Training, Strengthening, Patient/Caregiver Education & Training, Self-Care / ADL, Balance Training, Functional Mobility Training, Safety Education & Training  Plan Comment: will attempt to see 5x week as long as pt can tolerate it. See long-term goal time frame for expected duration of plan of care. If no long-term goals established, a short length of stay is anticipated. Goals:  Patient goals : get stronger to go home  Short term goals  Time Frame for Short term goals: by discharge  Short term goal 1: Pt to complete bathing tasks with min a and min cues for energy conservation tech or pursed lip breathing throughout  Short term goal 2: Pt to dmeo dynamic standing balance > 3 min with CGA and no UE support in prep for completing simple meal prep  Short term goal 3: Pt to increase endurance to maintain O2 sats above 88% while on O2 during ADL tasks wiht min cues for breathing tech and marilyn for rest breaks to increase ease of ADL Tasksk         Following session, patient left in safe position with all fall risk precautions in place.

## 2021-04-14 NOTE — PLAN OF CARE
Problem: Falls - Risk of:  Goal: Will remain free from falls  Description: Will remain free from falls  Outcome: Ongoing  Note: No falls this shift. Bed alarm, nonskid socks, and call light utilized. At risk due to generalized weakness and IV therapy. Goal: Absence of physical injury  Description: Absence of physical injury  Outcome: Ongoing     Problem: Grieving:  Goal: Verbalizes feelings, concerns and thoughts  Description: Verbalizes feelings, concerns and thoughts  Outcome: Ongoing  Note: Emotional support provided to patient and patient's daughter about stage 4 colon cancer. CT of abdomen and pelvis showed multiple mets throughout body. Palliative care consult placed d/t multiple mets from renal cancer. Problem: Pain:  Goal: Pain level will decrease  Description: Pain level will decrease  Outcome: Ongoing  Note: Pt denies pain at this time. Rest and repositioning offered. Will continue to monitor. Problem: Skin Integrity:  Goal: Will show no infection signs and symptoms  Description: Will show no infection signs and symptoms  Outcome: Ongoing  Note: Pt has pleurx drain in right chest. Drain emptied and new dressing applied. Skin shows no s/sx of infection. Goal: Absence of new skin breakdown  Description: Absence of new skin breakdown  Outcome: Ongoing  Note: No new skin breakdown. Pt turned and repositioned. Will continue on going skin assessment. Problem: Metabolic:  Goal: Will remain free of allergic reactions  Description: Will remain free of allergic reactions  Outcome: Ongoing  Note: Pt having increased redness to bilateral cheeks and neck after dose of vancomycin and cefepime. PRN benadryl given. Will continue to monitor for s/sx of possible allergic reaction to ATB.       Problem: Respiratory:  Goal: Ability to maintain vital signs within normal range will improve  Description: Ability to maintain vital signs within normal range will improve  Outcome: Ongoing  Note: SpO2 94% on This is a recent snapshot of the patient's [facility] medical record.  For current drug dose and complete information and questions, call [phone] or In Basket pool, [facility]v home infusion ([id number])  CSN Number:  [id number]     4.5 liters oxygen nasal cannula. Orders placed for humidified air d/t increased redness and dryness to patient's nostrils. Goal: Respiratory status will improve  Description: Respiratory status will improve  Outcome: Ongoing  Note: Increased in expiratory wheezes noted and physician notified. New orders placed for Q4H PRN breathing treatments. Will continue to monitor. Problem: Discharge Planning:  Goal: Discharged to appropriate level of care  Description: Discharged to appropriate level of care  Outcome: Ongoing  Note: Discharge plans to home. Discussed with pt. Care plan reviewed with patient and daughter. Patient and daughter verbalize understanding of the plan of care and contribute to goal setting.

## 2021-04-14 NOTE — PROGRESS NOTES
Received report from Nurse Radha Villegas RN in regards to my patient. Informed nurse that I will be passing medications for afternoon medications, and will be on the floor until 1300.  Electronically signed by Idolina Rinne  on 4/14/2021 at 7:43 AM

## 2021-04-14 NOTE — PROGRESS NOTES
Patient Alert and oriented times three to name, situation, and place. Patient denies having any pain at this time. Speech is clear and appropriate. Pupillary reacting is equal round reactive to light from a 5 mm to a 3 mm. Mucous membranes are pink, dry, and intact. Upper extremity assessment for patient is warm to touch, denies any numbness or tingling, less than 4 seconds skin turgor and capillary refill. Hand grasp is equal but weak bilaterally with negative arm drift present. Bilateral lower extremities assessed, warm to touch dorsalis pedis pulse and posterior tibialis pulse present strong bilaterally. Weak pedal push and pull but equal. Denies any numbness or tingling to BLE. Apical pulse of 113, informed nurse who stated this is normal for patient. Oxygen saturation 91% on 4.5 Liters per minute of supplemental nasal cannula oxygen. Respirations are non labored at this time with a respiratory rate of 22. Lung sounds are decreased throughout r/t diagnosis of pneumonia. Chest movements are symmetrical. No cough noted for patient, only when instructed to do cough turn and deep breathing exercises. Abdomen assessed with flat contour, no pain or masses felt on palpation. Bowel sounds hyperactive in all four quadrants. Bony Prominences pink dry and intact. Patient left with call light within reach, bed in a low position, and fluids within reach. Reported abnormalities off to [employer], and instructor Professor YG Mary.  Electronically signed by Katie Osborn  on 4/14/2021 at 8:29 AM

## 2021-04-14 NOTE — PROGRESS NOTES
INTERNAL MEDICINE SPECIALTIES  Progress Note For Dr Ki Weinstein       Patient:  Carl Burn  YOB: 1970  Date of Service: 4/14/2021  MRN: [id number]   Acct:  [de-identified]   Primary Care Physician: CAITY Orantes - CNP    SUBJECTIVE: breathing a bit better with drainage from chest tube        Home Medications:   No current facility-administered medications on file prior to encounter. Current Outpatient Medications on File Prior to Encounter   Medication Sig Dispense Refill    traMADol (ULTRAM) 50 MG tablet Take 50 mg by mouth every 6 hours as needed for Pain.       traZODone (DESYREL) 50 MG tablet Take 50 mg by mouth nightly      loratadine (CLARITIN) 10 MG capsule Take 10 mg by mouth daily      Cabozantinib S-Malate (CABOMETYX) 40 MG TABS Take by mouth daily      indomethacin (INDOCIN) 25 MG capsule Take 25 mg by mouth as needed for Pain      levothyroxine (SYNTHROID) 25 MCG tablet Take 25 mcg by mouth Daily      ALLOPURINOL PO Take 100 mg by mouth nightly       omeprazole (PRILOSEC) 40 MG delayed release capsule Take 40 mg by mouth daily      levothyroxine (SYNTHROID) 100 MCG tablet Take 25 mcg by mouth Daily       amLODIPine (NORVASC) 5 MG tablet Take 5 mg by mouth daily      colchicine (COLCRYS) 0.6 MG tablet Take 1 tablet by mouth daily for 3 days 3 tablet 0         Scheduled Meds:   allopurinol  100 mg Oral Nightly    pantoprazole  40 mg Oral Daily    traZODone  50 mg Oral Nightly    sodium chloride flush  5-40 mL Intravenous 2 times per day    enoxaparin  40 mg Subcutaneous Daily    cefepime  2,000 mg Intravenous Q8H    levothyroxine  62.5 mcg Oral Daily    vancomycin (VANCOCIN) intermittent dosing (placeholder)   Other RX Placeholder    vancomycin  1,500 mg Intravenous Q12H     Continuous Infusions:   sodium chloride      sodium chloride 70 mL/hr at 04/13/21 2131     PRN Meds:ipratropium-albuterol, indomethacin, traMADol, sodium chloride flush, sodium NONE SEEN FEW/NONE SEEN    Casts NONE SEEN NONE SEEN /lpf    Crystals NONE SEEN NONE SEEN    Renal Epithelial, UA NONE SEEN NONE SEEN    Yeast, UA NONE SEEN NONE SEEN    Casts NONE SEEN /lpf    Miscellaneous Lab Test Result NONE SEEN    Sodium    Collection Time: 04/13/21  7:43 PM   Result Value Ref Range    Sodium 129 (L) 135 - 145 meq/L   Basic Metabolic Panel w/ Reflex to MG    Collection Time: 04/14/21  5:45 AM   Result Value Ref Range    Sodium 131 (L) 135 - 145 meq/L    Potassium reflex Magnesium 5.0 3.5 - 5.2 meq/L    Chloride 97 (L) 98 - 111 meq/L    CO2 25 23 - 33 meq/L    Glucose 107 70 - 108 mg/dL    BUN 18 7 - 22 mg/dL    CREATININE 0.9 0.4 - 1.2 mg/dL    Calcium 8.7 8.5 - 10.5 mg/dL   CBC auto differential    Collection Time: 04/14/21  5:45 AM   Result Value Ref Range    WBC 5.2 4.8 - 10.8 thou/mm3    RBC 3.89 (L) 4.70 - 6.10 mill/mm3    Hemoglobin 10.4 (L) 14.0 - 18.0 gm/dl    Hematocrit 34.1 (L) 42.0 - 52.0 %    MCV 87.7 80.0 - 94.0 fL    MCH 26.7 26.0 - 33.0 pg    MCHC 30.5 (L) 32.2 - 35.5 gm/dl    RDW-CV 18.0 (H) 11.5 - 14.5 %    RDW-SD 57.6 (H) 35.0 - 45.0 fL    Platelets 843 207 - 960 thou/mm3    MPV 9.9 9.4 - 12.4 fL    Seg Neutrophils 64.0 %    Lymphocytes 15.6 %    Monocytes 14.9 %    Eosinophils 3.9 %    Basophils 0.6 %    Immature Granulocytes 1.0 %    Segs Absolute 3.3 1 - 7 thou/mm3    Lymphocytes Absolute 0.8 (L) 1.0 - 4.8 thou/mm3    Monocytes Absolute 0.8 0.4 - 1.3 thou/mm3    Eosinophils Absolute 0.2 0.0 - 0.4 thou/mm3    Basophils Absolute 0.0 0.0 - 0.1 thou/mm3    Immature Grans (Abs) 0.05 0.00 - 0.07 thou/mm3    nRBC 0 /100 wbc   Anion Gap    Collection Time: 04/14/21  5:45 AM   Result Value Ref Range    Anion Gap 9.0 8.0 - 16.0 meq/L   Glomerular Filtration Rate, Estimated    Collection Time: 04/14/21  5:45 AM   Result Value Ref Range    Est, Glom Filt Rate 88 (A) ml/min/1.73m2       Radiology:     Echo Complete 2d W Doppler W Color    Result Date: 4/13/2021  Transthoracic Echocardiography Report (TTE)  Demographics   Patient Name   [facility] Gender              Male                 P   MR #           [id number]         Race                                                    Ethnicity   Account #      [de-identified]         Room Number         6441   Accession      [id number]        Date of Study       04/13/2021  Number   Date of Birth  1970        Referring Physician FLORENTIN Newby Demetriusthais FLORENTIN   Age            44 year(s)        [facility]                                    Interpreting        Echo reader of the                                   Physician           martinez Carlisle MD  Procedure Type of Study   TTE procedure:ECHOCARDIOGRAM COMPLETE 2D W DOPPLER W COLOR. Procedure Date Date: 04/13/2021 Start: 02:57 PM Study Location: Bedside Technical Quality: Limited visualization due to restricted mobility. Indications:Shortness of breath. Additional Medical History:Recent 15 pound weight gain, diabetic, hypertension, renal cell cancer with mets Patient Status: Routine Height: 72.05 inches Weight: 207.24 pounds BSA: 2.16 m^2 BMI: 28.07 kg/m^2 BP: 115/76 mmHg  Conclusions   Summary  Normal left ventricle size and systolic function. Ejection fraction was  estimated at 60 to 65%. There were no regional left ventricular wall  motion abnormalities and wall thickness was within normal limits. Left atrial size was normal.   Signature   ----------------------------------------------------------------  Electronically signed by Hussain Carlisle MD (Interpreting  physician) on 04/13/2021 at 06:17 PM  ----------------------------------------------------------------   Findings   Mitral Valve  The mitral valve structure was normal with normal leaflet separation.   DOPPLER: The transmitral velocity was within the normal range with Diastolic Dimension: 2 cm  Diastolic: 1 cm EF Calculated: 61.4 %                                              LA/Aorta: 0.87  Doppler Measurements & Calculations   MV Peak E-Wave: 74.6 cm/s  AV Peak Velocity: 150 LVOT Peak Velocity: 130  MV Peak A-Wave: 98.1 cm/s  cm/s                  cm/s  MV E/A Ratio: 0.76         AV Peak Gradient: 9   LVOT Peak Gradient: 7  MV Peak Gradient: 2.23     mmHg                  mmHg  mmHg                                                   TV Peak E-Wave: 62.6 cm/s  MV Deceleration Time: 345                        TV Peak A-Wave: 88.7 cm/s  msec  MV P1/2t: 101 msec                               TV Peak Gradient: 1.57  MVA by PHT:2.18 cm^2                             mmHg                                                   TR Velocity:247 cm/s  MV E' Septal Velocity: 7.1 AV DVI (Vmax):0.87    TR Gradient:24.4 mmHg  cm/s                                             PV Peak Velocity: 75 cm/s  MV A' Septal Velocity:                           PV Peak Gradient: 2.25  10.8 cm/s                                        mmHg  MV E' Lateral Velocity:  8.9 cm/s  MV A' Lateral Velocity:  12.8 cm/s  E/E' septal: 10.51  E/E' lateral: 8.38  [email or web address] [id number]    Ct Abdomen Pelvis W Iv Contrast Additional Contrast? None    Result Date: 4/13/2021  * ADDENDUM #1 *  This report was discussed with Emiliana Ahn RN on Apr 13, 2021 21:43:00 EDT. This document has been electronically signed by: Glenna Martinez on 04/13/2021 09:43 PM *  ORIGINAL REPORT * CT abdomen and pelvis with contrast Comparison:  CT,[facility]  - CT ABDOMEN PELVIS W IV CONTRAST  - 03/23/2021 02:48 PM EDT Findings: Walled off, peripherally enhancing multiloculated bilateral pleural collections and numerous peripherally enhancing low-density masses involving the bilateral pleural spaces.  Extension into the mediastinum, and into multiple intercostal spaces. Early involvement of the pericardium. Destructive changes of multiple ribs. Interval progression since the prior study. Multifocal consolidation and volume loss within the lungs. Heterogeneous lung parenchyma. Multiple bilateral pulmonary nodules. Multiple calcified granulomas within the spleen. Unremarkable liver and pancreas. Large solid mass within the right kidney without change. Horseshoe kidney noted. Small bilateral adrenal nodules unchanged. Colonic diverticulosis. No pneumatosis or bowel obstruction. Unremarkable bladder and prostate gland. Nonvisualization of the appendix. No acute fracture. Postsurgical changes at L5. Multifocal lytic metastases to bony structures. 1. Walled off, peripherally enhancing multiloculated bilateral pleural collections and numerous peripherally enhancing low-density masses involving the bilateral pleural spaces. Extension into the mediastinum, and into multiple intercostal spaces. Early involvement of the pericardium. Destructive changes of multiple ribs. Interval progression since the prior study. Findings are compatible with extensive malignant involvement. There could also be a component of infection. 2. Multifocal consolidation and volume loss within the lungs. Heterogeneous lung parenchyma raising the possibility of underlying involvement with neoplasm. Multiple bilateral pulmonary nodules likely on the basis of metastatic disease. 3. Right-sided chest tube tip within the right major fissure. 4. 5.1 cm solid mass within the right kidney, compatible with neoplasm without significant change. 5. Moderate abdominal and pelvic free fluid with interval worsening. 6. Multifocal peritoneal thickening and enhancement compatible with malignant involvement. 7. Soft tissue infiltration of the omentum of the proximal sigmoid colon, likely on the basis of malignant involvement. 8. Bilateral adrenal gland nodules, compatible with metastatic disease.  9. focal infiltrate. **This report has been created using voice recognition software. It may contain minor errors which are inherent in voice recognition technology. ** Final report electronically signed by Dr. Betsy West on 4/13/2021 11:28 AM        ASSESMENT:      Active Problems:    Pneumonia    Acute on chronic respiratory failure with hypoxemia ([address]  Resolved Problems:    * No resolved hospital problems. *  OTHER PROBLEMS:    - Metastatic clear cell renal carcinoma to  lungs, pleura, adrenal glands and bone with malignant pleural effusion, omentum, pericardial wall. -  Gout   -  Hypothyroidism   -  Hyponatremia likely related to SIADH      PLAN:  Continue pleural fluid drainage via PleurX catheter,  maintain bronchodilators, O2, broad spectrum antibiotics to cover  HAP,   Pulmonology and ID consult,  Follow-up legionella and strep pneumonia urinary antigens, rapid influenza screen, blood cultures, sputum gram stain c/s, Incentive spirometry , acapella.     Plan to discuss with patient's oncologist    The patient's 2D echocardiogram showed normal EF, no evidence of pericardial effusion    Patient's prognosis is very poor, palliative Care consult in place            DVT prophylaxis: [x] Lovenox                                 [] SCDs                                 [] SQ Heparin                                 [] Encourage ambulation, low risk for DVT, no chemical or mechanical prophylaxis necessary              [] Already on Anticoagulation                Anticipated Disposition upon discharge: [] Home                                                                         [] Home with Home Health                                                                         [] [facility]                                                                         [] [address]          Electronically signed by Kobe Covington MD on 4/14/2021 at 7:13 AM

## 2021-04-14 NOTE — PLAN OF CARE
Problem: Nutrition  Goal: Optimal nutrition therapy  Outcome: Ongoing  Nutrition Problem #1: Moderate malnutrition  Intervention: Food and/or Nutrient Delivery: Continue Current Diet, Start Oral Nutrition Supplement  Nutritional Goals: Pt. will consume 75% or more of meals during LOS.

## 2021-04-14 NOTE — PROGRESS NOTES
Discussed with Dr. Gertrude Allen the oncologist.  He will be reviewing the patient's records and decide what his options are.   He will see the patient  tomorrow

## 2021-04-14 NOTE — CONSULTS
[facility]      Patient - Amando Marcos   MRN -  [de-identified]   Acct # - [de-identified]   - 1970      Date of Admission -  2021 10:24 AM  Date of evaluation -  2021  Room - [address] [place] [address],[address] Anthony Cobos MD Primary Care Physician - Luna Cooper, CAIYT - CNP     Problem List      Active Hospital Problems    Diagnosis Date Noted    Pneumonia [J18.9] 2021    Acute on chronic respiratory failure with hypoxemia [facility]) [J96.21] 2021     Reason for Consult    Pleural effusion status post right chest tube along with pneumonia and worsening pleural effusions. HPI   History Obtained From: Patient daughter, and electronic medical record. Amando Marcso is a 44 y.o. male never smoker with a complex past medical history including insulin-dependent diabetes mellitus, essential hypertension, CKD, Covid pneumonia 2021, gout, and a history of metastatic renal cell carcinoma with mets to the lungs and brain being followed by the [facility] with a chief complaint of increased shortness of breath and who we are seeing for worsening pleural effusions and septic pneumonia. Patient to the hospital for shortness of breath and gout in his elbow. States \"just overall not feeling good. \"  Affirms been having trouble breathing \"for a while\" but stated it got much worse yesterday. Needed to increase his oxygen from his baseline of 2 L up to 6 L and eventually down to 4 and 1/2 L. States that shortness of breath is improved very slightly since the previous day but is essentially the same. Worse when he sits up or tries to walk. Denies chest pain, pleuritic chest pain, and chest tightness but states his lungs \"cannot expand. \"  Coughs up a little bit of clear sputum with no blood. Improved breathing when laying forward. States that he normally takes \"chemotherapy pills. \" However, states he has not taken them for the past week and further states that his Oncologist recommends he not take them for another further week. Summary Hospital Course:    Presented to PCP in the morning of 4/13/2021 to refill gout medication but was visibly very dyspneic and SPO2 was 86% on his baseline 2 L nasal cannula and blood pressure was low at 80/40. O2 increased to 4 L was told to go to the emergency room. Was in [place] last week for pain the right elbow and was diagnosed with gout. Worsening shortness of breath since coming home from [place] which gotten worse over the previous day. Previous chest tube placed on February 2021 for pleural effusions which was drained every other day with about 200 to 250 cc taken out. Reported chills but no fever. Did not want to go to [place]. ID consulted for pneumonia and stage IV metastatic renal carcinoma, Oncology consulted for metastatic renal cell carcinoma, Palliative Care consulted for metastatic renal cell carcinoma, and Pulmonology consulted as above.     PMHx   Past Medical History      Diagnosis Date    Cancer [facility])     kidney    Chronic kidney disease, unspecified     Collapsed lung     IDDM (insulin dependent diabetes mellitus)     Kidney mass     Lung nodules     Sinusitis     Unspecified essential hypertension       Past Surgical History        Procedure Laterality Date    BACK SURGERY      CHOLECYSTECTOMY       Meds    Current Medications    vancomycin  1,500 mg Intravenous Q12H    allopurinol  100 mg Oral Nightly    pantoprazole  40 mg Oral Daily    traZODone  50 mg Oral Nightly    sodium chloride flush  5-40 mL Intravenous 2 times per day    enoxaparin  40 mg Subcutaneous Daily    cefepime  2,000 mg Intravenous Q8H    levothyroxine  62.5 mcg Oral Daily    vancomycin (VANCOCIN) intermittent dosing (placeholder)   Other RX Placeholder     ipratropium-albuterol, indomethacin, traMADol, sodium chloride flush, Stress: Not on file   Relationships    Social connections     Talks on phone: Not on file     Gets together: Not on file     Attends Confucianist service: Not on file     Active member of club or organization: Not on file     Attends meetings of clubs or organizations: Not on file     Relationship status: Not on file    Intimate partner violence     Fear of current or ex partner: Not on file     Emotionally abused: Not on file     Physically abused: Not on file     Forced sexual activity: Not on file   Other Topics Concern    Not on file   Social History Narrative    Not on file     Family History    History reviewed. No pertinent family history. Sleep History    Never diagnosed with sleep apnea in the past.    Review of systems   Review of Systems   Constitutional: Negative for chills and fever. HENT: Negative for postnasal drip, rhinorrhea, sinus pressure and sinus pain. Eyes: Negative for itching and visual disturbance. Respiratory: Positive for cough and shortness of breath. Negative for chest tightness. Cardiovascular: Negative for chest pain and palpitations. Gastrointestinal: Positive for nausea. Negative for abdominal pain. Genitourinary: Negative for difficulty urinating, dysuria and hematuria. Musculoskeletal: Negative for arthralgias and myalgias. Skin: Positive for rash (Resolving rash over face). Negative for color change. Neurological: Negative for numbness and headaches. Hematological: Bruises/bleeds easily (Bruise over bellybutton. ). Psychiatric/Behavioral: Negative for dysphoric mood. The patient is not nervous/anxious. Vitals     height is 6' (1.829 m) and weight is 207 lb 9.6 oz (94.2 kg). His oral temperature is 98.2 °F (36.8 °C). His blood pressure is 110/70 and his pulse is 116. His respiration is 23 and oxygen saturation is 93%. Body mass index is 28.16 kg/m².     SUPPLEMENTAL O2: O2 Flow Rate (L/min): 4.5 L/min     I/O        Intake/Output Summary (Last 24 hours) at 4/14/2021 1334  Last data filed at 4/14/2021 0331  Gross per 24 hour   Intake 1466.4 ml   Output 825 ml   Net 641.4 ml     I/O last 3 completed shifts: In: 1466.4 [P.O.:1000; I.V.:466.4]  Out: 825 [Urine:725; Drains:100]   Patient Vitals for the past 96 hrs (Last 3 readings):   Weight   04/14/21 0430 207 lb 9.6 oz (94.2 kg)   04/13/21 1045 207 lb (93.9 kg)       Exam   Nursing note and vitals reviewed. Physical Exam  Constitutional:       General: He is not in acute distress. Appearance: Normal appearance. He is ill-appearing. He is not toxic-appearing. HENT:      Head: Normocephalic and atraumatic. Right Ear: External ear normal.      Left Ear: External ear normal.      Mouth/Throat:      Mouth: Mucous membranes are moist.      Pharynx: Oropharynx is clear. Eyes:      General:         Right eye: No discharge. Left eye: No discharge. Pupils: Pupils are equal, round, and reactive to light. Cardiovascular:      Rate and Rhythm: Normal rate and regular rhythm. Pulses: Normal pulses. Heart sounds: Normal heart sounds. No murmur. No gallop. Pulmonary:      Effort: Pulmonary effort is normal.      Breath sounds: Decreased air movement present. Decreased breath sounds present. No wheezing, rhonchi or rales. Musculoskeletal: Normal range of motion. Skin:     General: Skin is warm and dry. Capillary Refill: Capillary refill takes 2 to 3 seconds. Neurological:      Mental Status: He is alert. Psychiatric:         Mood and Affect: Mood normal.         Behavior: Behavior normal.       Labs  - Old records and notes have been reviewed in [facility]   1. Electrolyte panel notable for low sodium that appears to be new onset. 2.  Electrolyte panel notable for slightly low estimated GFR that is improved from previous day.   3.  Baseline creatinine appears to be at 0.9.  4.  Very high TSH but normal free T4.  5.  CBC notable for normocytic anemia with a hemoglobin of 10.4.  No leukocytosis or leukopenia. 6.  Urinalysis notable for ketones, high specific gravity, and trace protein. 7.  Arterial blood gas analysis indicates primary respiratory alkalosis with a secondary non-anion gap acidosis. 8.  Arterial blood gas analysis indicates acute on chronic hypoxic respiratory failure. 9.  Otherwise, labs and imaging results as below. PFTs   None immediately available. Sleep studies   None immediately available. Cultures    Respiratory cultures, blood cultures, Legionella antigen, strep pneumo antigen, and respiratory virus antigens panel pending. EKG   Sinus tachycardia  Low voltage QRS, consider pulmonary disease, pericardial effusion, or normal variant  Nonspecific T wave abnormality  Abnormal ECG  When compared with ECG of 23-MAR-2021 13:11,  Nonspecific T wave abnormality now evident in Lateral leads  Confirmed by Barbara Locke ([id number]) on 4/13/2021 11:56:37 AM  Echocardiogram    Summary   Normal left ventricle size and systolic function. Ejection fraction was   estimated at 60 to 65%. There were no regional left ventricular wall   motion abnormalities and wall thickness was within normal limits. Left atrial size was normal.      Signature      ----------------------------------------------------------------   Electronically signed by Ivy Gonzalez MD (Interpreting   physician) on 04/13/2021 at 06:17 PM   ----------------------------------------------------------------      Radiology    CXR  1. Loculated bilateral pleural collections without change. 2. Bilateral pulmonary infiltrates and atelectasis without change.       This document has been electronically signed by: Sean Douglass MD on    04/14/2021 01:55 AM     CT Scans  1. Walled off, peripherally enhancing multiloculated bilateral pleural    collections and numerous peripherally enhancing low-density masses    involving the bilateral pleural spaces.  Extension into the mediastinum,    and into multiple intercostal spaces. Early involvement of the    pericardium. Destructive changes of multiple ribs. Interval progression    since the prior study. Findings are compatible with extensive malignant    involvement. There could also be a component of infection. 2. Multifocal consolidation and volume loss within the lungs. Heterogeneous lung parenchyma raising the possibility of underlying    involvement with neoplasm. Multiple bilateral pulmonary nodules likely on    the basis of metastatic disease. 3. Right-sided chest tube tip within the right major fissure. 4. 5.1 cm solid mass within the right kidney, compatible with neoplasm    without significant change. 5. Moderate abdominal and pelvic free fluid with interval worsening. 6. Multifocal peritoneal thickening and enhancement compatible with    malignant involvement. 7. Soft tissue infiltration of the omentum of the proximal sigmoid colon,    likely on the basis of malignant involvement. 8. Bilateral adrenal gland nodules, compatible with metastatic disease. 9. Large lytic lesion involving the right iliac bone compatible with    metastatic disease, without significant change. 10. Extensive severe edema of the soft tissues with worsening.       This document has been electronically signed by: Khang Morgan MD on    04/13/2021 09:33 PM     Assessment/Plan:   1. Acute on chronic hypoxic respiratory failure. Increasing dyspnea and hypoxia on baseline home 2 L of oxygen. Currently requiring 4-1/2 L. Confirmed by arterial blood gas findings. Chronic portion secondary to pleural effusion secondary to metastatic renal cell carcinoma. Differential for acute portion is broad and includes worsening pleural effusion, pneumonia, and pulmonary embolism. ACS cause unlikely considering negative serial troponins, borderline EKG, and normal echo. Infiltrates seen on chest x-ray but lacks classic symptoms of pneumonia leukocytosis.   Cannot rule out PE with PERC criteria. Concern for PE considering history of metastatic renal cell carcinoma. Estimated Wells score of 2.5, indicating moderate risk. Estimated 4PEPS of 4, indicating relatively low risk and ability to rule out with negative D-dimer. Obtain new D-dimer and procalcitonin. Blood cultures, respiratory cultures, streptococcal antigen, Legionella antigen, and respiratory panel pending. If negative D-dimer, will rule out PE. If positive, CTA. Maintain on cefepime and vancomycin per primary team.  Every 4 hour DuoNeb inhaler as needed. Wean O2 as tolerated. ID and oncology will continue to follow. Otherwise treat as below. 2.  Pleural effusion secondary to metastatic renal cell carcinoma. Right-sided Pleurx drain already placed. Nursing communication to drain Pleurx once daily. Follow-up with pulmonology as an outpatient. 3.  Sepsis secondary to pneumonia. Meets 2 out of 4 SIRS criteria. Low risk qSOFA score. Elevated lactate acid. No elevated anion gap. Monitor with daily BMP and CBC. Maintain on fluid resuscitation of 75 cc/h. Otherwise monitor as above. 4.  Stage IV metastatic renal cell carcinoma. Management per Oncology. Palliative care will follow. Long-term prognosis is poor. 5.  Other medical problems managed per the primary care team.    \"Thank you for asking us to see this patient\"    Questions and concerns addressed.     Electronically signed by   Inés Montaño MD, MPH, [facility] on 4/14/2021 at 1:34 PM   Supervised by Dr. Elizabeth Ignacio

## 2021-04-14 NOTE — FLOWSHEET NOTE
04/14/21 0004   Provider Notification   Reason for Communication Critical Value (comment)   Provider Name Dr. Sarai Paulino   Provider Notification Physician   Method of Communication Secure Message   Response Waiting for response   Notification Time 0004     Secure message sent to Dr. Sarai Paulino about pt's critical result from CT of abdomen and pelvis results reading, \"  1. Walled off, peripherally enhancing multiloculated bilateral pleural    collections and numerous peripherally enhancing low-density masses    involving the bilateral pleural spaces. Extension into the mediastinum,    and into multiple intercostal spaces. Early involvement of the    pericardium. Destructive changes of multiple ribs. Interval progression    since the prior study. Findings are compatible with extensive malignant    involvement. There could also be a component of infection. 2. Multifocal consolidation and volume loss within the lungs. Heterogeneous lung parenchyma raising the possibility of underlying    involvement with neoplasm. Multiple bilateral pulmonary nodules likely on    the basis of metastatic disease. 3. Right-sided chest tube tip within the right major fissure. 4. 5.1 cm solid mass within the right kidney, compatible with neoplasm    without significant change. 5. Moderate abdominal and pelvic free fluid with interval worsening. 6. Multifocal peritoneal thickening and enhancement compatible with    malignant involvement. 7. Soft tissue infiltration of the omentum of the proximal sigmoid colon,    likely on the basis of malignant involvement. 8. Bilateral adrenal gland nodules, compatible with metastatic disease. 9. Large lytic lesion involving the right iliac bone compatible with    metastatic disease, without significant change. 10. Extensive severe edema of the soft tissues with worsening. \"   Also notified physician of pleurx drain emptied per order.    Dr. Sarai Paulino called this RN and gave telephone orders with read back. See orders. Waiting for response.  Electronically signed by Candido Lincoln RN on 4/14/2021 at 12:05 AM

## 2021-04-14 NOTE — CARE COORDINATION
4/14/21, 10:53 AM EDT    DISCHARGE PLANNING EVALUATION    Received update from Hillary Flannery with Palliative Care-family is interested in home palliative care services at discharge. They reside in the [facility] area. Hillary Flannery has reached out to a few agencies that provide palliative care services and they do not cover the [facility] and are not aware of any agencies that do. Call to [facility]-spoke with Nguyễn-he advised that their hospice program was attempting to get palliative program in place. SW transferred to that department and spoke with [facility] do have palliative care and they do cover the [facility] area. He will need order, face sheet and H&P. Obtained direct line number ([phone]) and fax number ([phone]) for information. Diya with palliative care updated.

## 2021-04-14 NOTE — PROGRESS NOTES
[facility]           Progress Note    Patient Name:  Bulmaro Paulino  Medical Record Number:  [id number]  YOB: 1970    Date:  4/14/2021  Time:  11:25 AM  Completed By:  Wilfrido Pritchard RN    Reason for Palliative Care Evaluation:  Stage 4 renal cancer    Current Issues:  []  Pain  []  Fatigue  []  Nausea  []  Anxiety  []  Depression  [x]  Shortness of Breath  []  Constipation  []  Appetite  []  Other:    Advance Directives:none on file  [] [facility] DNR Form  [] Living Will  [] Medical POA    Current Code Status  [x] Full Resuscitation  [] DNR-Comfort Care-Arrest  [] DNR-Comfort Care  [] Limited   [] No CPR   [] No shock   [] No ET intubation/reintubation   [] No resuscitative medications   [] Other limitation:    Performance Status:  70    Family/Patient Discussion:  Patient sitting up in the chair. Patient appears short of breath at rest.  Patient's daughter, James brush is at the bedside. Palliative care introduced. Patient and daughter immediately inquire about palliative care in their home and ask about medical equipment and if palliative care would supply this. Did ask the patient if he is still wishing to pursue treatments for his cancer and patient indicates that he would still like to receive cancer treatment. Briefly discussed that hospice would provide equipment but palliative care would not. Discussed that a doctor's order to indicate the need may assist with cost of equipment. This RN left the room to investigate agencies that provided palliative care in the home. Discussed with Milla ALTAMIRANO and  Trixie Kaminski so that palliative care and home equipment needs would be addressed. Re-entered the room and updated patient's daughter and patient. Did discuss code status levels and what each level entails. Discussed complications of rib fractures, brain and organ damage associated with resuscitative measures.   Patient/daughter verbalize understanding that the patient is a full code and if a change is desired, they must alert staff. Emotional support provided. Plan/Follow-Up:  Updated primary RN, Terra Spear. Palliative care will continue to follow and assist as appropriate. Please do not hesitate to call prn.     Lion Desouza RN  4/14/2021,  11:25 AM

## 2021-04-15 PROBLEM — E22.2 SIADH (SYNDROME OF INAPPROPRIATE ADH PRODUCTION) (HCC): Status: ACTIVE | Noted: 2021-01-01

## 2021-04-15 PROBLEM — E87.1 HYPONATREMIA: Status: ACTIVE | Noted: 2021-01-01

## 2021-04-15 PROBLEM — C64.9 RENAL CELL CANCER, UNSPECIFIED LATERALITY (HCC): Status: ACTIVE | Noted: 2021-01-01

## 2021-04-15 NOTE — CONSULTS
[address] [place] [address]                                  CONSULTATION    PATIENT NAME: Yeyn Dubon                :        1970  MED REC NO:   [id number]                           ROOM:       3222  ACCOUNT NO:   [de-identified]                           ADMIT DATE: 2021  PROVIDER:     HERIBERTO Moncada DATE:  2021    ONCOLOGY CONSULT    LOCATION:  , room 26. HISTORY OF PRESENT ILLNESS:  The patient is a 49-year-old gentleman who  carries a diagnosis of metastatic renal cell carcinoma. The patient was  diagnosed on 2019. The patient presented at that time with  shortness of breath. He was found to have a left-sided pleural  effusion. The patient also was found to have a kidney mass. The  patient was treated in my office with immunotherapy, Keytruda plus  axitinib. The patient progressed and the patient was seen in [facility]. He is currently getting treatment with cabozantinib. The  patient told me that when he started the treatment, he was very sick; he  has diarrhea, nausea, vomiting; and the treatment has been stopped for  the last two weeks because he cannot tolerate it. The patient still  have fluid and he still have NG tube. The patient did not feel any  better. The patient now is requiring oxygen. He was on 2 liters at  home and now is on 4 liters. Apparently, the patient was diagnosed with  pneumonia in [address] [place] [address] and he is currently getting antibiotic. The  patient has poor appetite. He is gaining weight inspite that he is not  eating and he is retaining the fluid. PAST MEDICAL HISTORY:  Diabetes. ALLERGIES:  No known allergy. SOCIAL HISTORY:  He is . He has three grandchildren. He does  not smoke. He does not drink alcohol. He works in sales. FAMILY HISTORY:  He is adopted. REVIEW OF SYSTEMS:  12 systems were reviewed.   Pertinent positive to [facility] at that time, his  CAT scan done at that time did not show significant disease. I  discussed with the patient different options. One option is to treat  the patient with Opdivo. He will get 240 mg every two weeks. I do  believe we need to treat the patient as soon as possible, so we can  control the disease. So, his need for oxygen will decrease and his  breathing will improve. The patient is retaining fluid. I am going to  start the patient on Aldactone 50 mg b.i.d.    I will be following the patient. We thank you for the consult.         Jeancarols Garibay M.D.    D: 04/14/2021 17:26:56       T: 04/14/2021 18:37:24     [email or web address]  Job#: [id number]     Doc#: [id number]    CC:

## 2021-04-15 NOTE — FLOWSHEET NOTE
[facility]AT[facility]  [facility] 5K  [id number]/[id number]-A      Date: 4/15/2021  Patient Name: Amando Marcos        CSN: [de-identified]   : 1970  (44 y.o.)  Gender: male   Referring Practitioner: Tiffany Gaxiola CNP  Diagnosis: pneumonia         REASON FOR MISSED TREATMENT: Hold Treatment per Nursing due to patient feeling SOB.   Will check back as time allows

## 2021-04-15 NOTE — PLAN OF CARE
Problem: Falls - Risk of:  Goal: Will remain free from falls  Description: Will remain free from falls  Outcome: Met This Shift  Fall assessment completed. Patient using call light appropriately to call for assistance with ambulation to bathroom. Personal items within reach. Patient is also compliant with use of non-skid slippers. Problem: Grieving:  Goal: Verbalizes feelings, concerns and thoughts  Description: Verbalizes feelings, concerns and thoughts  Outcome: Ongoing  Patient and family able to verbalized feelings. Emotional support given. Problem: Pain:  Goal: Pain level will decrease  Description: Pain level will decrease  Outcome: Ongoing  Patient states pain relief from PRN pain medications. Pain reassessed one hour post PRN pain medication given. Patient rates pain 3 on FLORIDALMA 0-10 scale. Problem: Skin Integrity:  Goal: Will show no infection signs and symptoms  Description: Will show no infection signs and symptoms  Outcome: Met This Shift  No s/s infection. VS-WNL. Problem: Skin Integrity:  Goal: Absence of new skin breakdown  Description: Absence of new skin breakdown  Outcome: Met This Shift  No skin breakdown this shift. Patient being assisted with turning. Patients states understanding of repositioning every two hours. Problem: Metabolic:  Goal: Will remain free of allergic reactions  Description: Will remain free of allergic reactions  Outcome: Met This Shift  No s/s allergic reaction. Problem: Respiratory:  Goal: Ability to maintain vital signs within normal range will improve  Description: Ability to maintain vital signs within normal range will improve  Outcome: Ongoing  Patients oxygen saturation 96-98 % on 5 L02 per Nasal Canula. No shortness of breath noted. Lung sounds dim. , able C&DB as ordered.           Problem: Discharge Planning:  Goal: Discharged to appropriate level of care  Description: Discharged to appropriate level of care  Outcome: Ongoing  Discharge

## 2021-04-15 NOTE — PROGRESS NOTES
indomethacin, traMADol, sodium chloride flush, sodium chloride, promethazine **OR** ondansetron, polyethylene glycol, acetaminophen **OR** acetaminophen, diphenhydrAMINE        Allergies:  Patient has no known allergies. OBJECTIVE:    Vitals:   Vitals:    04/15/21 0818   BP: 108/72   Pulse: 113   Resp: 30   Temp: 97.4 °F (36.3 °C)   SpO2: 97%      BMI: Body mass index is 28.16 kg/m². PHYSICAL EXAMINATION:            General appearance: ill looking male mild respiratory distress*, appears stated age and cooperative. HEENT:  Normal cephalic, atraumatic without obvious deformity. Pupils equal, round, and reactive to light. Extra ocular muscles intact. Conjunctivae/corneas clear. Neck: Supple. Chest:  PleurX catheter right chest  Respiratory:  Diminished air entry bilaterally  Cardiovascular:  Regular rhythm with normal S1/S2 without  rubs or gallops. Abdomen:  Full,soft, non-tender, non-distended with normal bowel sounds. Musculoskeletal:  No clubbing, cyanosis or ankle edema bilaterally.   Has some swelling and tenderness right elbow  Neurologic:   Alert and oriented x3 with no gross focal deficits  Psychiatric: Thought content appropriate, normal insight      Review of Labs and Diagnostic Testing:    Recent Results (from the past 24 hour(s))   D-dimer, quantitative    Collection Time: 04/14/21 10:18 AM   Result Value Ref Range    D-Dimer, Quant 36095.00 (H) 0.00 - 500.00 ng/ml FEU   Culture, Blood 1    Collection Time: 04/14/21 10:18 AM    Specimen: Blood   Result Value Ref Range    Blood Culture, Routine No growth-preliminary     Rapid influenza A/B antigens    Collection Time: 04/14/21  5:15 PM    Specimen: Nasopharyngeal   Result Value Ref Range    Flu A Antigen Negative NEGATIVE    Flu B Antigen Negative NEGATIVE   Culture, Blood 2    Collection Time: 04/14/21  6:01 PM    Specimen: Blood   Result Value Ref Range    Blood Culture, Routine No growth-preliminary     Sodium    Collection Time: 04/14/21 6:01 PM   Result Value Ref Range    Sodium 127 (L) 135 - 145 meq/L   Basic Metabolic Panel    Collection Time: 04/15/21  2:25 AM   Result Value Ref Range    Sodium 129 (L) 135 - 145 meq/L    Potassium 4.5 3.5 - 5.2 meq/L    Chloride 95 (L) 98 - 111 meq/L    CO2 25 23 - 33 meq/L    Glucose 135 (H) 70 - 108 mg/dL    BUN 15 7 - 22 mg/dL    CREATININE 0.8 0.4 - 1.2 mg/dL    Calcium 7.9 (L) 8.5 - 10.5 mg/dL   CBC    Collection Time: 04/15/21  2:25 AM   Result Value Ref Range    WBC 5.5 4.8 - 10.8 thou/mm3    RBC 3.80 (L) 4.70 - 6.10 mill/mm3    Hemoglobin 10.1 (L) 14.0 - 18.0 gm/dl    Hematocrit 32.9 (L) 42.0 - 52.0 %    MCV 86.6 80.0 - 94.0 fL    MCH 26.6 26.0 - 33.0 pg    MCHC 30.7 (L) 32.2 - 35.5 gm/dl    RDW-CV 18.1 (H) 11.5 - 14.5 %    RDW-SD 57.4 (H) 35.0 - 45.0 fL    Platelets 223 700 - 172 thou/mm3    MPV 9.3 (L) 9.4 - 12.4 fL   Anion Gap    Collection Time: 04/15/21  2:25 AM   Result Value Ref Range    Anion Gap 9.0 8.0 - 16.0 meq/L   Glomerular Filtration Rate, Estimated    Collection Time: 04/15/21  2:25 AM   Result Value Ref Range    Est, Glom Filt Rate >90 ml/min/1.73m2       Radiology:     Echo Complete 2d W Doppler W Color    Result Date: 4/13/2021  Transthoracic Echocardiography Report (TTE)  Demographics   Patient Name   [address] [place] [address],[address] Gender              Male                 P   MR #           [id number]         Race                                                    Ethnicity   Account #      [de-identified]         Room Number         7550   Accession      [id number]        Date of Study       04/13/2021  Number   Date of Birth  1970        Referring Physician FLORENTIN Christianson CNP   Age            44 year(s)        [address], [place]                                    Interpreting        Echo reader of the                                   Physician           week Left atrial size was normal.   Left Ventricle  Normal left ventricle size and systolic function. Ejection fraction was  estimated at 60 to 65%. There were no regional left ventricular wall  motion abnormalities and wall thickness was within normal limits. Right Atrium  Right atrial size was normal.   Right Ventricle  The right ventricular size was normal with normal systolic function and  wall thickness. Pericardial Effusion  The pericardium was normal in appearance with no evidence of a pericardial  effusion. Pleural Effusion  No evidence of pleural effusion. Aorta / Great Vessels  -Aortic root dimension within normal limits.  -The Pulmonary artery is within normal limits. -IVC size is within normal limits with normal respiratory phasic changes.   M-Mode/2D Measurements & Calculations   LV Diastolic    LV Systolic Dimension: 2.7  AV Cusp Separation: 2.3 cmLA  Dimension: 4 cm cm                          Dimension: 3.3 cmAO Root  LV FS:32.5 %    LV Volume Diastolic: 70 ml  Dimension: 3.8 cm  LV PW           LV Volume Systolic: 27 ml  Diastolic: 0.9  LV EDV/LV EDV Index: 70  cm              ml/32 m^2LV ESV/LV ESV  Septum          Index: 27 ml/12 m^2         RV Diastolic Dimension: 2 cm  Diastolic: 1 cm EF Calculated: 61.4 %                                              LA/Aorta: 0.87  Doppler Measurements & Calculations   MV Peak E-Wave: 74.6 cm/s  AV Peak Velocity: 150 LVOT Peak Velocity: 130  MV Peak A-Wave: 98.1 cm/s  cm/s                  cm/s  MV E/A Ratio: 0.76         AV Peak Gradient: 9   LVOT Peak Gradient: 7  MV Peak Gradient: 2.23     mmHg                  mmHg  mmHg                                                   TV Peak E-Wave: 62.6 cm/s  MV Deceleration Time: 345                        TV Peak A-Wave: 88.7 cm/s  msec  MV P1/2t: 101 msec                               TV Peak Gradient: 1.57  MVA by PHT:2.18 cm^2                             mmHg                                                   TR Velocity:247 cm/s  MV E' Septal Velocity: 7.1 AV DVI (Vmax):0.87    TR Gradient:24.4 mmHg  cm/s                                             PV Peak Velocity: 75 cm/s  MV A' Septal Velocity:                           PV Peak Gradient: 2.25  10.8 cm/s                                        mmHg  MV E' Lateral Velocity:  8.9 cm/s  MV A' Lateral Velocity:  12.8 cm/s  E/E' septal: 10.51  E/E' lateral: 8.38  [email or web address] [id number]    Ct Abdomen Pelvis W Iv Contrast Additional Contrast? None    Result Date: 4/13/2021  * ADDENDUM #1 *  This report was discussed with Libby Harada, RN on Apr 13, 2021 21:43:00 EDT. This document has been electronically signed by: Marti Bird on 04/13/2021 09:43 PM *  ORIGINAL REPORT * CT abdomen and pelvis with contrast Comparison:  CT,KOSR  - CT ABDOMEN PELVIS W IV CONTRAST  - 03/23/2021 02:48 PM EDT Findings: Walled off, peripherally enhancing multiloculated bilateral pleural collections and numerous peripherally enhancing low-density masses involving the bilateral pleural spaces. Extension into the mediastinum, and into multiple intercostal spaces. Early involvement of the pericardium. Destructive changes of multiple ribs. Interval progression since the prior study. Multifocal consolidation and volume loss within the lungs. Heterogeneous lung parenchyma. Multiple bilateral pulmonary nodules. Multiple calcified granulomas within the spleen. Unremarkable liver and pancreas. Large solid mass within the right kidney without change. Horseshoe kidney noted. Small bilateral adrenal nodules unchanged. Colonic diverticulosis. No pneumatosis or bowel obstruction. Unremarkable bladder and prostate gland. Nonvisualization of the appendix. No acute fracture. Postsurgical changes at L5. Multifocal lytic metastases to bony structures.      1. Walled off, peripherally enhancing multiloculated bilateral pleural collections and numerous peripherally enhancing low-density masses involving the bilateral pleural spaces. Extension into the mediastinum, and into multiple intercostal spaces. Early involvement of the pericardium. Destructive changes of multiple ribs. Interval progression since the prior study. Findings are compatible with extensive malignant involvement. There could also be a component of infection. 2. Multifocal consolidation and volume loss within the lungs. Heterogeneous lung parenchyma raising the possibility of underlying involvement with neoplasm. Multiple bilateral pulmonary nodules likely on the basis of metastatic disease. 3. Right-sided chest tube tip within the right major fissure. 4. 5.1 cm solid mass within the right kidney, compatible with neoplasm without significant change. 5. Moderate abdominal and pelvic free fluid with interval worsening. 6. Multifocal peritoneal thickening and enhancement compatible with malignant involvement. 7. Soft tissue infiltration of the omentum of the proximal sigmoid colon, likely on the basis of malignant involvement. 8. Bilateral adrenal gland nodules, compatible with metastatic disease. 9. Large lytic lesion involving the right iliac bone compatible with metastatic disease, without significant change. 10. Extensive severe edema of the soft tissues with worsening. This document has been electronically signed by: Diana Dia MD on 04/13/2021 09:33 PM All CTs at this facility use dose modulation techniques and iterative reconstructions, and/or weight-based dosing when appropriate to reduce radiation to a low as reasonably achievable. Xr Chest Portable    Result Date: 4/14/2021  1 view chest x-ray Comparison:  SR SANDI  - XR CHEST PORTABLE  - 04/13/2021 11:16 AM EDT Findings: Loculated bilateral pleural collections without gross interval change. Airspace filling within the bilateral mid and lower lungs without change.  Enlarged related to SIADH      PLAN:  Seen by Oncologist , started on opdivo to receive 240 mg [id number]      Continue pleural fluid drainage via PleurX catheter,  maintain bronchodilators, O2, broad spectrum antibiotics to cover  HAP,   Pulmonology and ID services following,  Follow-up legionella and strep pneumonia urinary antigens, rapid influenza screen, blood cultures, sputum gram stain c/s, Incentive spirometry , acapella.        The patient's 2D echocardiogram showed normal EF, no evidence of pericardial effusion    Overall prognosis is very poor, palliative Care consult in place    IVF to INT        DVT prophylaxis: [x] Lovenox                                 [] SCDs                                 [] SQ Heparin                                 [] Encourage ambulation, low risk for DVT, no chemical or mechanical prophylaxis necessary              [] Already on Anticoagulation                Anticipated Disposition upon discharge: [] Home                                                                         [] Home with Home Health                                                                         [] [facility]                                                                         [] [address] [place] [address],[address]          Electronically signed by Santana Brooks MD on 4/15/2021 at 9:32 AM

## 2021-04-15 NOTE — CONSULTS
CT/CV Surgery Consult Note    4/15/2021 8:58 AM    Reason for Consult: Surgical opinion for recurrent right pleural effusion. CC: Shortness of breath. Mild functioning right Pleurx catheter. HPI:    Mr. Paris Squires  is a 48year old male with a PMH including right kidney cancer, status post right pleural Pleurx catheter insertion at [address] [place] [address] in  for recurrent right pleural effusion. He was found to have reaccumulation of right pleural effusion with malfunctioning of right Pleurx cath. I was asked to evaluate the patient. He reports weekly drainage of right pleural effusion through Pleurx catheter approximately 200 cc at a time. But the amount of drainage has been decreased significantly over last 2 to 3 weeks. And he became more shortness of breath. Chest CTA was done yesterday, which showed loculated right pleural effusion. Vital Signs: /72   Pulse 113   Temp 97.4 °F (36.3 °C) (Oral)   Resp 30   Ht 6' (1.829 m)   Wt 207 lb 9.6 oz (94.2 kg)   SpO2 97%   BMI 28.16 kg/m²    Temp (24hrs), Av.3 °F (36.8 °C), Min:97.4 °F (36.3 °C), Max:98.7 °F (37.1 °C)      PULSE OXIMETRY RANGE: SpO2  Av.5 %  Min: 87 %  Max: 97 %    SUPPLEMENTAL O2: O2 Flow Rate (L/min): 5 L/min     Labs:   CBC:     Recent Labs     2145 04/15/21  0225   WBC 6.4 5.2 5.5   HGB 11.3* 10.4* 10.1*   HCT 37.0* 34.1* 32.9*   MCV 86.9 87.7 86.6    259 231     BMP:   Recent Labs     21  1109 21  1109 21  0545 21  1801 04/15/21  0225   *   < > 131* 127* 129*   K 4.9  --  5.0  --  4.5   CL 95*  --  97*  --  95*   CO2 23  --  25  --  25   BUN 21  --  18  --  15   CREATININE 1.1  --  0.9  --  0.8    < > = values in this interval not displayed. Last HgA1C:   Lab Results   Component Value Date    LABA1C 7.1 (H) 2019       Imaging:  CXR: I have reviewed the CXR image. CT chest w/o IV contrast: I have reviewed the CT image.        Intake/Output Summary (Last 24 hours) at 4/15/2021 0858  Last data filed at 4/14/2021 1348  Gross per 24 hour   Intake 1206.72 ml   Output --   Net 1206.72 ml       Scheduled Meds:    vancomycin  1,500 mg Intravenous Q12H    spironolactone  50 mg Oral BID    nivolumab (OPDIVO) chemo IVPB  240 mg Intravenous Once    allopurinol  100 mg Oral Nightly    pantoprazole  40 mg Oral Daily    traZODone  50 mg Oral Nightly    sodium chloride flush  5-40 mL Intravenous 2 times per day    enoxaparin  40 mg Subcutaneous Daily    cefepime  2,000 mg Intravenous Q8H    levothyroxine  62.5 mcg Oral Daily    vancomycin (VANCOCIN) intermittent dosing (placeholder)   Other RX Placeholder         PastMedical History: Lena Fontaine  has a past medical history of Cancer ([facility] [address] Chronic kidney disease, unspecified, Collapsed lung, IDDM (insulin dependent diabetes mellitus), Kidney mass, Lung nodules, Sinusitis, and Unspecified essential hypertension. Past Surgical History:  The patient  has a past surgical history that includes Cholecystectomy and back surgery. Allergies: The patient has No Known Allergies. Family History: This patient's family history is not on file. Social History: Orlando CHEEMA  reports that he has never smoked. He has never used smokeless tobacco. He reports previous alcohol use of about 1.0 standard drinks of alcohol per week. He reports that he does not use drugs. ROS:  Constitutional: Weight loss and lack of energy. HENT: Negative for congestion, facial swelling, sore throat, and changes in voice. Eyes: Negative for photophobia, redness, itching and visual disturbance. Respiratory: Increasing shortness of breath. Cardiovascular: Negative for chest pain, palpitations and leg swelling. Gastrointestinal: Negative for abdominal distention, constipation, nausea and vomiting. Endocrine: Negative for cold intolerance, heat intolerance, polyphagia and polyuria.    Musculoskeletal: Negative for arthralgias, gait problem, and myalgias. Skin: Negative for color change, rash and wound. Allergic/Immunologic: Negative for food allergies and immunocompromised state. Neurological: Negative for dizziness, tremors, speech difficulty, weakness, numbness and headaches. Hematological: Negative for adenopathy. Does not bruise/bleed easily. Psychiatric/Behavioral: Negative for agitation, confusion, and dysphoric mood. Physical Exam:   General appearance:   Mild shortness of breath at rest.  HEENT:  Normal cephalic, atraumatic without obvious deformity. Conjunctivae/corneas clear. Neck: Supple, with full range of motion. No jugular venous distention. Trachea midline. Respiratory:   Decreased breathing sound in the right chest.  Pleurx catheter in place in the right chest.  Cardiovascular:  Regular rate and rhythm with normal S1/S2 without murmurs, rubs or gallops. Abdomen: Soft, non-tender, non-distended with normal bowel sounds. Musculoskeletal:  No clubbing, cyanosis or edema bilaterally. Full range of motion without deformity. Skin: Skin color, texture, turgor normal.  No rashes or lesions. Neurologic:  Neurovascularly intact without any focal sensory/motor deficits. Psychiatric:  Alert and oriented, thought content appropriate, normal insight.   Capillary Refill: Brisk,< 3 seconds   Peripheral Pulses: +2 palpable, equal bilaterally      Problem List:  Patient Active Problem List   Diagnosis    Type 2 diabetes mellitus treated without insulin ([facility] [address]    Essential hypertension    Chronic kidney disease    CKD (chronic kidney disease), stage III    Intracranial arachnoid cysts 4th ventricle    Headache    Lt facial numbness    Right renal mass    Hematuria    Lung nodules    Hydropneumothorax    Adrenal nodule (HCC)    Shortness of breath    Lymph node disorder    Respiratory distress    Pleural nodules    Horseshoe kidney    Leukocytosis    Cyst of brain    Pneumonia due to organism    Acute on chronic respiratory failure with hypoxemia (HCC)    Moderate malnutrition (HCC)       Assessment: Recurrent right pleural effusion with a malfunctioning of Pleurx catheter. Status post metastatic kidney cancer. Plan: 4/15/21  1. Right thoracentesis. 2. TPA injection through the Pleurx catheter. Issues discussed in detail with the patient, who understands and has no further questions. Time spent with patient: 80 minutes, of which more than 50% was spent counseling/coordinating the patient's care.     Gretchen Moya MD

## 2021-04-15 NOTE — PROGRESS NOTES
Chemotherapy verified with  and pharmasist prior to administration.  Original order, appropriateness of regimen, drug supplied, height, weight, BSA, dose calculations, expiration dates/times, drug appearance, and two patient identifiers were verified by both RNs.  Most recent laboratory values and allergies, were reviewed.  Positive, brisk blood return via IV was confirmed prior to administration.     Trudell Duverney and Constance Temple RN verified correct rate of chemotherapy and maintenance IV fluids

## 2021-04-15 NOTE — PLAN OF CARE
Problem: Falls - Risk of:  Goal: Will remain free from falls  Description: Will remain free from falls  Outcome: Ongoing   Patient has remained free from falls this shift; bed alarm on with bed in lowest position and call light/possessions within reach during hourly rounds. Pt ambulated in room and to bathroom this shift with assistance, but does become very SOB with ambulation and requires breaks. Problem: Grieving:  Goal: Verbalizes feelings, concerns and thoughts  Description: Verbalizes feelings, concerns and thoughts  Outcome: Ongoing   Emotional support provided to patient/daughter [facility]. Pt verbalized wishes to continue treatments that are available r/t metastatic disease. Problem: Pain:  Goal: Pain level will decrease  Description: Pain level will decrease  Outcome: Ongoing   Patient has denied acute pain this shift, but has received two doses of prn Indocin for chronic gout pain to RUE. Problem: Skin Integrity:  Goal: Will show no infection signs and symptoms  Description: Will show no infection signs and symptoms  Outcome: Ongoing   No sxs skin breakdown this shift, pt repositions self in bed and has been up to chair as well. Afebrile. Problem: Metabolic:  Goal: Will remain free of allergic reactions  Description: Will remain free of allergic reactions  Outcome: Ongoing   Rash did reappear (less severe) to bilat cheeks shortly after finishing morning dose of IV vancomycin. PRN benadryl IV given, effective and rash resolved. Informed oncoming night shift nurse, will give prn benadryl prior to vanc administration this evening and continue to monitor. Pt has denied increased shortness of breath/tightness in throat or itching to face. Problem: Respiratory:  Goal: Respiratory status will improve  Description: Respiratory status will improve  Outcome: Ongoing   Intermittent episodes of SOB/dyspnea at rest, oxygen has been maintained at 4.5 L via NC this shift, O2sat 92-94%.  Due for pleurex drain tomorrow and daily thereafter, which pt reports helps alleviate some respiratory effort with fluid removed. Problem: Nutrition  Goal: Optimal nutrition therapy  4/14/2021 2020 by Angela Curiel RN  Outcome: Ongoing   Ensures provided and encouraged, pt ate some breakfast and minimal dinner, was NPO for lunch. Continues to have poor appetite and has been encouraged to eat snacks when can tolerate. PRN zofran given this shift in an attempt to prevent nausea with meal.  Care plan reviewed with patient and family. Patient verbalizes understanding of the plan of care and contributes to goal setting.

## 2021-04-15 NOTE — PROGRESS NOTES
Patient resting in bed. Daughter at the bedside. Patient shares that a new treatment was offered by the oncologist.  Patient expresses hope and says, \"whatever works. \"  Emotional support provided. Daughter does voice some questions regarding disability and message left for  to address. Please call palliative care if further needs arise.

## 2021-04-15 NOTE — PROGRESS NOTES
Progress note: Infectious diseases    Patient - Cody Ramirez,  Age - 44 y.o.    - 1970      Room Number - 4H-38/545-I   MRN -  [id number]   Acct # - [de-identified]  Date of Admission -  2021 10:24 AM    SUBJECTIVE:   Events noted. Seen by Oncology, started on opdivio. He is short of breath  OBJECTIVE   VITALS    height is 6' (1.829 m) and weight is 207 lb 9.6 oz (94.2 kg). His oral temperature is 98.2 °F (36.8 °C). His blood pressure is 108/73 and his pulse is 111. His respiration is 20 and oxygen saturation is 98%. Wt Readings from Last 3 Encounters:   21 207 lb 9.6 oz (94.2 kg)   21 192 lb 6.4 oz (87.3 kg)   02/15/21 200 lb (90.7 kg)      General Appearance  Awake, alert, oriented, chronically sick looking.   HEENT - normocephalic, atraumatic, pale  conjunctiva,  anicteric sclera  Neck - Supple, no mass  Lungs -  Bilateral   air entry, diminished breath sound  Cardiovascular - Heart sounds are normal.     Abdomen -edematous abdominal wall  Neurologic -oriented  Skin - No bruising or bleeding  Extremities - trace edema    MEDICATIONS:      [START ON 2021] alteplase (ACTIVASE) syringe  6 mg Intrapleural Once    And    [START ON 2021] dornase (PULMOZYME) syringe  5 mg Intrapleural Daily    vancomycin  1,500 mg Intravenous Q12H    spironolactone  50 mg Oral BID    nivolumab (OPDIVO) chemo IVPB  240 mg Intravenous Once    allopurinol  100 mg Oral Nightly    pantoprazole  40 mg Oral Daily    traZODone  50 mg Oral Nightly    sodium chloride flush  5-40 mL Intravenous 2 times per day    enoxaparin  40 mg Subcutaneous Daily    cefepime  2,000 mg Intravenous Q8H    levothyroxine  62.5 mcg Oral Daily    vancomycin (VANCOCIN) intermittent dosing (placeholder)   Other RX Placeholder      sodium chloride       ipratropium-albuterol, indomethacin, traMADol, sodium chloride flush, sodium chloride, promethazine **OR** ondansetron, polyethylene glycol, acetaminophen **OR** acetaminophen, diphenhydrAMINE      LABS:     CBC:   Recent Labs     04/13/21  1109 04/14/21  0545 04/15/21  0225   WBC 6.4 5.2 5.5   HGB 11.3* 10.4* 10.1*    259 231     BMP:    Recent Labs     04/13/21  1109 04/13/21  1109 04/14/21  0545 04/14/21  1801 04/15/21  0225 04/15/21  0946   *   < > 131* 127* 129* 130*   K 4.9  --  5.0  --  4.5  --    CL 95*  --  97*  --  95*  --    CO2 23  --  25  --  25  --    BUN 21  --  18  --  15  --    CREATININE 1.1  --  0.9  --  0.8  --    GLUCOSE 101  --  107  --  135*  --     < > = values in this interval not displayed.      Calcium:  Recent Labs     04/15/21  0225   CALCIUM 7.9*    Hepatic:   Recent Labs     04/13/21 1109   ALKPHOS 81   ALT 8*   AST 22   PROT 6.5   BILITOT 0.4   LABALBU 2.8*      Recent Labs     04/13/21 1109   CKTOTAL 41*        CULTURES:   UA:   Recent Labs     04/13/21 1925   SPECGRAV >1.030*   PHUR 5.0   COLORU YELLOW   PROTEINU TRACE*   BLOODU NEGATIVE   RBCUA 0-2   WBCUA 0-2   BACTERIA NONE SEEN   NITRU NEGATIVE   BILIRUBINUR NEGATIVE   UROBILINOGEN 0.2   KETUA 80*   LABCAST NONE SEEN  NONE SEEN     Micro:   Lab Results   Component Value Date    BC No growth-preliminary  04/14/2021          Problem list of patient:     Patient Active Problem List   Diagnosis Code    Type 2 diabetes mellitus treated without insulin ([facility] [address] E11.9    Essential hypertension I10    Chronic kidney disease N18.9    CKD (chronic kidney disease), stage III N18.30    Intracranial arachnoid cysts 4th ventricle G93.0    Headache R51.9    Lt facial numbness R20.0    Right renal mass N28.89    Hematuria R31.9    Lung nodules R91.8    Hydropneumothorax J94.8    Adrenal nodule (HCC) E27.8    Shortness of breath R06.02    Lymph node disorder I89.9    Respiratory distress R06.03    Pleural nodules R22.2    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    Leukocytosis D72.829    Cyst of brain G93.0    Pneumonia due to organism J18.9    Acute on chronic respiratory failure with hypoxemia (HCC) J96.21    Moderate malnutrition (HCC) E44.0    SIADH (syndrome of inappropriate ADH production) (HCC) E22.2    Hyponatremia E87.1    Renal cell cancer, unspecified laterality (HCC) C64.9         ASSESSMENT/PLAN   Metastatic renal cell cancer: evaluated by Oncology, started on  Opdivo. Recurrent pleural effusion: right side pleurex. [facility] by [facility]: will have alteplase/dornase to help break the loculation. He has loculated collection and multiple metastatic disease   Continue current treatment.   Discussed with nursing staff and his family      Alyse Coleman MD, [address] 4/15/2021 1:57 PM

## 2021-04-16 PROBLEM — E43 SEVERE MALNUTRITION (HCC): Status: ACTIVE | Noted: 2021-01-01

## 2021-04-16 NOTE — PROGRESS NOTES
[facility]      Patient - Aaron Ordonez   MRN -  [de-identified]   Acct # - [de-identified]   - 1970      Date of Admission -  2021 10:24 AM  Date of evaluation -  2021  Room - [facility] Kapil Hammond MD Primary Care Physician - CAITY Pryor - CNP     Problem List      Active Hospital Problems    Diagnosis Date Noted    SIADH (syndrome of inappropriate ADH production) ([facility] [address] [E22.2] 04/15/2021    Hyponatremia [E87.1] 04/15/2021    Renal cell cancer, unspecified laterality ([facility] [address] [C64.9] 04/15/2021    Severe malnutrition ([facility] [address] [E43] 2021    Pneumonia due to organism [J18.9] 2021    Acute on chronic respiratory failure with hypoxemia [facility]) [J96.21] 2021     Chief complaint:   Management of right side pleurx catheter  HPI   History Obtained From: Patient daughter, and electronic medical record. Aaron Ordonez is a 44 y.o. male never smoker with a complex past medical history including insulin-dependent diabetes mellitus, essential hypertension, CKD, Covid pneumonia 2021, gout, and a history of metastatic renal cell carcinoma with mets to the lungs and brain being followed by the [facility] with a chief complaint of increased shortness of breath and who we are seeing for worsening pleural effusions and septic pneumonia. Patient to the hospital for shortness of breath and gout in his elbow. States \"just overall not feeling good. \"  Affirms been having trouble breathing \"for a while\" but stated it got much worse yesterday. Needed to increase his oxygen from his baseline of 2 L up to 6 L and eventually down to 4 and 1/2 L. States that shortness of breath is improved very slightly since the previous day but is essentially the same. Worse when he sits up or tries to walk.   Denies chest pain, pleuritic chest pain, and chest tightness but states his lungs \"cannot expand. \"  Coughs up a little bit of clear sputum with no blood. Improved breathing when laying forward. States that he normally takes \"chemotherapy pills. \" However, states he has not taken them for the past week and further states that his Oncologist recommends he not take them for another further week. Summary Hospital Course:  Presented to PCP in the morning of 4/13/2021 to refill gout medication but was visibly very dyspneic and SPO2 was 86% on his baseline 2 L nasal cannula and blood pressure was low at 80/40. O2 increased to 4 L was told to go to the emergency room. Was in [facility] last week for pain the right elbow and was diagnosed with gout. Worsening shortness of breath since coming home from [facility] which gotten worse over the previous day. Previous chest tube placed on February 2021 for pleural effusions which was drained every other day with about 200 to 250 cc taken out. Reported chills but no fever. Did not want to go to [facility]. ID consulted for pneumonia and stage IV metastatic renal carcinoma, Oncology consulted for metastatic renal cell carcinoma, Palliative Care consulted for metastatic renal cell carcinoma, and Pulmonology consulted as above.     Subjective/Events Past 24 hours/ROS   -Chills today afebrile  -Denies CP  -SOB slightly improved after Pleurx opened had TPA/dornase per CTS this AM  -Some nausea  Rest of the review of systems reviewed    PMHx   Past Medical History      Diagnosis Date    Cancer ([facility] [address]     kidney    Chronic kidney disease, unspecified     Collapsed lung     IDDM (insulin dependent diabetes mellitus)     Kidney mass     Lung nodules     Sinusitis     Unspecified essential hypertension       Past Surgical History        Procedure Laterality Date    BACK SURGERY      CHOLECYSTECTOMY       Meds    Current Medications    [START ON 4/17/2021] alteplase (ACTIVASE) syringe  6 mg Intrapleural Once    And    [START ON 4/17/2021] dornase (PULMOZYME) syringe  5 mg Intrapleural Once    vancomycin  1,500 mg Intravenous Q12H    spironolactone  50 mg Oral BID    allopurinol  100 mg Oral Nightly    pantoprazole  40 mg Oral Daily    traZODone  50 mg Oral Nightly    sodium chloride flush  5-40 mL Intravenous 2 times per day    enoxaparin  40 mg Subcutaneous Daily    cefepime  2,000 mg Intravenous Q8H    levothyroxine  62.5 mcg Oral Daily    vancomycin (VANCOCIN) intermittent dosing (placeholder)   Other RX Placeholder     ipratropium-albuterol, indomethacin, traMADol, sodium chloride flush, sodium chloride, promethazine **OR** ondansetron, polyethylene glycol, acetaminophen **OR** acetaminophen, diphenhydrAMINE  IV Drips/Infusions   sodium chloride       Home Medications  Medications Prior to Admission: traMADol (ULTRAM) 50 MG tablet, Take 50 mg by mouth every 6 hours as needed for Pain. traZODone (DESYREL) 50 MG tablet, Take 50 mg by mouth nightly  loratadine (CLARITIN) 10 MG capsule, Take 10 mg by mouth daily  Cabozantinib S-Malate (CABOMETYX) 40 MG TABS, Take by mouth daily  indomethacin (INDOCIN) 25 MG capsule, Take 25 mg by mouth as needed for Pain  levothyroxine (SYNTHROID) 25 MCG tablet, Take 25 mcg by mouth Daily  ALLOPURINOL PO, Take 100 mg by mouth nightly   omeprazole (PRILOSEC) 40 MG delayed release capsule, Take 40 mg by mouth daily  levothyroxine (SYNTHROID) 100 MCG tablet, Take 25 mcg by mouth Daily   amLODIPine (NORVASC) 5 MG tablet, Take 5 mg by mouth daily  colchicine (COLCRYS) 0.6 MG tablet, Take 1 tablet by mouth daily for 3 days  Diet    DIET GENERAL;  Dietary Nutrition Supplements: Low Calorie High Protein Supplement  Allergies    Patient has no known allergies.   Social History     Social History     Socioeconomic History    Marital status:      Spouse name: Not on file    Number of children: Not on file    Years of education: Not on file    Highest education level: Not on file Occupational History    Not on file   Social Needs    Financial resource strain: Not on file    Food insecurity     Worry: Not on file     Inability: Not on file    Transportation needs     Medical: Not on file     Non-medical: Not on file   Tobacco Use    Smoking status: Never Smoker    Smokeless tobacco: Never Used   Substance and Sexual Activity    Alcohol use: Not Currently     Alcohol/week: 1.0 standard drinks     Types: 1 Cans of beer per week    Drug use: Never    Sexual activity: Not on file   Lifestyle    Physical activity     Days per week: Not on file     Minutes per session: Not on file    Stress: Not on file   Relationships    Social connections     Talks on phone: Not on file     Gets together: Not on file     Attends Worship service: Not on file     Active member of club or organization: Not on file     Attends meetings of clubs or organizations: Not on file     Relationship status: Not on file    Intimate partner violence     Fear of current or ex partner: Not on file     Emotionally abused: Not on file     Physically abused: Not on file     Forced sexual activity: Not on file   Other Topics Concern    Not on file   Social History Narrative    Not on file     Family History    History reviewed. No pertinent family history. Sleep History    Never diagnosed with sleep apnea in the past.    Vitals     height is 6' (1.829 m) and weight is 207 lb 9.6 oz (94.2 kg). His oral temperature is 97.5 °F (36.4 °C). His blood pressure is 121/75 and his pulse is 130. His respiration is 22 and oxygen saturation is 93%. Body mass index is 28.16 kg/m². SUPPLEMENTAL O2: O2 Flow Rate (L/min): 5 L/min     I/O        Intake/Output Summary (Last 24 hours) at 4/16/2021 1510  Last data filed at 4/15/2021 2038  Gross per 24 hour   Intake 2942 ml   Output --   Net 2942 ml     I/O last 3 completed shifts: In: 2942 [P.O.:500;  I.V.:2442]  Out: -    Patient Vitals for the past 96 hrs (Last 3 readings): Weight   04/14/21 0430 207 lb 9.6 oz (94.2 kg)   04/13/21 1045 207 lb (93.9 kg)       Exam   Physical Exam   Constitutional: No distress on 5 LPM O2 per NC. Patient appears chronically ill. Head: Normocephalic and atraumatic. Mouth/Throat: Oropharynx is clear and moist.  No oral thrush. Eyes: Conjunctivae are normal. Pupils are equal, round. No scleral icterus. Neck: Neck supple. No tracheal deviation present. Cardiovascular: S1 and S2 with no murmur. No peripheral edema  Pulmonary/Chest: Normal effort with bilateral air entry, clear breath sounds, diminished to right base. No stridor. No respiratory distress. Patient exhibits no tenderness. Right side pleurx attached to atrium noted rubén drainage in system. Abdominal: Soft. Bowel sounds audible. No distension or tenderness to palp. Musculoskeletal: Moves all extremities  Neurological: Patient is alert and oriented to person, place, and time. Skin: Warm and dry. PFTs   None immediately available. Sleep studies   None immediately available. Cultures    Rapid Flu A/B-Negative  Respiratory culture-Pending  blood cultures x2 No prelim growth  Legionella antigen- Negative  strep pneumo antigen- Negative  respiratory virus antigens panel pending. Body fluid culture-No growth prelim   EKG   Sinus tachycardia  Low voltage QRS, consider pulmonary disease, pericardial effusion, or normal variant  Nonspecific T wave abnormality  Abnormal ECG  When compared with ECG of 23-MAR-2021 13:11,  Nonspecific T wave abnormality now evident in Lateral leads  Confirmed by JAMIE RODRIGUEZ ([id number]) on 4/13/2021 11:56:37 AM  Echocardiogram    Summary   Normal left ventricle size and systolic function. Ejection fraction was   estimated at 60 to 65%. There were no regional left ventricular wall   motion abnormalities and wall thickness was within normal limits.    Left atrial size was normal.      Signature ----------------------------------------------------------------   Electronically signed by Yulisa Chappell MD (Interpreting   physician) on 04/13/2021 at 06:17 PM   ----------------------------------------------------------------      Radiology    CXR    XR CHEST 1 VIEW   4/15/2021   Persistent pleural fluid at the lower bilateral chest.   Persistent atelectasis versus pneumonia at both lower lungs. 1. Loculated bilateral pleural collections without change. 2. Bilateral pulmonary infiltrates and atelectasis without change.       This document has been electronically signed by: Winnie Kaminski MD on    04/14/2021 01:55 AM     CT Scans  1. Walled off, peripherally enhancing multiloculated bilateral pleural    collections and numerous peripherally enhancing low-density masses    involving the bilateral pleural spaces. Extension into the mediastinum,    and into multiple intercostal spaces. Early involvement of the    pericardium. Destructive changes of multiple ribs. Interval progression    since the prior study. Findings are compatible with extensive malignant    involvement. There could also be a component of infection. 2. Multifocal consolidation and volume loss within the lungs. Heterogeneous lung parenchyma raising the possibility of underlying    involvement with neoplasm. Multiple bilateral pulmonary nodules likely on    the basis of metastatic disease. 3. Right-sided chest tube tip within the right major fissure. 4. 5.1 cm solid mass within the right kidney, compatible with neoplasm    without significant change. 5. Moderate abdominal and pelvic free fluid with interval worsening. 6. Multifocal peritoneal thickening and enhancement compatible with    malignant involvement. 7. Soft tissue infiltration of the omentum of the proximal sigmoid colon,    likely on the basis of malignant involvement. 8. Bilateral adrenal gland nodules, compatible with metastatic disease.    9. Large lytic lesion involving the right iliac bone compatible with    metastatic disease, without significant change. 10. Extensive severe edema of the soft tissues with worsening.       This document has been electronically signed by: Kailee Mccain MD on    04/13/2021 09:33 PM     Assessment   -Acute hypoxic respiratory failure due to right-sided pleural effusion  -Dyspnea and hypoxia on baseline home 2 L of oxygen.  -Right side Pleural effusion secondary to metastatic renal cell carcinoma status post a Pleurx catheter placement at [facility]. -Sepsis secondary to pneumonia. -Stage IV metastatic renal cell carcinoma    Plan   -Wean FiO2 keep saturations more than 90%  -CTS consulted and following planning TPA/dornse instillations via pleurx  -Follow pending cultures  -Antibiotic management per ID service by Dr. Polo Jonas MD  Patient educated about my impression plan. Questions and concerns addressed. Electronically signed by   CAITY Batista - CNP, MPH, [facility] on 4/16/2021 at 3:10 PM     Addendum by Dr. Dagmar Andujar MD:  I have seen and examined the patient independently. Face to face evaluation and examination was performed. The above evaluation and note has been reviewed. Labs and radiographs were reviewed. I Have discussed with Mr. Radha Eduardo CNP about this patient in detail. The above assessment and plan has been reviewed. Please see my modifications mentioned below. My modifications:  He is on a 5 L of oxygen via nasal cannula  Minimal improvement in shortness of breath after thoracentesis. Patient is for a TPA/dornase today by CT surgery service. Wean FiO2 keep saturation more than 90%.     Danette Norton MD 4/16/2021 5:54 PM

## 2021-04-16 NOTE — PROGRESS NOTES
CT/CV Surgery Progress Note    2021 8:45 AM  Surgeon:  Dr. Niharika Cruz: Mr. Obdulia Veliz has hx of metastatic kidney cancer with recurrent pleural effusions leadiing to Pleur-X catheter being placed with 200-250 cc removed every other day, but the past couple weeks the drainage has significantly decreased and he became more SOB.       4/15   thoracocentesis with 73 ml removed  -  Report states multiple septations    I/O last 3 completed shifts: In: 3859 [P.O.:1050; I.V.:2442]  Out: -     Vital Signs: /67   Pulse 112   Temp 98.2 °F (36.8 °C) (Oral)   Resp 20   Ht 6' (1.829 m)   Wt 207 lb 9.6 oz (94.2 kg)   SpO2 94%   BMI 28.16 kg/m²    Temp (24hrs), Av.4 °F (36.9 °C), Min:97.5 °F (36.4 °C), Max:99.4 °F (37.4 °C)    Labs:   CBC:   Recent Labs     21  0545 04/15/21  0225 21  0559   WBC 5.2 5.5 7.0   HGB 10.4* 10.1* 10.3*   HCT 34.1* 32.9* 34.2*   MCV 87.7 86.6 85.7    231 245     BMP:   Recent Labs     21  0545 21  0545 04/15/21  0225 04/15/21  0225 04/15/21  1841 21  0215 21  0559   *   < > 129*   < > 127* 127* 131*   K 5.0  --  4.5  --   --   --  5.1   CL 97*  --  95*  --   --   --  97*   CO2 25  --  25  --   --   --  25   BUN 18  --  15  --   --   --  11   CREATININE 0.9  --  0.8  --   --   --  0.8    < > = values in this interval not displayed.      Imaging:  Chest X-Ray: 2021     Intake/Output Summary (Last 24 hours) at 2021 0845  Last data filed at 4/15/2021 2038  Gross per 24 hour   Intake 3492 ml   Output --   Net 3492 ml     Scheduled Meds:    alteplase (ACTIVASE) syringe  6 mg Intrapleural Once    And    dornase (PULMOZYME) syringe  5 mg Intrapleural Daily    vancomycin  1,500 mg Intravenous Q12H    spironolactone  50 mg Oral BID    allopurinol  100 mg Oral Nightly    pantoprazole  40 mg Oral Daily    traZODone  50 mg Oral Nightly    sodium chloride flush  5-40 mL Intravenous 2 times per day    enoxaparin  40 mg Subcutaneous Daily    cefepime  2,000 mg Intravenous Q8H    levothyroxine  62.5 mcg Oral Daily    vancomycin (VANCOCIN) intermittent dosing (placeholder)   Other RX Placeholder     ROS: All neg unless specifically mentioned in subjective section. Exam:  General Appearance: alert ,conversing, in no acute distress  Cardiovascular: normal rate, regular rhythm, normal S1 and S2, no murmurs, rubs, clicks, or gallops  Pulmonary/Chest: decreased BS right base - Pleur-X catheter in place  Neurological: alert, oriented, normal speech, no focal findings or movement disorder noted    Assessment:   Patient Active Problem List   Diagnosis    Type 2 diabetes mellitus treated without insulin (HCC)    Essential hypertension    Chronic kidney disease    CKD (chronic kidney disease), stage III    Intracranial arachnoid cysts 4th ventricle    Headache    Lt facial numbness    Right renal mass    Hematuria    Lung nodules    Hydropneumothorax    Adrenal nodule (HCC)    Shortness of breath    Lymph node disorder    Respiratory distress    Pleural nodules    Horseshoe kidney    Leukocytosis    Cyst of brain    Pneumonia due to organism    Acute on chronic respiratory failure with hypoxemia (HCC)    Moderate malnutrition (HCC)    SIADH (syndrome of inappropriate ADH production) (HCC)    Hyponatremia    Renal cell cancer, unspecified laterality ([facility] [address]     Plan:   1. CXR not done yet today  2. TPA/Dornase today via Pleur-X today which was connected to atrium with suction.     The plan of care was discussed in detail with Dr. Oxana Barber     Electronically signed by Sourav Ellis PA-C on 4/16/2021 at 8:45 AM

## 2021-04-16 NOTE — PROGRESS NOTES
INTERNAL MEDICINE SPECIALTIES  Progress Note For Dr Neela Escudero       Patient:  Maryann Rincon  YOB: 1970  Date of Service: 4/16/2021  MRN: [id number]   Acct:  [de-identified]   Primary Care Physician: CAITY Zambrano CNP    SUBJECTIVE: Was seen by the cardiothoracic servce , had TPA infused in through chest tube. Has been shivering but with no fever        Home Medications:   No current facility-administered medications on file prior to encounter. Current Outpatient Medications on File Prior to Encounter   Medication Sig Dispense Refill    traMADol (ULTRAM) 50 MG tablet Take 50 mg by mouth every 6 hours as needed for Pain.       traZODone (DESYREL) 50 MG tablet Take 50 mg by mouth nightly      loratadine (CLARITIN) 10 MG capsule Take 10 mg by mouth daily      Cabozantinib S-Malate (CABOMETYX) 40 MG TABS Take by mouth daily      indomethacin (INDOCIN) 25 MG capsule Take 25 mg by mouth as needed for Pain      levothyroxine (SYNTHROID) 25 MCG tablet Take 25 mcg by mouth Daily      ALLOPURINOL PO Take 100 mg by mouth nightly       omeprazole (PRILOSEC) 40 MG delayed release capsule Take 40 mg by mouth daily      levothyroxine (SYNTHROID) 100 MCG tablet Take 25 mcg by mouth Daily       amLODIPine (NORVASC) 5 MG tablet Take 5 mg by mouth daily      colchicine (COLCRYS) 0.6 MG tablet Take 1 tablet by mouth daily for 3 days 3 tablet 0         Scheduled Meds:   alteplase (ACTIVASE) syringe  6 mg Intrapleural Once    And    dornase (PULMOZYME) syringe  5 mg Intrapleural Daily    vancomycin  1,500 mg Intravenous Q12H    spironolactone  50 mg Oral BID    allopurinol  100 mg Oral Nightly    pantoprazole  40 mg Oral Daily    traZODone  50 mg Oral Nightly    sodium chloride flush  5-40 mL Intravenous 2 times per day    enoxaparin  40 mg Subcutaneous Daily    cefepime  2,000 mg Intravenous Q8H    levothyroxine  62.5 mcg Oral Daily    vancomycin (VANCOCIN) intermittent dosing (placeholder)   Other RX Placeholder     Continuous Infusions:   sodium chloride       PRN Meds:ipratropium-albuterol, indomethacin, traMADol, sodium chloride flush, sodium chloride, promethazine **OR** ondansetron, polyethylene glycol, acetaminophen **OR** acetaminophen, diphenhydrAMINE        Allergies:  Patient has no known allergies. OBJECTIVE:    Vitals:   Vitals:    04/16/21 0819   BP: 128/67   Pulse: 112   Resp: 20   Temp: 98.2 °F (36.8 °C)   SpO2: 94%      BMI: Body mass index is 28.16 kg/m². PHYSICAL EXAMINATION:            General appearance: ill looking male mild respiratory distress*, appears stated age and cooperative. HEENT:  Normal cephalic, atraumatic without obvious deformity. Pupils equal, round, and reactive to light. Extra ocular muscles intact. Conjunctivae/corneas clear. Neck: Supple. Chest:  PleurX catheter right chest  Respiratory:  Diminished air entry bilaterally  Cardiovascular:  Regular rhythm with normal S1/S2 without  rubs or gallops. Abdomen:  Full,soft, non-tender, non-distended with normal bowel sounds. Musculoskeletal:  No clubbing, cyanosis or ankle edema bilaterally. Has some swelling and tenderness right elbow  Neurologic:   Alert and oriented x3 with no gross focal deficits  Psychiatric: Thought content appropriate, normal insight      Review of Labs and Diagnostic Testing:    Recent Results (from the past 24 hour(s))   Sodium    Collection Time: 04/15/21  9:46 AM   Result Value Ref Range    Sodium 130 (L) 135 - 145 meq/L   Vancomycin, trough    Collection Time: 04/15/21  9:46 AM   Result Value Ref Range    Vancomycin Tr 15.4 10.0 - 20.0 ug/mL   Culture, Body Fluid    Collection Time: 04/15/21  3:40 PM    Specimen: Pleural Fluid   Result Value Ref Range    Gram Stain Result       Many segmented neutrophils observed. No organisms observed.  performed on cytospun specimen     Body Fluid Culture, Sterile No growth-preliminary     Sodium    Collection Time: 04/15/21 6:41 PM   Result Value Ref Range    Sodium 127 (L) 135 - 145 meq/L   Sodium    Collection Time: 04/16/21  2:15 AM   Result Value Ref Range    Sodium 127 (L) 135 - 145 meq/L   Basic Metabolic Panel    Collection Time: 04/16/21  5:59 AM   Result Value Ref Range    Sodium 131 (L) 135 - 145 meq/L    Potassium 5.1 3.5 - 5.2 meq/L    Chloride 97 (L) 98 - 111 meq/L    CO2 25 23 - 33 meq/L    Glucose 94 70 - 108 mg/dL    BUN 11 7 - 22 mg/dL    CREATININE 0.8 0.4 - 1.2 mg/dL    Calcium 8.3 (L) 8.5 - 10.5 mg/dL   CBC    Collection Time: 04/16/21  5:59 AM   Result Value Ref Range    WBC 7.0 4.8 - 10.8 thou/mm3    RBC 3.99 (L) 4.70 - 6.10 mill/mm3    Hemoglobin 10.3 (L) 14.0 - 18.0 gm/dl    Hematocrit 34.2 (L) 42.0 - 52.0 %    MCV 85.7 80.0 - 94.0 fL    MCH 25.8 (L) 26.0 - 33.0 pg    MCHC 30.1 (L) 32.2 - 35.5 gm/dl    RDW-CV 17.8 (H) 11.5 - 14.5 %    RDW-SD 55.1 (H) 35.0 - 45.0 fL    Platelets 018 308 - 233 thou/mm3    MPV 8.9 (L) 9.4 - 12.4 fL   Anion Gap    Collection Time: 04/16/21  5:59 AM   Result Value Ref Range    Anion Gap 9.0 8.0 - 16.0 meq/L   Glomerular Filtration Rate, Estimated    Collection Time: 04/16/21  5:59 AM   Result Value Ref Range    Est, Glom Filt Rate >90 ml/min/1.73m2       Radiology:     Echo Complete 2d W Doppler W Color    Result Date: 4/13/2021  Transthoracic Echocardiography Report (TTE)  Demographics   Patient Name   [address] [place] [address],[address] Gender              Male                 P   MR #           [id number]         Race                                                    Ethnicity   Account #      [de-identified]         Room Number         1200   Accession      [id number]        Date of Study       04/13/2021  Number   Date of Birth  1970        Referring Physician FLORENTIN Villarreal CNP   Age            44 year(s)        Sonographer         Viri Carrillo, RDCS                                    Interpreting        Echo reader of the                                   Physician           week                                                       Serina Avery MD  Procedure Type of Study   TTE procedure:ECHOCARDIOGRAM COMPLETE 2D W DOPPLER W COLOR. Procedure Date Date: 04/13/2021 Start: 02:57 PM Study Location: Bedside Technical Quality: Limited visualization due to restricted mobility. Indications:Shortness of breath. Additional Medical History:Recent 15 pound weight gain, diabetic, hypertension, renal cell cancer with mets Patient Status: Routine Height: 72.05 inches Weight: 207.24 pounds BSA: 2.16 m^2 BMI: 28.07 kg/m^2 BP: 115/76 mmHg  Conclusions   Summary  Normal left ventricle size and systolic function. Ejection fraction was  estimated at 60 to 65%. There were no regional left ventricular wall  motion abnormalities and wall thickness was within normal limits. Left atrial size was normal.   Signature   ----------------------------------------------------------------  Electronically signed by Serina Avery MD (Interpreting  physician) on 04/13/2021 at 06:17 PM  ----------------------------------------------------------------   Findings   Mitral Valve  The mitral valve structure was normal with normal leaflet separation. DOPPLER: The transmitral velocity was within the normal range with no  evidence for mitral stenosis. There was no evidence of mitral  regurgitation. Aortic Valve  The aortic valve was trileaflet with normal thickness and cuspal  separation. DOPPLER: Transaortic velocity was within the normal range with  no evidence of aortic stenosis. There was no evidence of aortic  regurgitation. Tricuspid Valve  The tricuspid valve structure was normal with normal leaflet separation. DOPPLER: There was no evidence of tricuspid stenosis. There was no  evidence of tricuspid regurgitation. Pulmonic Valve  The pulmonic valve leaflets exhibited normal thickness, no calcification,  and normal cuspal separation.  DOPPLER: The transpulmonic velocity was  within the normal range with no evidence for regurgitation. Left Atrium  Left atrial size was normal.   Left Ventricle  Normal left ventricle size and systolic function. Ejection fraction was  estimated at 60 to 65%. There were no regional left ventricular wall  motion abnormalities and wall thickness was within normal limits. Right Atrium  Right atrial size was normal.   Right Ventricle  The right ventricular size was normal with normal systolic function and  wall thickness. Pericardial Effusion  The pericardium was normal in appearance with no evidence of a pericardial  effusion. Pleural Effusion  No evidence of pleural effusion. Aorta / Great Vessels  -Aortic root dimension within normal limits.  -The Pulmonary artery is within normal limits. -IVC size is within normal limits with normal respiratory phasic changes.   M-Mode/2D Measurements & Calculations   LV Diastolic    LV Systolic Dimension: 2.7  AV Cusp Separation: 2.3 cmLA  Dimension: 4 cm cm                          Dimension: 3.3 cmAO Root  LV FS:32.5 %    LV Volume Diastolic: 70 ml  Dimension: 3.8 cm  LV PW           LV Volume Systolic: 27 ml  Diastolic: 0.9  LV EDV/LV EDV Index: 70  cm              ml/32 m^2LV ESV/LV ESV  Septum          Index: 27 ml/12 m^2         RV Diastolic Dimension: 2 cm  Diastolic: 1 cm EF Calculated: 61.4 %                                              LA/Aorta: 0.87  Doppler Measurements & Calculations   MV Peak E-Wave: 74.6 cm/s  AV Peak Velocity: 150 LVOT Peak Velocity: 130  MV Peak A-Wave: 98.1 cm/s  cm/s                  cm/s  MV E/A Ratio: 0.76         AV Peak Gradient: 9   LVOT Peak Gradient: 7  MV Peak Gradient: 2.23     mmHg                  mmHg  mmHg                                                   TV Peak E-Wave: 62.6 cm/s  MV Deceleration Time: 345                        TV Peak A-Wave: 88.7 cm/s  msec  MV P1/2t: 101 msec                               TV Peak Gradient: 1.57

## 2021-04-16 NOTE — FLOWSHEET NOTE
[facility] THERAPY MISSED TREATMENT NOTE  [facility] 5K  [id number]      Date: 2021  Patient Name: Priscilla Frazier        CSN: [de-identified]   : 1970  (44 y.o.)  Gender: male   Referring Practitioner: Connie Luna CNP  Diagnosis: pneumonia         REASON FOR MISSED TREATMENT: Patient Refused Family present.   Will check back as time allows

## 2021-04-16 NOTE — PROGRESS NOTES
and chest tightness but states his lungs \"cannot expand. \"  Coughs up a little bit of clear sputum with no blood. Improved breathing when laying forward. States that he normally takes \"chemotherapy pills. \" However, states he has not taken them for the past week and further states that his Oncologist recommends he not take them for another further week. Summary Hospital Course:  Presented to PCP in the morning of 4/13/2021 to refill gout medication but was visibly very dyspneic and SPO2 was 86% on his baseline 2 L nasal cannula and blood pressure was low at 80/40. O2 increased to 4 L was told to go to the emergency room. Was in [facility] last week for pain the right elbow and was diagnosed with gout. Worsening shortness of breath since coming home from [facility] which gotten worse over the previous day. Previous chest tube placed on February 2021 for pleural effusions which was drained every other day with about 200 to 250 cc taken out. Reported chills but no fever. Did not want to go to [facility]. ID consulted for pneumonia and stage IV metastatic renal carcinoma, Oncology consulted for metastatic renal cell carcinoma, Palliative Care consulted for metastatic renal cell carcinoma, and Pulmonology consulted as above.     Subjective/Events Past 24 hours/ROS   Is still having shortness of breath with no improvement from the time of admission  Afebrile  Right-sided Pleurx catheter in place  No chest pain  Rest of the review of systems reviewed    PMHx   Past Medical History      Diagnosis Date    Cancer ([facility] [address]     kidney    Chronic kidney disease, unspecified     Collapsed lung     IDDM (insulin dependent diabetes mellitus)     Kidney mass     Lung nodules     Sinusitis     Unspecified essential hypertension       Past Surgical History        Procedure Laterality Date    BACK SURGERY      CHOLECYSTECTOMY       Meds    Current Medications    [START ON 4/16/2021] alteplase (ACTIVASE) syringe  6 mg Intrapleural Once    And    [START ON 4/16/2021] dornase (PULMOZYME) syringe  5 mg Intrapleural Daily    vancomycin  1,500 mg Intravenous Q12H    spironolactone  50 mg Oral BID    allopurinol  100 mg Oral Nightly    pantoprazole  40 mg Oral Daily    traZODone  50 mg Oral Nightly    sodium chloride flush  5-40 mL Intravenous 2 times per day    enoxaparin  40 mg Subcutaneous Daily    cefepime  2,000 mg Intravenous Q8H    levothyroxine  62.5 mcg Oral Daily    vancomycin (VANCOCIN) intermittent dosing (placeholder)   Other RX Placeholder     ipratropium-albuterol, indomethacin, traMADol, sodium chloride flush, sodium chloride, promethazine **OR** ondansetron, polyethylene glycol, acetaminophen **OR** acetaminophen, diphenhydrAMINE  IV Drips/Infusions   sodium chloride       Home Medications  Medications Prior to Admission: traMADol (ULTRAM) 50 MG tablet, Take 50 mg by mouth every 6 hours as needed for Pain. traZODone (DESYREL) 50 MG tablet, Take 50 mg by mouth nightly  loratadine (CLARITIN) 10 MG capsule, Take 10 mg by mouth daily  Cabozantinib S-Malate (CABOMETYX) 40 MG TABS, Take by mouth daily  indomethacin (INDOCIN) 25 MG capsule, Take 25 mg by mouth as needed for Pain  levothyroxine (SYNTHROID) 25 MCG tablet, Take 25 mcg by mouth Daily  ALLOPURINOL PO, Take 100 mg by mouth nightly   omeprazole (PRILOSEC) 40 MG delayed release capsule, Take 40 mg by mouth daily  levothyroxine (SYNTHROID) 100 MCG tablet, Take 25 mcg by mouth Daily   amLODIPine (NORVASC) 5 MG tablet, Take 5 mg by mouth daily  colchicine (COLCRYS) 0.6 MG tablet, Take 1 tablet by mouth daily for 3 days  Diet    DIET GENERAL;  Dietary Nutrition Supplements: Low Calorie High Protein Supplement  Allergies    Patient has no known allergies.   Social History     Social History     Socioeconomic History    Marital status:      Spouse name: Not on file    Number of children: Not on file    Years of education: Not on file    Highest education level: Not on file   Occupational History    Not on file   Social Needs    Financial resource strain: Not on file    Food insecurity     Worry: Not on file     Inability: Not on file    Transportation needs     Medical: Not on file     Non-medical: Not on file   Tobacco Use    Smoking status: Never Smoker    Smokeless tobacco: Never Used   Substance and Sexual Activity    Alcohol use: Not Currently     Alcohol/week: 1.0 standard drinks     Types: 1 Cans of beer per week    Drug use: Never    Sexual activity: Not on file   Lifestyle    Physical activity     Days per week: Not on file     Minutes per session: Not on file    Stress: Not on file   Relationships    Social connections     Talks on phone: Not on file     Gets together: Not on file     Attends Shinto service: Not on file     Active member of club or organization: Not on file     Attends meetings of clubs or organizations: Not on file     Relationship status: Not on file    Intimate partner violence     Fear of current or ex partner: Not on file     Emotionally abused: Not on file     Physically abused: Not on file     Forced sexual activity: Not on file   Other Topics Concern    Not on file   Social History Narrative    Not on file     Family History    History reviewed. No pertinent family history. Sleep History    Never diagnosed with sleep apnea in the past.    Vitals     height is 6' (1.829 m) and weight is 207 lb 9.6 oz (94.2 kg). His oral temperature is 98.6 °F (37 °C). His blood pressure is 120/72 and his pulse is 112. His respiration is 23 and oxygen saturation is 97%. Body mass index is 28.16 kg/m². SUPPLEMENTAL O2: O2 Flow Rate (L/min): 5 L/min     I/O        Intake/Output Summary (Last 24 hours) at 4/15/2021 2035  Last data filed at 4/15/2021 1400  Gross per 24 hour   Intake 550 ml   Output --   Net 550 ml     I/O last 3 completed shifts:   In: 550 [P.O.:550]  Out: -    Patient Vitals for the past 96 hrs (Last 3 readings):   Weight   04/14/21 0430 207 lb 9.6 oz (94.2 kg)   04/13/21 1045 207 lb (93.9 kg)       Exam   Constitutional: Patient appears in no distress on 6 L of oxygen via nasal cannula. HENT:    Head: Normocephalic and atraumatic. Right Ear: External ear normal.   Left Ear: External ear normal.   Mouth/Throat: Oropharynx is clear and moist.  No oral thrush. Eyes: Pupils are equal, round, and reactive to light. Neck: Neck supple. Cardiovascular: Tachycardia with regular rhythm. normal heart sounds. No murmur heard. Pulmonary/Chest: Effort normal with decreased breath sounds in the lower part of the chest right side more than left side. Diminished at bases. No stridor. No respiratory distress. No wheezes. Right-sided Pleurx catheter in place with the dressing  Abdominal: Soft. Patient exhibits no distension. No tenderness. Musculoskeletal: Moving all 4 extremities for verbal commands  Extremities: Patient exhibits no edema and no tenderness. Lymphadenopathy:  No cervical adenopathy. Neurological: Patient is alert and oriented to person, place, and time. Skin: Skin is warm and dry. Psychiatric: Patient  has a normal mood and affect. Labs  - Old records and notes have been reviewed in [facility]   1. Electrolyte panel notable for low sodium that appears to be new onset. 2.  Electrolyte panel notable for slightly low estimated GFR that is improved from previous day. 3.  Baseline creatinine appears to be at 0.9.  4.  Very high TSH but normal free T4.  5.  CBC notable for normocytic anemia with a hemoglobin of 10.4. No leukocytosis or leukopenia. 6.  Urinalysis notable for ketones, high specific gravity, and trace protein. 7.  Arterial blood gas analysis indicates primary respiratory alkalosis with a secondary non-anion gap acidosis. 8.  Arterial blood gas analysis indicates acute on chronic hypoxic respiratory failure.   9.  Otherwise, labs and imaging results as below. PFTs   None immediately available. Sleep studies   None immediately available. Cultures    Respiratory cultures, blood cultures, Legionella antigen, strep pneumo antigen, and respiratory virus antigens panel pending. EKG   Sinus tachycardia  Low voltage QRS, consider pulmonary disease, pericardial effusion, or normal variant  Nonspecific T wave abnormality  Abnormal ECG  When compared with ECG of 23-MAR-2021 13:11,  Nonspecific T wave abnormality now evident in Lateral leads  Confirmed by Vera Vargas ([id number]) on 4/13/2021 11:56:37 AM  Echocardiogram    Summary   Normal left ventricle size and systolic function. Ejection fraction was   estimated at 60 to 65%. There were no regional left ventricular wall   motion abnormalities and wall thickness was within normal limits. Left atrial size was normal.      Signature      ----------------------------------------------------------------   Electronically signed by Joyce Dong MD (Interpreting   physician) on 04/13/2021 at 06:17 PM   ----------------------------------------------------------------      Radiology    CXR  1. Loculated bilateral pleural collections without change. 2. Bilateral pulmonary infiltrates and atelectasis without change.       This document has been electronically signed by: Brielle Arauz MD on    04/14/2021 01:55 AM     CT Scans  1. Walled off, peripherally enhancing multiloculated bilateral pleural    collections and numerous peripherally enhancing low-density masses    involving the bilateral pleural spaces. Extension into the mediastinum,    and into multiple intercostal spaces. Early involvement of the    pericardium. Destructive changes of multiple ribs. Interval progression    since the prior study. Findings are compatible with extensive malignant    involvement. There could also be a component of infection. 2. Multifocal consolidation and volume loss within the lungs.     Heterogeneous lung parenchyma raising the possibility of underlying    involvement with neoplasm. Multiple bilateral pulmonary nodules likely on    the basis of metastatic disease. 3. Right-sided chest tube tip within the right major fissure. 4. 5.1 cm solid mass within the right kidney, compatible with neoplasm    without significant change. 5. Moderate abdominal and pelvic free fluid with interval worsening. 6. Multifocal peritoneal thickening and enhancement compatible with    malignant involvement. 7. Soft tissue infiltration of the omentum of the proximal sigmoid colon,    likely on the basis of malignant involvement. 8. Bilateral adrenal gland nodules, compatible with metastatic disease. 9. Large lytic lesion involving the right iliac bone compatible with    metastatic disease, without significant change. 10. Extensive severe edema of the soft tissues with worsening.       This document has been electronically signed by: Neema Sweet MD on    04/13/2021 09:33 PM     Assessment   .-Acute hypoxic respiratory failure due to right-sided pleural effusion. Patient currently on 6 L of oxygen on nasal cannula. -Dyspnea and hypoxia on baseline home 2 L of oxygen.  -Right side Pleural effusion secondary to metastatic renal cell carcinoma status post a Pleurx catheter placement at [facility]. -Sepsis secondary to pneumonia. -Stage IV metastatic renal cell carcinoma    Plan   Wean FiO2 keep saturations more than 90%  Continue Pleurx drainage every day. Please document amount of pleural fluid drainage and input output chart in [facility]  We will place a cardiothoracic surgery consultation with Dr. So Ceballos MD for further management of loculated pleural effusion on the right side  Follow pending cultures  Antibiotic management per ID service by Dr. Minor Aguilar MD  Patient educated about my impression plan. Questions and concerns addressed.     Electronically signed by   Warden Bryce MD, MPH, [facility] on 4/15/2021 at 8:35 PM

## 2021-04-16 NOTE — PROGRESS NOTES
Progress note: Infectious diseases    Patient - Millard Nissen,  Age - 44 y.o.    - 1970      Room Number - 8U-15/012-A   MRN -  [id number]   Acct # - [de-identified]  Date of Admission -  2021 10:24 AM    SUBJECTIVE:   No new issues. OBJECTIVE   VITALS    height is 6' (1.829 m) and weight is 207 lb 9.6 oz (94.2 kg). His oral temperature is 98.2 °F (36.8 °C). His blood pressure is 128/67 and his pulse is 112. His respiration is 20 and oxygen saturation is 94%. Wt Readings from Last 3 Encounters:   21 207 lb 9.6 oz (94.2 kg)   21 192 lb 6.4 oz (87.3 kg)   02/15/21 200 lb (90.7 kg)      General Appearance  Awake, alert, oriented, chronically sick looking.   HEENT - normocephalic, atraumatic, pale  conjunctiva,  anicteric sclera  Neck - Supple, no mass  Lungs -  Bilateral   air entry, diminished breath sound  Cardiovascular - Heart sounds are normal.     Abdomen -edematous abdominal wall  Neurologic -oriented  Skin - No bruising or bleeding  Extremities - trace edema    MEDICATIONS:      vancomycin  1,500 mg Intravenous Q12H    spironolactone  50 mg Oral BID    allopurinol  100 mg Oral Nightly    pantoprazole  40 mg Oral Daily    traZODone  50 mg Oral Nightly    sodium chloride flush  5-40 mL Intravenous 2 times per day    enoxaparin  40 mg Subcutaneous Daily    cefepime  2,000 mg Intravenous Q8H    levothyroxine  62.5 mcg Oral Daily    vancomycin (VANCOCIN) intermittent dosing (placeholder)   Other RX Placeholder      sodium chloride       ipratropium-albuterol, indomethacin, traMADol, sodium chloride flush, sodium chloride, promethazine **OR** ondansetron, polyethylene glycol, acetaminophen **OR** acetaminophen, diphenhydrAMINE      LABS:     CBC:   Recent Labs     21  0545 04/15/21  0225 21  0559   WBC 5.2 5.5 7.0   HGB 10.4* 10.1* 10.3*    231 245     BMP: Recent Labs     04/14/21  0545 04/14/21  0545 04/15/21  0225 04/15/21  0225 04/15/21  1841 04/16/21  0215 04/16/21  0559   *   < > 129*   < > 127* 127* 131*   K 5.0  --  4.5  --   --   --  5.1   CL 97*  --  95*  --   --   --  97*   CO2 25  --  25  --   --   --  25   BUN 18  --  15  --   --   --  11   CREATININE 0.9  --  0.8  --   --   --  0.8   GLUCOSE 107  --  135*  --   --   --  94    < > = values in this interval not displayed. Calcium:  Recent Labs     04/16/21  0559   CALCIUM 8.3*    Hepatic:   No results for input(s): ALKPHOS, ALT, AST, PROT, BILITOT, BILIDIR, LABALBU in the last 72 hours. No results for input(s): CKTOTAL, CKMB, TROPONINI in the last 72 hours.      CULTURES:   UA:   Recent Labs     04/13/21 1925   SPECGRAV >1.030*   PHUR 5.0   COLORU YELLOW   PROTEINU TRACE*   BLOODU NEGATIVE   RBCUA 0-2   WBCUA 0-2   BACTERIA NONE SEEN   NITRU NEGATIVE   BILIRUBINUR NEGATIVE   UROBILINOGEN 0.2   KETUA 80*   LABCAST NONE SEEN  NONE SEEN     Micro:   Lab Results   Component Value Date    BC No growth-preliminary  04/14/2021          Problem list of patient:     Patient Active Problem List   Diagnosis Code    Type 2 diabetes mellitus treated without insulin ([facility] [address] E11.9    Essential hypertension I10    Chronic kidney disease N18.9    CKD (chronic kidney disease), stage III N18.30    Intracranial arachnoid cysts 4th ventricle G93.0    Headache R51.9    Lt facial numbness R20.0    Right renal mass N28.89    Hematuria R31.9    Lung nodules R91.8    Hydropneumothorax J94.8    Adrenal nodule (HCC) E27.8    Shortness of breath R06.02    Lymph node disorder I89.9    Respiratory distress R06.03    Pleural nodules R22.2    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    Leukocytosis D72.829    Cyst of brain G93.0    Pneumonia due to organism J18.9    Acute on chronic respiratory failure with hypoxemia (HCC) J96.21    Moderate malnutrition (HCC) E44.0    SIADH (syndrome of inappropriate ADH production)

## 2021-04-16 NOTE — PROGRESS NOTES
Comprehensive Nutrition Assessment    Type and Reason for Visit:  Reassess(PO Monitor)    Nutrition Recommendations/Plan:   *Recommend a Multivitamin w/minerals daily if able to tolerate. *Continue current diet and Ensure High Protein TID. *Consider additional antiemetics d/t ongoing nausea/emesis. Nutrition Assessment: Pt. declining from a nutritional standpoint AEB pt now meets criteria for severe malnutrition. Remains at risk for further nutritional compromise r/t admit d/t SOB/chills/poor appetite, pt with ongoing nausea/emesis and underlying medical condition (metastatic renal cancer with mets to lung and brain, CKD, DM). Nutrition recommendations/interventions as per above. Malnutrition Assessment:  Malnutrition Status:  Severe malnutrition    Context:  Acute Illness     Findings of the 6 clinical characteristics of malnutrition:  Energy Intake:  7 - 50% or less of estimated energy requirements for 5 or more days  Weight Loss:  No significant weight loss     Body Fat Loss:  7 - Moderate body fat loss Orbital   Muscle Mass Loss:  7 - Moderate muscle mass loss Temples (temporalis)  Fluid Accumulation:  No significant fluid accumulation Extremities   Strength:  Not Performed    Estimated Daily Nutrient Needs:  Energy (kcal):  6631-3412 kcals (20-25); Weight Used for Energy Requirements:  (94 kgm 4/14)     Protein (g):   gm (1.2-1.4); Weight Used for Protein Requirements:  Ideal(81 kgm)          Nutrition Related Findings:  pt seen with family; pt with nausea/emesis during visit; pt did report prior to emesis poor appetite and intake of meals d/t ongoing N/V; pt states Zofran was helping over the past few days until today; pt reports unable to keep ensure down d/t N/V but does like them. Note- pt drank 1-2 Ensure daily PTA.  Rx includes: Zofran PRN      Wounds:  None       Current Nutrition Therapies:    DIET GENERAL;  Dietary Nutrition Supplements: Low Calorie High Protein

## 2021-04-16 NOTE — PROGRESS NOTES
[facility]  PHYSICAL THERAPY MISSED TREATMENT NOTE  STR ONC MED 5K    Date: 2021  Patient Name: Brittany Arriola        MRN: [de-identified]   : 1970  (44 y.o.)  Gender: male   Referring Practitioner: CAITY Jaimes CNP  Diagnosis: Pneumonia         REASON FOR MISSED TREATMENT:    Patient refused due to being to cold at this time. Patient continued to refuse even with max encouragement. Will try again on next schedule visit.

## 2021-04-17 NOTE — OP NOTE
Department of Radiology  Post Procedure Progress Note      Pre-Procedure Diagnosis:  Pneumonia, renal failure    Procedure Performed:  Right IJ PICC insertion    Anesthesia: local     Findings: successful    Immediate Complications:  None    Estimated Blood Loss: minimal    SEE DICTATED PROCEDURE NOTE FOR COMPLETE DETAILS.     Electronically signed by Anderson Montana MD on 4/17/2021 at 5:03 PM

## 2021-04-17 NOTE — CONSULTS
[facility]    [address], [address]  [facility]  4/17/2021 2:50 PM    Pt Name:    Maurice Avendano  MRN:     [de-identified]   [id number]  YOB: 1970  Admit Date:    4/13/2021 10:24 AM  Primary Care Physician:  Susi Draper, APRN - CNP        Reason for Consult:  CAMILLE, hyponatremia    History:   The patient is a 44 y.o. male seen in nephrology consult for CAMILLE, hyponatremia, hyperkalemia. He has a history of metastatic renal cell carcinoma with lung and brain mets, DM, HTN, presented to the hospital with increased shortness of breath. Found to have worsening pleural effusion and pneumonia. He had worsening hypoxia chronically on 2 L and had to be increased to 6L. He had pleurex catheter placed on right and has been getting intrapleural TPA. He has been receiving Vancomycin and Cefepime. Today patient's labs showed creatinine up to 1.5 with sodium of 125, K of 5.8 so nephrology was consulted. He has also been receiving Spironolactone 50 mg BID, Indomethacin and he received IV contrast on 4/13 and 4/14. Vancomycin trough was elevated at 25. Urine output incompletely recorded but looks like he had 400 cc out from AM shift today. Blood pressures are borderline low today. Patient was seen by oncology and received a dose of Opdivo while in the hospital.      Past Medical History:  Past Medical History:   Diagnosis Date    Cancer ([address]     kidney    Chronic kidney disease, unspecified     Collapsed lung     IDDM (insulin dependent diabetes mellitus)     Kidney mass     Lung nodules     Sinusitis     Unspecified essential hypertension        Past Surgical History:  Past Surgical History:   Procedure Laterality Date    BACK SURGERY      CHOLECYSTECTOMY         Family History:  History reviewed. No pertinent family history.     Social History:  Social History     Socioeconomic History    Marital status:      Spouse name: Not on file    Number of children: Not on file    Years of education: Not on file    Highest education level: Not on file   Occupational History    Not on file   Social Needs    Financial resource strain: Not on file    Food insecurity     Worry: Not on file     Inability: Not on file    Transportation needs     Medical: Not on file     Non-medical: Not on file   Tobacco Use    Smoking status: Never Smoker    Smokeless tobacco: Never Used   Substance and Sexual Activity    Alcohol use: Not Currently     Alcohol/week: 1.0 standard drinks     Types: 1 Cans of beer per week    Drug use: Never    Sexual activity: Not on file   Lifestyle    Physical activity     Days per week: Not on file     Minutes per session: Not on file    Stress: Not on file   Relationships    Social connections     Talks on phone: Not on file     Gets together: Not on file     Attends [employer] service: Not on file     Active member of club or organization: Not on file     Attends meetings of clubs or organizations: Not on file     Relationship status: Not on file    Intimate partner violence     Fear of current or ex partner: Not on file     Emotionally abused: Not on file     Physically abused: Not on file     Forced sexual activity: Not on file   Other Topics Concern    Not on file   Social History Narrative    Not on file       Home Meds:  Prior to Admission medications    Medication Sig Start Date End Date Taking? Authorizing Provider   traMADol (ULTRAM) 50 MG tablet Take 50 mg by mouth every 6 hours as needed for Pain.    Yes Historical Provider, MD   traZODone (DESYREL) 50 MG tablet Take 50 mg by mouth nightly   Yes Historical Provider, MD   loratadine (CLARITIN) 10 MG capsule Take 10 mg by mouth daily   Yes Historical Provider, MD   Cabozantinib S-Malate (CABOMETYX) 40 MG TABS Take by mouth daily   Yes Historical Provider, MD   indomethacin (INDOCIN) 25 MG capsule Take 25 mg by mouth as needed for Pain   Yes Historical (Last 24 hours) at 4/17/2021 1450  Last data filed at 4/17/2021 1301  Gross per 24 hour   Intake 1403.36 ml   Output 1710 ml   Net -306.64 ml     I/O last 3 completed shifts: In: 1600 [P.O.:900; I.V.:700]  Out: 960 [Drains:960]  I/O this shift: In: 903.4 [I.V.:903.4]  Out: 750 [Urine:400; Drains:350]  Admission weight: 207 lb (93.9 kg)  Wt Readings from Last 3 Encounters:   04/17/21 206 lb 12.8 oz (93.8 kg)   03/23/21 192 lb 6.4 oz (87.3 kg)   02/15/21 200 lb (90.7 kg)     Body mass index is 28.05 kg/m². Physical Examination:  VITALS:  /70   Pulse 107   Temp 97.9 °F (36.6 °C) (Oral)   Resp 20   Ht 6' (1.829 m)   Wt 206 lb 12.8 oz (93.8 kg)   SpO2 96%   BMI 28.05 kg/m²   Weight:   Wt Readings from Last 3 Encounters:   04/17/21 206 lb 12.8 oz (93.8 kg)   03/23/21 192 lb 6.4 oz (87.3 kg)   02/15/21 200 lb (90.7 kg)     Constitutional and General Appearance: awake,alert, chronically ill appearing  Eyes: no icteric sclera, no pallor conjunctiva, no discharge seen from either eye  Ears and Nose: normal external appearance of left and right ear and nose. No active drainage from nose.    Oral: moist oral mucus membranes  Neck: No jugular venous distention, appears symmetric, good ROM  Lungs: rales right lung  Heart: regular rate, S1, S2 tachy  Extremities: trace edema noted  GI: soft, non-tender, no guarding  Skin: no rash seen on exposed extremities  Musculo: moves all extremities  Neuro: no slurred speech, no facial drooping, no asterixis  Psychiatric: Awake, alert, Oriented    Lab Data  CBC:   Recent Labs     04/15/21  0225 04/16/21  0559 04/17/21  0509   WBC 5.5 7.0 9.6   HGB 10.1* 10.3* 9.5*   HCT 32.9* 34.2* 30.4*    245 259     BMP:  Recent Labs     04/16/21  0559 04/16/21  1146 04/17/21  0509 04/17/21  1033   * 129* 122* 125*   K 5.1  --  5.3* 5.8*   CL 97*  --  91* 93*   CO2 25  --  23 22*   BUN 11  --  16 19   CREATININE 0.8  --  1.4* 1.5*   GLUCOSE 94  --  96 107   CALCIUM 8.3*  --

## 2021-04-17 NOTE — H&P
Formulation and discussion of sedation / procedure plans, risks, benefits, side effects and alternatives with patient and/or responsible adult completed.     Electronically signed by Lavon Mathur MD on 4/17/2021 at 5:02 PM

## 2021-04-17 NOTE — PROGRESS NOTES
Orlando CRISTINA Carl Cisneros is a 29-year-old male with recurrent right pleural effusion related to renal cell cancer and metastasis. He is a status post Pleurx catheter to the right chest.    There is increased drainage through Pleurx catheter after TPA solution was injected. He remained in stable condition. He received another dose of TPA with dornase through Pleurx catheter. Plan: Clamp the Pleurx cath for 2 hours. And open to Pleur-evac with negative suction.

## 2021-04-17 NOTE — PROGRESS NOTES
oriented, normal speech, no focal findings or movement disorder noted  Musculoskeletal - no joint tenderness, deformity or swelling  Extremities - peripheral pulses normal, no pedal edema, no clubbing or cyanosis  Skin - normal coloration and turgor, no rashes, no suspicious skin lesions noted    Review of Labs and Diagnostic Testing:    CBC:   Recent Labs     04/17/21  0509   WBC 9.6   HGB 9.5*   HCT 30.4*   MCV 84.0        BMP:   Recent Labs     04/17/21  0509   *   K 5.3*   CL 91*   CO2 23   BUN 16   CREATININE 1.4*   CALCIUM 7.5*   GLUCOSE 96     PT/INR: No results for input(s): PROTIME, INR in the last 72 hours. APTT: No results for input(s): APTT in the last 72 hours. Lipids: No results for input(s): ALKPHOS, ALT, AST, BILITOT, BILIDIR, LABALBU, AMYLASE, LIPASE in the last 72 hours. Troponin: No results for input(s): TROPONINT in the last 72 hours.      Imaging:  [unfilled]    EKG:      Diet: DIET GENERAL;  Dietary Nutrition Supplements: Low Calorie High Protein Supplement        Data:   Scheduled Meds: Scheduled Meds:   alteplase (ACTIVASE) syringe  6 mg Intrapleural Once    And    dornase (PULMOZYME) syringe  5 mg Intrapleural Once    vancomycin  1,500 mg Intravenous Q12H    spironolactone  50 mg Oral BID    allopurinol  100 mg Oral Nightly    pantoprazole  40 mg Oral Daily    traZODone  50 mg Oral Nightly    sodium chloride flush  5-40 mL Intravenous 2 times per day    enoxaparin  40 mg Subcutaneous Daily    cefepime  2,000 mg Intravenous Q8H    levothyroxine  62.5 mcg Oral Daily    vancomycin (VANCOCIN) intermittent dosing (placeholder)   Other RX Placeholder     Continuous Infusions:   sodium chloride       PRN Meds:.ipratropium-albuterol, indomethacin, traMADol, sodium chloride flush, sodium chloride, promethazine **OR** ondansetron, polyethylene glycol, acetaminophen **OR** acetaminophen, diphenhydrAMINE  Continuous Infusions:   sodium chloride           Assessment/Plan 1. Pneumonia with sepsis criteria  a. IV Cefepime + Vanc   b. ID following  c. Awaiting final sputum/blood culture results   2. Hyponatremia  a. Start NS at 75/hr  b. Re-check in 4 hours  3. Hyperkalemia  a. Re-check in 4 hours after hydration  4. Elevated Cr  a. Start hydration and re-assess  5. Acute respiratory failure  a. Now on 5L NC, weaned down from 6L overnight  6. Pleural effusions/possible pneumothorax  a. Pulmonary + cardiothoracic following  b. Trend chest X rays  c. Right side pleur-x catheter in place; tpa was instilled yesterday  7. Stage 4 metastatic renal carcinoma  a. Palliative care following  8. Gout      a. Cont home meds   9. Hypothyroid  a. Cont home med   10. Weak/Fatigued  a. PT/OT to work with pt while inpatient  11. DVT prophylaxis   a. Lovenox    Assessment and plan of care discussed with supervising physician, Dr Kelli Maier. Electronically signed by CAITY Rossi CNP on 4/17/2021 at 6:29 AM      Addendum/attestation by Linsey Robles MD:  I have seen and examined the patient independently. Face to face evaluation and examination was performed. The above evaluation and note has been reviewed. Laboratory and radiological data were reviewed. I Have discussed with Bhavin Giles CNP about this patient in detail. The above assessment and plan has been reviewed. Please see my modifications mentioned below.       My modifications:  Patient lethargic , plan head CT, ABG

## 2021-04-17 NOTE — PROGRESS NOTES
Pharmacy Vancomycin Consult     Vancomycin Day: 5  Current Dosin mg Q12H    Temp max:  98.4    Recent Labs     21  0559 21  0509   BUN 11 16   CREATININE 0.8 1.4*   WBC 7.0 9.6       Intake/Output Summary (Last 24 hours) at 2021 0913  Last data filed at 2021 0448  Gross per 24 hour   Intake 1600 ml   Output 960 ml   Net 640 ml     Date Source Result   2021 Strep/Legionella Ag negative   2021 BC1 ngtd   2021 BC2 ngtd   2021 Flu A&B negative         Ht Readings from Last 1 Encounters:   21 6' (1.829 m)        Wt Readings from Last 1 Encounters:   21 206 lb 12.8 oz (93.8 kg)       Body mass index is 28.05 kg/m². Estimated Creatinine Clearance: 73 mL/min (A) (based on SCr of 1.4 mg/dL (H)). Trough: 25.2    Assessment/Plan:  HOLD vancomycin. Scr increased from 0.8 to 1.4.   Will recheck a random level tomorrow AM with labs    Maximus ShepardD, BCPS  2021  9:13 AM

## 2021-04-17 NOTE — SIGNIFICANT EVENT
Was called for possible need for transfer to the ICU. I did arrive and resource nurse was at the bedside with patient. Patient had large output from chest tube after TPA administration and unclamping of chest tube. I did reach out to Dr. John Fofana and explained the situation that I felt the patient was stable to remain on 4K. I did also explain to Dr. John Fofana that a.m. the intensivist overnight and willing to assist the patient throughout the evening, if he decompensates I am happy to admit him to the ICU. Dr. John Fofana agreed to plan. Stat CBC was ordered. Stat chest x-ray was obtained which did show improvement. Chest tube output had slowed since my arrival.  Patient was added to the rapid response watch her list.  Patient stable when I left 4K. Electronically signed by Sony Mac.  Cordelia Do CNP on 4/17/2021 at 7:37 PM

## 2021-04-17 NOTE — PROGRESS NOTES
1549 Patient received in IR for PICC line insertion. 1552 This procedure has been fully reviewed with the patient and written informed consent has been obtained. 1611 Procedure started with Dr. Godwin Domingo. 1620 Procedure completed; patient tolerated well. Bio-patch, op-site; no bleeding noted. 1625 Patient on bed; comfort ensured. *** Patient taken to 4K via bed.

## 2021-04-18 NOTE — PROGRESS NOTES
Pt woke from nap disoriented and slurring his speech. Did not recognize wife and thought he was in [place]. MD paged and rapid response called.

## 2021-04-18 NOTE — PROGRESS NOTES
[facility]      Patient - Valeria Tello   MRN -  [de-identified]   Acct # - [de-identified]   - 1970      Date of Admission -  2021 10:24 AM  Date of evaluation -  2021  Room - --A   Hospital Day - [facility] Tee Rangel MD Primary Care Physician - Hi Guy, CAITY - CNP     Problem List      Active Hospital Problems    Diagnosis Date Noted    SIADH (syndrome of inappropriate ADH production) ([facility] [E22.2] 04/15/2021    Hyponatremia [E87.1] 04/15/2021    Renal cell cancer, unspecified laterality ([facility] [C64.9] 04/15/2021    Severe malnutrition ([facility] [E43] 2021    Pneumonia due to organism [J18.9] 2021    Acute on chronic respiratory failure with hypoxemia [facility]) [J96.21] 2021     Chief complaint:   Management of right side pleurx catheter  HPI   History Obtained From: Patient daughter, and electronic medical record. Valeria Tello is a 44 y.o. male never smoker with a complex past medical history including insulin-dependent diabetes mellitus, essential hypertension, CKD, Covid pneumonia 2021, gout, and a history of metastatic renal cell carcinoma with mets to the lungs and brain being followed by the [facility] with a chief complaint of increased shortness of breath and who we are seeing for worsening pleural effusions and septic pneumonia. Patient to the hospital for shortness of breath and gout in his elbow. States \"just overall not feeling good. \"  Affirms been having trouble breathing \"for a while\" but stated it got much worse yesterday. Needed to increase his oxygen from his baseline of 2 L up to 6 L and eventually down to 4 and 1/2 L. States that shortness of breath is improved very slightly since the previous day but is essentially the same. Worse when he sits up or tries to walk.   Denies chest pain, pleuritic chest pain, and chest tightness but states his lungs \"cannot expand. \"  Coughs up a little bit of clear sputum with no blood. Improved breathing when laying forward. States that he normally takes \"chemotherapy pills. \" However, states he has not taken them for the past week and further states that his Oncologist recommends he not take them for another further week. Summary Hospital Course:  Presented to PCP in the morning of 4/13/2021 to refill gout medication but was visibly very dyspneic and SPO2 was 86% on his baseline 2 L nasal cannula and blood pressure was low at 80/40. O2 increased to 4 L was told to go to the emergency room. Was in [facility] last week for pain the right elbow and was diagnosed with gout. Worsening shortness of breath since coming home from [facility] which gotten worse over the previous day. Previous chest tube placed on February 2021 for pleural effusions which was drained every other day with about 200 to 250 cc taken out. Reported chills but no fever. Did not want to go to [facility]. ID consulted for pneumonia and stage IV metastatic renal carcinoma, Oncology consulted for metastatic renal cell carcinoma, Palliative Care consulted for metastatic renal cell carcinoma, and Pulmonology consulted as above. Subjective/Events Past 24 hours/ROS     Had bloody drainage though the pig tail overnight after second dose of TPA, TPA was stopped  He underwent packed RBC transfusion this am  No respiratory distress, On Oxygen by nasal canula 4 lpm        Sleep History    Never diagnosed with sleep apnea in the past.    Vitals     height is 6' (1.829 m) and weight is 205 lb 6.4 oz (93.2 kg). His oral temperature is 98 °F (36.7 °C). His blood pressure is 94/51 (abnormal) and his pulse is 109. His respiration is 21 and oxygen saturation is 99%. Body mass index is 27.86 kg/m².     SUPPLEMENTAL O2: O2 Flow Rate (L/min): 4 L/min     I/O        Intake/Output Summary (Last 24 hours) at 4/18/2021 1248  Last data filed at 4/18/2021 1236  Gross per 24 hour   Intake 3799.1 ml   Output 2430 ml   Net 1369.1 ml     I/O last 3 completed shifts: In: 3799.1 [P.O.:450; I.V.:3039.1; Blood:310]  Out: 2200 [Urine:400; Drains:1800]   Patient Vitals for the past 96 hrs (Last 3 readings):   Weight   04/18/21 0345 205 lb 6.4 oz (93.2 kg)   04/17/21 0600 206 lb 12.8 oz (93.8 kg)       Exam   Physical Exam   Constitutional: No distress on 5 LPM O2 per NC. Patient appears chronically ill. Head: Normocephalic and atraumatic. Mouth/Throat: Oropharynx is clear and moist.  No oral thrush. Eyes: Conjunctivae are normal. Pupils are equal, round. No scleral icterus. Neck: Neck supple. No tracheal deviation present. Cardiovascular: S1 and S2 with no murmur. No peripheral edema  Pulmonary/Chest: Diminished breath sounds bi basal, crackles   Abdominal: Soft. Bowel sounds audible. No distension or tenderness to palp. Musculoskeletal: Moves all extremities  Neurological: Patient is alert and oriented to person, place, and time. Skin: Warm and dry. PFTs   None immediately available. Sleep studies   None immediately available. Cultures    Rapid Flu A/B-Negative  Respiratory culture-Pending  blood cultures x2 No prelim growth  Legionella antigen- Negative  strep pneumo antigen- Negative  respiratory virus antigens panel pending. Body fluid culture-No growth prelim   EKG   Sinus tachycardia  Low voltage QRS, consider pulmonary disease, pericardial effusion, or normal variant  Nonspecific T wave abnormality  Abnormal ECG  When compared with ECG of 23-MAR-2021 13:11,  Nonspecific T wave abnormality now evident in Lateral leads  Confirmed by JAMIE RODRIGUEZ ([id number]) on 4/13/2021 11:56:37 AM  Echocardiogram    Summary   Normal left ventricle size and systolic function. Ejection fraction was   estimated at 60 to 65%. There were no regional left ventricular wall   motion abnormalities and wall thickness was within normal limits.    Left atrial size was normal.      Signature      ----------------------------------------------------------------   Electronically signed by Alonzo Angel MD (Interpreting   physician) on 04/13/2021 at 06:17 PM   ----------------------------------------------------------------      Radiology    CXR    XR CHEST 1 VIEW   4/15/2021   Persistent pleural fluid at the lower bilateral chest.   Persistent atelectasis versus pneumonia at both lower lungs. 1. Loculated bilateral pleural collections without change. 2. Bilateral pulmonary infiltrates and atelectasis without change.       This document has been electronically signed by: Kailee Mccain MD on    04/14/2021 01:55 AM     CT Scans  1. Walled off, peripherally enhancing multiloculated bilateral pleural    collections and numerous peripherally enhancing low-density masses    involving the bilateral pleural spaces. Extension into the mediastinum,    and into multiple intercostal spaces. Early involvement of the    pericardium. Destructive changes of multiple ribs. Interval progression    since the prior study. Findings are compatible with extensive malignant    involvement. There could also be a component of infection. 2. Multifocal consolidation and volume loss within the lungs. Heterogeneous lung parenchyma raising the possibility of underlying    involvement with neoplasm. Multiple bilateral pulmonary nodules likely on    the basis of metastatic disease. 3. Right-sided chest tube tip within the right major fissure. 4. 5.1 cm solid mass within the right kidney, compatible with neoplasm    without significant change. 5. Moderate abdominal and pelvic free fluid with interval worsening. 6. Multifocal peritoneal thickening and enhancement compatible with    malignant involvement. 7. Soft tissue infiltration of the omentum of the proximal sigmoid colon,    likely on the basis of malignant involvement.    8. Bilateral adrenal gland nodules, compatible with metastatic disease. 9. Large lytic lesion involving the right iliac bone compatible with    metastatic disease, without significant change. 10. Extensive severe edema of the soft tissues with worsening.       This document has been electronically signed by: Anson Burciaga MD on    04/13/2021 09:33 PM     Assessment   -Acute hypoxic respiratory failure due to right-sided pleural effusion  -Dyspnea and hypoxia on baseline home 2 L of oxygen.  -Right side Pleural effusion secondary to metastatic renal cell carcinoma status post a Pleurx catheter [facility] at [facility]. -Sepsis secondary to pneumonia. -Stage IV metastatic renal cell carcinoma    Plan   - Remained on O2 nasal canula 4 lpm, stopped TPA infusion through pig tail due to bloody drainage and drop ion hemoglobin   -Follow pending cultures  - continue Antibiotics       Questions and concerns addressed.     Electronically signed by   Harvey Stewart MD, MPH, [address] [place] [address] on 4/18/2021 at 12:48 PM

## 2021-04-18 NOTE — PROGRESS NOTES
Renal Progress Note  [facility]    Patient :  Yonny Holt; 44 y.o. MRN# [id number]  Location:  4-01/001-A  Attending:  Margie Arvizu MD  Admit Date:  4/13/2021   Hospital Day: 5      Subjective:     Nephrology is following the patient for CAMLILE. Patient seen and examined. Yesterday's events noted. Patient with hypotension, dropping hemoglobin, received 2 units blood transfusion, and high output from chest tube. Today he states he is feeling ok. Having some dizziness. Breathing is stable. Denies any decrease in urine output. Outpatient Medications:     Medications Prior to Admission: traMADol (ULTRAM) 50 MG tablet, Take 50 mg by mouth every 6 hours as needed for Pain.   traZODone (DESYREL) 50 MG tablet, Take 50 mg by mouth nightly  loratadine (CLARITIN) 10 MG capsule, Take 10 mg by mouth daily  Cabozantinib S-Malate (CABOMETYX) 40 MG TABS, Take by mouth daily  indomethacin (INDOCIN) 25 MG capsule, Take 25 mg by mouth as needed for Pain  levothyroxine (SYNTHROID) 25 MCG tablet, Take 25 mcg by mouth Daily  ALLOPURINOL PO, Take 100 mg by mouth nightly   omeprazole (PRILOSEC) 40 MG delayed release capsule, Take 40 mg by mouth daily  levothyroxine (SYNTHROID) 100 MCG tablet, Take 25 mcg by mouth Daily   amLODIPine (NORVASC) 5 MG tablet, Take 5 mg by mouth daily  colchicine (COLCRYS) 0.6 MG tablet, Take 1 tablet by mouth daily for 3 days    Current Medications:     Scheduled Meds:    [Held by provider] vancomycin  1,500 mg Intravenous Q36H    famotidine  20 mg Oral BID    midodrine  5 mg Oral TID WC    methylPREDNISolone  250 mg Intravenous Daily    allopurinol  100 mg Oral Nightly    traZODone  50 mg Oral Nightly    sodium chloride flush  5-40 mL Intravenous 2 times per day    cefepime  2,000 mg Intravenous Q8H    levothyroxine  62.5 mcg Oral Daily    vancomycin (VANCOCIN) intermittent dosing (placeholder)   Other RX Placeholder     Continuous Infusions:    sodium chloride      sodium chloride 100 mL/hr at 21 0321     PRN Meds:  sodium chloride, ipratropium-albuterol, traMADol, sodium chloride flush, promethazine **OR** ondansetron, polyethylene glycol, acetaminophen **OR** acetaminophen, diphenhydrAMINE    Input/Output:       I/O last 3 completed shifts: In: 3799.1 [P.O.:450; I.V.:3039.1; Blood:310]  Out: 2200 [Urine:400; Drains:1800]. Patient Vitals for the past 96 hrs (Last 3 readings):   Weight   21 0345 205 lb 6.4 oz (93.2 kg)   21 0600 206 lb 12.8 oz (93.8 kg)       Vital Signs:   Temperature:  Temp: 98 °F (36.7 °C)  TMax:   Temp (24hrs), Av.7 °F (36.5 °C), Min:97.4 °F (36.3 °C), Max:98 °F (36.7 °C)    Respirations:  Resp: 20  Pulse:   Pulse: 110  BP:    BP: (!) 92/54  BP Range: Systolic (37ZEN), HDS:98 , Min:90 , OUE:557       Diastolic (88AQU), JTF:15, Min:51, Max:70      Physical Examination:     General:  Awake, alert, pleasant  HEENT: NC/AT/ MMM  Chest:             Right sided chest tube, rales noted  Cardiac:  S1 S2   Abdomen: Soft, non-tender,  Neuro:  No facial droop, No Asterixis  SKIN:  No rashes, good skin turgor. Extremities:  Trace LE edema    Labs:       Recent Labs     21  0509 21  0509 21  1940 21  0330 21  0435   WBC 9.6  --  12.8* 9.7  --    RBC 3.62*  --  2.95* 2.72*  --    HGB 9.5*   < > 7.7* 7.3* 7.1*   HCT 30.4*   < > 24.9* 23.3* 22.1*   MCV 84.0  --  84.4 85.7  --    MCH 26.2  --  26.1 26.8  --    MCHC 31.3*  --  30.9* 31.3*  --      --  339 267  --    MPV 9.2*  --  9.9 10.1  --     < > = values in this interval not displayed.       BMP:   Recent Labs     21  0509 21  1033 21  1850 21  0330   * 125*  --  128*   K 5.3* 5.8* 4.7 5.3*   CL 91* 93*  --  96*   CO2 23 22*  --  21*   BUN 16 19  --  26*   CREATININE 1.4* 1.5*  --  2.0*   GLUCOSE 96 107  --  120*   CALCIUM 7.5* 8.1*  --  7.4*      Phosphorus:   No results for input(s): PHOS in the last 72 hours.  Magnesium:  No results for input(s): MG in the last 72 hours. Albumin:  No results for input(s): LABALBU in the last 72 hours. BNP:    No results found for: BNP  MICHAEL:    No results found for: MICHAEL  SPEP:  Lab Results   Component Value Date    PROT 6.5 04/13/2021     UPEP:   No results found for: LABPE  C3:   No results found for: C3  C4:   No results found for: C4  MPO ANCA:   No results found for: MPO  PR3 ANCA:   No results found for: PR3  Anti-GBM:   No results found for: GBMABIGG  Hep BsAg:       No results found for: HEPBSAG  Hep C AB:        No results found for: HEPCAB    Urinalysis/Chemistries:      Lab Results   Component Value Date    NITRU NEGATIVE 04/13/2021    COLORU YELLOW 04/13/2021    PHUR 5.0 04/13/2021    LABCAST NONE SEEN 04/13/2021    LABCAST NONE SEEN 04/13/2021    WBCUA 0-2 04/13/2021    RBCUA 0-2 04/13/2021    MUCUS THREADS 07/22/2019    YEAST NONE SEEN 04/13/2021    BACTERIA NONE SEEN 04/13/2021    SPECGRAV >1.030 04/13/2021    LEUKOCYTESUR NEGATIVE 04/13/2021    LEUKOCYTESUR Negative 12/14/2020    UROBILINOGEN 0.2 04/13/2021    BILIRUBINUR NEGATIVE 04/13/2021    BLOODU NEGATIVE 04/13/2021    GLUCOSEU NEGATIVE 07/22/2019    KETUA 80 04/13/2021     Urine Sodium:   No results found for: NEVAEH  Urine Potassium:  No results found for: KUR  Urine Chloride:  No results found for: CLUR  Urine Osmolarity:   Lab Results   Component Value Date    OSMOU 796 04/13/2021     Urine Protein:   No components found for: TOTALPROTEIN, URINE   Urine Creatinine:   No results found for: LABCREA  Urine Eosinophils:  No components found for: UEOS        Impression and Plan:  1. CAMILLE: worsening. This is likely ATN from vancomycin nephrotoxicity compounded by recent hypotension however cannot rule out underlying AIN from [address] [place] [address]. Will start on empiric steroids Solumedrol 250 mg IV daily x 3 days. Start Midodrine 5 mg TID. Stop Vancomycin. Renal US is pending. Avoid nephrotoxic agents  2.  Hyperkalemia: improved  3. Hyponatremia from decreased water excretion from CAMILLE: improving  4. Hypotension likely from blood loss: start Midodrine as above, IV fluids. Blood transfusions as needed  5. Anemia  6. Malignant pleural effusion  7. Acute on chronic hypoxic resp failure  8. Metastatic RCC        Please don't hesitate to call with any questions.   Electronically signed by Halina Devi DO on 4/18/2021 at 9:24 AM

## 2021-04-18 NOTE — PROGRESS NOTES
Pharmacy Vancomycin Consult     Vancomycin Day: 6  Current Dosing: vanc 1500mg q12h (held)    Temp max:  afebrile    Recent Labs     04/17/21  1033 04/17/21  1940 04/18/21  0330   BUN 19  --  26*   CREATININE 1.5*  --  2.0*   WBC  --  12.8* 9.7       Intake/Output Summary (Last 24 hours) at 4/18/2021 0530  Last data filed at 4/18/2021 0345  Gross per 24 hour   Intake 3799.1 ml   Output 2200 ml   Net 1599.1 ml     Date Source Result   4/13/2021 Strep/Legionella Ag negative   4/14/2021 BC1 ngtd   4/14/2021 BC2 ngtd   4/14/2021 Flu A&B negative        Ht Readings from Last 1 Encounters:   04/18/21 6' (1.829 m)        Wt Readings from Last 1 Encounters:   04/18/21 205 lb 6.4 oz (93.2 kg)       Body mass index is 27.86 kg/m². Estimated Creatinine Clearance: 51 mL/min (A) (based on SCr of 2 mg/dL (H)). Trough: 16.8    Assessment/Plan:  Last dose given ~ 36 hours ago.   Will reorder vanc 1500mg q36h starting at 0800    Leigha Estrada RPh, BCPS, BCGP  4/18/2021     5:33 AM

## 2021-04-18 NOTE — PROGRESS NOTES
Progress note      [facility]             Patient:  Joselin Saenz  YOB: 1970    MRN: [id number]   Acct:  [de-identified]   4K-01/001-A  Primary Care Physician: CAITY Patel - CNP    Admit Date: 4/13/2021           Subjective: Pt resting in bed. Noted that the ICU was called yesterday due to pt dumping a large amount of his pleur-x catheter (about 1000cc) and a drop in his BP. ICU felt that he was stable enough to stay on 4K but they would remain on standby if he deteriorates.  He got 1URBC yesterday and 1U this AM.      Objective:      Physical Exam:    Vitals:  Patient Vitals for the past 24 hrs:   BP Temp Temp src Pulse Resp SpO2 Height Weight   04/18/21 0625 (!) 94/52 97.8 °F (36.6 °C) Axillary 110 18 97 % -- --   04/18/21 0538 (!) 96/51 97.7 °F (36.5 °C) Axillary 109 16 96 % -- --   04/18/21 0345 (!) 94/55 97.9 °F (36.6 °C) Oral 112 18 97 % 6' (1.829 m) 205 lb 6.4 oz (93.2 kg)   04/18/21 0130 (!) 110/55 97.4 °F (36.3 °C) Oral 112 20 97 % -- --   04/18/21 0010 99/61 97.5 °F (36.4 °C) Oral 107 18 97 % -- --   04/17/21 2330 (!) 92/52 97.6 °F (36.4 °C) Oral 108 16 98 % -- --   04/17/21 2100 (!) 90/52 98 °F (36.7 °C) Oral 110 18 100 % -- --   04/17/21 1514 102/64 97.5 °F (36.4 °C) Oral 107 20 96 % -- --   04/17/21 1333 -- -- -- -- -- 96 % -- --   04/17/21 1106 118/70 97.9 °F (36.6 °C) Oral 107 20 99 % -- --   04/17/21 0841 (!) 103/58 98.4 °F (36.9 °C) Oral 103 18 94 % -- --     Weight: Weight: 205 lb 6.4 oz (93.2 kg)     24 hour intake/output:    Intake/Output Summary (Last 24 hours) at 4/18/2021 0705  Last data filed at 4/18/2021 0345  Gross per 24 hour   Intake 3799.1 ml   Output 2200 ml   Net 1599.1 ml       General appearance - alert, well appearing, and in no distress  Eyes - pupils equal and reactive, extraocular eye movements intact  Mouth - mucous membranes moist, pharynx normal without lesions  Neck - supple, no significant adenopathy  Chest - clear to auscultation, no wheezes, rales or rhonchi, symmetric air entry  Heart - sinus tachycardia, normal S1, S2, no murmurs, rubs, clicks or gallops  Abdomen - soft, nontender, nondistended, no masses or organomegaly, pos bs. Neurological - alert, oriented, normal speech, no focal findings or movement disorder noted  Musculoskeletal - no joint tenderness, deformity or swelling  Extremities - peripheral pulses normal, no pedal edema, no clubbing or cyanosis  Skin - normal coloration and turgor, no rashes, no suspicious skin lesions noted, Right PICC line in place    Review of Labs and Diagnostic Testing:    CBC:   Recent Labs     04/18/21  0330 04/18/21  0435   WBC 9.7  --    HGB 7.3* 7.1*   HCT 23.3* 22.1*   MCV 85.7  --      --      BMP:   Recent Labs     04/18/21  0330   *   K 5.3*   CL 96*   CO2 21*   BUN 26*   CREATININE 2.0*   CALCIUM 7.4*   GLUCOSE 120*     PT/INR:   Recent Labs     04/17/21  1940   INR 1.39*     APTT: No results for input(s): APTT in the last 72 hours. Lipids: No results for input(s): ALKPHOS, ALT, AST, BILITOT, BILIDIR, LABALBU, AMYLASE, LIPASE in the last 72 hours. Troponin: No results for input(s): TROPONINT in the last 72 hours.      Imaging:  [unfilled]    EKG:      Diet: Dietary Nutrition Supplements: Low Calorie High Protein Supplement  DIET GENERAL; Low Potassium        Data:   Scheduled Meds: Scheduled Meds:   [Held by provider] vancomycin  1,500 mg Intravenous Q36H    allopurinol  100 mg Oral Nightly    pantoprazole  40 mg Oral Daily    traZODone  50 mg Oral Nightly    sodium chloride flush  5-40 mL Intravenous 2 times per day    cefepime  2,000 mg Intravenous Q8H    levothyroxine  62.5 mcg Oral Daily    vancomycin (VANCOCIN) intermittent dosing (placeholder)   Other RX Placeholder     Continuous Infusions:   sodium chloride      sodium chloride 100 mL/hr at 04/18/21 0321     PRN Meds:.sodium chloride, patient in detail. The above assessment and plan has been reviewed. Please see my modifications mentioned below. My modifications:  Seen by Nephrologist reccomendations noted - This is likely ATN from vancomycin nephrotoxicity compounded by recent hypotension however cannot rule out underlying AIN from [address]. Started on empiric steroids Solumedrol 250 mg IV daily x 3 days. Started Midodrine 5 mg TID. Vancomycin stopped. Renal US is pending.  Avoid nephrotoxic agents

## 2021-04-18 NOTE — FLOWSHEET NOTE
Pt is a 50y. o. male, sitting up in bed in 4K-01.  was summoned to room due to a rapid response, suspected stroke. Orlando's wife and his eldest son were also in the room;  seated on the couch with them. They shared that he was very disoriented following waking up from a nap and misplaced nc. Episode was over in minutes and he seemed to be just fine, following examination. Elías Saldaña shared his family relationships, complimented his wife and mentioned he's lived in this part of [place] all his life, upon moving from [place] many years ago. His son was very active in questioning medical professionals present, in an inquisitive and not a combative way.  provided presence, prayer, active listening, encouragement and nurtured hope.     04/18/21 1520   Encounter Summary   Services provided to: Patient and family together   Referral/Consult From: Multi-disciplinary team   Support System Children   Continue Visiting Yes  (4/18)   Complexity of Encounter Moderate   Length of Encounter 30 minutes   Crisis   Type Rapid response   Assessment Approachable;Coping   Intervention Sustaining presence/ Ministry of presence; Active listening;Nurtured hope;Prayer;Discussed illness/injury and it's impact   Outcome Engaged in conversation;Expressed gratitude;Receptive

## 2021-04-19 NOTE — PROGRESS NOTES
Pharmacy Renal Adjustment    Ruth Turner is a 44 y.o. male. Pharmacy renally adjust the following medications per P&T approved policy: cefepime, famotidine    Recent Labs     04/18/21  1537 04/19/21  0500   BUN 29* 35*   CREATININE 2.0* 2.2*     Estimated Creatinine Clearance: 47 mL/min (A) (based on SCr of 2.2 mg/dL (H)).   Est GFR 32    Height:   Ht Readings from Last 1 Encounters:   04/19/21 6' (1.829 m)     Weight:  Wt Readings from Last 1 Encounters:   04/19/21 207 lb 12.8 oz (94.3 kg)      Baseline SCr: 0.8    Plan: Adjustments based on renal function:          Decrease cefepime 2 g IV q8h to cefepime 2g IV q12h for CrCl 30 to 60          Decrease famotidine 20mg BID to famotidine 20mg daily for CrCl below 50    Soto Poe, PharmD, BCPS 4/19/2021 9:27 AM

## 2021-04-19 NOTE — ACP (ADVANCE CARE PLANNING)
Advance Care Planning Note  hospitals Care Department  [facility] ICU STEPDOWN TELEMETRY 4K  [address] [facility] [address]  Phone: [phone]    Today's Date: 4/19/2021    Received request from:IDT member. Upon review of the chart and communication with the care team, patient's decision making abilities are not in question. Goals of ACP Conversation:  Discuss advance care planning documents per consult request from the interdisciplinary team/provider. At the request of the patient or surrogate decision maker, facilitate a discussion related to patient's goals of care as they align with the patient's values and beliefs. Facilitate clarification of patient desires and values around code status in particular with some discussion around the order of naming primary agent and following designations. Health Care Decision Makers:         Advance Care Planning Documents:    No Health Care Living Will on file. Patient not interested in completing a [address] Will at this time. Assessment:    Patient is alert and oriented to person, place, time, and situation. Patient and family present at consult - one son and one daughjter- communicate very effectively around ACP topics and patient's objectives and values. HCPOA document copies left for further discussion before order of decision-makers is identified. Interventions:   provided education on documents for clarity and greater understanding.  encouraged ongoing ACP conversation with decision-makers and family members. Family refined mutual underestanding of patient desires and values. Family exhibited very effective communciation and appears highlymotivated to complete HCPOA docs which accurately convey patient wishes, and to talk through these decisions as a family group. Outcomes/Plan:    ACP discussion completed. Follow-up visit with  to continue conversation.     Electronically signed by Lenora Pang  on 4/19/2021 at 6:42 PM.

## 2021-04-19 NOTE — PROGRESS NOTES
I have a discussion with the patient and his family. The patient symptoms and poor performance status is related to his metastatic cancer. The best palliative care is to get treatment to control his cancer. Doy Claudia as a third line treatment has 25% response rate. The average time to respond is 4-6 weeks,  The patient has decided to continue with treatment.   I will fulfill  his request

## 2021-04-19 NOTE — PROGRESS NOTES
Renal Progress Note  [facility]    Patient :  Dayan Hickey; 44 y.o. MRN# [id number]  Location:  4K-01/001-A  Attending:  Doris Nunez MD  Admit Date:  4/13/2021   Hospital Day: 6      Subjective:     Nephrology is following the patient for CAMILLE. Patient was seen this morning. This is a late entry. He reported feeling okay. Breathing is ok at rest.  On 2 L NC. Reports urine output is stable. Documentation is only 600 cc/24 hours. Outpatient Medications:     Medications Prior to Admission: traMADol (ULTRAM) 50 MG tablet, Take 50 mg by mouth every 6 hours as needed for Pain.   traZODone (DESYREL) 50 MG tablet, Take 50 mg by mouth nightly  loratadine (CLARITIN) 10 MG capsule, Take 10 mg by mouth daily  Cabozantinib S-Malate (CABOMETYX) 40 MG TABS, Take by mouth daily  indomethacin (INDOCIN) 25 MG capsule, Take 25 mg by mouth as needed for Pain  levothyroxine (SYNTHROID) 25 MCG tablet, Take 25 mcg by mouth Daily  ALLOPURINOL PO, Take 100 mg by mouth nightly   omeprazole (PRILOSEC) 40 MG delayed release capsule, Take 40 mg by mouth daily  levothyroxine (SYNTHROID) 100 MCG tablet, Take 25 mcg by mouth Daily   amLODIPine (NORVASC) 5 MG tablet, Take 5 mg by mouth daily  colchicine (COLCRYS) 0.6 MG tablet, Take 1 tablet by mouth daily for 3 days    Current Medications:     Scheduled Meds:    midodrine  10 mg Oral TID WC    cefepime  2,000 mg Intravenous Q12H    [START ON 4/20/2021] famotidine  20 mg Oral Daily    allopurinol  100 mg Oral BID    [Held by provider] vancomycin  1,500 mg Intravenous Q36H    methylPREDNISolone (SOLU-MEDROL) IVPB  250 mg Intravenous Daily    insulin lispro  0-6 Units Subcutaneous TID WC    insulin lispro  0-3 Units Subcutaneous Nightly    traZODone  50 mg Oral Nightly    sodium chloride flush  5-40 mL Intravenous 2 times per day    levothyroxine  62.5 mcg Oral Daily    vancomycin (VANCOCIN) intermittent dosing (placeholder)   Other RX Placeholder     Continuous Infusions:    sodium chloride      dextrose      sodium chloride 60 mL/hr at 21 0837     PRN Meds:  sodium chloride, glucose, dextrose, glucagon (rDNA), dextrose, ipratropium-albuterol, traMADol, sodium chloride flush, promethazine **OR** ondansetron, polyethylene glycol, acetaminophen **OR** acetaminophen, diphenhydrAMINE    Input/Output:       I/O last 3 completed shifts: In: 3831.6 [P.O.:600; I.V.:2964.9; Blood:266.7]  Out: 2210 [Urine:600; Drains:1610]. Patient Vitals for the past 96 hrs (Last 3 readings):   Weight   21 0445 207 lb 12.8 oz (94.3 kg)   21 0345 205 lb 6.4 oz (93.2 kg)   21 0600 206 lb 12.8 oz (93.8 kg)       Vital Signs:   Temperature:  Temp: 98.4 °F (36.9 °C)  TMax:   Temp (24hrs), Av.2 °F (36.8 °C), Min:97.9 °F (36.6 °C), Max:98.6 °F (37 °C)    Respirations:  Resp: 20  Pulse:   Pulse: 114  BP:    BP: 106/61  BP Range: Systolic (14WMO), TRF:096 , Min:90 , TSY:181       Diastolic (01HJH), BZL:66, Min:41, Max:80      Physical Examination:     General:  Awake, alert, pleasant  HEENT: NC/AT/ MMM  Chest:             Right sided chest tube, rales noted  Cardiac:  S1 S2 tachycardic  Abdomen: Soft, non-tender,  Neuro:  No facial droop, No Asterixis  SKIN:  No rashes, good skin turgor. Extremities:  Trace LE edema    Labs:       Recent Labs     21  1940 21  0330 21  0330 21  1005 21  1537 21  0500   WBC 12.8* 9.7  --   --   --  5.4   RBC 2.95* 2.72*  --   --   --  3.36*   HGB 7.7* 7.3*   < > 7.6* 7.5* 9.1*   HCT 24.9* 23.3*   < > 24.0* 23.9* 28.4*   MCV 84.4 85.7  --   --   --  84.5   MCH 26.1 26.8  --   --   --  27.1   MCHC 30.9* 31.3*  --   --   --  32.0*    267  --   --   --  251   MPV 9.9 10.1  --   --   --  9.8    < > = values in this interval not displayed.       BMP:   Recent Labs     21  1005 21  1537 21  0500   * 127* 131*   K 4.6 5.0 5.1   CL 97* 97* 99   CO2 20* 20* steroids. Increase Midodrine to 10 mg TID. Avoid nephrotoxic agents  2. Hyperkalemia: improved  3. Hyponatremia from decreased water excretion from CAMILLE: improving  4. Hypotension likely from blood loss  5. Anemia  6. Malignant pleural effusion  7. Acute on chronic hypoxic resp failure  8. Metastatic RCC        Please don't hesitate to call with any questions.   Electronically signed by Ady Levine DO on 4/19/2021 at 12:30 PM

## 2021-04-19 NOTE — CARE COORDINATION
Handoff report sent to [facility]; patient transferred from [phone] to [phone].

## 2021-04-19 NOTE — PLAN OF CARE
Problem: Falls - Risk of:  Goal: Will remain free from falls  Description: Will remain free from falls  4/19/2021 1034 by Evans Leach RN  Outcome: Ongoing  Note: No falls this shift. Call light in reach and used appropriately. Bed alarm remains on. Patient remains alert and oriented at this time. Problem: Grieving:  Goal: Verbalizes feelings, concerns and thoughts  Description: Verbalizes feelings, concerns and thoughts  4/19/2021 1034 by Evans Leach RN  Outcome: Ongoing  Note: Patient active in decision making this shift, sharing thoughts and feelings with this RN and 2 children at bedside. Problem: Skin Integrity:  Goal: Will show no infection signs and symptoms  Description: Will show no infection signs and symptoms  4/19/2021 1034 by Evans Leach RN  Outcome: Ongoing  Note: No new skin breakdown this shift. Patient is assisted with turning every two hours and as needed. Site around pleurix drain remains clean and dry. Problem: Respiratory:  Goal: Respiratory status will improve  Description: Respiratory status will improve  4/19/2021 1034 by Evans Leach RN  Outcome: Ongoing  Note: Patient weaned to [facility] this shift, continuing to wean as able. Problem: Discharge Planning:  Goal: Discharged to appropriate level of care  Description: Discharged to appropriate level of care  4/19/2021 1034 by Evans Leach RN  Outcome: Ongoing  Note: Patient plans to return home with wife at discharge. Patient continues to require oxygen. Problem: Nutrition  Goal: Optimal nutrition therapy  4/19/2021 1034 by Evans Leach RN  Outcome: Ongoing  Note: Poor PO intake this shift. Patient encouraged to order and eat 50% of each meal including ensure. Nausea treated with PRN medications this shift, as ordered. No complaints of pain this shift. Pain goal 0, met. Care plan reviewed with patient and children.   Patient and children verbalize understanding of the plan of care and contribute to goal setting.

## 2021-04-19 NOTE — PROGRESS NOTES
INTERNAL MEDICINE SPECIALTIES  Progress Note For Dr Pedro Henriquez       Patient:  Josefina Lopezi  YOB: 1970  Date of Service: 4/19/2021  MRN: [id number]   Acct:  [de-identified]   Primary Care Physician: CAITY Mancuso CNP    SUBJECTIVE: breathing better today, O2 down to 2L/min        Home Medications:   No current facility-administered medications on file prior to encounter. Current Outpatient Medications on File Prior to Encounter   Medication Sig Dispense Refill    traMADol (ULTRAM) 50 MG tablet Take 50 mg by mouth every 6 hours as needed for Pain.       traZODone (DESYREL) 50 MG tablet Take 50 mg by mouth nightly      loratadine (CLARITIN) 10 MG capsule Take 10 mg by mouth daily      Cabozantinib S-Malate (CABOMETYX) 40 MG TABS Take by mouth daily      indomethacin (INDOCIN) 25 MG capsule Take 25 mg by mouth as needed for Pain      levothyroxine (SYNTHROID) 25 MCG tablet Take 25 mcg by mouth Daily      ALLOPURINOL PO Take 100 mg by mouth nightly       omeprazole (PRILOSEC) 40 MG delayed release capsule Take 40 mg by mouth daily      levothyroxine (SYNTHROID) 100 MCG tablet Take 25 mcg by mouth Daily       amLODIPine (NORVASC) 5 MG tablet Take 5 mg by mouth daily      colchicine (COLCRYS) 0.6 MG tablet Take 1 tablet by mouth daily for 3 days 3 tablet 0         Scheduled Meds:   midodrine  10 mg Oral TID WC    cefepime  2,000 mg Intravenous Q12H    [START ON 4/20/2021] famotidine  20 mg Oral Daily    allopurinol  100 mg Oral BID    [Held by provider] vancomycin  1,500 mg Intravenous Q36H    methylPREDNISolone (SOLU-MEDROL) IVPB  250 mg Intravenous Daily    insulin lispro  0-6 Units Subcutaneous TID WC    insulin lispro  0-3 Units Subcutaneous Nightly    traZODone  50 mg Oral Nightly    sodium chloride flush  5-40 mL Intravenous 2 times per day    levothyroxine  62.5 mcg Oral Daily    vancomycin (VANCOCIN) intermittent dosing (placeholder)   Other RX Placeholder Continuous Infusions:   sodium chloride      dextrose      sodium chloride 60 mL/hr at 04/19/21 0837     PRN Meds:sodium chloride, glucose, dextrose, glucagon (rDNA), dextrose, ipratropium-albuterol, traMADol, sodium chloride flush, promethazine **OR** ondansetron, polyethylene glycol, acetaminophen **OR** acetaminophen, diphenhydrAMINE        Allergies:  Patient has no known allergies. OBJECTIVE:    Vitals:   Vitals:    04/19/21 1154   BP:    Pulse:    Resp:    Temp:    SpO2: 93%      BMI: Body mass index is 28.18 kg/m². PHYSICAL EXAMINATION:            General appearance: ill looking male mild respiratory distress*, appears stated age and cooperative. HEENT:  Normal cephalic, atraumatic without obvious deformity. Pupils equal, round, and reactive to light. Extra ocular muscles intact. Conjunctivae/corneas clear. Neck: Supple. Chest:  PleurX catheter right chest  Respiratory:  Diminished air entry bilaterally  Cardiovascular:  Regular rhythm with normal S1/S2 without  rubs or gallops. Abdomen:  Full,soft, non-tender, non-distended with normal bowel sounds. Musculoskeletal:  No clubbing, cyanosis or ankle edema bilaterally.   Has some swelling and tenderness right elbow  Neurologic:   Alert and oriented x3 with no gross focal deficits  Psychiatric: Thought content appropriate, normal insight      Review of Labs and Diagnostic Testing:    Recent Results (from the past 24 hour(s))   POCT glucose    Collection Time: 04/18/21 12:29 PM   Result Value Ref Range    POC Glucose 129 (H) 70 - 108 mg/dl   POCT glucose    Collection Time: 04/18/21  3:03 PM   Result Value Ref Range    POC Glucose 182 (H) 70 - 108 mg/dl   Urinalysis with microscopic    Collection Time: 04/18/21  3:30 PM   Result Value Ref Range    Glucose, Urine NEGATIVE NEGATIVE mg/dl    Bilirubin Urine NEGATIVE NEGATIVE    Ketones, Urine TRACE (A) NEGATIVE    Specific Gravity, UA 1.021 1.002 - 1.030    Blood, Urine NEGATIVE NEGATIVE    pH, UA 5.0 5.0 - 9.0    Protein, UA 30 (A) NEGATIVE mg/dl    Urobilinogen, Urine 0.2 0.0 - 1.0 eu/dl    Nitrite, Urine NEGATIVE NEGATIVE    Leukocyte Esterase, Urine NEGATIVE NEGATIVE    Color, UA YELLOW YELLOW-STRAW    Character, Urine CLEAR CLR-SL.CLOUD    RBC, UA NONE 0-2/hpf /hpf    WBC, UA 0-2 0-4/hpf /hpf    Epithelial Cells, UA 0-2 3-5/hpf /hpf    Amorphous, UA DEBRIS NONE SEEN    Bacteria, UA NONE SEEN FEW/NONE SEEN    Casts 4-8 HYALINE NONE SEEN /lpf    Crystals NONE SEEN NONE SEEN    Renal Epithelial, UA NONE SEEN NONE SEEN    Yeast, UA NONE SEEN NONE SEEN    Casts 0-4 C. GRAN /lpf    Miscellaneous Lab Test Result NONE SEEN    Comprehensive Metabolic Panel    Collection Time: 04/18/21  3:37 PM   Result Value Ref Range    Glucose 163 (H) 70 - 108 mg/dL    CREATININE 2.0 (H) 0.4 - 1.2 mg/dL    BUN 29 (H) 7 - 22 mg/dL    Sodium 127 (L) 135 - 145 meq/L    Potassium 5.0 3.5 - 5.2 meq/L    Chloride 97 (L) 98 - 111 meq/L    CO2 20 (L) 23 - 33 meq/L    Calcium 6.8 (L) 8.5 - 10.5 mg/dL    AST 27 5 - 40 U/L    Alkaline Phosphatase 44 38 - 126 U/L    Total Protein 4.1 (L) 6.1 - 8.0 g/dL    Albumin 2.2 (L) 3.5 - 5.1 g/dL    Total Bilirubin 0.3 0.3 - 1.2 mg/dL    ALT 8 (L) 11 - 66 U/L   Calcium, Ionized    Collection Time: 04/18/21  3:37 PM   Result Value Ref Range    Calcium, Ion 0.99 (L) 1.12 - 1.32 mmol/L   Hemoglobin and hematocrit, blood    Collection Time: 04/18/21  3:37 PM   Result Value Ref Range    Hemoglobin 7.5 (L) 14.0 - 18.0 gm/dl    Hematocrit 23.9 (L) 42.0 - 52.0 %   Anion Gap    Collection Time: 04/18/21  3:37 PM   Result Value Ref Range    Anion Gap 10.0 8.0 - 16.0 meq/L   Glomerular Filtration Rate, Estimated    Collection Time: 04/18/21  3:37 PM   Result Value Ref Range    Est, Glom Filt Rate 35 (A) ml/min/1.73m2   POCT glucose    Collection Time: 04/18/21  8:19 PM   Result Value Ref Range    POC Glucose 199 (H) 70 - 108 mg/dl   CBC auto differential    Collection Time: 04/19/21  5:00 AM   Result Value Ref Ethnicity   Account #      [de-identified]         Room Number         1099   Accession      [id number]        Date of Study       04/13/2021  Number   Date of Birth  1970        Referring Physician Rosalba Bhakta, FLORENTIN Valderrama CNP   Age            44 year(s)        [facility]                                    Interpreting        Echo reader of the                                   Physician           martinez Chappell MD  Procedure Type of Study   TTE procedure:ECHOCARDIOGRAM COMPLETE 2D W DOPPLER W COLOR. Procedure Date Date: 04/13/2021 Start: 02:57 PM Study Location: Bedside Technical Quality: Limited visualization due to restricted mobility. Indications:Shortness of breath. Additional Medical History:Recent 15 pound weight gain, diabetic, hypertension, renal cell cancer with mets Patient Status: Routine Height: 72.05 inches Weight: 207.24 pounds BSA: 2.16 m^2 BMI: 28.07 kg/m^2 BP: 115/76 mmHg  Conclusions   Summary  Normal left ventricle size and systolic function. Ejection fraction was  estimated at 60 to 65%. There were no regional left ventricular wall  motion abnormalities and wall thickness was within normal limits. Left atrial size was normal.   Signature   ----------------------------------------------------------------  Electronically signed by Yulisa Chappell MD (Interpreting  physician) on 04/13/2021 at 06:17 PM  ----------------------------------------------------------------   Findings   Mitral Valve  The mitral valve structure was normal with normal leaflet separation. DOPPLER: The transmitral velocity was within the normal range with no  evidence for mitral stenosis. There was no evidence of mitral  regurgitation. Aortic Valve  The aortic valve was trileaflet with normal thickness and cuspal  separation.  DOPPLER: Transaortic velocity was within the normal range with  no evidence of aortic stenosis. There was no evidence of aortic  regurgitation. Tricuspid Valve  The tricuspid valve structure was normal with normal leaflet separation. DOPPLER: There was no evidence of tricuspid stenosis. There was no  evidence of tricuspid regurgitation. Pulmonic Valve  The pulmonic valve leaflets exhibited normal thickness, no calcification,  and normal cuspal separation. DOPPLER: The transpulmonic velocity was  within the normal range with no evidence for regurgitation. Left Atrium  Left atrial size was normal.   Left Ventricle  Normal left ventricle size and systolic function. Ejection fraction was  estimated at 60 to 65%. There were no regional left ventricular wall  motion abnormalities and wall thickness was within normal limits. Right Atrium  Right atrial size was normal.   Right Ventricle  The right ventricular size was normal with normal systolic function and  wall thickness. Pericardial Effusion  The pericardium was normal in appearance with no evidence of a pericardial  effusion. Pleural Effusion  No evidence of pleural effusion. Aorta / Great Vessels  -Aortic root dimension within normal limits.  -The Pulmonary artery is within normal limits. -IVC size is within normal limits with normal respiratory phasic changes.   M-Mode/2D Measurements & Calculations   LV Diastolic    LV Systolic Dimension: 2.7  AV Cusp Separation: 2.3 cmLA  Dimension: 4 cm cm                          Dimension: 3.3 cmAO Root  LV FS:32.5 %    LV Volume Diastolic: 70 ml  Dimension: 3.8 cm  LV PW           LV Volume Systolic: 27 ml  Diastolic: 0.9  LV EDV/LV EDV Index: 70  cm              ml/32 m^2LV ESV/LV ESV  Septum          Index: 27 ml/12 m^2         RV Diastolic Dimension: 2 cm  Diastolic: 1 cm EF Calculated: 61.4 %                                              LA/Aorta: 0.87  Doppler Measurements & Calculations   MV Peak E-Wave: 74.6 cm/s  AV Peak Velocity: 150 LVOT Peak Velocity: 130  MV Peak A-Wave: 98.1 cm/s  cm/s                  cm/s  MV E/A Ratio: 0.76         AV Peak Gradient: 9   LVOT Peak Gradient: 7  MV Peak Gradient: 2.23     mmHg                  mmHg  mmHg                                                   TV Peak E-Wave: 62.6 cm/s  MV Deceleration Time: 345                        TV Peak A-Wave: 88.7 cm/s  msec  MV P1/2t: 101 msec                               TV Peak Gradient: 1.57  MVA by PHT:2.18 cm^2                             mmHg                                                   TR Velocity:247 cm/s  MV E' Septal Velocity: 7.1 AV DVI (Vmax):0.87    TR Gradient:24.4 mmHg  cm/s                                             PV Peak Velocity: 75 cm/s  MV A' Septal Velocity:                           PV Peak Gradient: 2.25  10.8 cm/s                                        mmHg  MV E' Lateral Velocity:  8.9 cm/s  MV A' Lateral Velocity:  12.8 cm/s  E/E' septal: 10.51  E/E' lateral: 8.38  [email or web address] [id number]    Ct Abdomen Pelvis W Iv Contrast Additional Contrast? None    Result Date: 4/13/2021  * ADDENDUM #1 *  This report was discussed with Mauricio Dye RN on Apr 13, 2021 21:43:00 EDT. This document has been electronically signed by: Rigo Nunez on 04/13/2021 09:43 PM *  ORIGINAL REPORT * CT abdomen and pelvis with contrast Comparison:  CT,KOSR  - CT ABDOMEN PELVIS W IV CONTRAST  - 03/23/2021 02:48 PM EDT Findings: Walled off, peripherally enhancing multiloculated bilateral pleural collections and numerous peripherally enhancing low-density masses involving the bilateral pleural spaces. Extension into the mediastinum, and into multiple intercostal spaces. Early involvement of the pericardium. Destructive changes of multiple ribs. Interval progression since the prior study. Multifocal consolidation and volume loss within the lungs.  Heterogeneous lung by: Breezy Vazquez MD on 04/13/2021 09:33 PM All CTs at this facility use dose modulation techniques and iterative reconstructions, and/or weight-based dosing when appropriate to reduce radiation to a low as reasonably achievable. Xr Chest Portable    Result Date: 4/14/2021  1 view chest x-ray Comparison:  CRSR  - XR CHEST PORTABLE  - 04/13/2021 11:16 AM EDT Findings: Loculated bilateral pleural collections without gross interval change. Airspace filling within the bilateral mid and lower lungs without change. Enlarged cardiomediastinal silhouette without change. Destructive changes of multiple left-sided ribs without change. No acute bony abnormality. 1. Loculated bilateral pleural collections without change. 2. Bilateral pulmonary infiltrates and atelectasis without change. This document has been electronically signed by: Breezy Vazquez MD on 04/14/2021 01:55 AM    Xr Chest Portable    Result Date: 4/13/2021  PROCEDURE: XR CHEST PORTABLE CLINICAL INFORMATION: Shortness of breath TECHNIQUE: Mobile AP chest radiograph. COMPARISON: AP and lateral chest radiographs 20/3/2020 FINDINGS: Cardiac silhouette is obscured by opacities at the bilateral lung bases. The bilateral costophrenic angles are blunted. Hazy and reticular opacities are present in the mid and lower lungs. There is a new density at the right lung apex, likely a  focal infiltrate. Multiple nodules seen on CT of the chest from 3/23/2021 are not clearly visible on the current study. Bilateral pleural thickening is noted. Degenerative changes in the thoracic spine are poorly visualized. 1. Small to moderate-sized bilateral pleural effusions with adjacent atelectasis/infiltrate. 2. New density at the right lung apex, likely focal infiltrate. **This report has been created using voice recognition software. It may contain minor errors which are inherent in voice recognition technology. ** Final report electronically signed by Dr. Delmy Gonzalez on 4/13/2021 11:28 AM        ASSESMENT:      Active Problems:    Pneumonia due to organism    Acute on chronic respiratory failure with hypoxemia (HCC)    Severe malnutrition (HCC)    SIADH (syndrome of inappropriate ADH production) (HCC)    Hyponatremia    Renal cell cancer, unspecified laterality ([facility] [address]  Resolved Problems:    * No resolved hospital problems. *  OTHER PROBLEMS:    - Metastatic clear cell renal carcinoma to  lungs, pleura, adrenal glands and bone with loculated malignant pleural effusion, omentum, pericardial wall. -  Gout   -  Hypothyroidism   -  Hyponatremia likely related to SIADH      PLAN:    Some worsening renal function noted, nephrology Service following on account of the CAMILLE. Suspected to be secondary to ATN, cannot rule out AIN from opdivo. Was started empirically on steroids and also midodrine for blood pressure support. .  His vancomycin has since been discontinued. Renal ultrasound had shown no hydronephrosis. Patient being followed by the cardiothoracic service on account of the loculated pleural effusion s/p TPA , continue pleural fluid drainage via PleurX catheter,  Continue with bronchodilators, O2, broad spectrum antibiotics to cover  HAP,   Pulmonology and ID services following. Legionella ,strep pneumonia urinary antigens & rapid influenza screen were all negative  Follow-up blood cultures, sputum gram stain c/s, Incentive spirometry , acapella. Hyperglycemia is steroid related.      Seen by Oncologist , started on opdivo to receive 240 mg g5shwcz      The patient's 2D echocardiogram showed normal EF, no evidence of pericardial effusion    Overall prognosis is very poor, palliative Care consult in place            DVT prophylaxis: [x] Lovenox                                 [] SCDs                                 [] SQ Heparin                                 [] Encourage ambulation, low risk for DVT, no chemical or mechanical prophylaxis necessary              [] Already on Anticoagulation                Anticipated Disposition upon discharge: [] Home                                                                         [] Home with Home Health                                                                         [] [facility]                                                                         [] [address]          Electronically signed by Virgil Clark MD on 4/19/2021 at 12:20 PM

## 2021-04-19 NOTE — PROGRESS NOTES
Progress note: Infectious diseases    Patient - Matias Yip,  Age - 44 y.o.    - 1970      Room Number - 4K-01/001-A   MRN -  [id number]   Acct # - [de-identified]  Date of Admission -  2021 10:24 AM    SUBJECTIVE:   He feels better he still has shortness of breath he has right chest tube with bloody fluid  OBJECTIVE   VITALS    height is 6' (1.829 m) and weight is 207 lb 12.8 oz (94.3 kg). His oral temperature is 98.4 °F (36.9 °C). His blood pressure is 106/61 and his pulse is 114. His respiration is 20 and oxygen saturation is 93%. Wt Readings from Last 3 Encounters:   21 207 lb 12.8 oz (94.3 kg)   21 192 lb 6.4 oz (87.3 kg)   02/15/21 200 lb (90.7 kg)       MEDICATIONS:      midodrine  10 mg Oral TID WC    cefepime  2,000 mg Intravenous Q12H    [START ON 2021] famotidine  20 mg Oral Daily    allopurinol  100 mg Oral BID    methylPREDNISolone (SOLU-MEDROL) IVPB  250 mg Intravenous Daily    insulin lispro  0-6 Units Subcutaneous TID WC    insulin lispro  0-3 Units Subcutaneous Nightly    traZODone  50 mg Oral Nightly    sodium chloride flush  5-40 mL Intravenous 2 times per day    levothyroxine  62.5 mcg Oral Daily      sodium chloride      dextrose      sodium chloride 60 mL/hr at 21 0837     sodium chloride, glucose, dextrose, glucagon (rDNA), dextrose, ipratropium-albuterol, traMADol, sodium chloride flush, promethazine **OR** ondansetron, polyethylene glycol, acetaminophen **OR** acetaminophen, diphenhydrAMINE      LABS:     CBC:   Recent Labs     21  1940 21  0330 21  0330 21  1005 21  1537 21  0500   WBC 12.8* 9.7  --   --   --  5.4   HGB 7.7* 7.3*   < > 7.6* 7.5* 9.1*    267  --   --   --  251    < > = values in this interval not displayed.      BMP:    Recent Labs     21  1005 21  1537 21  0500   NA 126* 127* 131*   K 4.6 5.0 5.1   CL 97* 97* 99   CO2 20* 20* 21*   BUN 27* 29* 35*   CREATININE 2.1* 2.0* 2.2*   GLUCOSE 127* 163* 159*     Calcium:  Recent Labs     04/19/21  0500   CALCIUM 7.6*    Hepatic:   Recent Labs     04/18/21  1537   ALKPHOS 44   ALT 8*   AST 27   PROT 4.1*   BILITOT 0.3   LABALBU 2.2*      No results for input(s): CKTOTAL, CKMB, TROPONINI in the last 72 hours. CULTURES:   UA:   Recent Labs     04/18/21  1530   SPECGRAV 1.021   PHUR 5.0   COLORU YELLOW   PROTEINU 30*   BLOODU NEGATIVE   RBCUA NONE   WBCUA 0-2   BACTERIA NONE SEEN   NITRU NEGATIVE   BILIRUBINUR NEGATIVE   UROBILINOGEN 0.2   KETUA TRACE*   LABCAST 4-8 HYALINE  0-4 C. GRAN     Micro:   Lab Results   Component Value Date    BC No growth-preliminary  04/14/2021          Problem list of patient:     Patient Active Problem List   Diagnosis Code    Type 2 diabetes mellitus treated without insulin ([facility] [address] E11.9    Essential hypertension I10    Chronic kidney disease N18.9    CKD (chronic kidney disease), stage III N18.30    Intracranial arachnoid cysts 4th ventricle G93.0    Headache R51.9    Lt facial numbness R20.0    Right renal mass N28.89    Hematuria R31.9    Lung nodules R91.8    Hydropneumothorax J94.8    Adrenal nodule (HCC) E27.8    Shortness of breath R06.02    Lymph node disorder I89.9    Respiratory distress R06.03    Pleural nodules R22.2    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 Recurrent pleural effusion: right side pleurex. seen by [facility]:  tpa and dornase injected to the right pleural cavity. Discussed with his family i.e. his son and his daughter about his condition. The report of the CT scan of the abdomen and pelvis and chest was discussed with the family and read the report. Stop vancomycin.     Dhaval Bell MD, [address] 4/19/2021 2:28 PM

## 2021-04-19 NOTE — CARE COORDINATION
4/19/21, 4:44 PM EDT    DISCHARGE ON GOING EVALUATION    [facility] AT [facility] day: 6  Location: 4K-01/001-A Reason for admit: Pneumonia [J18.9]   Procedure:   04/15 right thoracentesis IR drained 0.4 L  04/15 - IV Opdivo (chemo) given  04/16 TPA/Dornase today by [facility]  4/17 - PICC line placed. 4/18 CXR -   Similar possible small right pneumothorax. Right chest tube remains in    place. Similar bilateral lower lung atelectasis and/or consolidations. Unchanged left pleural effusion. Unchanged right lung apex density. Barriers to Discharge: IM, ID, [facility], Pulmonology, Nephrology, Oncology following. Palliative Care following. Pt given options by Oncology, plans to continue with treatment. Afebrile. O2 down to 2L/nc, sats 96%. Drain in place. Sodium 131, Hgb 9.1. BUN 35, creatinine 2.2. Solumedrol 250 mg iv daily. Diabetes management. Midodrine tid. To start Cefepime iv q12hr, Pepcid to change to po. PCP: CAITY Jenkins CNP  Readmission Risk Score: 24%  Patient Goals/Plan/Treatment Preferences: From home with wife; has home oxygen (BL 2L/min) thru [employer], daughter was attempting to find RW, BSC, and wheelchair thru [employer]. SW following planning Interim Palliative care.

## 2021-04-19 NOTE — PROGRESS NOTES
Family/patient would like for advance directives to be completed at this time. Spiritual care consult placed and phone call made to spiritual care. Dr. Parth Waggoner will be in to update patient/family and patient gives this RN permission to be present with this conversation. Primary RN, Cheri to call this RN when Dr. Parth Waggoner arrives.

## 2021-04-19 NOTE — PROGRESS NOTES
[facility]      Patient - Valeria Tello   MRN -  [de-identified]   Acct # - [de-identified]   - 1970      Date of Admission -  2021 10:24 AM  Date of evaluation -  2021  Room - --A   Hospital Day - [address] Delia Rangel MD Primary Care Physician - CAITY Song - CNP     Problem List      Active Hospital Problems    Diagnosis Date Noted    SIADH (syndrome of inappropriate ADH production) ([facility] [E22.2] 04/15/2021    Hyponatremia [E87.1] 04/15/2021    Renal cell cancer, unspecified laterality ([facility] [C64.9] 04/15/2021    Severe malnutrition ([facility] [E43] 2021    Pneumonia due to organism [J18.9] 2021    Acute on chronic respiratory failure with hypoxemia [facility]) [J96.21] 2021     Chief complaint:   Management of right side pleurx catheter  HPI   History Obtained From: Patient daughter, and electronic medical record. Valeria Tello is a 44 y.o. male never smoker with a complex past medical history including insulin-dependent diabetes mellitus, essential hypertension, CKD, Covid pneumonia 2021, gout, and a history of metastatic renal cell carcinoma with mets to the lungs and brain being followed by the [facility] with a chief complaint of increased shortness of breath and who we are seeing for worsening pleural effusions and septic pneumonia. Patient to the hospital for shortness of breath and gout in his elbow. States \"just overall not feeling good. \"  Affirms been having trouble breathing \"for a while\" but stated it got much worse yesterday. Needed to increase his oxygen from his baseline of 2 L up to 6 L and eventually down to 4 and 1/2 L. States that shortness of breath is improved very slightly since the previous day but is essentially the same. Worse when he sits up or tries to walk.   Denies chest pain, pleuritic chest pain, and chest tightness but states his lungs Net 1504.92 ml     I/O last 3 completed shifts: In: 3831.6 [P.O.:600; I.V.:2964.9; Blood:266.7]  Out: 2210 [Urine:600; Drains:1610]   Patient Vitals for the past 96 hrs (Last 3 readings):   Weight   04/19/21 0445 207 lb 12.8 oz (94.3 kg)   04/18/21 0345 205 lb 6.4 oz (93.2 kg)   04/17/21 0600 206 lb 12.8 oz (93.8 kg)       Exam   Physical Exam   Constitutional: No distress on 3 LPM O2 per NC. Patient appears chronically ill. Head: Normocephalic and atraumatic. Mouth/Throat: Oropharynx is clear and moist.  No oral thrush. Eyes: Conjunctivae are normal. Pupils are equal, round. No scleral icterus. Neck: Neck supple. No tracheal deviation present. Cardiovascular: S1 and S2 with no murmur. No peripheral edema  Pulmonary/Chest: Normal effort with bilateral air entry, clear breath sounds noted pleural rub at bases. No stridor. No respiratory distress. Pleurx attached to atrium at (-20) mmHg serosanguinous drainage noted in atrium. Abdominal: Soft. Bowel sounds audible. No distension or tenderness to palp. Musculoskeletal: Moves all extremities  Neurological: Patient is alert and oriented to person, place, and time. Skin: Warm and dry. PFTs   None immediately available. Sleep studies   None immediately available. Cultures    Rapid Flu A/B-Negative  Respiratory culture-Canceled  blood cultures x2 No prelim growth  Legionella antigen- Negative  strep pneumo antigen- Negative  respiratory virus antigens panel canceled  Body fluid culture-No growth   EKG   Sinus tachycardia  Low voltage QRS, consider pulmonary disease, pericardial effusion, or normal variant  Nonspecific T wave abnormality  Abnormal ECG  When compared with ECG of 23-MAR-2021 13:11,  Nonspecific T wave abnormality now evident in Lateral leads  Confirmed by JAMIE RODRIGUEZ ([id number]) on 4/13/2021 11:56:37 AM  Echocardiogram    Summary   Normal left ventricle size and systolic function. Ejection fraction was   estimated at 60 to 65%. There were no regional left ventricular wall   motion abnormalities and wall thickness was within normal limits. Left atrial size was normal.      Signature      ----------------------------------------------------------------   Electronically signed by Aakash Rocha MD (Interpreting   physician) on 04/13/2021 at 06:17 PM   ----------------------------------------------------------------      Radiology    CXR  1 view chest x-ray   04/19/2021   Impression:  1. Bilateral pulmonary infiltrates and atelectasis without gross interval change. 2. Moderate bilateral pleural effusions with interval worsening on the right. No definite residual pneumothorax. XR CHEST 1 VIEW   4/15/2021   Persistent pleural fluid at the lower bilateral chest.   Persistent atelectasis versus pneumonia at both lower lungs. 1. Loculated bilateral pleural collections without change. 2. Bilateral pulmonary infiltrates and atelectasis without change.       This document has been electronically signed by: Lázaro Muro MD on    04/14/2021 01:55 AM     CT Scans  1. Walled off, peripherally enhancing multiloculated bilateral pleural    collections and numerous peripherally enhancing low-density masses    involving the bilateral pleural spaces. Extension into the mediastinum,    and into multiple intercostal spaces. Early involvement of the    pericardium. Destructive changes of multiple ribs. Interval progression    since the prior study. Findings are compatible with extensive malignant    involvement. There could also be a component of infection. 2. Multifocal consolidation and volume loss within the lungs. Heterogeneous lung parenchyma raising the possibility of underlying    involvement with neoplasm. Multiple bilateral pulmonary nodules likely on    the basis of metastatic disease. 3. Right-sided chest tube tip within the right major fissure.    4. 5.1 cm solid mass within the right kidney, compatible with neoplasm without significant change. 5. Moderate abdominal and pelvic free fluid with interval worsening. 6. Multifocal peritoneal thickening and enhancement compatible with    malignant involvement. 7. Soft tissue infiltration of the omentum of the proximal sigmoid colon,    likely on the basis of malignant involvement. 8. Bilateral adrenal gland nodules, compatible with metastatic disease. 9. Large lytic lesion involving the right iliac bone compatible with    metastatic disease, without significant change. 10. Extensive severe edema of the soft tissues with worsening.       This document has been electronically signed by: Kassi Valverde MD on    04/13/2021 09:33 PM     Assessment   -Acute hypoxic respiratory failure due to right-sided pleural effusion  -Dyspnea and hypoxia on baseline home 2 L of oxygen.  -Right side Pleural effusion secondary to metastatic renal cell carcinoma status post a Pleurx catheter placement at [facility]. -Sepsis secondary to pneumonia. -[facility] Nephrology following   -Stage IV metastatic renal cell carcinoma    Plan   -Monitor SpO2 wean supplemental O2 to maintain SpO2 >90% baseline 2 LPM  -Monitor Pleurx output today, CXR in AM  -ID following managing ATB's   -Nephrology following giving solumedrol 250 mg x 3 days  -CTS signing off, will continue to drain pleurx daily  -Oncology and palliative care meeting with patient today     Questions and concerns addressed. Electronically signed by   CAITY Pool - CNP, MPH, [facility] on 4/19/2021 at 10:16 AM     Addendum by Dr. Maddy Driver MD:  I have seen and examined the patient independently. Face to face evaluation and examination was performed. The above evaluation and note has been reviewed. Labs and radiographs were reviewed. I Have discussed with Mr. Yessenia Manriquez CNP about this patient in detail. The above assessment and plan has been reviewed. Please see my modifications mentioned below.      My modifications:  He is on a 2 L of oxygen on nasal cannula. Incentive spirometer to use a every 4 hourly as tolerated. His right-sided Pleurx catheter was attached to atrium. CT surgery is following patient for Pleurx catheter management.     Joy Munoz MD 4/19/2021 6:33 PM

## 2021-04-19 NOTE — PLAN OF CARE
Problem: Falls - Risk of:  Goal: Will remain free from falls  Description: Will remain free from falls  Outcome: Ongoing  Note: Patient free from falls this shift with call light within reach, bed alarm on, and call light within reach. Problem: Falls - Risk of:  Goal: Absence of physical injury  Description: Absence of physical injury  Outcome: Ongoing  Note: Patient free from physical injury this shift with call light within reach, bed alarm on, and call light within reach. Problem: Grieving:  Goal: Verbalizes feelings, concerns and thoughts  Description: Verbalizes feelings, concerns and thoughts  Outcome: Ongoing  Note: Patient is very stoic but does talk to family about needs and wants. Problem: Skin Integrity:  Goal: Will show no infection signs and symptoms  Description: Will show no infection signs and symptoms  Outcome: Ongoing     Problem: Skin Integrity:  Goal: Absence of new skin breakdown  Description: Absence of new skin breakdown  Outcome: Ongoing     Problem: Respiratory:  Goal: Ability to maintain vital signs within normal range will improve  Description: Ability to maintain vital signs within normal range will improve  Outcome: Ongoing     Problem: Respiratory:  Goal: Respiratory status will improve  Description: Respiratory status will improve  Outcome: Ongoing     Problem: Discharge Planning:  Goal: Discharged to appropriate level of care  Description: Discharged to appropriate level of care  Outcome: Ongoing     Problem: Nutrition  Goal: Optimal nutrition therapy  Outcome: Ongoing  Note: Patient is very nauseated and is only keeping one ensure down a day, dietician on case. Care plan reviewed with patient and family. Patient and family verbalize understanding of the plan of care and contribute to goal setting.

## 2021-04-19 NOTE — PROGRESS NOTES
CT/CV Surgery Progress Note    2021 8:01 AM  Surgeon:  Dr. Romy Sanchez     Subjective: Mr. Benitez Gaxiola has hx of metastatic kidney cancer with recurrent pleural effusions leadiing to Pleur-X catheter being placed with 200-250 cc removed every other day, but the past couple weeks the drainage has significantly decreased and he became more SOB. Multiple doses of TPA/Dornase have been given via Pleur-X catheter with over 1610 cc out past 24 hours. I/O last 3 completed shifts: In: 3831.6 [P.O.:600; I.V.:2964.9; Blood:266.7]  Out: 2210 [Urine:600; Drains:1610]    Vital Signs: /62   Pulse 107   Temp 98.1 °F (36.7 °C) (Oral)   Resp 18   Ht 6' (1.829 m)   Wt 207 lb 12.8 oz (94.3 kg)   SpO2 94%   BMI 28.18 kg/m²    Temp (24hrs), Av.1 °F (36.7 °C), Min:97.9 °F (36.6 °C), Max:98.6 °F (37 °C)    Labs:   CBC:   Recent Labs     21  1940 21  0330 21  0330 21  1005 21  1537 21  0500   WBC 12.8* 9.7  --   --   --  5.4   HGB 7.7* 7.3*   < > 7.6* 7.5* 9.1*   HCT 24.9* 23.3*   < > 24.0* 23.9* 28.4*   MCV 84.4 85.7  --   --   --  84.5    267  --   --   --  251   INR 1.39*  --   --   --   --   --     < > = values in this interval not displayed. BMP:   Recent Labs     21  1005 21  1005 21  1537 21  1537 21  0500 21  0613   *  --  127*  --  131*  --    K 4.6  --  5.0  --  5.1  --    CL 97*  --  97*  --  99  --    CO2 20*  --  20*  --  21*  --    BUN 27*  --  29*  --  35*  --    CREATININE 2.1*  --  2.0*  --  2.2*  --    POCGLU  --    < >  --    < >  --  177*    < > = values in this interval not displayed.      Imaging:  Chest X-Ray: 2021       Intake/Output Summary (Last 24 hours) at 2021 0801  Last data filed at 2021 0510  Gross per 24 hour   Intake 3314.92 ml   Output 2210 ml   Net 1104.92 ml     Scheduled Meds:    midodrine  10 mg Oral TID WC    [Held by provider] vancomycin  1,500 mg Intravenous Q36H    4/19/2021 at 8:01 AM

## 2021-04-19 NOTE — SIGNIFICANT EVENT
Rapid response called for strokelike symptoms. Patient is Mellisa Lu, 78-year-old male with past medical history of diabetes mellitus, hypertension, CKD, sinusitis, kidney mass, collapsed lung, lung nodules, kidney cancer. He is here under the care of Dr. Daxa Brown for acute respiratory failure, right-sided pleural effusion requiring Pleurx catheter, pneumonia, CAMILLE, stage IV metastatic renal carcinoma. Rapid response was called after the bedside nurse noted the patient to be confused, unclear on his location, with slurred speech, after arousing from sleep. Of note, she reported recent Phenergan administration. The patient reported this the first time he received that medication. The episode lasted 1-2 minutes. After that, he returned to baseline rapidly. Upon my arrival, NIH stroke scale was 0. He was alert and oriented. He was in no acute acute distress. Blood glucose was WNL. He was hemodynamically stable. He was on 4 L nasal cannula. Telemetry revealed normal sinus rhythm. The patient stated he felt \"fine\". He denied any complaints. He reported feeling at his baseline. He reported that he felt like \" I was just sleeping really hard and was a bit disoriented when I woke up. \"  He thought the Phenergan may have played a role as well. He denies any cardiac history. He denied any history of seizures, stroke, TIA. Acute confusion attributed to possible Phenergan side effect. Rapid response was terminated. No indication for CT head. CMP and CBC were ordered. I updated Dr. Daxa Brown on the events.

## 2021-04-20 NOTE — PROGRESS NOTES
[facility] ICU STEPDOWN TELEMETRY 4K  Occupational Therapy  Daily Note  Time:   Time In:   Time Out: 5085  Timed Code Treatment Minutes: 24 Minutes  Minutes: 24          Date: 2021  Patient Name: Maryann Rincon,   Gender: male      Room: [facility]-A  MRN: [id number]  : 1970  (44 y.o.)  Referring Practitioner: Laury Krishnan CNP  Diagnosis: pneumonia  Additional Pertinent Hx: 44 y.o. male who was diagnosed with Stage 4 RCC since 2019, who presents with shortness of breath and worsening chills, poor appetite, onset was this past week or two. He was taken off his oral chemo pills by [facility] due to side effects. He normally uses 2 liters of oxygen, but that has been ramped up to 4L today. The duration has been constant. He also has mets to his brain, bones and his breathing is worse. He also felt chilled. No fever reported, but he was told to come here to be admitted for palliative care and further management. He also gained 10 lbs over past week or two, with possible new onset ascites. Restrictions/Precautions:  Restrictions/Precautions: General Precautions, Fall Risk  Position Activity Restriction  Other position/activity restrictions: : O2 at 4 L/min, 2L of O2 at baseline, pluerex drain on right side     SUBJECTIVE: Nurse Cheri hanna session, In bed upon arrival, family member present, agreeable to OT session    PAIN: 0/10:     Vitals: Oxygen: 2L02 90-95%    COGNITION: Slow Processing, Decreased Insight and Impaired Memory    ADL:   Upper Extremity Dressing: with set-up.  donned jacket sitting at EOB  Lower Extremity Dressing: with set-up.  donned B shoes sitting at EOB. BED MOBILITY:  Supine to Sit: Stand By Assistance HOB elevated, used bedrail    TRANSFERS:  Sit to Stand:  Contact Guard Assistance. EOB  Stand to Sit: Contact Guard Assistance. bedside chair    FUNCTIONAL MOBILITY:  Assistive Device: IV pole  Assist Level:  Contact Guard Assistance.    Distance: EOB

## 2021-04-20 NOTE — PLAN OF CARE
Problem: Nutrition  Goal: Optimal nutrition therapy  4/20/2021 1224 by Dat Abarca RD, LD  Outcome: Ongoing  Nutrition Problem #1: Moderate malnutrition  Intervention: Food and/or Nutrient Delivery: Continue Current Diet, Modify Oral Nutrition Supplement  Nutritional Goals: Pt. will consume 75% or more of meals during LOS.

## 2021-04-20 NOTE — PROGRESS NOTES
dextrose, glucagon (rDNA), dextrose, ipratropium-albuterol, traMADol, sodium chloride flush, promethazine **OR** ondansetron, polyethylene glycol, acetaminophen **OR** acetaminophen, diphenhydrAMINE    Input/Output:       I/O last 3 completed shifts: In: 4728 [P.O.:400; I.V.:1870; Blood:310]  Out: 1340 [Urine:1350; Drains:300]. Patient Vitals for the past 96 hrs (Last 3 readings):   Weight   21 0445 207 lb 12.8 oz (94.3 kg)   21 0345 205 lb 6.4 oz (93.2 kg)   21 0600 206 lb 12.8 oz (93.8 kg)       Vital Signs:   Temperature:  Temp: 97.4 °F (36.3 °C)  TMax:   Temp (24hrs), Av.7 °F (36.5 °C), Min:97.4 °F (36.3 °C), Max:98.4 °F (36.9 °C)    Respirations:  Resp: 18  Pulse:   Pulse: 94  BP:    BP: 112/75  BP Range: Systolic (97JUX), GA , Min:102 , MASOOD:403       Diastolic (71WEN), PS, Min:61, Max:76      Physical Examination:     General:  Awake, alert, pleasant  HEENT: NC/AT/ MMM  Chest:             Right sided chest tube, diminished  Cardiac:  S1 S2 tachycardic  Abdomen: Soft, non-tender,  Neuro:  No facial droop, No Asterixis  SKIN:  No rashes, good skin turgor. Extremities:  Trace LE edema    Labs:       Recent Labs     21  0330 21  0330 21  1537 21  0500 21  0450   WBC 9.7  --   --  5.4 11.3*   RBC 2.72*  --   --  3.36* 2.38*   HGB 7.3*   < > 7.5* 9.1* 6.5*   HCT 23.3*   < > 23.9* 28.4* 20.8*   MCV 85.7  --   --  84.5 87.4   MCH 26.8  --   --  27.1 27.3   MCHC 31.3*  --   --  32.0* 31.3*     --   --  251 413*   MPV 10.1  --   --  9.8 9.8    < > = values in this interval not displayed. BMP:   Recent Labs     21  1537 21  0500 21  0405   * 131* 132*   K 5.0 5.1 4.6   CL 97* 99 101   CO2 20* 21* 21*   BUN 29* 35* 43*   CREATININE 2.0* 2.2* 2.4*   GLUCOSE 163* 159* 163*   CALCIUM 6.8* 7.6* 7.8*      Phosphorus:   No results for input(s): PHOS in the last 72 hours.   Magnesium:  No results for input(s): MG in the last 72 hours. Albumin:    Recent Labs     04/18/21  1537   LABALBU 2.2*     BNP:    No results found for: BNP  MICHAEL:    No results found for: MICHAEL  SPEP:  Lab Results   Component Value Date    PROT 4.1 04/18/2021     UPEP:   No results found for: LABPE  C3:   No results found for: C3  C4:   No results found for: C4  MPO ANCA:   No results found for: MPO  PR3 ANCA:   No results found for: PR3  Anti-GBM:   No results found for: GBMABIGG  Hep BsAg:       No results found for: HEPBSAG  Hep C AB:        No results found for: HEPCAB    Urinalysis/Chemistries:      Lab Results   Component Value Date    NITRU NEGATIVE 04/18/2021    COLORU YELLOW 04/18/2021    PHUR 5.0 04/18/2021    LABCAST 4-8 HYALINE 04/18/2021    LABCAST 0-4 C. GRAN 04/18/2021    WBCUA 0-2 04/18/2021    RBCUA NONE 04/18/2021    MUCUS THREADS 07/22/2019    YEAST NONE SEEN 04/18/2021    BACTERIA NONE SEEN 04/18/2021    SPECGRAV 1.021 04/18/2021    LEUKOCYTESUR NEGATIVE 04/18/2021    UROBILINOGEN 0.2 04/18/2021    BILIRUBINUR NEGATIVE 04/18/2021    BLOODU NEGATIVE 04/18/2021    GLUCOSEU NEGATIVE 07/22/2019    KETUA TRACE 04/18/2021    AMORPHOUS DEBRIS 04/18/2021     Urine Sodium:   No results found for: NEVAEH  Urine Potassium:  No results found for: KUR  Urine Chloride:  No results found for: CLUR  Urine Osmolarity:   Lab Results   Component Value Date    OSMOU 796 04/13/2021     Urine Protein:   No components found for: TOTALPROTEIN, URINE   Urine Creatinine:   No results found for: LABCREA  Urine Eosinophils:  No components found for: UEOS        Impression and Plan:  1. CAMILLE: worsening. This is likely ATN from vancomycin nephrotoxicity compounded by recent hypotension however cannot rule out underlying AIN from [address] [place] [address]. Started on empiric steroids. Continue Midodrine. Avoid nephrotoxic agents. No indication for RRT at this time. Reduce fluids to 40 ml/hr since he will be receiving more blood products today. 2. Hyperkalemia: improved  3.  Hyponatremia from decreased water excretion from CAMILLE: stable  4. Hypotension likely from blood loss  5. Anemia : receiving another blood transfusion  6. Malignant pleural effusion  7. Acute on chronic hypoxic resp failure  8. Metastatic RCC        Please don't hesitate to call with any questions.   Electronically signed by Ady Levine DO on 4/20/2021 at 8:56 AM

## 2021-04-20 NOTE — PROGRESS NOTES
This RN locked out of patient's MAR due to system issue. Patient given 1 unit Humalog sliding scale per order. Witnessed by Anny Agustin RN.

## 2021-04-20 NOTE — PROGRESS NOTES
[address] [address]  INPATIENT PHYSICAL THERAPY  DAILY NOTE  [facility] ICU STEPDOWN TELEMETRY 4K - 4K-01001-A      Time In: 1316  Time Out: 1346  Timed Code Treatment Minutes: 30 Minutes  Minutes: 30          Date: 2021  Patient Name: Gary Sparrow,  Gender:  male        MRN: [id number]  : 1970  (44 y.o.)     Referring Practitioner: CAITY Perez CNP  Diagnosis: Pneumonia  Additional Pertinent Hx: Per ED notes, pt \"is a 44 y.o. male who was diagnosed with Stage 4 RCC since 2019, who presents with shortness of breath and worsening chills, poor appetite, onset was this past week or two. He was taken off his oral chemo pills by [facility] due to side effects. He normally uses 2 liters of oxygen, but that has been ramped up to 4L today. The duration has been constant. He also has mets to his brain, bones and his breathing is worse. He also felt chilled. No fever reported, but he was told to come here to be admitted for palliative care and further management. He also gained 10 lbs over past week or two, with possible new onset ascites. \"     Prior Level of Function:  Lives With: Spouse  Type of Home: House  Home Layout: One level  Home Access: Stairs to enter without rails  Entrance Stairs - Number of Steps: 1 step with a rail  Home Equipment: Oxygen   Bathroom Shower/Tub: Tub/Shower unit  Bathroom Toilet: Standard  Bathroom Accessibility: Accessible    ADL Assistance: Independent  Homemaking Assistance: Independent  Ambulation Assistance: Independent  Transfer Assistance: Independent  Active : Yes  IADL Comments: Pt has to complete simple meal prep while spouse is at work  Additional Comments: Pt stating he is indep with all ADL tasks and did not use AD prior. Pt on 2L of O2 at baseline. Spouse works during day of hours 6-230 or 6-430. Daughter lives approx 1 hour away.  Daughter concerned regarding pt being home alone for that long of

## 2021-04-20 NOTE — PROGRESS NOTES
INTERNAL MEDICINE SPECIALTIES  Progress Note For Dr Leroy Canales       Patient:  Dayan Hickey  YOB: 1970  Date of Service: 4/20/2021  MRN: [id number]   Acct:  [de-identified]   Primary Care Physician: CAITY Jenkins CNP    SUBJECTIVE:           Home Medications:   No current facility-administered medications on file prior to encounter. Current Outpatient Medications on File Prior to Encounter   Medication Sig Dispense Refill    traMADol (ULTRAM) 50 MG tablet Take 50 mg by mouth every 6 hours as needed for Pain.       traZODone (DESYREL) 50 MG tablet Take 50 mg by mouth nightly      loratadine (CLARITIN) 10 MG capsule Take 10 mg by mouth daily      Cabozantinib S-Malate (CABOMETYX) 40 MG TABS Take by mouth daily      indomethacin (INDOCIN) 25 MG capsule Take 25 mg by mouth as needed for Pain      levothyroxine (SYNTHROID) 25 MCG tablet Take 25 mcg by mouth Daily      ALLOPURINOL PO Take 100 mg by mouth nightly       omeprazole (PRILOSEC) 40 MG delayed release capsule Take 40 mg by mouth daily      levothyroxine (SYNTHROID) 100 MCG tablet Take 25 mcg by mouth Daily       amLODIPine (NORVASC) 5 MG tablet Take 5 mg by mouth daily      colchicine (COLCRYS) 0.6 MG tablet Take 1 tablet by mouth daily for 3 days 3 tablet 0         Scheduled Meds:   midodrine  10 mg Oral TID WC    cefepime  2,000 mg Intravenous Q12H    famotidine  20 mg Oral Daily    allopurinol  100 mg Oral BID    methylPREDNISolone (SOLU-MEDROL) IVPB  250 mg Intravenous Daily    insulin lispro  0-6 Units Subcutaneous TID WC    insulin lispro  0-3 Units Subcutaneous Nightly    traZODone  50 mg Oral Nightly    sodium chloride flush  5-40 mL Intravenous 2 times per day    levothyroxine  62.5 mcg Oral Daily     Continuous Infusions:   sodium chloride      sodium chloride      dextrose      sodium chloride 60 mL/hr at 04/20/21 0410     PRN Meds:sodium chloride, sodium chloride, glucose, dextrose, glucagon Calcium 7.8 (L) 8.5 - 10.5 mg/dL   Anion Gap    Collection Time: 04/20/21  4:05 AM   Result Value Ref Range    Anion Gap 10.0 8.0 - 16.0 meq/L   Glomerular Filtration Rate, Estimated    Collection Time: 04/20/21  4:05 AM   Result Value Ref Range    Est, Glom Filt Rate 29 (A) ml/min/1.73m2   SPECIMEN REJECTION    Collection Time: 04/20/21  4:43 AM   Result Value Ref Range    Rejected Test cbcwd     Reason for Rejection see below    CBC Auto Differential    Collection Time: 04/20/21  4:50 AM   Result Value Ref Range    WBC 11.3 (H) 4.8 - 10.8 thou/mm3    RBC 2.38 (L) 4.70 - 6.10 mill/mm3    Hemoglobin 6.5 (LL) 14.0 - 18.0 gm/dl    Hematocrit 20.8 (LL) 42.0 - 52.0 %    MCV 87.4 80.0 - 94.0 fL    MCH 27.3 26.0 - 33.0 pg    MCHC 31.3 (L) 32.2 - 35.5 gm/dl    RDW-CV 18.0 (H) 11.5 - 14.5 %    RDW-SD 57.2 (H) 35.0 - 45.0 fL    Platelets 804 (H) 040 - 400 thou/mm3    MPV 9.8 9.4 - 12.4 fL    Seg Neutrophils 90.6 %    Lymphocytes 4.7 %    Monocytes 3.8 %    Eosinophils 0.0 %    Basophils 0.1 %    Immature Granulocytes 0.8 %    Segs Absolute 10.2 (H) 1 - 7 thou/mm3    Lymphocytes Absolute 0.5 (L) 1.0 - 4.8 thou/mm3    Monocytes Absolute 0.4 0.4 - 1.3 thou/mm3    Eosinophils Absolute 0.0 0.0 - 0.4 thou/mm3    Basophils Absolute 0.0 0.0 - 0.1 thou/mm3    Immature Grans (Abs) 0.09 (H) 0.00 - 0.07 thou/mm3    nRBC 0 /100 wbc   POCT glucose    Collection Time: 04/20/21  7:41 AM   Result Value Ref Range    POC Glucose 164 (H) 70 - 108 mg/dl       Radiology:     Echo Complete 2d W Doppler W Color    Result Date: 4/13/2021  Transthoracic Echocardiography Report (TTE)  Demographics   Patient Name   [address] [facility] Gender              Male                 P   MR #           [id number]         Race                                                    Ethnicity   Account #      [de-identified]         Room Number         4043   Accession      [id number]        Date of Study       04/13/2021  Number   Date of Birth  1970 separation. DOPPLER: There was no evidence of tricuspid stenosis. There was no  evidence of tricuspid regurgitation. Pulmonic Valve  The pulmonic valve leaflets exhibited normal thickness, no calcification,  and normal cuspal separation. DOPPLER: The transpulmonic velocity was  within the normal range with no evidence for regurgitation. Left Atrium  Left atrial size was normal.   Left Ventricle  Normal left ventricle size and systolic function. Ejection fraction was  estimated at 60 to 65%. There were no regional left ventricular wall  motion abnormalities and wall thickness was within normal limits. Right Atrium  Right atrial size was normal.   Right Ventricle  The right ventricular size was normal with normal systolic function and  wall thickness. Pericardial Effusion  The pericardium was normal in appearance with no evidence of a pericardial  effusion. Pleural Effusion  No evidence of pleural effusion. Aorta / Great Vessels  -Aortic root dimension within normal limits.  -The Pulmonary artery is within normal limits. -IVC size is within normal limits with normal respiratory phasic changes.   M-Mode/2D Measurements & Calculations   LV Diastolic    LV Systolic Dimension: 2.7  AV Cusp Separation: 2.3 cmLA  Dimension: 4 cm cm                          Dimension: 3.3 cmAO Root  LV FS:32.5 %    LV Volume Diastolic: 70 ml  Dimension: 3.8 cm  LV PW           LV Volume Systolic: 27 ml  Diastolic: 0.9  LV EDV/LV EDV Index: 70  cm              ml/32 m^2LV ESV/LV ESV  Septum          Index: 27 ml/12 m^2         RV Diastolic Dimension: 2 cm  Diastolic: 1 cm EF Calculated: 61.4 %                                              LA/Aorta: 0.87  Doppler Measurements & Calculations   MV Peak E-Wave: 74.6 cm/s  AV Peak Velocity: 150 LVOT Peak Velocity: 130  MV Peak A-Wave: 98.1 cm/s  cm/s                  cm/s  MV E/A Ratio: 0.76         AV Peak Gradient: 9   LVOT Peak Gradient: 7  MV Peak Gradient: 2.23     mmHg mmHg  mmHg                                                   TV Peak E-Wave: 62.6 cm/s  MV Deceleration Time: 345                        TV Peak A-Wave: 88.7 cm/s  msec  MV P1/2t: 101 msec                               TV Peak Gradient: 1.57  MVA by PHT:2.18 cm^2                             mmHg                                                   TR Velocity:247 cm/s  MV E' Septal Velocity: 7.1 AV DVI (Vmax):0.87    TR Gradient:24.4 mmHg  cm/s                                             PV Peak Velocity: 75 cm/s  MV A' Septal Velocity:                           PV Peak Gradient: 2.25  10.8 cm/s                                        mmHg  MV E' Lateral Velocity:  8.9 cm/s  MV A' Lateral Velocity:  12.8 cm/s  E/E' septal: 10.51  E/E' lateral: 8.38  [email or web address] [id number]    Ct Abdomen Pelvis W Iv Contrast Additional Contrast? None    Result Date: 4/13/2021  * ADDENDUM #1 *  This report was discussed with Sierra Oneil RN on Apr 13, 2021 21:43:00 EDT. This document has been electronically signed by: Beth Watkins on 04/13/2021 09:43 PM *  ORIGINAL REPORT * CT abdomen and pelvis with contrast Comparison:  CT,KOSR  - CT ABDOMEN PELVIS W IV CONTRAST  - 03/23/2021 02:48 PM EDT Findings: Walled off, peripherally enhancing multiloculated bilateral pleural collections and numerous peripherally enhancing low-density masses involving the bilateral pleural spaces. Extension into the mediastinum, and into multiple intercostal spaces. Early involvement of the pericardium. Destructive changes of multiple ribs. Interval progression since the prior study. Multifocal consolidation and volume loss within the lungs. Heterogeneous lung parenchyma. Multiple bilateral pulmonary nodules. Multiple calcified granulomas within the spleen. Unremarkable liver and pancreas.   Large solid mass within the right kidney without change. Horseshoe kidney noted. Small bilateral adrenal nodules unchanged. Colonic diverticulosis. No pneumatosis or bowel obstruction. Unremarkable bladder and prostate gland. Nonvisualization of the appendix. No acute fracture. Postsurgical changes at L5. Multifocal lytic metastases to bony structures. 1. Walled off, peripherally enhancing multiloculated bilateral pleural collections and numerous peripherally enhancing low-density masses involving the bilateral pleural spaces. Extension into the mediastinum, and into multiple intercostal spaces. Early involvement of the pericardium. Destructive changes of multiple ribs. Interval progression since the prior study. Findings are compatible with extensive malignant involvement. There could also be a component of infection. 2. Multifocal consolidation and volume loss within the lungs. Heterogeneous lung parenchyma raising the possibility of underlying involvement with neoplasm. Multiple bilateral pulmonary nodules likely on the basis of metastatic disease. 3. Right-sided chest tube tip within the right major fissure. 4. 5.1 cm solid mass within the right kidney, compatible with neoplasm without significant change. 5. Moderate abdominal and pelvic free fluid with interval worsening. 6. Multifocal peritoneal thickening and enhancement compatible with malignant involvement. 7. Soft tissue infiltration of the omentum of the proximal sigmoid colon, likely on the basis of malignant involvement. 8. Bilateral adrenal gland nodules, compatible with metastatic disease. 9. Large lytic lesion involving the right iliac bone compatible with metastatic disease, without significant change. 10. Extensive severe edema of the soft tissues with worsening.  This document has been electronically signed by: Luke Valencia MD on 04/13/2021 09:33 PM All CTs at this facility use dose modulation techniques and iterative reconstructions, and/or weight-based dosing when appropriate to reduce radiation to a low as reasonably achievable. Xr Chest Portable    Result Date: 4/14/2021  1 view chest x-ray Comparison:  CR,SR  - XR CHEST PORTABLE  - 04/13/2021 11:16 AM EDT Findings: Loculated bilateral pleural collections without gross interval change. Airspace filling within the bilateral mid and lower lungs without change. Enlarged cardiomediastinal silhouette without change. Destructive changes of multiple left-sided ribs without change. No acute bony abnormality. 1. Loculated bilateral pleural collections without change. 2. Bilateral pulmonary infiltrates and atelectasis without change. This document has been electronically signed by: Sean Douglass MD on 04/14/2021 01:55 AM    Xr Chest Portable    Result Date: 4/13/2021  PROCEDURE: XR CHEST PORTABLE CLINICAL INFORMATION: Shortness of breath TECHNIQUE: Mobile AP chest radiograph. COMPARISON: AP and lateral chest radiographs 20/3/2020 FINDINGS: Cardiac silhouette is obscured by opacities at the bilateral lung bases. The bilateral costophrenic angles are blunted. Hazy and reticular opacities are present in the mid and lower lungs. There is a new density at the right lung apex, likely a  focal infiltrate. Multiple nodules seen on CT of the chest from 3/23/2021 are not clearly visible on the current study. Bilateral pleural thickening is noted. Degenerative changes in the thoracic spine are poorly visualized. 1. Small to moderate-sized bilateral pleural effusions with adjacent atelectasis/infiltrate. 2. New density at the right lung apex, likely focal infiltrate. **This report has been created using voice recognition software. It may contain minor errors which are inherent in voice recognition technology. ** Final report electronically signed by Dr. Shelley Vitale on 4/13/2021 11:28 AM    4/20/2021 CXR   Findings:   Bilateral pleural effusions and opacities at the lung bases are similar to    the prior exam.   Right lung base tunneled pleural catheter, and right internal jugular    central venous catheter in the SVC again noted. Cardiomegaly. No acute fracture           ASSESMENT:      Active Problems:    Pneumonia due to organism    Acute on chronic respiratory failure with hypoxemia (HCC)    Severe malnutrition (HCC)    SIADH (syndrome of inappropriate ADH production) (HCC)    Hyponatremia    Renal cell cancer, unspecified laterality ([facility] [address]  Resolved Problems:    * No resolved hospital problems. *  OTHER PROBLEMS:    - Metastatic clear cell renal carcinoma to  lungs, pleura, adrenal glands and bone with loculated malignant pleural effusion, omentum, pericardial wall. -  Gout   -  Hypothyroidism   -  Hyponatremia likely related to SIADH      PLAN:    Creatinine worsened today at 2.4mg/dl, to have 1 unit PRBC, nephrology Service following on account of the CAMILLE. Suspected to be secondary to ATN, cannot rule out AIN from opdivo. Started empirically on steroids and also midodrine for blood pressure support. .  His vancomycin has since been discontinued. Renal ultrasound had shown no hydronephrosis. CXR stable; Patient being followed by the cardiothoracic service on account of the loculated pleural effusion s/p TPA , continue pleural fluid drainage via PleurX catheter,  Continue with bronchodilators, O2, broad spectrum antibiotics to cover  HAP,   Pulmonology and ID services following. Legionella ,strep pneumonia urinary antigens & rapid influenza screen were all negative  Follow-up blood cultures, sputum gram stain c/s, Incentive spirometry , acapella. Hyperglycemia is steroid related.      Seen by Oncologist , started on opdivo to receive 240 mg r2qwcle      The patient's 2D echocardiogram showed normal EF, no evidence of pericardial effusion    Overall prognosis is very poor, palliative Care consult in place            DVT prophylaxis: [x] Lovenox                                 [] SCDs                                 [] SQ Heparin [] Encourage ambulation, low risk for DVT, no chemical or mechanical prophylaxis necessary              [] Already on Anticoagulation                Anticipated Disposition upon discharge: [] Home                                                                         [] Home with Home Health                                                                         [] [facility]                                                                         [] [address] [place], [place] [address]          Electronically signed by Brooke Galvan MD on 4/20/2021 at 7:59 AM

## 2021-04-20 NOTE — PROGRESS NOTES
Progress note: Infectious diseases    Patient - Bulmaro Paulino,  Age - 44 y.o.    - 1970      Room Number - 4K-01/001-A   MRN -  [id number]   Acct # - [de-identified]  Date of Admission -  2021 10:24 AM    SUBJECTIVE:   No new issues. OBJECTIVE   VITALS    height is 6' (1.829 m) and weight is 207 lb 12.8 oz (94.3 kg). His oral temperature is 97.4 °F (36.3 °C). His blood pressure is 112/75 and his pulse is 94. His respiration is 18 and oxygen saturation is 96%. Wt Readings from Last 3 Encounters:   21 207 lb 12.8 oz (94.3 kg)   21 192 lb 6.4 oz (87.3 kg)   02/15/21 200 lb (90.7 kg)      General Appearance  Awake, alert, oriented, chronically sick looking.   HEENT - normocephalic, atraumatic, pale  conjunctiva,  anicteric sclera  Neck - Supple, no mass  Lungs -  Bilateral   air entry, diminished breath sound, right side chest tube drainage  Cardiovascular - Heart sounds are normal.     Abdomen -edematous abdominal wall  Neurologic -oriented  Skin - No bruising or bleeding  Extremities - trace edema    MEDICATIONS:      midodrine  10 mg Oral TID WC    cefepime  2,000 mg Intravenous Q12H    famotidine  20 mg Oral Daily    allopurinol  100 mg Oral BID    methylPREDNISolone (SOLU-MEDROL) IVPB  250 mg Intravenous Daily    insulin lispro  0-6 Units Subcutaneous TID WC    insulin lispro  0-3 Units Subcutaneous Nightly    traZODone  50 mg Oral Nightly    sodium chloride flush  5-40 mL Intravenous 2 times per day    levothyroxine  62.5 mcg Oral Daily      sodium chloride      sodium chloride      dextrose      sodium chloride 40 mL/hr at 21 0910     sodium chloride, sodium chloride, glucose, dextrose, glucagon (rDNA), dextrose, ipratropium-albuterol, traMADol, sodium chloride flush, promethazine **OR** ondansetron, polyethylene glycol, acetaminophen **OR** acetaminophen, diphenhydrAMINE      LABS:     CBC:   Recent Labs     04/18/21  0330 04/18/21  0330 04/18/21  1537 04/19/21  0500 04/20/21  0450   WBC 9.7  --   --  5.4 11.3*   HGB 7.3*   < > 7.5* 9.1* 6.5*     --   --  251 413*    < > = values in this interval not displayed. BMP:    Recent Labs     04/18/21  1537 04/19/21  0500 04/20/21  0405   * 131* 132*   K 5.0 5.1 4.6   CL 97* 99 101   CO2 20* 21* 21*   BUN 29* 35* 43*   CREATININE 2.0* 2.2* 2.4*   GLUCOSE 163* 159* 163*     Calcium:  Recent Labs     04/20/21  0405   CALCIUM 7.8*    Hepatic:   Recent Labs     04/18/21  1537   ALKPHOS 44   ALT 8*   AST 27   PROT 4.1*   BILITOT 0.3   LABALBU 2.2*      No results for input(s): CKTOTAL, CKMB, TROPONINI in the last 72 hours. CULTURES:   UA:   Recent Labs     04/18/21  1530   SPECGRAV 1.021   PHUR 5.0   COLORU YELLOW   PROTEINU 30*   BLOODU NEGATIVE   RBCUA NONE   WBCUA 0-2   BACTERIA NONE SEEN   NITRU NEGATIVE   BILIRUBINUR NEGATIVE   UROBILINOGEN 0.2   KETUA TRACE*   LABCAST 4-8 HYALINE  0-4 C. GRAN     Micro:   Lab Results   Component Value Date    BC No growth-preliminary No growth  04/14/2021          Problem list of patient:     Patient Active Problem List   Diagnosis Code    Type 2 diabetes mellitus treated without insulin ([facility] [address] E11.9    Essential hypertension I10    Chronic kidney disease N18.9    CKD (chronic kidney disease), stage III N18.30    Intracranial arachnoid cysts 4th ventricle G93.0    Headache R51.9    Lt facial numbness R20.0    Right renal mass N28.89    Hematuria R31.9    Lung nodules R91.8    Hydropneumothorax J94.8    Adrenal nodule (HCC) E27.8    Shortness of breath R06.02    Lymph node disorder I89.9    Respiratory distress R06.03    Pleural nodules R22.2    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    Leukocytosis D72.829    Cyst of brain G93.0    Pneumonia due to organism J18.9    Acute on chronic respiratory failure with hypoxemia (HCC) J96.21    Severe malnutrition ([facility] [address] E43  SIADH (syndrome of inappropriate ADH production) (HCC) E22.2    Hyponatremia E87.1    Renal cell cancer, unspecified laterality (HCC) C64.9         ASSESSMENT/PLAN   Metastatic renal cell cancer: evaluated by Oncology, started on  Opdivo. Recurrent pleural effusion: right side pleurex. seen by [facility]:   Anemia:   Stop antibiotic at am        Sabina Flowers MD, Naa Odell 4/20/2021 9:33 AM

## 2021-04-20 NOTE — CARE COORDINATION
DISCHARGE/PLANNING EVALUATION  4/20/21, 10:55 AM EDT    Reason for Referral:  \"new Interim HH\"  Mental Status:  Alert and oriented  Decision Making:  Makes decisions with his family  Family/Social/Home Environment:  YAMILE spoke to patient and his son, present in the home. He states the home is one story without a basement. He has been on short term disability and states this should stop on June 2 and then he should start long term disability which should last a year. His wife works day shift. She takes care of all housekeeping. He is able to manage a lunch meal. He had been able to shower in the bathtub shower and states his wife just recently got a shower seat. He does not have a grab bar. He will sometimes wait to take his shower when wife is home. He was not using any DME to ambulate but due his wife's parent's death, he now has a lift chair and has a walker but was not using the walker. He does have home o2, 2 Liters from [employer], transportation and housekeeping:  See above  Current Equipment: see above  Payment Source: BCBS  Concerns or Barriers to Discharge:  He wants to return home. He states his brother-in-law, who works from home, can come and help if called, his neighbor, who is also an RN, comes to the home every other night to assist. He states he also has a back up to this person if needed. YAMILE advised that he should have a schedule of people who will call him when he gets home so that he does not need to call them. We discussed hired help and the family is aware it is out of pocket. YAMILE advised them on the [facility] and how the visits would occur. They requested that the insurance would be checked regarding Palliative Care visits  Post acute provider list with quality measures, geographic area and applicable managed care information provided.   Questions regarding selection process answered: not at this time    Teach Back Method used with patient and son regarding care plan

## 2021-04-21 NOTE — PROGRESS NOTES
[facility] ICU STEPDOWN TELEMETRY 4K  Occupational Therapy  Daily Note  Time:  Time In: 8354  Time Out: 5192  Timed Code Treatment Minutes: 32 Minutes  Minutes: 31          Date: 2021  Patient Name: Bulmaro Paulino,   Gender: male      Room: -01/001-A  MRN: [id number]  : 1970  (44 y.o.)  Referring Practitioner: Kwasi Chow CNP  Diagnosis: pneumonia  Additional Pertinent Hx: 44 y.o. male who was diagnosed with Stage 4 RCC since 2019, who presents with shortness of breath and worsening chills, poor appetite, onset was this past week or two. He was taken off his oral chemo pills by [facility] due to side effects. He normally uses 2 liters of oxygen, but that has been ramped up to 4L today. The duration has been constant. He also has mets to his brain, bones and his breathing is worse. He also felt chilled. No fever reported, but he was told to come here to be admitted for palliative care and further management. He also gained 10 lbs over past week or two, with possible new onset ascites. Restrictions/Precautions:  Restrictions/Precautions: General Precautions, Fall Risk  Position Activity Restriction  Other position/activity restrictions: : O2 at 4 L/min, 2L of O2 at baseline, pluerex drain on right side     SUBJECTIVE: Pt. Nurse okayed OT treatment. Pt. Seated on EOB upon arrival and agreeable to OT treatment. PAIN: discomfort at drain site on R side nurse present and aware    Vitals: Vitals not assessed per clinical judgement, see nursing flowsheet    COGNITION: Slow Processing, Decreased Insight and Impaired Memory    ADL:   Toileting: Contact Guard Assistance and with set-up. to stand with support of grab bar to complete toileting. .  Grooming: Hand hygiene at sink with Contact Guard Asss  BALANCE:  Sitting Balance:  Supervision. Standing Balance: Contact Guard Assistance. BED MOBILITY:  Not Tested    TRANSFERS:  Sit to Stand:  Stand By Assistance. Stand to Sit: Stand By Assistance. FUNCTIONAL MOBILITY:  Assistive Device: None  Assist Level:  Contact Guard Assistance. Distance: To and from bathroom and and HH distance on unit, slow and steady pace       ADDITIONAL ACTIVITIES:  Pt. Declined further activity returned to recliner chair. ASSESSMENT:     Activity Tolerance:  Patient tolerance of  treatment: fair. Discharge Recommendations: Patient would benefit from continued therapy after discharge, Continue to assess pending progress(Pt not safe to return home alone at this time. Initaited education on [facility] services with pt stating they have been talking about it.)   Equipment Recommendations: Equipment Needed: Yes  Other: rolling walker, BSC and wheelchair  Plan: Times per week: 5x  Current Treatment Recommendations: Endurance Training, Strengthening, Patient/Caregiver Education & Training, Self-Care / ADL, Balance Training, Functional Mobility Training, Safety Education & Training  Plan Comment: will attempt to see 5x week as long as pt can tolerate it    Patient Education  Patient Education: ADL's and safety with functional mobility and transfers. Goals  Short term goals  Time Frame for Short term goals: by discharge  Short term goal 1: Pt to complete bathing tasks with min a and min cues for energy conservation tech or pursed lip breathing throughout  Short term goal 2: Pt to dmeo dynamic standing balance > 3 min with CGA and no UE support in prep for completing simple meal prep  Short term goal 3: Pt to increase endurance to maintain O2 sats above 88% while on O2 during ADL tasks wiht min cues for breathing tech and marilyn for rest breaks to increase ease of ADL Tasksk    Following session, patient left in safe position with all fall risk precautions in place.

## 2021-04-21 NOTE — PLAN OF CARE
Problem: Falls - Risk of:  Goal: Will remain free from falls  Description: Will remain free from falls  4/21/2021 1117 by Yung Gonzales RN  Outcome: Ongoing  Note: No falls this shift. Patient tolerating acitivity when up with therapy   Bed alarm in use when in bed. Chair alarm in use when up to chair. Patient is able to make use of his call light when needed      Problem: Grieving:  Goal: Verbalizes feelings, concerns and thoughts  Description: Verbalizes feelings, concerns and thoughts  4/21/2021 1117 by Yung Gonzales RN  Outcome: Ongoing  Note: Patient has poor prognosis and stage IV cancer involving the lungs, liver and kidneys. Palliative care to discuss CODE status and possible home comfort needs. Problem: Skin Integrity:  Goal: Will show no infection signs and symptoms  Description: Will show no infection signs and symptoms  4/21/2021 1117 by Yung Gonzales RN  Outcome: Ongoing  Note: No new skin breakdown noted. Patient is able to turn and reposition with minimal assist      Problem: Respiratory:  Goal: Ability to maintain vital signs within normal range will improve  Description: Ability to maintain vital signs within normal range will improve  4/21/2021 1117 by Yung Gonzales RN  Outcome: Ongoing  Note: Pleurex drain to suction however planning to wean off from suction to just atrium and possibly cap pleurex drain and plan for home later tomorrow. O2 currently at 2L/NC, will attempt to wean however patient states he usually has home O2 at 2L/NC. Patient denies any shortness of breath while at rest   Denies any chest pain. Problem: Discharge Planning:  Goal: Discharged to appropriate level of care  Description: Discharged to appropriate level of care  4/21/2021 1117 by Yung Gonzales RN  Outcome: Ongoing  Note: Patient is from home with home health.    Possibly being discharged home with pleurex drain for palliative care comfort      Care Plan reviewed in detail with patient. Patient has verbalized understanding regarding current treatment plan and is compliant with current treatment.

## 2021-04-21 NOTE — PROGRESS NOTES
INTERNAL MEDICINE SPECIALTIES  Progress Note For Dr Ki Weinstein       Patient:  Carl Burn  YOB: 1970  Date of Service: 4/21/2021  MRN: [id number]   Acct:  [de-identified]   Primary Care Physician: CAITY Orantes CNP    SUBJECTIVE: Feels a bit better, appetite better          Home Medications:   No current facility-administered medications on file prior to encounter. Current Outpatient Medications on File Prior to Encounter   Medication Sig Dispense Refill    traMADol (ULTRAM) 50 MG tablet Take 50 mg by mouth every 6 hours as needed for Pain.       traZODone (DESYREL) 50 MG tablet Take 50 mg by mouth nightly      loratadine (CLARITIN) 10 MG capsule Take 10 mg by mouth daily      Cabozantinib S-Malate (CABOMETYX) 40 MG TABS Take by mouth daily      indomethacin (INDOCIN) 25 MG capsule Take 25 mg by mouth as needed for Pain      levothyroxine (SYNTHROID) 25 MCG tablet Take 25 mcg by mouth Daily      ALLOPURINOL PO Take 100 mg by mouth nightly       omeprazole (PRILOSEC) 40 MG delayed release capsule Take 40 mg by mouth daily      levothyroxine (SYNTHROID) 100 MCG tablet Take 25 mcg by mouth Daily       amLODIPine (NORVASC) 5 MG tablet Take 5 mg by mouth daily      colchicine (COLCRYS) 0.6 MG tablet Take 1 tablet by mouth daily for 3 days 3 tablet 0         Scheduled Meds:   predniSONE  40 mg Oral Daily    epoetin yumiko-epbx  3,000 Units Subcutaneous Once per day on Mon Wed Fri    Or    epoetin yumiko-epbx  10,000 Units Subcutaneous Once per day on Mon Wed Fri    midodrine  10 mg Oral TID WC    cefepime  2,000 mg Intravenous Q12H    famotidine  20 mg Oral Daily    allopurinol  100 mg Oral BID    insulin lispro  0-6 Units Subcutaneous TID WC    insulin lispro  0-3 Units Subcutaneous Nightly    traZODone  50 mg Oral Nightly    sodium chloride flush  5-40 mL Intravenous 2 times per day    levothyroxine  62.5 mcg Oral Daily     Continuous Infusions:   sodium chloride  sodium chloride      dextrose       PRN Meds:sodium chloride, sodium chloride, glucose, dextrose, glucagon (rDNA), dextrose, ipratropium-albuterol, traMADol, sodium chloride flush, promethazine **OR** ondansetron, polyethylene glycol, acetaminophen **OR** acetaminophen, diphenhydrAMINE        Allergies:  Patient has no known allergies. OBJECTIVE:    Vitals:   Vitals:    04/21/21 0815   BP: 131/88   Pulse: 105   Resp: 18   Temp: 98.7 °F (37.1 °C)   SpO2: 96%      BMI: Body mass index is 29.04 kg/m². PHYSICAL EXAMINATION:            General appearance: ill looking male mild respiratory distress*, appears stated age and cooperative. HEENT:  Normal cephalic, atraumatic without obvious deformity. Pupils equal, round, and reactive to light. Extra ocular muscles intact. Conjunctivae/corneas clear. Neck: Supple. Chest:  PleurX catheter right chest  Respiratory:  Diminished air entry bilaterally  Cardiovascular:  Regular rhythm with normal S1/S2 without  rubs or gallops. Abdomen:  Full,soft, non-tender, non-distended with normal bowel sounds. Musculoskeletal:  No clubbing, cyanosis or ankle edema bilaterally.   Has some swelling and tenderness right elbow  Neurologic:   Alert and oriented x3 with no gross focal deficits  Psychiatric: Thought content appropriate, normal insight      Review of Labs and Diagnostic Testing:    Recent Results (from the past 24 hour(s))   Hemoglobin and hematocrit, blood    Collection Time: 04/20/21  9:52 AM   Result Value Ref Range    Hemoglobin 7.7 (L) 14.0 - 18.0 gm/dl    Hematocrit 24.8 (L) 42.0 - 52.0 %   POCT glucose    Collection Time: 04/20/21 12:03 PM   Result Value Ref Range    POC Glucose 163 (H) 70 - 108 mg/dl   POCT glucose    Collection Time: 04/20/21  4:47 PM   Result Value Ref Range    POC Glucose 263 (H) 70 - 108 mg/dl   POCT glucose    Collection Time: 04/20/21  7:39 PM   Result Value Ref Range    POC Glucose 213 (H) 70 - 108 mg/dl   CBC auto differential Collection Time: 04/21/21  3:45 AM   Result Value Ref Range    WBC 14.2 (H) 4.8 - 10.8 thou/mm3    RBC 2.85 (L) 4.70 - 6.10 mill/mm3    Hemoglobin 7.7 (L) 14.0 - 18.0 gm/dl    Hematocrit 24.8 (L) 42.0 - 52.0 %    MCV 87.0 80.0 - 94.0 fL    MCH 27.0 26.0 - 33.0 pg    MCHC 31.0 (L) 32.2 - 35.5 gm/dl    RDW-CV 17.8 (H) 11.5 - 14.5 %    RDW-SD 56.3 (H) 35.0 - 45.0 fL    Platelets 368 (H) 421 - 400 thou/mm3    MPV 9.4 9.4 - 12.4 fL    Seg Neutrophils 87.7 %    Lymphocytes 5.4 %    Monocytes 3.9 %    Eosinophils 0.0 %    Basophils 0.1 %    Immature Granulocytes 2.9 %    Segs Absolute 12.5 (H) 1 - 7 thou/mm3    Lymphocytes Absolute 0.8 (L) 1.0 - 4.8 thou/mm3    Monocytes Absolute 0.6 0.4 - 1.3 thou/mm3    Eosinophils Absolute 0.0 0.0 - 0.4 thou/mm3    Basophils Absolute 0.0 0.0 - 0.1 thou/mm3    Immature Grans (Abs) 0.41 (H) 0.00 - 0.07 thou/mm3    nRBC 0 /100 wbc   Basic Metabolic Panel    Collection Time: 04/21/21  3:45 AM   Result Value Ref Range    Sodium 136 135 - 145 meq/L    Potassium 4.7 3.5 - 5.2 meq/L    Chloride 106 98 - 111 meq/L    CO2 20 (L) 23 - 33 meq/L    Glucose 161 (H) 70 - 108 mg/dL    BUN 46 (H) 7 - 22 mg/dL    CREATININE 2.4 (H) 0.4 - 1.2 mg/dL    Calcium 8.6 8.5 - 10.5 mg/dL   Anion Gap    Collection Time: 04/21/21  3:45 AM   Result Value Ref Range    Anion Gap 10.0 8.0 - 16.0 meq/L   Glomerular Filtration Rate, Estimated    Collection Time: 04/21/21  3:45 AM   Result Value Ref Range    Est, Glom Filt Rate 29 (A) ml/min/1.73m2   POCT glucose    Collection Time: 04/21/21  6:03 AM   Result Value Ref Range    POC Glucose 179 (H) 70 - 108 mg/dl       Radiology:     Echo Complete 2d W Doppler W Color    Result Date: 4/13/2021  Transthoracic Echocardiography Report (TTE)  Demographics   Patient Name   [address] [place] [address],[address] Gender              Male                 P   MR #           [id number]         Race                                                    Ethnicity   Account #      [de-identified] Room Number         6119   Accession      [id number]        Date of Study       04/13/2021  Number   Date of Birth  1970        Referring Physician Musa Francois, FLORENTIN Muñiz CNP   Age            44 year(s)        [address] [place], [place] [address]y [facility]                                    Interpreting        Echo reader of the                                   Physician           martinez Larose MD  Procedure Type of Study   TTE procedure:ECHOCARDIOGRAM COMPLETE 2D W DOPPLER W COLOR. Procedure Date Date: 04/13/2021 Start: 02:57 PM Study Location: Bedside Technical Quality: Limited visualization due to restricted mobility. Indications:Shortness of breath. Additional Medical History:Recent 15 pound weight gain, diabetic, hypertension, renal cell cancer with mets Patient Status: Routine Height: 72.05 inches Weight: 207.24 pounds BSA: 2.16 m^2 BMI: 28.07 kg/m^2 BP: 115/76 mmHg  Conclusions   Summary  Normal left ventricle size and systolic function. Ejection fraction was  estimated at 60 to 65%. There were no regional left ventricular wall  motion abnormalities and wall thickness was within normal limits. Left atrial size was normal.   Signature   ----------------------------------------------------------------  Electronically signed by Judy Larose MD (Interpreting  physician) on 04/13/2021 at 06:17 PM  ----------------------------------------------------------------   Findings   Mitral Valve  The mitral valve structure was normal with normal leaflet separation. DOPPLER: The transmitral velocity was within the normal range with no  evidence for mitral stenosis. There was no evidence of mitral  regurgitation. Aortic Valve  The aortic valve was trileaflet with normal thickness and cuspal  separation.  DOPPLER: Transaortic velocity was within the normal range with  no evidence of aortic stenosis. There was no evidence of aortic  regurgitation. Tricuspid Valve  The tricuspid valve structure was normal with normal leaflet separation. DOPPLER: There was no evidence of tricuspid stenosis. There was no  evidence of tricuspid regurgitation. Pulmonic Valve  The pulmonic valve leaflets exhibited normal thickness, no calcification,  and normal cuspal separation. DOPPLER: The transpulmonic velocity was  within the normal range with no evidence for regurgitation. Left Atrium  Left atrial size was normal.   Left Ventricle  Normal left ventricle size and systolic function. Ejection fraction was  estimated at 60 to 65%. There were no regional left ventricular wall  motion abnormalities and wall thickness was within normal limits. Right Atrium  Right atrial size was normal.   Right Ventricle  The right ventricular size was normal with normal systolic function and  wall thickness. Pericardial Effusion  The pericardium was normal in appearance with no evidence of a pericardial  effusion. Pleural Effusion  No evidence of pleural effusion. Aorta / Great Vessels  -Aortic root dimension within normal limits.  -The Pulmonary artery is within normal limits. -IVC size is within normal limits with normal respiratory phasic changes.   M-Mode/2D Measurements & Calculations   LV Diastolic    LV Systolic Dimension: 2.7  AV Cusp Separation: 2.3 cmLA  Dimension: 4 cm cm                          Dimension: 3.3 cmAO Root  LV FS:32.5 %    LV Volume Diastolic: 70 ml  Dimension: 3.8 cm  LV PW           LV Volume Systolic: 27 ml  Diastolic: 0.9  LV EDV/LV EDV Index: 70  cm              ml/32 m^2LV ESV/LV ESV  Septum          Index: 27 ml/12 m^2         RV Diastolic Dimension: 2 cm  Diastolic: 1 cm EF Calculated: 61.4 %                                              LA/Aorta: 0.87  Doppler Measurements & Calculations   MV Peak E-Wave: 74.6 cm/s  AV Peak Velocity: 150 LVOT Peak Velocity: 130  MV Peak A-Wave: 98.1 cm/s  cm/s cm/s  MV E/A Ratio: 0.76         AV Peak Gradient: 9   LVOT Peak Gradient: 7  MV Peak Gradient: 2.23     mmHg                  mmHg  mmHg                                                   TV Peak E-Wave: 62.6 cm/s  MV Deceleration Time: 345                        TV Peak A-Wave: 88.7 cm/s  msec  MV P1/2t: 101 msec                               TV Peak Gradient: 1.57  MVA by PHT:2.18 cm^2                             mmHg                                                   TR Velocity:247 cm/s  MV E' Septal Velocity: 7.1 AV DVI (Vmax):0.87    TR Gradient:24.4 mmHg  cm/s                                             PV Peak Velocity: 75 cm/s  MV A' Septal Velocity:                           PV Peak Gradient: 2.25  10.8 cm/s                                        mmHg  MV E' Lateral Velocity:  8.9 cm/s  MV A' Lateral Velocity:  12.8 cm/s  E/E' septal: 10.51  E/E' lateral: 8.38  [email or web address] [id number]    Ct Abdomen Pelvis W Iv Contrast Additional Contrast? None    Result Date: 4/13/2021  * ADDENDUM #1 *  This report was discussed with Montrell Maldonado RN on Apr 13, 2021 21:43:00 EDT. This document has been electronically signed by: Mj Osman on 04/13/2021 09:43 PM *  ORIGINAL REPORT * CT abdomen and pelvis with contrast Comparison:  CT,KOSR  - CT ABDOMEN PELVIS W IV CONTRAST  - 03/23/2021 02:48 PM EDT Findings: Walled off, peripherally enhancing multiloculated bilateral pleural collections and numerous peripherally enhancing low-density masses involving the bilateral pleural spaces. Extension into the mediastinum, and into multiple intercostal spaces. Early involvement of the pericardium. Destructive changes of multiple ribs. Interval progression since the prior study. Multifocal consolidation and volume loss within the lungs. Heterogeneous lung parenchyma. Multiple bilateral pulmonary nodules. Multiple calcified granulomas within the spleen. Unremarkable liver and pancreas. Large solid mass within the right kidney without change. Horseshoe kidney noted. Small bilateral adrenal nodules unchanged. Colonic diverticulosis. No pneumatosis or bowel obstruction. Unremarkable bladder and prostate gland. Nonvisualization of the appendix. No acute fracture. Postsurgical changes at L5. Multifocal lytic metastases to bony structures. 1. Walled off, peripherally enhancing multiloculated bilateral pleural collections and numerous peripherally enhancing low-density masses involving the bilateral pleural spaces. Extension into the mediastinum, and into multiple intercostal spaces. Early involvement of the pericardium. Destructive changes of multiple ribs. Interval progression since the prior study. Findings are compatible with extensive malignant involvement. There could also be a component of infection. 2. Multifocal consolidation and volume loss within the lungs. Heterogeneous lung parenchyma raising the possibility of underlying involvement with neoplasm. Multiple bilateral pulmonary nodules likely on the basis of metastatic disease. 3. Right-sided chest tube tip within the right major fissure. 4. 5.1 cm solid mass within the right kidney, compatible with neoplasm without significant change. 5. Moderate abdominal and pelvic free fluid with interval worsening. 6. Multifocal peritoneal thickening and enhancement compatible with malignant involvement. 7. Soft tissue infiltration of the omentum of the proximal sigmoid colon, likely on the basis of malignant involvement. 8. Bilateral adrenal gland nodules, compatible with metastatic disease. 9. Large lytic lesion involving the right iliac bone compatible with metastatic disease, without significant change. 10. Extensive severe edema of the soft tissues with worsening.  This document has been electronically signed by: Frederick May MD on 04/13/2021 09:33 PM All CTs at this facility use dose modulation techniques and iterative reconstructions, and/or weight-based dosing when appropriate to reduce radiation to a low as reasonably achievable. Xr Chest Portable    Result Date: 4/14/2021  1 view chest x-ray Comparison:  CR,SR  - XR CHEST PORTABLE  - 04/13/2021 11:16 AM EDT Findings: Loculated bilateral pleural collections without gross interval change. Airspace filling within the bilateral mid and lower lungs without change. Enlarged cardiomediastinal silhouette without change. Destructive changes of multiple left-sided ribs without change. No acute bony abnormality. 1. Loculated bilateral pleural collections without change. 2. Bilateral pulmonary infiltrates and atelectasis without change. This document has been electronically signed by: Vesta Jackson MD on 04/14/2021 01:55 AM    Xr Chest Portable    Result Date: 4/13/2021  PROCEDURE: XR CHEST PORTABLE CLINICAL INFORMATION: Shortness of breath TECHNIQUE: Mobile AP chest radiograph. COMPARISON: AP and lateral chest radiographs 20/3/2020 FINDINGS: Cardiac silhouette is obscured by opacities at the bilateral lung bases. The bilateral costophrenic angles are blunted. Hazy and reticular opacities are present in the mid and lower lungs. There is a new density at the right lung apex, likely a  focal infiltrate. Multiple nodules seen on CT of the chest from 3/23/2021 are not clearly visible on the current study. Bilateral pleural thickening is noted. Degenerative changes in the thoracic spine are poorly visualized. 1. Small to moderate-sized bilateral pleural effusions with adjacent atelectasis/infiltrate. 2. New density at the right lung apex, likely focal infiltrate. **This report has been created using voice recognition software. It may contain minor errors which are inherent in voice recognition technology. ** Final report electronically signed by Dr. Betsy West on 4/13/2021 11:28 AM    4/20/2021 CXR   Findings: pericardial effusion    Overall prognosis is poor, palliative Care consult in place            DVT prophylaxis: [x] Lovenox                                 [] SCDs                                 [] SQ Heparin                                 [] Encourage ambulation, low risk for DVT, no chemical or mechanical prophylaxis necessary              [] Already on Anticoagulation                Anticipated Disposition upon discharge: [] Home                                                                         [] Home with Home Health                                                                         [] [facility]                                                                         [] [address]          Electronically signed by Tosin Henriquez MD on 4/21/2021 at 9:41 AM

## 2021-04-21 NOTE — PROGRESS NOTES
I/O last 3 completed shifts: In: 4178 [P.O.:2410; I.V.:1671; Blood:310]  Out: 2250 [Urine:2000; Drains:250]   Patient Vitals for the past 96 hrs (Last 3 readings):   Weight   04/21/21 0330 214 lb 1.6 oz (97.1 kg)   04/19/21 0445 207 lb 12.8 oz (94.3 kg)   04/18/21 0345 205 lb 6.4 oz (93.2 kg)       Exam   Physical Exam   Constitutional: No distress on 2 LPM O2 per NC. Patient appears chronically ill. Head: Normocephalic and atraumatic. Mouth/Throat: Oropharynx is clear and moist.  No oral thrush. Eyes: Conjunctivae are normal. Pupils are equal, round. No scleral icterus. Neck: Neck supple. No tracheal deviation present. Cardiovascular: S1 and S2 with no murmur. No peripheral edema  Pulmonary/Chest: Normal effort with bilateral air entry, clear breath sounds noted pleural rub at bases. No stridor. No respiratory distress. Patient exhibits no tenderness. Pleurx to right chest attached to atrium at 20 mmHg no airleak. Abdominal: Soft. Bowel sounds audible. No distension or tenderness to palp. Musculoskeletal: Moves all extremities  Neurological: Patient is alert and oriented to person, place, and time. PFTs   None immediately available. Sleep studies   None immediately available. Cultures    Rapid Flu A/B-Negative  Respiratory culture-Canceled  blood cultures x2 No prelim growth  Legionella antigen- Negative  strep pneumo antigen- Negative  respiratory virus antigens panel canceled  Body fluid culture-No growth   EKG   Sinus tachycardia  Low voltage QRS, consider pulmonary disease, pericardial effusion, or normal variant  Nonspecific T wave abnormality  Abnormal ECG  When compared with ECG of 23-MAR-2021 13:11,  Nonspecific T wave abnormality now evident in Lateral leads  Confirmed by JAMIE RODRIGUEZ ([id number]) on 4/13/2021 11:56:37 AM  Echocardiogram    Summary   Normal left ventricle size and systolic function. Ejection fraction was   estimated at 60 to 65%.  There were no regional left ventricular wall   motion abnormalities and wall thickness was within normal limits. Left atrial size was normal.      Signature      ----------------------------------------------------------------   Electronically signed by Alonzo Angel MD (Interpreting   physician) on 04/13/2021 at 06:17 PM   ----------------------------------------------------------------      Radiology    CXR  X-ray, 1 View   04/20/2021   FINDINGS: Similar bibasilar consolidations. Similar bilateral pleural effusions. No pneumothorax. Stable heart size. No acute fracture. Right internal jugular central line in place with tip projecting over the cavoatrial junction. Lower right chest tube remains in place. Impression:  No significant interval change from 04/20/2021.        1 view chest x-ray   04/19/2021   Impression:  1. Bilateral pulmonary infiltrates and atelectasis without gross interval change. 2. Moderate bilateral pleural effusions with interval worsening on the right. No definite residual pneumothorax. XR CHEST 1 VIEW   4/15/2021   Persistent pleural fluid at the lower bilateral chest.   Persistent atelectasis versus pneumonia at both lower lungs. CT Scans  1. Walled off, peripherally enhancing multiloculated bilateral pleural    collections and numerous peripherally enhancing low-density masses    involving the bilateral pleural spaces. Extension into the mediastinum,    and into multiple intercostal spaces. Early involvement of the    pericardium. Destructive changes of multiple ribs. Interval progression    since the prior study. Findings are compatible with extensive malignant    involvement. There could also be a component of infection. 2. Multifocal consolidation and volume loss within the lungs. Heterogeneous lung parenchyma raising the possibility of underlying    involvement with neoplasm. Multiple bilateral pulmonary nodules likely on    the basis of metastatic disease.    3. Right-sided chest tube tip within the right major fissure. 4. 5.1 cm solid mass within the right kidney, compatible with neoplasm    without significant change. 5. Moderate abdominal and pelvic free fluid with interval worsening. 6. Multifocal peritoneal thickening and enhancement compatible with    malignant involvement. 7. Soft tissue infiltration of the omentum of the proximal sigmoid colon,    likely on the basis of malignant involvement. 8. Bilateral adrenal gland nodules, compatible with metastatic disease. 9. Large lytic lesion involving the right iliac bone compatible with    metastatic disease, without significant change. 10. Extensive severe edema of the soft tissues with worsening.       This document has been electronically signed by: Anson Burciaga MD on    04/13/2021 09:33 PM     Assessment   -Acute hypoxic respiratory failure due to right-sided pleural effusion  -Dyspnea and hypoxia on baseline home 2 L of oxygen.  -Right side Pleural effusion secondary to metastatic renal cell carcinoma status post a Pleurx catheter placement at regarding his. -Sepsis secondary to pneumonia. -CAMILLE Nephrology following   -Stage IV metastatic renal cell carcinoma    Plan   -Monitor SpO2 wean supplemental O2 to maintain SpO2 >90% baseline 2 LPM  -Monitor Pleurx output record in I/O, instructed RN to disconnect from atrium and resume intermittent draining daily with Vacutainer bottles  -ID following planning to stop ATB's cultures negative to date  -Nephrology following changed solumedrol to prednisone 40 gm PO Daily  -CTS signed off  -Oncology following planning to restart chemotherapy as outpatient   -Will repeat CXR in AM to follow pleural effusion     Questions and concerns addressed. Electronically signed by   CAITY Jackson CNP on 4/21/2021 at 1:30 PM     Addendum by Dr. Monica Oropeza MD:  I have seen and examined the patient independently. Face to face evaluation and examination was performed.    The above evaluation and note has been reviewed. Labs and radiographs were reviewed. I Have discussed with Mr. Tereza Julian CNP about this patient in detail. The above assessment and plan has been reviewed. Please see my modifications mentioned below. My modifications:  He is on 2 L of oxygen via nasal cannula  For shortness of breath but significantly better. Patient told me that he follows with a pulmonologist in [facility] regarding his pleural effusion on right side. He was advised to resume his follow-up with the previous pulmonologist.    He also told me that he had a home O2 at home  Chest x-ray portable view performed on 21 April 2021: Wed Apr 21, 2021  3:57 AM   Chest X-ray, 1 View   No significant interval change from 04/20/2021. He was advised to continue using incentive spirometer Q4h as tolerated.     William Raymundo MD 4/21/2021 6:18 PM

## 2021-04-21 NOTE — PLAN OF CARE
Problem: Falls - Risk of:  Goal: Will remain free from falls  Description: Will remain free from falls  Outcome: Ongoing  Note: Patient in bed, call light and items in reach, bed alarm on. Goal: Absence of physical injury  Description: Absence of physical injury  Outcome: Ongoing  Note: Patient requiring 1 assist with a walker when ambulating     Problem: Grieving:  Goal: Verbalizes feelings, concerns and thoughts  Description: Verbalizes feelings, concerns and thoughts  Outcome: Ongoing  Note: Patient expressing concerns and feelings     Problem: Skin Integrity:  Goal: Will show no infection signs and symptoms  Description: Will show no infection signs and symptoms  Outcome: Ongoing  Note: No infection s/s at this time. Afebrile   Goal: Absence of new skin breakdown  Description: Absence of new skin breakdown  Outcome: Ongoing  Note: Q2 turn,      Problem: Respiratory:  Goal: Ability to maintain vital signs within normal range will improve  Description: Ability to maintain vital signs within normal range will improve  Outcome: Ongoing  Note:   Vitals:    04/20/21 2300   BP: 123/79   Pulse: 108   Resp: 17   Temp: 97.7 °F (36.5 °C)   SpO2: 96%       Goal: Respiratory status will improve  Description: Respiratory status will improve  Outcome: Ongoing  Note: Patient on 2 L nasal cannula, which is baseline.       Problem: Discharge Planning:  Goal: Discharged to appropriate level of care  Description: Discharged to appropriate level of care  Outcome: Ongoing     Problem: Musculor/Skeletal Functional Status  Goal: Highest potential functional level  Outcome: Ongoing     Problem: Nutrition  Goal: Optimal nutrition therapy  4/21/2021 0000 by Joanne Noyola RN  Outcome: Ongoing

## 2021-04-21 NOTE — PROGRESS NOTES
Renal Progress Note  [facility]    Patient :  Carlos Jessica; 44 y.o. MRN# [id number]  Location:  4K-01/001-A  Attending:  Kobe Covington MD  Admit Date:  4/13/2021   Hospital Day: 8      Subjective:     Nephrology is following the patient for CAMILLE. Patient was seen and examined this AM.  States he did not sleep well and is tired. Bps are stablle. Good urine output. On 2 L NC. Having some edema. Outpatient Medications:     Medications Prior to Admission: traMADol (ULTRAM) 50 MG tablet, Take 50 mg by mouth every 6 hours as needed for Pain.   traZODone (DESYREL) 50 MG tablet, Take 50 mg by mouth nightly  loratadine (CLARITIN) 10 MG capsule, Take 10 mg by mouth daily  Cabozantinib S-Malate (CABOMETYX) 40 MG TABS, Take by mouth daily  indomethacin (INDOCIN) 25 MG capsule, Take 25 mg by mouth as needed for Pain  levothyroxine (SYNTHROID) 25 MCG tablet, Take 25 mcg by mouth Daily  ALLOPURINOL PO, Take 100 mg by mouth nightly   omeprazole (PRILOSEC) 40 MG delayed release capsule, Take 40 mg by mouth daily  levothyroxine (SYNTHROID) 100 MCG tablet, Take 25 mcg by mouth Daily   amLODIPine (NORVASC) 5 MG tablet, Take 5 mg by mouth daily  colchicine (COLCRYS) 0.6 MG tablet, Take 1 tablet by mouth daily for 3 days    Current Medications:     Scheduled Meds:    predniSONE  40 mg Oral Daily    epoetin uymiko-epbx  3,000 Units Subcutaneous Once per day on Mon Wed Fri    Or    epoetin yumiko-epbx  10,000 Units Subcutaneous Once per day on Mon Wed Fri    midodrine  10 mg Oral TID     cefepime  2,000 mg Intravenous Q12H    famotidine  20 mg Oral Daily    allopurinol  100 mg Oral BID    insulin lispro  0-6 Units Subcutaneous TID     insulin lispro  0-3 Units Subcutaneous Nightly    traZODone  50 mg Oral Nightly    sodium chloride flush  5-40 mL Intravenous 2 times per day    levothyroxine  62.5 mcg Oral Daily     Continuous Infusions:    sodium chloride      sodium chloride      04/18/21  1537   LABALBU 2.2*     BNP:    No results found for: BNP  MICHAEL:    No results found for: MICHAEL  SPEP:  Lab Results   Component Value Date    PROT 4.1 04/18/2021     UPEP:   No results found for: LABPE  C3:   No results found for: C3  C4:   No results found for: C4  MPO ANCA:   No results found for: MPO  PR3 ANCA:   No results found for: PR3  Anti-GBM:   No results found for: GBMABIGG  Hep BsAg:       No results found for: HEPBSAG  Hep C AB:        No results found for: HEPCAB    Urinalysis/Chemistries:      Lab Results   Component Value Date    NITRU NEGATIVE 04/18/2021    COLORU YELLOW 04/18/2021    PHUR 5.0 04/18/2021    LABCAST 4-8 HYALINE 04/18/2021    LABCAST 0-4 C. GRAN 04/18/2021    WBCUA 0-2 04/18/2021    RBCUA NONE 04/18/2021    MUCUS THREADS 07/22/2019    YEAST NONE SEEN 04/18/2021    BACTERIA NONE SEEN 04/18/2021    SPECGRAV 1.021 04/18/2021    LEUKOCYTESUR NEGATIVE 04/18/2021    UROBILINOGEN 0.2 04/18/2021    BILIRUBINUR NEGATIVE 04/18/2021    BLOODU NEGATIVE 04/18/2021    GLUCOSEU NEGATIVE 07/22/2019    KETUA TRACE 04/18/2021    AMORPHOUS DEBRIS 04/18/2021     Urine Sodium:   No results found for: NEVAEH  Urine Potassium:  No results found for: KUR  Urine Chloride:  No results found for: CLUR  Urine Osmolarity:   Lab Results   Component Value Date    OSMOU 796 04/13/2021     Urine Protein:   No components found for: TOTALPROTEIN, URINE   Urine Creatinine:   No results found for: LABCREA  Urine Eosinophils:  No components found for: UEOS        Impression and Plan:  1. CAMILLE:  likely ATN from vancomycin nephrotoxicity compounded by recent hypotension however cannot rule out underlying AIN from [id number] [facility]. Started on empiric steroids. S/p 3 doses solumedrol. Will start Pred 40 mg daily and monitor. Continue Midodrine. Avoid nephrotoxic agents.   -renal function has stabilized today  -stop IV fluids as he is getting swollen. bumex 1 mg IVP x 1  2. Hyperkalemia: improved  3.  Hyponatremia from decreased water excretion from CAMILLE: resolved  4. Hypotension likely from blood loss: improved  5. Anemia s/p blood transfusion  6. Malignant pleural effusion  7. Acute on chronic hypoxic resp failure  8. Metastatic RCC        Please don't hesitate to call with any questions.   Electronically signed by Mark Juárez DO on 4/21/2021 at 12:37 PM

## 2021-04-21 NOTE — PROGRESS NOTES
04/19/21  0500 04/20/21  0405 04/21/21  0345   * 132* 136   K 5.1 4.6 4.7   CL 99 101 106   CO2 21* 21* 20*   BUN 35* 43* 46*   CREATININE 2.2* 2.4* 2.4*   GLUCOSE 159* 163* 161*     PT/INR:No results for input(s): PROTIME, INR in the last 72 hours. APTT:No results for input(s): APTT in the last 72 hours. LIVER PROFILE:  Recent Labs     04/18/21  1537   AST 27   ALT 8*   BILITOT 0.3   ALKPHOS 44       Imaging Last 24 Hours:  Xr Chest Portable    Result Date: 4/21/2021  Chest X-ray, 1 View COMPARISON:  CR,SR  - XR CHEST PORTABLE  - 04/20/2021 12:38 AM EDT FINDINGS: Similar bibasilar consolidations. Similar bilateral pleural effusions. No pneumothorax. Stable heart size. No acute fracture. Right internal jugular central line in place with tip projecting over the cavoatrial junction. Lower right chest tube remains in place. No significant interval change from 04/20/2021. This document has been electronically signed by: Derrek Fox MD on 04/21/2021 03:57 AM    Xr Chest Portable    Result Date: 4/20/2021  1 view chest x-ray Comparison:  CR,SR  - XR CHEST PORTABLE  - 04/19/2021 12:38 AM EDT Findings: Bilateral pleural effusions and opacities at the lung bases are similar to the prior exam. Right lung base tunneled pleural catheter, and right internal jugular central venous catheter in the SVC again noted. Cardiomegaly. No acute fracture. No acute cardiopulmonary findings. This document has been electronically signed by:  Hipolito Tirado MD on 04/20/2021 04:06 AM    Assessment//Plan           Hospital Problems           Last Modified POA    Pneumonia due to organism 4/14/2021 Yes    Acute on chronic respiratory failure with hypoxemia ([address] 4/13/2021 Yes    Severe malnutrition ([address] 4/16/2021 Yes    SIADH (syndrome of inappropriate ADH production) ([address] 4/15/2021 Yes    Hyponatremia 4/15/2021 Yes    Renal cell cancer, unspecified laterality ([address] 4/15/2021 Yes        Assessment:  (Progressing metastatic renal carcinoma. Started third line treatment with Opdivo  O2 saturation is improving. Try to wean from oxygen if possible. I will follow. ).        Electronically signed by Ej Arteaga MD on 4/21/21 at 12:59 PM EDT

## 2021-04-21 NOTE — PROGRESS NOTES
[facility]  INPATIENT PHYSICAL THERAPY  DAILY NOTE  [facility] ICU STEPDOWN TELEMETRY 4K - 4K-01/001-A    Time In: 6219  Time Out: 1033  Timed Code Treatment Minutes: 23 Minutes  Minutes: 31      Pulmonary CNP present to examine pt during tx       Date: 2021  Patient Name: Perri Douglas,  Gender:  male        MRN: [id number]  : 1970  (44 y.o.)     Referring Practitioner: CAITY Wooten CNP  Diagnosis: Pneumonia  Additional Pertinent Hx: Per ED notes, pt \"is a 44 y.o. male who was diagnosed with Stage 4 RCC since 2019, who presents with shortness of breath and worsening chills, poor appetite, onset was this past week or two. He was taken off his oral chemo pills by [facility] due to side effects. He normally uses 2 liters of oxygen, but that has been ramped up to 4L today. The duration has been constant. He also has mets to his brain, bones and his breathing is worse. He also felt chilled. No fever reported, but he was told to come here to be admitted for palliative care and further management. He also gained 10 lbs over past week or two, with possible new onset ascites. \"     Prior Level of Function:  Lives With: Spouse  Type of Home: House  Home Layout: One level  Home Access: Stairs to enter without rails  Entrance Stairs - Number of Steps: 1 step with a rail  Home Equipment: Oxygen   Bathroom Shower/Tub: Tub/Shower unit  Bathroom Toilet: Standard  Bathroom Accessibility: Accessible    ADL Assistance: Independent  Homemaking Assistance: Independent  Ambulation Assistance: Independent  Transfer Assistance: Independent  Active : Yes  IADL Comments: Pt has to complete simple meal prep while spouse is at work  Additional Comments: Pt stating he is indep with all ADL tasks and did not use AD prior. Pt on 2L of O2 at baseline. Spouse works during day of hours 6-230 or 6-430. Daughter lives approx 1 hour away.  Daughter concerned regarding pt being home alone for that long of period. Restrictions/Precautions:  Restrictions/Precautions: General Precautions, Fall Risk  Position Activity Restriction  Other position/activity restrictions: 4/14: O2 at 4 L/min, 2L of O2 at baseline, pluerex drain on right side     SUBJECTIVE: RN approved session. Pt in recliner upon arrival and agrees to therapy. Pt pleasant and cooperative. Per RN okay to remove suction for ambulation- hooked suction back up after gait    PAIN: R side pleurex drain site    Vitals: Oxygen: WNL  Heart Rate: 104 after gait  Pt on 2L nasal cannula    OBJECTIVE:  Bed Mobility:  Not Tested    Transfers:  Sit to Stand: Stand By Assistance, Contact Guard Assistance  Stand to Sit:Stand By Assistance    Ambulation:  Contact Guard Assistance  Distance: 65ft  Surface: Level Tile  Device:No Device pt holding onto waistband of pants (too big for pt)  Gait Deviations: Forward Flexed Posture, Slow Marjorie, Decreased Step Length Bilaterally, Decreased Arm Swing, Decreased Gait Speed, Decreased Heel Strike Bilaterally, Mild Path Deviations, Unsteady Gait and slow and guarded    Exercise:  Patient was guided in 1 set(s) 10 reps of exercise to both lower extremities. Ankle pumps, Glut sets, Quad sets, Seated marches, Long arc quads and Seated abduction/adduction. Exercises were completed for increased independence with functional mobility. Functional Outcome Measures: Completed  -PAC Inpatient Mobility Raw Score : 18  AM-PAC Inpatient T-Scale Score : 43.63    ASSESSMENT:  Assessment: Patient progressing toward established goals. Activity Tolerance:  Patient tolerance of  treatment: good. Pt tolerated session well. Pt demos decreased endurance and stability on feet. Will benefit from cont PT at this time     Equipment Recommendations: Other: monitor for needs  Discharge Recommendations:  (anticipate home with assist- pt would benefit from home health vs OP therapy to work on endurance)    Plan: Times per week: 4-5x GM  Times per day: Daily  Current Treatment Recommendations: Strengthening, Home Exercise Program, Balance Training, Endurance Training, Functional Mobility Training, Transfer Training, Gait Training, Patient/Caregiver Education & Training, Safety Education & Training    Patient Education  Patient Education: Plan of Care, Transfers, Gait, Verbal Exercise Instruction    Goals:  Patient goals : go home  Short term goals  Time Frame for Short term goals: at discharge  Short term goal 1: Pt to be Mod I for supine <> sit from flat bed with no rails to get in/out of bed  Short term goal 2: Pt to be Mod I for sit <> stand to get up to ambulate  Short term goal 3: Pt to ambulate > 100 ft with no AD with O2 with Mod I for household distances  Long term goals  Time Frame for Long term goals : not set due to short ELOS    Following session, patient left in safe position with all fall risk precautions in place.

## 2021-04-22 NOTE — PROGRESS NOTES
Progress Note  Date:2021       Room:[facility]001-A  Patient Name:Orlando Dhaliwal     YOB: 1970     Age:50 y.o. Subjective    Subjective:  Symptoms:  Improved. He reports shortness of breath. Diet:  Adequate intake. Activity level: Returning to normal.    Pain:  He reports no pain. Review of Systems   Respiratory: Positive for shortness of breath. Objective         Vitals Last 24 Hours:  TEMPERATURE:  Temp  Av.8 °F (36.6 °C)  Min: 97.6 °F (36.4 °C)  Max: 98 °F (36.7 °C)  RESPIRATIONS RANGE: Resp  Av  Min: 18  Max: 18  PULSE OXIMETRY RANGE: SpO2  Av.4 %  Min: 95 %  Max: 98 %  PULSE RANGE: Pulse  Av.1  Min: 84  Max: 104  BLOOD PRESSURE RANGE: Systolic (85VOW), ELVIS:276 , Min:131 , QGD:678   ; Diastolic (68PMH), QUH:40, Min:81, Max:96    I/O (24Hr): Intake/Output Summary (Last 24 hours) at 2021 1425  Last data filed at 2021 1331  Gross per 24 hour   Intake 1010 ml   Output 1810 ml   Net -800 ml     Objective:  General Appearance:  Comfortable and in no acute distress. Vital signs: (most recent): Blood pressure (!) 139/96, pulse 103, temperature 97.6 °F (36.4 °C), temperature source Oral, resp. rate 18, height 6' (1.829 m), weight 210 lb 11.2 oz (95.6 kg), SpO2 95 %. Output: Producing urine and producing stool. HEENT: Normal HEENT exam.    Lungs:  Normal effort and normal respiratory rate. Heart: Normal rate. Regular rhythm. Abdomen: Abdomen is soft. Neurological: Patient is alert and oriented to person, place and time. Normal strength.       Labs/Imaging/Diagnostics    Labs:  CBC:  Recent Labs     21  0450 21  0952 21  0345 21  0550   WBC 11.3*  --  14.2* 17.7*   RBC 2.38*  --  2.85* 3.10*   HGB 6.5* 7.7* 7.7* 8.5*   HCT 20.8* 24.8* 24.8* 27.4*   MCV 87.4  --  87.0 88.4   *  --  466* 474*     CHEMISTRIES:  Recent Labs     21  0405 21  0345 21  0550   * 136 136   K 4.6 4.7 4.3    106 104   CO2 21* 20* 20*   BUN 43* 46* 52*   CREATININE 2.4* 2.4* 2.5*   GLUCOSE 163* 161* 132*     PT/INR:No results for input(s): PROTIME, INR in the last 72 hours. APTT:No results for input(s): APTT in the last 72 hours. LIVER PROFILE:No results for input(s): AST, ALT, BILIDIR, BILITOT, ALKPHOS in the last 72 hours. Imaging Last 24 Hours:  Xr Chest Portable    Result Date: 4/22/2021  AP chest  Comparison:  CR,SR  - XR CHEST PORTABLE  - 04/21/2021 01:20 AM EDT Findings: Moderate bilateral pleural effusions noted. The right basilar pleural fluid has reaccumulated. Ill-defined opacities in the mid to lower lungs are unchanged. Cardiomegaly unchanged. Right subclavian central venous catheter in SVC. Right basilar Pleurx catheter. Left lower rib fractures again noted. Impression: 1. Bilateral pleural effusions, and lung opacities. The right basilar pleural fluid has reaccumulated since the most recent exam. This document has been electronically signed by: Bird Walsh MD on 04/22/2021 03:14 AM    Xr Chest Portable    Result Date: 4/21/2021  Chest X-ray, 1 View COMPARISON:  CR,SR  - XR CHEST PORTABLE  - 04/20/2021 12:38 AM EDT FINDINGS: Similar bibasilar consolidations. Similar bilateral pleural effusions. No pneumothorax. Stable heart size. No acute fracture. Right internal jugular central line in place with tip projecting over the cavoatrial junction. Lower right chest tube remains in place. No significant interval change from 04/20/2021.  This document has been electronically signed by: Lakisha Marks MD on 04/21/2021 03:57 AM    Assessment//Plan           Hospital Problems           Last Modified POA    Pneumonia due to organism 4/14/2021 Yes    Acute on chronic respiratory failure with hypoxemia ([address] 4/13/2021 Yes    Severe malnutrition ([address] 4/16/2021 Yes    SIADH (syndrome of inappropriate ADH production) ([address] 4/15/2021 Yes    Hyponatremia 4/15/2021 Yes    [facility]al cell [facility] unspecified laterality [facility] 4/15/2021 Yes        Assessment:  (Progressing metastatic renal carcinoma. Cannot tolerate Cabozantinib  Started third line treatment with Opdivo  O2 saturation is improving. Try to wean from oxygen if possible. Okay to discharge. Patient wants to get cycle# 2 of Opdivo. Patient will be followed in the office. ).        Electronically signed by Romina Penn MD on 4/22/21 at 2:25 PM EDT

## 2021-04-22 NOTE — CARE COORDINATION
4/22/21, 3:29 PM EDT    DISCHARGE PLANNING EVALUATION      SW received a call from [address] [place] [address] with [facility], Palliative Care program. They have researched his insurance benefit but he does not have one, then SW also received a call from Critical access hospital with the [facility] area of [facility], they can not take for [facility] due to location. YAMILE spoke to staff at [facility], they can go to this area and they have nurses who also are crossed trained for [place] and Palliative. There is no extra billing for the patient. YAMILE spoke to patient and 3 of his children. YAMILE informed him of the above. He was agreeable to the plan. YAMILE advised that he needs to have that schedule ready of family and friends who will be with him or in and out of the home to assist him. He states this will be done.

## 2021-04-22 NOTE — FLOWSHEET NOTE
Riya Rudolph 58  OCCUPATIONAL THERAPY MISSED TREATMENT NOTE  [facility] ICU STEPDOWN TELEMETRY 4K  4K-01/001-A      Date: 2021  Patient Name: Maryann Rincon        CSN: [de-identified]   : 1970  (44 y.o.)  Gender: male   Referring Practitioner: Laury Krishnan CNP  Diagnosis: pneumonia         REASON FOR MISSED TREATMENT: Patient Refused,  Encouragement given with no success. Patient reported having a rough night.   Will check back as time allows

## 2021-04-22 NOTE — PLAN OF CARE
Problem: Falls - Risk of:  Goal: Will remain free from falls  Description: Will remain free from falls  8/63/4968 8932 by Siobhan Sladivar RN  Outcome: Ongoing   All fall precautions in place. Bed in low position, alarm activated and appropriate use of call light. Problem: Skin Integrity:  Goal: Will show no infection signs and symptoms  Description: Will show no infection signs and symptoms  3/18/2423 6667 by Siobhan Saldivar RN  Outcome: Ongoing   Skin assessment completed. Patient able to reposition q 2 hours independently. No skin breakdown this shift. Problem: Discharge Planning:  Goal: Discharged to appropriate level of care  Description: Discharged to appropriate level of care  8/59/8071 5521 by Siobhan Saldivar RN  Outcome: Ongoing   Discharge date is unclear, but plan is to go to home with wife. Problem: Pain:  Goal: Pain level will decrease  Description: Pain level will decrease  9/42/5496 6840 by Siobhan Saldivar RN  Outcome: Ongoing   Pain Assessment: 0-10  Pain Level: 3   Patient's Stated Pain Goal: 3   Is pain goal met at this time? Yes   Pt has take Tylenol for pain and appears to get good relief. Will continue to monitor and reassess. Care plan reviewed with patient. Patient verbalizes understanding of the plan of care and contributes to goal setting.

## 2021-04-22 NOTE — PLAN OF CARE
Problem: Falls - Risk of:  Goal: Will remain free from falls  Description: Will remain free from falls  4/21/2021 2336 by Sudhakar Beauchamp RN  Outcome: Ongoing  Note: No falls this shift. Patient uses call light before getting out of bed. Bed alarm on when patient is in bed. Bedside table close to patient and personal belongings within reach. Up to bathroom with 1 assist.      Problem: Falls - Risk of:  Goal: Absence of physical injury  Description: Absence of physical injury  Outcome: Ongoing  Note: Patient remained free of physical injury this shift . Purposeful hourly rounding and vital signs every 4 hours to prevent physical injury. Problem: Grieving:  Goal: Verbalizes feelings, concerns and thoughts  Description: Verbalizes feelings, concerns and thoughts  4/21/2021 2336 by Sudhakar Beauchamp RN  Outcome: Ongoing  Note: Patient has poor prognosis. Patient has the support of his wife. Both are meeting with Palliative to discuss care and going home. Problem: Discharge Planning:  Goal: Discharged to appropriate level of care  Description: Discharged to appropriate level of care  4/21/2021 2340 by Sudhakar Beauchamp RN  Note: Patient is planning on discharging home with wife. Palliative care and Home health care will continue when patients is discharged. 4/21/2021 2336 by Sudhakar Beauchamp RN  Outcome: Ongoing    Problem: Pain:  Goal: Pain level will decrease  Description: Pain level will decrease  Outcome: Ongoing  Note: Patient denied any pain this shift. Will continue to monitor. Care plan reviewed with patient. Patient verbalizes understanding of plan of care and contributes to goal setting.

## 2021-04-22 NOTE — PROGRESS NOTES
INTERNAL MEDICINE SPECIALTIES  Progress Note For Dr Ghazal Gross       Patient:  Maurice Sample  YOB: 1970  Date of Service: 4/22/2021  MRN: [id number]   Acct:  [de-identified]   Primary Care Physician: CAITY Snyder CNP    Late entry    SUBJECTIVE:  Patient has no new complaints        Home Medications:   No current facility-administered medications on file prior to encounter. Current Outpatient Medications on File Prior to Encounter   Medication Sig Dispense Refill    traMADol (ULTRAM) 50 MG tablet Take 50 mg by mouth every 6 hours as needed for Pain.       traZODone (DESYREL) 50 MG tablet Take 50 mg by mouth nightly      loratadine (CLARITIN) 10 MG capsule Take 10 mg by mouth daily      Cabozantinib S-Malate (CABOMETYX) 40 MG TABS Take by mouth daily      indomethacin (INDOCIN) 25 MG capsule Take 25 mg by mouth as needed for Pain      levothyroxine (SYNTHROID) 25 MCG tablet Take 25 mcg by mouth Daily      ALLOPURINOL PO Take 100 mg by mouth nightly       omeprazole (PRILOSEC) 40 MG delayed release capsule Take 40 mg by mouth daily      levothyroxine (SYNTHROID) 100 MCG tablet Take 25 mcg by mouth Daily       amLODIPine (NORVASC) 5 MG tablet Take 5 mg by mouth daily      colchicine (COLCRYS) 0.6 MG tablet Take 1 tablet by mouth daily for 3 days 3 tablet 0         Scheduled Meds:   bumetanide  1 mg Intravenous BID    midodrine  5 mg Oral TID WC    predniSONE  40 mg Oral Daily    epoetin yumiko-epbx  3,000 Units Subcutaneous Once per day on Mon Wed Fri    Or    epoetin yumiko-epbx  10,000 Units Subcutaneous Once per day on Mon Wed Fri    famotidine  20 mg Oral Daily    allopurinol  100 mg Oral BID    insulin lispro  0-6 Units Subcutaneous TID WC    insulin lispro  0-3 Units Subcutaneous Nightly    traZODone  50 mg Oral Nightly    sodium chloride flush  5-40 mL Intravenous 2 times per day    levothyroxine  62.5 mcg Oral Daily     Continuous Infusions:   sodium chloride Sathish Dubon CNP   Age            44 year(s)        Sonographer         Dimple Wray RDCS                                    Interpreting        Echo reader of the                                   Physician           week                                                       Geetha Lloyd MD  Procedure Type of Study   TTE procedure:ECHOCARDIOGRAM COMPLETE 2D W DOPPLER W COLOR. Procedure Date Date: 04/13/2021 Start: 02:57 PM Study Location: Bedside Technical Quality: Limited visualization due to restricted mobility. Indications:Shortness of breath. Additional Medical History:Recent 15 pound weight gain, diabetic, hypertension, renal cell cancer with mets Patient Status: Routine Height: 72.05 inches Weight: 207.24 pounds BSA: 2.16 m^2 BMI: 28.07 kg/m^2 BP: 115/76 mmHg  Conclusions   Summary  Normal left ventricle size and systolic function. Ejection fraction was  estimated at 60 to 65%. There were no regional left ventricular wall  motion abnormalities and wall thickness was within normal limits. Left atrial size was normal.   Signature   ----------------------------------------------------------------  Electronically signed by Geetha Lloyd MD (Interpreting  physician) on 04/13/2021 at 06:17 PM  ----------------------------------------------------------------   Findings   Mitral Valve  The mitral valve structure was normal with normal leaflet separation. DOPPLER: The transmitral velocity was within the normal range with no  evidence for mitral stenosis. There was no evidence of mitral  regurgitation. Aortic Valve  The aortic valve was trileaflet with normal thickness and cuspal  separation. DOPPLER: Transaortic velocity was within the normal range with  no evidence of aortic stenosis. There was no evidence of aortic  regurgitation. Tricuspid Valve  The tricuspid valve structure was normal with normal leaflet separation.   DOPPLER: There was no evidence of tricuspid stenosis. There was no  evidence of tricuspid regurgitation. Pulmonic Valve  The pulmonic valve leaflets exhibited normal thickness, no calcification,  and normal cuspal separation. DOPPLER: The transpulmonic velocity was  within the normal range with no evidence for regurgitation. Left Atrium  Left atrial size was normal.   Left Ventricle  Normal left ventricle size and systolic function. Ejection fraction was  estimated at 60 to 65%. There were no regional left ventricular wall  motion abnormalities and wall thickness was within normal limits. Right Atrium  Right atrial size was normal.   Right Ventricle  The right ventricular size was normal with normal systolic function and  wall thickness. Pericardial Effusion  The pericardium was normal in appearance with no evidence of a pericardial  effusion. Pleural Effusion  No evidence of pleural effusion. Aorta / Great Vessels  -Aortic root dimension within normal limits.  -The Pulmonary artery is within normal limits. -IVC size is within normal limits with normal respiratory phasic changes.   M-Mode/2D Measurements & Calculations   LV Diastolic    LV Systolic Dimension: 2.7  AV Cusp Separation: 2.3 cmLA  Dimension: 4 cm cm                          Dimension: 3.3 cmAO Root  LV FS:32.5 %    LV Volume Diastolic: 70 ml  Dimension: 3.8 cm  LV PW           LV Volume Systolic: 27 ml  Diastolic: 0.9  LV EDV/LV EDV Index: 70  cm              ml/32 m^2LV ESV/LV ESV  Septum          Index: 27 ml/12 m^2         RV Diastolic Dimension: 2 cm  Diastolic: 1 cm EF Calculated: 61.4 %                                              LA/Aorta: 0.87  Doppler Measurements & Calculations   MV Peak E-Wave: 74.6 cm/s  AV Peak Velocity: 150 LVOT Peak Velocity: 130  MV Peak A-Wave: 98.1 cm/s  cm/s                  cm/s  MV E/A Ratio: 0.76         AV Peak Gradient: 9   LVOT Peak Gradient: 7  MV Peak Gradient: 2.23     mmHg                  mmHg  mmHg nodules unchanged. Colonic diverticulosis. No pneumatosis or bowel obstruction. Unremarkable bladder and prostate gland. Nonvisualization of the appendix. No acute fracture. Postsurgical changes at L5. Multifocal lytic metastases to bony structures. 1. Walled off, peripherally enhancing multiloculated bilateral pleural collections and numerous peripherally enhancing low-density masses involving the bilateral pleural spaces. Extension into the mediastinum, and into multiple intercostal spaces. Early involvement of the pericardium. Destructive changes of multiple ribs. Interval progression since the prior study. Findings are compatible with extensive malignant involvement. There could also be a component of infection. 2. Multifocal consolidation and volume loss within the lungs. Heterogeneous lung parenchyma raising the possibility of underlying involvement with neoplasm. Multiple bilateral pulmonary nodules likely on the basis of metastatic disease. 3. Right-sided chest tube tip within the right major fissure. 4. 5.1 cm solid mass within the right kidney, compatible with neoplasm without significant change. 5. Moderate abdominal and pelvic free fluid with interval worsening. 6. Multifocal peritoneal thickening and enhancement compatible with malignant involvement. 7. Soft tissue infiltration of the omentum of the proximal sigmoid colon, likely on the basis of malignant involvement. 8. Bilateral adrenal gland nodules, compatible with metastatic disease. 9. Large lytic lesion involving the right iliac bone compatible with metastatic disease, without significant change. 10. Extensive severe edema of the soft tissues with worsening. This document has been electronically signed by: Winnie Kaminski MD on 04/13/2021 09:33 PM All CTs at this facility use dose modulation techniques and iterative reconstructions, and/or weight-based dosing when appropriate to reduce radiation to a low as reasonably achievable.     Aleksander Lucas Chest Portable    Result Date: 4/14/2021  1 view chest x-ray Comparison:  CR,SR  - XR CHEST PORTABLE  - 04/13/2021 11:16 AM EDT Findings: Loculated bilateral pleural collections without gross interval change. Airspace filling within the bilateral mid and lower lungs without change. Enlarged cardiomediastinal silhouette without change. Destructive changes of multiple left-sided ribs without change. No acute bony abnormality. 1. Loculated bilateral pleural collections without change. 2. Bilateral pulmonary infiltrates and atelectasis without change. This document has been electronically signed by: Jolie Gonzalez MD on 04/14/2021 01:55 AM    Xr Chest Portable    Result Date: 4/13/2021  PROCEDURE: XR CHEST PORTABLE CLINICAL INFORMATION: Shortness of breath TECHNIQUE: Mobile AP chest radiograph. COMPARISON: AP and lateral chest radiographs 20/3/2020 FINDINGS: Cardiac silhouette is obscured by opacities at the bilateral lung bases. The bilateral costophrenic angles are blunted. Hazy and reticular opacities are present in the mid and lower lungs. There is a new density at the right lung apex, likely a  focal infiltrate. Multiple nodules seen on CT of the chest from 3/23/2021 are not clearly visible on the current study. Bilateral pleural thickening is noted. Degenerative changes in the thoracic spine are poorly visualized. 1. Small to moderate-sized bilateral pleural effusions with adjacent atelectasis/infiltrate. 2. New density at the right lung apex, likely focal infiltrate. **This report has been created using voice recognition software. It may contain minor errors which are inherent in voice recognition technology. ** Final report electronically signed by Dr. Jessica Latham on 4/13/2021 11:28 AM    4/20/2021 CXR   Findings:   Bilateral pleural effusions and opacities at the lung bases are similar to    the prior exam.   Right lung base tunneled pleural catheter, and right internal jugular    central venous catheter in the SVC again noted. Cardiomegaly. No acute fracture           ASSESMENT:      Active Problems:    Pneumonia due to organism    Acute on chronic respiratory failure with hypoxemia (HCC)    Severe malnutrition (HCC)    SIADH (syndrome of inappropriate ADH production) (HCC)    Hyponatremia    Renal cell cancer, unspecified laterality ([facility] [address]  Resolved Problems:    * No resolved hospital problems. *  OTHER PROBLEMS:    - Metastatic clear cell renal carcinoma to  lungs, pleura, adrenal glands and bone with loculated malignant pleural effusion, omentum, pericardial wall. -  Gout   -  Hypothyroidism   -  Hyponatremia likely related to SIADH      PLAN:    Creatinine remains at 2.5 mg/dl, nephrology Service following on account of the CAMILLE. Suspected to be secondary to ATN, cannot rule out AIN from opdivo. Patient had 3 doses solumedrol. Now on Prednisone 40 mg qd . Avoid nephrotoxic agents.       Volume overload from IV fluids:  Scheduled for bumex 1 mg IV x 2 doses today and midodrine dose reduced . His vancomycin has since been discontinued. Renal ultrasound had shown no hydronephrosis. hemoglobin remains stable post transfusion of 1 unit PRBC,     Patient being followed by the cardiothoracic service on account of the loculated pleural effusion s/p TPA , continue pleural fluid drainage via PleurX catheter,  Continue with bronchodilators, O2, broad spectrum antibiotics to cover  HAP,   Pulmonology and ID services following. Legionella ,strep pneumonia urinary antigens & rapid influenza screen were all negative . Antibiotics to be completed today. Blood cultures negative, continue Incentive spirometry , acapella. Hyperglycemia is steroid related. The leukocytosis may also have a steroid component. Seen by Oncologist ,   Received a dose of opdivo.     The patient's 2D echocardiogram showed normal EF, no evidence of pericardial effusion    Overall prognosis is poor, palliative Care consult in place            DVT prophylaxis: [x] Lovenox                                 [] SCDs                                 [] SQ Heparin                                 [] Encourage ambulation, low risk for DVT, no chemical or mechanical prophylaxis necessary              [] Already on Anticoagulation                Anticipated Disposition upon discharge: [] Home                                                                         [] Home with Home Health                                                                         [] [facility]                                                                         [] [address]          Electronically signed by Lonnie Lau MD on 4/22/2021 at 2:53 PM

## 2021-04-22 NOTE — PROGRESS NOTES
Patient denies any needs at this time. Does voice concerns regarding [facility] upon discharge. Updated Deb Pineda. Please call palliative care if needs arise.

## 2021-04-22 NOTE — FLOWSHEET NOTE
[address] [place] [address]  PHYSICAL THERAPY MISSED TREATMENT NOTE  ACUTE CARE  [facility] ICU STEPDOWN TELEMETRY 4K              Missed Treatment  Pt declined session due to having a rough night and needing to rest at this time. Requesting therapy check back after lunch.  Will try back if able or per POC

## 2021-04-22 NOTE — PROGRESS NOTES
expand. \"  Coughs up a little bit of clear sputum with no blood. Improved breathing when laying forward. States that he normally takes \"chemotherapy pills. \" However, states he has not taken them for the past week and further states that his Oncologist recommends he not take them for another further week. Summary Hospital Course:  Presented to PCP in the morning of 4/13/2021 to refill gout medication but was visibly very dyspneic and SPO2 was 86% on his baseline 2 L nasal cannula and blood pressure was low at 80/40. O2 increased to 4 L was told to go to the emergency room. Was in [facility] last week for pain the right elbow and was diagnosed with gout. Worsening shortness of breath since coming home from [facility] which gotten worse over the previous day. Previous chest tube placed on February 2021 for pleural effusions which was drained every other day with about 200 to 250 cc taken out. Reported chills but no fever. Did not want to go to [facility]. ID consulted for pneumonia and stage IV metastatic renal carcinoma, Oncology consulted for metastatic renal cell carcinoma, Palliative Care consulted for metastatic renal cell carcinoma, and Pulmonology consulted as above. Subjective/Events Past 24 hours/ROS   -On 2 LPM via NC baseline  -Right pleurx disconnected from atrium CXR this AM noted small increase in R pleural fluid as expected  -Reports some SOB  All other systems reviewed    Sleep History    Never diagnosed with sleep apnea in the past.    Vitals     height is 6' (1.829 m) and weight is 210 lb 11.2 oz (95.6 kg). His oral temperature is 97.6 °F (36.4 °C). His blood pressure is 141/81 (abnormal) and his pulse is 104. His respiration is 18 and oxygen saturation is 96%. Body mass index is 28.58 kg/m².     SUPPLEMENTAL O2: O2 Flow Rate (L/min): 2 L/min     I/O        Intake/Output Summary (Last 24 hours) at 4/22/2021 0938  Last data filed at 4/22/2021 0809  Gross per 24 hour   Intake 750 ml   Output 710 ml   Net 40 ml     I/O last 3 completed shifts: In: 200 [P.O.:980]  Out: 760 [Urine:550; Drains:210]   Patient Vitals for the past 96 hrs (Last 3 readings):   Weight   04/22/21 0342 210 lb 11.2 oz (95.6 kg)   04/21/21 0330 214 lb 1.6 oz (97.1 kg)   04/19/21 0445 207 lb 12.8 oz (94.3 kg)       Exam   Physical Exam   Constitutional: No distress on 2 LPM O2 per NC. Patient appears chronically ill. Head: Normocephalic and atraumatic. Mouth/Throat: Oropharynx is clear and moist.  No oral thrush. Eyes: Conjunctivae are normal. Pupils are equal, round. No scleral icterus. Neck: Neck supple. No tracheal deviation present. Cardiovascular: S1 and S2 with no murmur. No peripheral edema  Pulmonary/Chest: Normal effort with bilateral air entry, clear breath sounds, diminished at bases. No stridor. No respiratory distress. Pleurx to right chest secured with dressing. Abdominal: Soft. Bowel sounds audible. No distension or tenderness to palp. Musculoskeletal: Moves all extremities  Neurological: Patient is alert and oriented to person, place, and time. Skin: Warm and dry. PFTs   None immediately available. Sleep studies   None immediately available. Cultures    Rapid Flu A/B-Negative  Respiratory culture-Canceled  blood cultures x2 No prelim growth  Legionella antigen- Negative  strep pneumo antigen- Negative  respiratory virus antigens panel canceled  Body fluid culture-No growth   EKG   Sinus tachycardia  Low voltage QRS, consider pulmonary disease, pericardial effusion, or normal variant  Nonspecific T wave abnormality  Abnormal ECG  When compared with ECG of 23-MAR-2021 13:11,  Nonspecific T wave abnormality now evident in Lateral leads  Confirmed by JAMIE RODRIGUEZ ([id number]) on 4/13/2021 11:56:37 AM  Echocardiogram    Summary   Normal left ventricle size and systolic function. Ejection fraction was   estimated at 60 to 65%.  There were no regional left Daily and record output in chart   -ID following   -Nephrology following changed solumedrol to prednisone 40 gm PO Daily giving Bumex for volume overload  -CTS signed off  -Oncology following planning to restart chemotherapy as outpatient   -At time of discharge will have patient follow-up with provider who placed jenna De Leon from [facility]    Questions and concerns addressed. Electronically signed by   CAITY Mederos CNP on 4/22/2021 at 9:13 AM     Addendum by Dr. Yojana Chavarria MD:  I have seen and examined the patient independently. Face to face evaluation and examination was performed. The above evaluation and note has been reviewed. Labs and radiographs were reviewed. I Have discussed with Mr. Krish Martinez CNP about this patient in detail. The above assessment and plan has been reviewed. Please see my modifications mentioned below. My modifications:  He is on 3 L of oxygen on nasal cannula  Follow-up as above  - Camryn Smith educated about my impression and plan. He verbalizes understanding.     La Nunez MD 4/22/2021 7:55 PM

## 2021-04-22 NOTE — CARE COORDINATION
4/22/21, 7:36 AM EDT    DISCHARGE ON GOING EVALUATION    [facility] AT [facility] day: 9  Location: [facility]01/001-A Reason for admit: Pneumonia [J18.9]   Procedure:   4/17 PICC  4/22 CXR B/L Pleural Effusions/Opacities  Barriers to Discharge: Creatinine 2.5 (2.4) worse, H 8.5, WBC 17.7; monitor.  Oxygen 2L, IV AB, IV Diuresing continued  PCP: CAITY De Los Santos CNP  Readmission Risk Score: 20%  Patient Goals/Plan/Treatment Preferences: plans home w spouse who works, new Inter[facility] HH (nsg, therapy, aide, Palliative Care), Right Pleuryx drain, RW, WC PTA, follows at [facility], ambulates 200 feet w therapy; plans [facility] for [facility], RW, WC

## 2021-04-22 NOTE — PROGRESS NOTES
Renal Progress Note  [facility]    Patient :  Yobani Julian; 44 y.o. MRN# [id number]  Location:  4-01/001-A  Attending:  Rayshawn Perera MD  Admit Date:  4/13/2021   Hospital Day: 9      Subjective:     Nephrology is following the patient for CAMILLE. Patient seen and examined. Having a little more shortness of breath. CXR reviewed. Received IV bumex yesterday. Reports he urinated quite a bit yesterday but does not seem to be all documented. Weight down 4 pounds. Outpatient Medications:     Medications Prior to Admission: traMADol (ULTRAM) 50 MG tablet, Take 50 mg by mouth every 6 hours as needed for Pain.   traZODone (DESYREL) 50 MG tablet, Take 50 mg by mouth nightly  loratadine (CLARITIN) 10 MG capsule, Take 10 mg by mouth daily  Cabozantinib S-Malate (CABOMETYX) 40 MG TABS, Take by mouth daily  indomethacin (INDOCIN) 25 MG capsule, Take 25 mg by mouth as needed for Pain  levothyroxine (SYNTHROID) 25 MCG tablet, Take 25 mcg by mouth Daily  ALLOPURINOL PO, Take 100 mg by mouth nightly   omeprazole (PRILOSEC) 40 MG delayed release capsule, Take 40 mg by mouth daily  levothyroxine (SYNTHROID) 100 MCG tablet, Take 25 mcg by mouth Daily   amLODIPine (NORVASC) 5 MG tablet, Take 5 mg by mouth daily  colchicine (COLCRYS) 0.6 MG tablet, Take 1 tablet by mouth daily for 3 days    Current Medications:     Scheduled Meds:    bumetanide  1 mg Intravenous BID    midodrine  5 mg Oral TID WC    predniSONE  40 mg Oral Daily    epoetin yumiko-epbx  3,000 Units Subcutaneous Once per day on Mon Wed Fri    Or    epoetin yumiko-epbx  10,000 Units Subcutaneous Once per day on Mon Wed Fri    cefepime  2,000 mg Intravenous Q12H    famotidine  20 mg Oral Daily    allopurinol  100 mg Oral BID    insulin lispro  0-6 Units Subcutaneous TID     insulin lispro  0-3 Units Subcutaneous Nightly    traZODone  50 mg Oral Nightly    sodium chloride flush  5-40 mL Intravenous 2 times per day    levothyroxine  62.5 mcg Oral Daily     Continuous Infusions:    sodium chloride      sodium chloride      dextrose       PRN Meds:  sodium chloride, sodium chloride, glucose, dextrose, glucagon (rDNA), dextrose, ipratropium-albuterol, traMADol, sodium chloride flush, promethazine **OR** ondansetron, polyethylene glycol, acetaminophen **OR** acetaminophen, diphenhydrAMINE    Input/Output:       I/O last 3 completed shifts: In: 200 [P.O.:980]  Out: 760 [Urine:550; Drains:210]. Patient Vitals for the past 96 hrs (Last 3 readings):   Weight   21 0342 210 lb 11.2 oz (95.6 kg)   21 0330 214 lb 1.6 oz (97.1 kg)   21 0445 207 lb 12.8 oz (94.3 kg)       Vital Signs:   Temperature:  Temp: 98 °F (36.7 °C)  TMax:   Temp (24hrs), Av.1 °F (36.7 °C), Min:97.8 °F (36.6 °C), Max:98.7 °F (37.1 °C)    Respirations:  Resp: 18  Pulse:   Pulse: 102  BP:    BP: (!) 134/95  BP Range: Systolic (38CIL), PWF:439 , Min:129 , KRR:590       Diastolic (66ACN), SOT:90, Min:75, Max:95      Physical Examination:     General:  Awake, alert, pleasant  HEENT: NC/AT/ MMM  Chest:             Right sided chest tube, diminished  Cardiac:  S1 S2 tachycardic  Abdomen: Soft, non-tender,  Neuro:  No facial droop, No Asterixis  SKIN:  No rashes, good skin turgor.   Extremities:  1+ LE edema    Labs:       Recent Labs     21  0450 21  0952 21  0345 21  0550   WBC 11.3*  --  14.2* 17.7*   RBC 2.38*  --  2.85* 3.10*   HGB 6.5* 7.7* 7.7* 8.5*   HCT 20.8* 24.8* 24.8* 27.4*   MCV 87.4  --  87.0 88.4   MCH 27.3  --  27.0 27.4   MCHC 31.3*  --  31.0* 31.0*   *  --  466* 474*   MPV 9.8  --  9.4 9.1*      BMP:   Recent Labs     21  0405 21  0345 21  0550   * 136 136   K 4.6 4.7 4.3    106 104   CO2 21* 20* 20*   BUN 43* 46* 52*   CREATININE 2.4* 2.4* 2.5*   GLUCOSE 163* 161* 132*   CALCIUM 7.8* 8.6 8.7      Phosphorus:   No results for input(s): PHOS in the last 72 hours.  Magnesium:  No results for input(s): MG in the last 72 hours. Albumin:    No results for input(s): LABALBU in the last 72 hours. BNP:    No results found for: BNP  MICHAEL:    No results found for: MICHAEL  SPEP:  Lab Results   Component Value Date    PROT 4.1 04/18/2021     UPEP:   No results found for: LABPE  C3:   No results found for: C3  C4:   No results found for: C4  MPO ANCA:   No results found for: MPO  PR3 ANCA:   No results found for: PR3  Anti-GBM:   No results found for: GBMABIGG  Hep BsAg:       No results found for: HEPBSAG  Hep C AB:        No results found for: HEPCAB    Urinalysis/Chemistries:      Lab Results   Component Value Date    NITRU NEGATIVE 04/18/2021    COLORU YELLOW 04/18/2021    PHUR 5.0 04/18/2021    LABCAST 4-8 HYALINE 04/18/2021    LABCAST 0-4 C. GRAN 04/18/2021    WBCUA 0-2 04/18/2021    RBCUA NONE 04/18/2021    MUCUS THREADS 07/22/2019    YEAST NONE SEEN 04/18/2021    BACTERIA NONE SEEN 04/18/2021    SPECGRAV 1.021 04/18/2021    LEUKOCYTESUR NEGATIVE 04/18/2021    UROBILINOGEN 0.2 04/18/2021    BILIRUBINUR NEGATIVE 04/18/2021    BLOODU NEGATIVE 04/18/2021    GLUCOSEU NEGATIVE 07/22/2019    KETUA TRACE 04/18/2021    AMORPHOUS DEBRIS 04/18/2021     Urine Sodium:   No results found for: NEVAEH  Urine Potassium:  No results found for: KUR  Urine Chloride:  No results found for: CLUR  Urine Osmolarity:   Lab Results   Component Value Date    OSMOU 796 04/13/2021     Urine Protein:   No components found for: TOTALPROTEIN, URINE   Urine Creatinine:   No results found for: LABCREA  Urine Eosinophils:  No components found for: UEOS        Impression and Plan:  1. CAMILLE:  likely ATN from vancomycin nephrotoxicity compounded by recent hypotension however cannot rule out underlying AIN from [address] [facility]. Overall ist stable. Started on empiric steroids. S/p 3 doses solumedrol. Now Pred 40 mg will continue for now. Avoid nephrotoxic agents.      2. Volume overload from IV fluids: bumex 1 mg IV x 2 doses today  3. Hypotension likely from blood loss: improved reduce Midodrine to 5 mg TID  4. Anemia s/p blood transfusion  5. Malignant pleural effusion  6. Acute on chronic hypoxic resp failure  7. Metastatic RCC        Please don't hesitate to call with any questions.   Electronically signed by Hood Dela Cruz DO on 4/22/2021 at 7:43 AM

## 2021-04-23 NOTE — PROGRESS NOTES
[facility]      Patient - Dayan Hickey   MRN -  [de-identified]   Acct # - [de-identified]   - 1970      Date of Admission -  2021 10:24 AM  Date of evaluation -  2021  Room - --A   Hospital Day -  Ag Hernandez MD Primary Care Physician - CAITY Jenkins - CNP     Problem List      Active Hospital Problems    Diagnosis Date Noted    SIADH (syndrome of inappropriate ADH production) ([facility] [E22.2] 04/15/2021    Hyponatremia [E87.1] 04/15/2021    Renal cell cancer, unspecified laterality ([facility] [C64.9] 04/15/2021    Severe malnutrition ([facility] [E43] 2021    Pneumonia due to organism [J18.9] 2021    Acute on chronic respiratory failure with hypoxemia [facility]) [J96.21] 2021     Chief complaint:   Management of right side pleurx  HPI   History Obtained From: Patient daughter, and electronic medical record. Dayan Hickey is a 44 y.o. male never smoker with a complex past medical history including insulin-dependent diabetes mellitus, essential hypertension, CKD, Covid pneumonia 2021, gout, and a history of metastatic renal cell carcinoma with mets to the lungs and brain being followed by the [facility] with a chief complaint of increased shortness of breath and who we are seeing for worsening pleural effusions and septic pneumonia. Patient to the hospital for shortness of breath and gout in his elbow. States \"just overall not feeling good. \"  Affirms been having trouble breathing \"for a while\" but stated it got much worse yesterday. Needed to increase his oxygen from his baseline of 2 L up to 6 L and eventually down to 4 and 1/2 L. States that shortness of breath is improved very slightly since the previous day but is essentially the same. Worse when he sits up or tries to walk.   Denies chest pain, pleuritic chest pain, and chest tightness but states his lungs \"cannot expand. \"  Coughs up a little bit of clear sputum with no blood. Improved breathing when laying forward. States that he normally takes \"chemotherapy pills. \" However, states he has not taken them for the past week and further states that his Oncologist recommends he not take them for another further week. Summary Hospital Course:  Presented to PCP in the morning of 4/13/2021 to refill gout medication but was visibly very dyspneic and SPO2 was 86% on his baseline 2 L nasal cannula and blood pressure was low at 80/40. O2 increased to 4 L was told to go to the emergency room. Was in [place] last week for pain the right elbow and was diagnosed with gout. Worsening shortness of breath since coming home from [place] which gotten worse over the previous day. Previous chest tube placed on February 2021 for pleural effusions which was drained every other day with about 200 to 250 cc taken out. Reported chills but no fever. Did not want to go to [place]. ID consulted for pneumonia and stage IV metastatic renal carcinoma, Oncology consulted for metastatic renal cell carcinoma, Palliative Care consulted for metastatic renal cell carcinoma, and Pulmonology consulted as above. Subjective/Events Past 24 hours/ROS   -On 2 LPM via NC  -250 cc output via pleurx yesterday  -SOB waxing and waning around draining schedule  All other systems reviewed    Sleep History    Never diagnosed with sleep apnea in the past.    Vitals     height is 6' (1.829 m) and weight is 210 lb 9.6 oz (95.5 kg). His oral temperature is 98.2 °F (36.8 °C). His blood pressure is 118/74 and his pulse is 99. His respiration is 18 and oxygen saturation is 94%. Body mass index is 28.56 kg/m².     SUPPLEMENTAL O2: O2 Flow Rate (L/min): 2 L/min     I/O        Intake/Output Summary (Last 24 hours) at 4/23/2021 1322  Last data filed at 4/23/2021 1033  Gross per 24 hour   Intake 1027.33 ml   Output 2300 ml   Net -1272.67 ml     I/O last 3 completed shifts: In: 1477.3 [P.O.:1400; I.V.:77.3]  Out: 3250 [Urine:3000; Drains:250]   Patient Vitals for the past 96 hrs (Last 3 readings):   Weight   04/23/21 0014 210 lb 9.6 oz (95.5 kg)   04/22/21 0342 210 lb 11.2 oz (95.6 kg)   04/21/21 0330 214 lb 1.6 oz (97.1 kg)       Exam   Physical Exam   Constitutional: No distress on 2 LPM O2 per NC. Patient appears chronically ill. Head: Normocephalic and atraumatic. Mouth/Throat: Oropharynx is clear and moist.  No oral thrush. Eyes: Conjunctivae are normal. Pupils are equal, round. No scleral icterus. Neck: Neck supple. No tracheal deviation present. Cardiovascular: S1 and S2 with no murmur. No peripheral edema  Pulmonary/Chest: Normal effort with bilateral air entry, clear breath sounds, diminished at bases. No stridor. No respiratory distress. Pleurx to right chest secured with dressing. Abdominal: Soft. Bowel sounds audible. No distension or tenderness to palp. Musculoskeletal: Moves all extremities  Neurological: Patient is alert and oriented to person, place, and time. Skin: Warm and dry. PFTs   None immediately available. Sleep studies   None immediately available. Cultures    Rapid Flu A/B-Negative  Respiratory culture-Canceled  blood cultures x2 No prelim growth  Legionella antigen- Negative  strep pneumo antigen- Negative  respiratory virus antigens panel canceled  Body fluid culture-No growth   EKG   Sinus tachycardia  Low voltage QRS, consider pulmonary disease, pericardial effusion, or normal variant  Nonspecific T wave abnormality  Abnormal ECG  When compared with ECG of 23-MAR-2021 13:11,  Nonspecific T wave abnormality now evident in Lateral leads  Confirmed by JAMIE RODRIGUEZ ([id number]) on 4/13/2021 11:56:37 AM  Echocardiogram    Summary   Normal left ventricle size and systolic function. Ejection fraction was   estimated at 60 to 65%.  There were no regional left ventricular wall   motion abnormalities and wall thickness was within normal limits. Left atrial size was normal.      Signature      ----------------------------------------------------------------   Electronically signed by Pavithra Robles MD (Interpreting   physician) on 04/13/2021 at 06:17 PM   ----------------------------------------------------------------      Radiology    CXR  AP chest   04/22/2021   Findings: Moderate bilateral pleural effusions noted. The right basilar pleural fluid has reaccumulated. Ill-defined opacities in the mid to lower lungs are unchanged. Cardiomegaly unchanged. Right subclavian central venous catheter in SVC. Right basilar Pleurx catheter. Left lower rib fractures again noted. Impression:  1. Bilateral pleural effusions, and lung opacities. The right basilar pleural fluid has reaccumulated since the most recent exam.       Chest x-ray portable view performed on 21 April 2021: Wed Apr 21, 2021  3:57 AM   Chest X-ray, 1 View   No significant interval change from 04/20/2021.          X-ray, 1 View   04/20/2021   FINDINGS: Similar bibasilar consolidations. Similar bilateral pleural effusions. No pneumothorax. Stable heart size. No acute fracture. Right internal jugular central line in place with tip projecting over the cavoatrial junction. Lower right chest tube remains in place. Impression:  No significant interval change from 04/20/2021.        1 view chest x-ray   04/19/2021   Impression:  1. Bilateral pulmonary infiltrates and atelectasis without gross interval change. 2. Moderate bilateral pleural effusions with interval worsening on the right. No definite residual pneumothorax. XR CHEST 1 VIEW   4/15/2021   Persistent pleural fluid at the lower bilateral chest.   Persistent atelectasis versus pneumonia at both lower lungs. CT Scans  1.  Walled off, peripherally enhancing multiloculated bilateral pleural    collections and numerous peripherally enhancing low-density masses    involving -ID following   -Nephrology following for CAMILLE   -CTS signed off  -Oncology following planning to restart chemotherapy as outpatient   -Patient Pulmonary status back to baseline contact Pulmonary service for change in condition will seen PRN  -At time of discharge advised patient need to follow-up with established provider office for pleurx management Licha CAROLINA [facility]    Questions and concerns addressed. Electronically signed by   CAITY Mathis - CNP on 4/23/2021 at 1:22 PM     Addendum by Dr. Moo Pascual MD:  I have seen and examined the patient independently. Face to face evaluation and examination was performed. The above evaluation and note has been reviewed. Labs and radiographs were reviewed. I Have discussed with . Tereza Julian CNP about this patient in detail. The above assessment and plan has been reviewed. Please see my modifications mentioned below. My modifications:  He is on 2 L of oxygen via nasal cannula-baseline  Right-sided Pleurx catheter in place with the dressing. He was advised to follow with his pulmonologist at [facility] after discharge. Patient to contact his homecare company to arrange for daily drainage of Pleurx catheter. Carlos Jessica educated about my impression and plan. He verbalizes understanding.  -Will sign off on the case from pulmonary point of view. -Will follow PRN. -Call [phone] with questions.     William Raymundo MD 4/23/2021 1:48 PM

## 2021-04-23 NOTE — PROGRESS NOTES
[facility] ICU STEPDOWN TELEMETRY 4K  Occupational Therapy  Daily Note  Time:   Time In: 1340  Time Out: 1420  Timed Code Treatment Minutes: 40 Minutes  Minutes: 40          Date: 2021  Patient Name: Dada Carranza,   Gender: male      Room: -01/001-A  MRN: [id number]  : 1970  (44 y.o.)  Referring Practitioner: Leena Stephens CNP  Diagnosis: pneumonia  Additional Pertinent Hx: 44 y.o. male who was diagnosed with Stage 4 RCC since 2019, who presents with shortness of breath and worsening chills, poor appetite, onset was this past week or two. He was taken off his oral chemo pills by [facility] due to side effects. He normally uses 2 liters of oxygen, but that has been ramped up to 4L today. The duration has been constant. He also has mets to his brain, bones and his breathing is worse. He also felt chilled. No fever reported, but he was told to come here to be admitted for palliative care and further management. He also gained 10 lbs over past week or two, with possible new onset ascites. Restrictions/Precautions:  Restrictions/Precautions: General Precautions, Fall Risk  Position Activity Restriction  Other position/activity restrictions: : O2 at 4 L/min, 2L of O2 at baseline, pluerex drain on right side  no drains and on RA    SUBJECTIVE: Nurse Fermin Chaves ok'd session, In bed upon arrival, agreeable to OT session, son and daughter present    PAIN: 0/10:     Vitals: Oxygen: 2L02 98-98%    COGNITION: Decreased Insight, Impaired Memory, Decreased Problem Solving and Confusion noted    ADL:   Grooming: Supervision. Completed standing sinkside  Bathing: Supervision. completed in bathroom, explaineduse of catrina hex soap with minimal success  Upper Extremity Dressing: with set-up.  donned standing  Lower Extremity Dressing: Supervision. donned sitting  Toileting: Supervision. clothing management  Toilet Transfer: Supervision. Andrea Berman BALANCE:  Standing Balance: Supervision. greater than 5 minutes    BED MOBILITY:  Supine to Sit: Supervision HOB elevated, used bedrail    TRANSFERS:  Sit to Stand:  Supervision. EOB  Stand to Sit: Stand By Assistance. bedside chair    FUNCTIONAL MOBILITY:  Assistive Device: None  Assist Level:  Stand By Assistance. Distance: To and from bathroom  No LOB     ASSESSMENT:     Activity Tolerance:  Patient tolerance of  treatment: good. Discharge Recommendations: Patient would benefit from continued therapy after discharge, Continue to assess pending progress(Pt not safe to return home alone at this time. Initaited education on [facility] services with pt stating they have been talking about it.)   Equipment Recommendations: Equipment Needed: Yes  Other: rolling walker, BSC and wheelchair  Plan: Times per week: 5x  Current Treatment Recommendations: Endurance Training, Strengthening, Patient/Caregiver Education & Training, Self-Care / ADL, Balance Training, Functional Mobility Training, Safety Education & Training  Plan Comment: will attempt to see 5x week as long as pt can tolerate it    Patient Education  Patient Education: ADL's    Goals  Short term goals  Time Frame for Short term goals: by discharge  Short term goal 1: Pt to complete bathing tasks with min a and min cues for energy conservation tech or pursed lip breathing throughout  Short term goal 2: Pt to dmeo dynamic standing balance > 3 min with CGA and no UE support in prep for completing simple meal prep  Short term goal 3: Pt to increase endurance to maintain O2 sats above 88% while on O2 during ADL tasks wiht min cues for breathing tech and marilyn for rest breaks to increase ease of ADL Tasksk    Following session, patient left in safe position with all fall risk precautions in place. Detail Level: Detailed Detail Level: Simple Detail Level: Zone Quality 137: Melanoma: Continuity Of Care - Recall System: Documentation of system reason(s) for not entering patient's information into a recall system (eg, melanoma being monitored by another physician provider)

## 2021-04-23 NOTE — PROGRESS NOTES
INTERNAL MEDICINE SPECIALTIES  Progress Note For Dr Neela Escudero       Patient:  Maryann Rincon  YOB: 1970  Date of Service: 4/23/2021  MRN: [id number]   Acct:  [de-identified]   Primary Care Physician: Lo Echeverria, APRN - CNP        SUBJECTIVE:  Anne Arroyo \"so so\". Has decided he wants to be a limited code x 4, no shock , no intubation, no CPR, no meds. Home Medications:   No current facility-administered medications on file prior to encounter. Current Outpatient Medications on File Prior to Encounter   Medication Sig Dispense Refill    traMADol (ULTRAM) 50 MG tablet Take 50 mg by mouth every 6 hours as needed for Pain.       traZODone (DESYREL) 50 MG tablet Take 50 mg by mouth nightly      loratadine (CLARITIN) 10 MG capsule Take 10 mg by mouth daily      Cabozantinib S-Malate (CABOMETYX) 40 MG TABS Take by mouth daily      indomethacin (INDOCIN) 25 MG capsule Take 25 mg by mouth as needed for Pain      levothyroxine (SYNTHROID) 25 MCG tablet Take 25 mcg by mouth Daily      ALLOPURINOL PO Take 100 mg by mouth nightly       omeprazole (PRILOSEC) 40 MG delayed release capsule Take 40 mg by mouth daily      levothyroxine (SYNTHROID) 100 MCG tablet Take 25 mcg by mouth Daily       amLODIPine (NORVASC) 5 MG tablet Take 5 mg by mouth daily      colchicine (COLCRYS) 0.6 MG tablet Take 1 tablet by mouth daily for 3 days 3 tablet 0         Scheduled Meds:   bumetanide  1 mg Intravenous BID    midodrine  5 mg Oral TID WC    predniSONE  40 mg Oral Daily    epoetin yumiko-epbx  3,000 Units Subcutaneous Once per day on Mon Wed Fri    Or    epoetin yumiko-epbx  10,000 Units Subcutaneous Once per day on Mon Wed Fri    famotidine  20 mg Oral Daily    allopurinol  100 mg Oral BID    insulin lispro  0-6 Units Subcutaneous TID WC    insulin lispro  0-3 Units Subcutaneous Nightly    traZODone  50 mg Oral Nightly    sodium chloride flush  5-40 mL Intravenous 2 times per day    levothyroxine 62.5 mcg Oral Daily     Continuous Infusions:   sodium chloride      sodium chloride      dextrose       PRN Meds:sodium chloride, sodium chloride, glucose, dextrose, glucagon (rDNA), dextrose, ipratropium-albuterol, traMADol, sodium chloride flush, promethazine **OR** ondansetron, polyethylene glycol, acetaminophen **OR** acetaminophen, diphenhydrAMINE        Allergies:  Patient has no known allergies. OBJECTIVE:    Vitals:   Vitals:    04/23/21 0804   BP:    Pulse:    Resp:    Temp:    SpO2: 97%      BMI: Body mass index is 28.56 kg/m². PHYSICAL EXAMINATION:            General appearance: ill looking male mild respiratory distress*, appears stated age and cooperative. HEENT:  Normal cephalic, atraumatic without obvious deformity. Pupils equal, round, and reactive to light. Extra ocular muscles intact. Conjunctivae/corneas clear. Neck: Supple. Chest:  PleurX catheter right chest  Respiratory:  Diminished air entry bilaterally  Cardiovascular:  Regular rhythm with normal S1/S2 without  rubs or gallops. Abdomen:  Full,soft, non-tender, non-distended with normal bowel sounds. Musculoskeletal:  No clubbing, cyanosis or ankle edema bilaterally.   Has some swelling and tenderness right elbow  Neurologic:   Alert and oriented x3 with no gross focal deficits  Psychiatric: Thought content appropriate, normal insight      Review of Labs and Diagnostic Testing:    Recent Results (from the past 24 hour(s))   POCT glucose    Collection Time: 04/22/21  4:04 PM   Result Value Ref Range    POC Glucose 257 (H) 70 - 108 mg/dl   POCT glucose    Collection Time: 04/22/21  7:49 PM   Result Value Ref Range    POC Glucose 219 (H) 70 - 108 mg/dl   CBC auto differential    Collection Time: 04/23/21  3:00 AM   Result Value Ref Range    WBC 22.3 (H) 4.8 - 10.8 thou/mm3    RBC 2.98 (L) 4.70 - 6.10 mill/mm3    Hemoglobin 8.4 (L) 14.0 - 18.0 gm/dl    Hematocrit 26.5 (L) 42.0 - 52.0 %    MCV 88.9 80.0 - 94.0 fL    MCH 28.2 26.0 - 33.0 pg    MCHC 31.7 (L) 32.2 - 35.5 gm/dl    RDW-CV 18.5 (H) 11.5 - 14.5 %    RDW-SD 57.6 (H) 35.0 - 45.0 fL    Platelets 424 (H) 736 - 400 thou/mm3    MPV 9.2 (L) 9.4 - 12.4 fL    Seg Neutrophils 87.2 %    Lymphocytes 4.5 %    Monocytes 7.3 %    Eosinophils 0.0 %    Basophils 0.1 %    Immature Granulocytes 0.9 %    Segs Absolute 19.4 (H) 1 - 7 thou/mm3    Lymphocytes Absolute 1.0 1.0 - 4.8 thou/mm3    Monocytes Absolute 1.6 (H) 0.4 - 1.3 thou/mm3    Eosinophils Absolute 0.0 0.0 - 0.4 thou/mm3    Basophils Absolute 0.0 0.0 - 0.1 thou/mm3    Immature Grans (Abs) 0.21 (H) 0.00 - 0.07 thou/mm3    nRBC 0 /100 wbc   Basic Metabolic Panel    Collection Time: 04/23/21  3:00 AM   Result Value Ref Range    Sodium 134 (L) 135 - 145 meq/L    Potassium 3.9 3.5 - 5.2 meq/L    Chloride 101 98 - 111 meq/L    CO2 22 (L) 23 - 33 meq/L    Glucose 125 (H) 70 - 108 mg/dL    BUN 51 (H) 7 - 22 mg/dL    CREATININE 2.4 (H) 0.4 - 1.2 mg/dL    Calcium 8.4 (L) 8.5 - 10.5 mg/dL   Anion Gap    Collection Time: 04/23/21  3:00 AM   Result Value Ref Range    Anion Gap 11.0 8.0 - 16.0 meq/L   Glomerular Filtration Rate, Estimated    Collection Time: 04/23/21  3:00 AM   Result Value Ref Range    Est, Glom Filt Rate 29 (A) ml/min/1.73m2   POCT glucose    Collection Time: 04/23/21  7:28 AM   Result Value Ref Range    POC Glucose 119 (H) 70 - 108 mg/dl       Radiology:     Echo Complete 2d W Doppler W Color    Result Date: 4/13/2021  Transthoracic Echocardiography Report (TTE)  Demographics   Patient Name   [address] [place] [address],[address] Gender              Male                 P   MR #           [id number]         Race                                                    Ethnicity   Account #      [de-identified]         Room Number         4526   Accession      [id number]        Date of Study       04/13/2021  Number   Date of Birth  1970        Referring Physician FLORENTIN Queen CNP Age            44 year(s)        Sonographer         Yuridia Jung RDCS                                    Interpreting        Echo reader of the                                   Physician           week                                                       Serina Avery MD  Procedure Type of Study   TTE procedure:ECHOCARDIOGRAM COMPLETE 2D W DOPPLER W COLOR. Procedure Date Date: 04/13/2021 Start: 02:57 PM Study Location: Bedside Technical Quality: Limited visualization due to restricted mobility. Indications:Shortness of breath. Additional Medical History:Recent 15 pound weight gain, diabetic, hypertension, renal cell cancer with mets Patient Status: Routine Height: 72.05 inches Weight: 207.24 pounds BSA: 2.16 m^2 BMI: 28.07 kg/m^2 BP: 115/76 mmHg  Conclusions   Summary  Normal left ventricle size and systolic function. Ejection fraction was  estimated at 60 to 65%. There were no regional left ventricular wall  motion abnormalities and wall thickness was within normal limits. Left atrial size was normal.   Signature   ----------------------------------------------------------------  Electronically signed by Serina Avery MD (Interpreting  physician) on 04/13/2021 at 06:17 PM  ----------------------------------------------------------------   Findings   Mitral Valve  The mitral valve structure was normal with normal leaflet separation. DOPPLER: The transmitral velocity was within the normal range with no  evidence for mitral stenosis. There was no evidence of mitral  regurgitation. Aortic Valve  The aortic valve was trileaflet with normal thickness and cuspal  separation. DOPPLER: Transaortic velocity was within the normal range with  no evidence of aortic stenosis. There was no evidence of aortic  regurgitation. Tricuspid Valve  The tricuspid valve structure was normal with normal leaflet separation. DOPPLER: There was no evidence of tricuspid stenosis.  There was no  evidence of tricuspid regurgitation. Pulmonic Valve  The pulmonic valve leaflets exhibited normal thickness, no calcification,  and normal cuspal separation. DOPPLER: The transpulmonic velocity was  within the normal range with no evidence for regurgitation. Left Atrium  Left atrial size was normal.   Left Ventricle  Normal left ventricle size and systolic function. Ejection fraction was  estimated at 60 to 65%. There were no regional left ventricular wall  motion abnormalities and wall thickness was within normal limits. Right Atrium  Right atrial size was normal.   Right Ventricle  The right ventricular size was normal with normal systolic function and  wall thickness. Pericardial Effusion  The pericardium was normal in appearance with no evidence of a pericardial  effusion. Pleural Effusion  No evidence of pleural effusion. Aorta / Great Vessels  -Aortic root dimension within normal limits.  -The Pulmonary artery is within normal limits. -IVC size is within normal limits with normal respiratory phasic changes.   M-Mode/2D Measurements & Calculations   LV Diastolic    LV Systolic Dimension: 2.7  AV Cusp Separation: 2.3 cmLA  Dimension: 4 cm cm                          Dimension: 3.3 cmAO Root  LV FS:32.5 %    LV Volume Diastolic: 70 ml  Dimension: 3.8 cm  LV PW           LV Volume Systolic: 27 ml  Diastolic: 0.9  LV EDV/LV EDV Index: 70  cm              ml/32 m^2LV ESV/LV ESV  Septum          Index: 27 ml/12 m^2         RV Diastolic Dimension: 2 cm  Diastolic: 1 cm EF Calculated: 61.4 %                                              LA/Aorta: 0.87  Doppler Measurements & Calculations   MV Peak E-Wave: 74.6 cm/s  AV Peak Velocity: 150 LVOT Peak Velocity: 130  MV Peak A-Wave: 98.1 cm/s  cm/s                  cm/s  MV E/A Ratio: 0.76         AV Peak Gradient: 9   LVOT Peak Gradient: 7  MV Peak Gradient: 2.23     mmHg                  mmHg  mmHg                                                   TV Peak E-Wave: 62.6 cm/s  MV Deceleration Time: 345                        TV Peak A-Wave: 88.7 cm/s  msec  MV P1/2t: 101 msec                               TV Peak Gradient: 1.57  MVA by PHT:2.18 cm^2                             mmHg                                                   TR Velocity:247 cm/s  MV E' Septal Velocity: 7.1 AV DVI (Vmax):0.87    TR Gradient:24.4 mmHg  cm/s                                             PV Peak Velocity: 75 cm/s  MV A' Septal Velocity:                           PV Peak Gradient: 2.25  10.8 cm/s                                        mmHg  MV E' Lateral Velocity:  8.9 cm/s  MV A' Lateral Velocity:  12.8 cm/s  E/E' septal: 10.51  E/E' lateral: 8.38  [email or web address] [id number]    Ct Abdomen Pelvis W Iv Contrast Additional Contrast? None    Result Date: 4/13/2021  * ADDENDUM #1 *  This report was discussed with Siomara Graf RN on Apr 13, 2021 21:43:00 EDT. This document has been electronically signed by: Sherita Bryson on 04/13/2021 09:43 PM *  ORIGINAL REPORT * CT abdomen and pelvis with contrast Comparison:  CT,KOSR  - CT ABDOMEN PELVIS W IV CONTRAST  - 03/23/2021 02:48 PM EDT Findings: Walled off, peripherally enhancing multiloculated bilateral pleural collections and numerous peripherally enhancing low-density masses involving the bilateral pleural spaces. Extension into the mediastinum, and into multiple intercostal spaces. Early involvement of the pericardium. Destructive changes of multiple ribs. Interval progression since the prior study. Multifocal consolidation and volume loss within the lungs. Heterogeneous lung parenchyma. Multiple bilateral pulmonary nodules. Multiple calcified granulomas within the spleen. Unremarkable liver and pancreas. Large solid mass within the right kidney without change. Horseshoe kidney noted. Small bilateral adrenal nodules unchanged. Colonic diverticulosis. No pneumatosis or bowel obstruction. Unremarkable bladder and prostate gland. Nonvisualization of the appendix. No acute fracture. Postsurgical changes at L5. Multifocal lytic metastases to bony structures. 1. Walled off, peripherally enhancing multiloculated bilateral pleural collections and numerous peripherally enhancing low-density masses involving the bilateral pleural spaces. Extension into the mediastinum, and into multiple intercostal spaces. Early involvement of the pericardium. Destructive changes of multiple ribs. Interval progression since the prior study. Findings are compatible with extensive malignant involvement. There could also be a component of infection. 2. Multifocal consolidation and volume loss within the lungs. Heterogeneous lung parenchyma raising the possibility of underlying involvement with neoplasm. Multiple bilateral pulmonary nodules likely on the basis of metastatic disease. 3. Right-sided chest tube tip within the right major fissure. 4. 5.1 cm solid mass within the right kidney, compatible with neoplasm without significant change. 5. Moderate abdominal and pelvic free fluid with interval worsening. 6. Multifocal peritoneal thickening and enhancement compatible with malignant involvement. 7. Soft tissue infiltration of the omentum of the proximal sigmoid colon, likely on the basis of malignant involvement. 8. Bilateral adrenal gland nodules, compatible with metastatic disease. 9. Large lytic lesion involving the right iliac bone compatible with metastatic disease, without significant change. 10. Extensive severe edema of the soft tissues with worsening. This document has been electronically signed by: Yinka Cruz MD on 04/13/2021 09:33 PM All CTs at this facility use dose modulation techniques and iterative reconstructions, and/or weight-based dosing when appropriate to reduce radiation to a low as reasonably achievable.     Xr Chest Portable    Result Date: 4/14/2021  1 view chest x-ray Comparison:  CR,SR  - XR CHEST PORTABLE  - 04/13/2021 11:16 AM EDT Findings: Loculated bilateral pleural collections without gross interval change. Airspace filling within the bilateral mid and lower lungs without change. Enlarged cardiomediastinal silhouette without change. Destructive changes of multiple left-sided ribs without change. No acute bony abnormality. 1. Loculated bilateral pleural collections without change. 2. Bilateral pulmonary infiltrates and atelectasis without change. This document has been electronically signed by: Khang Morgan MD on 04/14/2021 01:55 AM    Xr Chest Portable    Result Date: 4/13/2021  PROCEDURE: XR CHEST PORTABLE CLINICAL INFORMATION: Shortness of breath TECHNIQUE: Mobile AP chest radiograph. COMPARISON: AP and lateral chest radiographs 20/3/2020 FINDINGS: Cardiac silhouette is obscured by opacities at the bilateral lung bases. The bilateral costophrenic angles are blunted. Hazy and reticular opacities are present in the mid and lower lungs. There is a new density at the right lung apex, likely a  focal infiltrate. Multiple nodules seen on CT of the chest from 3/23/2021 are not clearly visible on the current study. Bilateral pleural thickening is noted. Degenerative changes in the thoracic spine are poorly visualized. 1. Small to moderate-sized bilateral pleural effusions with adjacent atelectasis/infiltrate. 2. New density at the right lung apex, likely focal infiltrate. **This report has been created using voice recognition software. It may contain minor errors which are inherent in voice recognition technology. ** Final report electronically signed by Dr. Abi Sanders on 4/13/2021 11:28 AM    4/20/2021 CXR   Findings:   Bilateral pleural effusions and opacities at the lung bases are similar to    the prior exam.   Right lung base tunneled pleural catheter, and right internal jugular    central venous catheter in the SVC again noted. Cardiomegaly. No acute fracture           ASSESMENT:      Active Problems:    Pneumonia due to organism    Acute on chronic respiratory failure with hypoxemia (HCC)    Severe malnutrition (HCC)    SIADH (syndrome of inappropriate ADH production) (HCC)    Hyponatremia    Renal cell cancer, unspecified laterality ([facility] [address]  Resolved Problems:    * No resolved hospital problems. *  OTHER PROBLEMS:    - Metastatic clear cell renal carcinoma to  lungs, pleura, adrenal glands and bone with loculated malignant pleural effusion, omentum, pericardial wall. -  Gout   -  Hypothyroidism   -  Hyponatremia likely related to SIADH      PLAN:  Creatinine 2.4 mg/dL, nephrology Service following on account of the CAMILLE. Suspected to be secondary to ATN, cannot rule out AIN from opdivo. Patient had 3 doses solumedrol. Now on Prednisone 40 mg qd . Avoid nephrotoxic agents. Had received couple of doses of IV Bumex for volume overload from IV fluids. Further diuresis planned today. Leukocytosis likely steroid related    Midodrine dose reduced . His vancomycin has since been discontinued. Renal ultrasound had shown no hydronephrosis. hemoglobin remains stable post transfusion of 1 unit PRBC,     Patient had been seen by cardiothoracic service on account of the loculated pleural effusion s/p TPA , continue pleural fluid drainage via PleurX catheter,  Continue with bronchodilators, O2, broad spectrum antibiotics to cover  HAP completed,   Pulmonology and ID services following. Legionella ,strep pneumonia urinary antigens & rapid influenza screen were all negative . Blood cultures negative, continue Incentive spirometry , acapella. Hyperglycemia is steroid related. The leukocytosis may also have a steroid component. Seen by Oncologist , received a dose of opdivo.  Plan further immunotherapy outpatient    The patient's 2D echocardiogram showed normal EF, no evidence of pericardial effusion    Overall prognosis is poor, palliative Care consult in place       DC planning when okay with all consultants possibly over the weekend    CODE STATUS :  limited code x 4, no shock , no intubation, no CPR, no meds.     Dr Benitez Harvey covers from today to 4/26/2021      DVT prophylaxis: [x] Lovenox                                 [] SCDs                                 [] SQ Heparin                                 [] Encourage ambulation, low risk for DVT, no chemical or mechanical prophylaxis necessary              [] Already on Anticoagulation                Anticipated Disposition upon discharge: [x] Home                                                                         [] Home with Home Health                                                                         [] [facility]                                                                         [] [address] [place] [address],[address]          Electronically signed by Lonnie Lau MD on 4/23/2021 at 8:54 AM

## 2021-04-23 NOTE — PLAN OF CARE
Problem: Falls - Risk of:  Goal: Will remain free from falls  Description: Will remain free from falls  4/23/2021 0030 by Shasta Gaines RN  Outcome: Ongoing  Note: No falls this shift. Bed alarm, nonskid socks, and call light utilized. At risk due to generalized weakness and oxygen use. Problem: Falls - Risk of:  Goal: Absence of physical injury  Description: Absence of physical injury  4/23/2021 0030 by Shasta Gaines RN  Outcome: Ongoing     Problem: Grieving:  Goal: Verbalizes feelings, concerns and thoughts  Description: Verbalizes feelings, concerns and thoughts  4/23/2021 0030 by Shasta Gaines RN  Outcome: Ongoing  Note: Patient and spouse voiced concerns with discharge d/t patient being alone for long hours. Pt became tearful when spouse went to leave stating, \"I miss you so much and I love you so much. \" Emotional support provided to both patient and his spouse and informed the couple that their concerns would be brought to the 's attention. Problem: Skin Integrity:  Goal: Will show no infection signs and symptoms  Description: Will show no infection signs and symptoms  4/23/2021 0030 by Shasta Gaines RN  Outcome: Ongoing     Problem: Skin Integrity:  Goal: Absence of new skin breakdown  Description: Absence of new skin breakdown  4/23/2021 0030 by Shasta Gaines RN  Outcome: Ongoing  Note: No new skin breakdown. Pt turned and repositioned. Will continue on going skin assessment. Problem: Respiratory:  Goal: Ability to maintain vital signs within normal range will improve  Description: Ability to maintain vital signs within normal range will improve  4/23/2021 0030 by Shasta Gaines RN  Outcome: Ongoing     Problem: Respiratory:  Goal: Respiratory status will improve  Description: Respiratory status will improve  4/23/2021 0030 by Shasta Gaines RN  Outcome: Ongoing  Note: Pt spO2 97% on 2 liters NC. Pt requesting we keep oxygen at 2 liters for his own comfort.  At times, oxygen raised to 3 liters when ambulating d/t increased SOB. Will continue to monitor. Problem: Musculor/Skeletal Functional Status  Goal: Highest potential functional level  4/23/2021 0030 by Breann Wade RN  Outcome: Ongoing  Note: Pt ambulating well with little assistance or difficulty. Pt educated importance of call light and bed alarms for safety d/t the increased risk of falling from long oxygen tubing. Pt receptive of teaching. Problem: Pain:  Goal: Pain level will decrease  Description: Pain level will decrease  4/23/2021 0030 by Breann Wade RN  Outcome: Ongoing  Note: Pt chronic pain controlled well throughout day. Pt did request evening pain medication d/t increased right shoulder pain and left generalized pain. PRN medication given as ordered. Will continue assessment of pain control. Problem: Pain:  Goal: Control of acute pain  Description: Control of acute pain  4/23/2021 0030 by Breann Wade RN  Outcome: Ongoing     Problem: Pain:  Goal: Control of chronic pain  Description: Control of chronic pain  4/23/2021 0030 by Breann Wade RN  Outcome: Ongoing     Care plan reviewed with patient and wife. Patient and wife verbalize understanding of the plan of care and contribute to goal setting.

## 2021-04-23 NOTE — PROGRESS NOTES
[address] [address]  INPATIENT PHYSICAL THERAPY  DAILY NOTE  [facility] ICU STEPDOWN TELEMETRY 4K - [id number]    Time In: 1441  Time Out: 1454  Timed Code Treatment Minutes: 13 Minutes  Minutes: 13          Date: 2021  Patient Name: Myron Wu,  Gender:  male        MRN: [id number]  : 1970  (44 y.o.)     Referring Practitioner: CAITY Graham CNP  Diagnosis: Pneumonia  Additional Pertinent Hx: Per ED notes, pt \"is a 44 y.o. male who was diagnosed with Stage 4 RCC since 2019, who presents with shortness of breath and worsening chills, poor appetite, onset was this past week or two. He was taken off his oral chemo pills by [facility] due to side effects. He normally uses 2 liters of oxygen, but that has been ramped up to 4L today. The duration has been constant. He also has mets to his brain, bones and his breathing is worse. He also felt chilled. No fever reported, but he was told to come here to be admitted for palliative care and further management. He also gained 10 lbs over past week or two, with possible new onset ascites. \"     Prior Level of Function:  Lives With: Spouse  Type of Home: House  Home Layout: One level  Home Access: Stairs to enter without rails  Entrance Stairs - Number of Steps: 1 step with a rail  Home Equipment: Oxygen   Bathroom Shower/Tub: Tub/Shower unit  Bathroom Toilet: Standard  Bathroom Accessibility: Accessible    ADL Assistance: Independent  Homemaking Assistance: Independent  Ambulation Assistance: Independent  Transfer Assistance: Independent  Active : Yes  IADL Comments: Pt has to complete simple meal prep while spouse is at work  Additional Comments: Pt stating he is indep with all ADL tasks and did not use AD prior. Pt on 2L of O2 at baseline. Spouse works during day of hours 6-230 or 6-430. Daughter lives approx 1 hour away.  Daughter concerned regarding pt being home alone for that long of

## 2021-04-23 NOTE — PROGRESS NOTES
chloride, sodium chloride, glucose, dextrose, glucagon (rDNA), dextrose, ipratropium-albuterol, traMADol, sodium chloride flush, promethazine **OR** ondansetron, polyethylene glycol, acetaminophen **OR** acetaminophen, diphenhydrAMINE    Input/Output:       I/O last 3 completed shifts: In: 1477.3 [P.O.:1400; I.V.:77.3]  Out: 3250 [Urine:3000; Drains:250]. Patient Vitals for the past 96 hrs (Last 3 readings):   Weight   21 0014 210 lb 9.6 oz (95.5 kg)   21 0342 210 lb 11.2 oz (95.6 kg)   21 0330 214 lb 1.6 oz (97.1 kg)       Vital Signs:   Temperature:  Temp: 98.2 °F (36.8 °C)  TMax:   Temp (24hrs), Av.7 °F (36.5 °C), Min:97.4 °F (36.3 °C), Max:98.2 °F (36.8 °C)    Respirations:  Resp: 18  Pulse:   Pulse: 99  BP:    BP: 118/74  BP Range: Systolic (56AFT), LBK:632 , Min:118 , IRR:867       Diastolic (05SNH), TFZ:53, Min:64, Max:82      Physical Examination:     General:  Awake, alert, pleasant  HEENT: NC/AT/ MMM  Chest:             Right sided pleurex drain, rales noted right lung  Cardiac:  S1 S2 tachycardic  Abdomen: Soft, non-tender,  Neuro:  No facial droop, No Asterixis  SKIN:  No rashes, good skin turgor. Extremities:  1+ LE edema    Labs:       Recent Labs     21  0345 21  0550 21  0300   WBC 14.2* 17.7* 22.3*   RBC 2.85* 3.10* 2.98*   HGB 7.7* 8.5* 8.4*   HCT 24.8* 27.4* 26.5*   MCV 87.0 88.4 88.9   MCH 27.0 27.4 28.2   MCHC 31.0* 31.0* 31.7*   * 474* 433*   MPV 9.4 9.1* 9.2*      BMP:   Recent Labs     21  0345 21  0550 21  0300    136 134*   K 4.7 4.3 3.9    104 101   CO2 20* 20* 22*   BUN 46* 52* 51*   CREATININE 2.4* 2.5* 2.4*   GLUCOSE 161* 132* 125*   CALCIUM 8.6 8.7 8.4*      Phosphorus:   No results for input(s): PHOS in the last 72 hours. Magnesium:  No results for input(s): MG in the last 72 hours. Albumin:    No results for input(s): LABALBU in the last 72 hours.   BNP:    No results found for: BNP  MICHAEL:    No results found for: MICHAEL  SPEP:  Lab Results   Component Value Date    PROT 4.1 04/18/2021     UPEP:   No results found for: LABPE  C3:   No results found for: C3  C4:   No results found for: C4  MPO ANCA:   No results found for: MPO  PR3 ANCA:   No results found for: PR3  Anti-GBM:   No results found for: GBMABIGG  Hep BsAg:       No results found for: HEPBSAG  Hep C AB:        No results found for: HEPCAB    Urinalysis/Chemistries:      Lab Results   Component Value Date    NITRU NEGATIVE 04/18/2021    COLORU YELLOW 04/18/2021    PHUR 5.0 04/18/2021    LABCAST 4-8 HYALINE 04/18/2021    LABCAST 0-4 C. GRAN 04/18/2021    WBCUA 0-2 04/18/2021    RBCUA NONE 04/18/2021    MUCUS THREADS 07/22/2019    YEAST NONE SEEN 04/18/2021    BACTERIA NONE SEEN 04/18/2021    SPECGRAV 1.021 04/18/2021    LEUKOCYTESUR NEGATIVE 04/18/2021    UROBILINOGEN 0.2 04/18/2021    BILIRUBINUR NEGATIVE 04/18/2021    BLOODU NEGATIVE 04/18/2021    GLUCOSEU NEGATIVE 07/22/2019    KETUA TRACE 04/18/2021    AMORPHOUS DEBRIS 04/18/2021     Urine Sodium:   No results found for: NEVAEH  Urine Potassium:  No results found for: KUR  Urine Chloride:  No results found for: CLUR  Urine Osmolarity:   Lab Results   Component Value Date    OSMOU 796 04/13/2021     Urine Protein:   No components found for: TOTALPROTEIN, URINE   Urine Creatinine:   No results found for: LABCREA  Urine Eosinophils:  No components found for: UEOS        Impression and Plan:  1. CAMILLE:  likely ATN from vancomycin nephrotoxicity compounded by recent hypotension however cannot rule out underlying AIN from [id number]. Started on empiric steroids. -overall renal function is stable  -will give IV bumex again today he still has some edema    2. Volume overload   3. Hypotension better, continue to wean Midodrine, decrease to 2.5 mg TID  4. Anemia s/p blood transfusion  5. Malignant pleural effusion  6. Acute on chronic hypoxic resp failure  7.  Metastatic RCC  8. Leukocytosis likely from steroids        Please don't hesitate to call with any questions.   Electronically signed by Chikis Douglas DO on 4/23/2021 at 12:23 PM

## 2021-04-23 NOTE — CARE COORDINATION
4/23/21, 4:44 PM EDT    DISCHARGE PLANNING EVALUATION      Late entry, SW spoke to family again as chart notes reflected that they(wife)  were requesting SW see them regarding final plan. SW spoke to the son and daughter, the son states he was with the wife yesterday and this was not mentioned. SW spoke both of these children yesterday as well as patient and all denied need for extra help.  YAMILE asked staff to notify [facility] at [phone]

## 2021-04-24 NOTE — PROGRESS NOTES
IM Progress Note  Dr. Doan List for Dr Raphael Screws  4/24/2021 8:26 AM      Patient name Anya Dhaliwal  [id number]  PCP: CAITY Gonsalez CNP  Admit Date: 4/13/2021  Acct No. [de-identified]    Subjective: Interval History:   Pt on 2 L NC  Says he feels much better  Wants to go home as he has been here close to 2 weeks    Diet: DIET GENERAL; Low Potassium  Dietary Nutrition Supplements: Diabetic Oral Supplement    I/O last 3 completed shifts: In: 850 [P.O.:850]  Out: 500 [Urine:200; Drains:300]  No intake/output data recorded. Admission weight: 207 lb (93.9 kg) as of 4/13/2021 10:24 AM  Wt Readings from Last 3 Encounters:   04/24/21 203 lb 1.6 oz (92.1 kg)   03/23/21 192 lb 6.4 oz (87.3 kg)   02/15/21 200 lb (90.7 kg)     Body mass index is 27.55 kg/m².     ROS   CVS;  no cp or palpitation  Resp: +SOB no cough  Neuro:  No numbness or weakness or dizziness  Abd: no nausea or vomiting or abd pain      Medications:   Scheduled Meds:   midodrine  2.5 mg Oral TID WC    predniSONE  40 mg Oral Daily    epoetin yumiko-epbx  3,000 Units Subcutaneous Once per day on Mon Wed Fri    Or    epoetin yumiko-epbx  10,000 Units Subcutaneous Once per day on Mon Wed Fri    famotidine  20 mg Oral Daily    allopurinol  100 mg Oral BID    insulin lispro  0-6 Units Subcutaneous TID WC    insulin lispro  0-3 Units Subcutaneous Nightly    traZODone  50 mg Oral Nightly    sodium chloride flush  5-40 mL Intravenous 2 times per day    levothyroxine  62.5 mcg Oral Daily     Continuous Infusions:   sodium chloride      sodium chloride      dextrose         Labs :     CBC:   Recent Labs     04/22/21  0550 04/23/21  0300   WBC 17.7* 22.3*   HGB 8.5* 8.4*   * 433*     BMP:    Recent Labs     04/22/21  0550 04/23/21  0300    134*   K 4.3 3.9    101   CO2 20* 22*   BUN 52* 51*   CREATININE 2.5* 2.4*   GLUCOSE 132* 125*     Hepatic: No results for input(s): AST, ALT, ALB, BILITOT, ALKPHOS in the last 72 hours.  Troponin: No results for input(s): TROPONINI in the last 72 hours. BNP: No results for input(s): BNP in the last 72 hours. Lipids: No results for input(s): CHOL, HDL in the last 72 hours. Invalid input(s): LDLCALCU  INR: No results for input(s): INR in the last 72 hours.     Radiology    Objective:   Vitals: /75   Pulse 100   Temp 97.7 °F (36.5 °C) (Oral)   Resp 18   Ht 6' (1.829 m)   Wt 203 lb 1.6 oz (92.1 kg)   SpO2 97%   BMI 27.55 kg/m²   HEENT: Head:pupils react  Neck: supple  Lungs: air exchange appreciated on both sides  Heart: regular rhythm tachy   Abdomen: soft BS heard   Extremities: warm  + pedal edema  Neurologic:  Alert, oriented X3    Impression:   :   Acute on chronic resp failure  Pneumonia  Loculated pleural effusion s/p Pleurx cath  CAMILLE  Per renal  Metastatic Renal cell Cancer   Hypotension   Anemia  Hypothyroid  leucocytosis    Plan:    Pt wishes to go home    Will check with consultants if ok for discharge  Check with nephro steroid dose at discharge  Pt to f/u with pulm outpt for his pleurx cath  diuresis per renal  Cont judy Bruno MD

## 2021-04-24 NOTE — PROGRESS NOTES
[facility]         Renal Inpatient Follow-Up note         4/24/2021 11:25 AM    Pt Name:   Yobani Julian  YOB: 1970  Attending:   Rayshawn Perera MD    Chief Complaint : Yobani Julian is a 44 y.o. male being followed by nephrology for acute kidney injury    Interval History :   Patient seen and examined by me. No distress  Feels well denies any chest pain or shortness of breath  Eager to go home     Scheduled Medications :    midodrine  2.5 mg Oral TID WC    predniSONE  40 mg Oral Daily    epoetin yumiko-epbx  3,000 Units Subcutaneous Once per day on Mon Wed Fri    Or    epoetin yumiko-epbx  10,000 Units Subcutaneous Once per day on Mon Wed Fri    famotidine  20 mg Oral Daily    allopurinol  100 mg Oral BID    insulin lispro  0-6 Units Subcutaneous TID WC    insulin lispro  0-3 Units Subcutaneous Nightly    traZODone  50 mg Oral Nightly    sodium chloride flush  5-40 mL Intravenous 2 times per day    levothyroxine  62.5 mcg Oral Daily      sodium chloride      sodium chloride      dextrose         Vitals :  /76   Pulse 101   Temp 98 °F (36.7 °C) (Oral)   Resp 18   Ht 6' (1.829 m)   Wt 203 lb 1.6 oz (92.1 kg)   SpO2 96%   BMI 27.55 kg/m²     24HR INTAKE/OUTPUT:      Intake/Output Summary (Last 24 hours) at 4/24/2021 1125  Last data filed at 4/24/2021 0317  Gross per 24 hour   Intake 790 ml   Output 300 ml   Net 490 ml     Last 3 weights  Wt Readings from Last 3 Encounters:   04/24/21 203 lb 1.6 oz (92.1 kg)   03/23/21 192 lb 6.4 oz (87.3 kg)   02/15/21 200 lb (90.7 kg)           Physical Exam :  General Appearance:  Well developed.  No distress  Mouth/Throat:  Oral mucosa moist  Neck:  Supple, no JVD  Lungs:  Breath sounds: clear  Heart[de-identified]  S1,S2 heard  Abdomen:  Soft, non - tender  Musculoskeletal:  Edema -noted         Last 3 CBC   Recent Labs     04/22/21  0550 04/23/21  0300   WBC 17.7* 22.3*   RBC 3.10* 2.98*   HGB 8.5* 8.4*   HCT 27.4* 26.5*   * 433*     Last 3 CMP  Recent Labs     04/22/21  0550 04/23/21  0300    134*   K 4.3 3.9    101   CO2 20* 22*   BUN 52* 51*   CREATININE 2.5* 2.4*   CALCIUM 8.7 8.4*             ASSESSMENT / Plan   1 Renal -acute kidney injury mostly due to Vanco toxicity/hypotension  ? Overall stable creatinine no new labs today  ? Also on low-dose steroids for possible AIN  ? Check a BMP now    2 Electrolytes -mild hyponatremia will follow on the labs  3 Hypotension much better on low-dose midodrine can stop and monitor  4 Anemia status post blood transfusion  5 Metastatic renal cell carcinoma  6 Leukocytosis from steroids  7 Meds reviewed and discussed with patient and family at bedside    VASU Thompson D.  [facility].

## 2021-04-24 NOTE — PROGRESS NOTES
Spoke with [facility] *[phone])regarding pt to be set up for discharge with daily RN for pleurx drainage, and PT/OT. Confirmed pt will receive [facility] services and they will contact him tmrw. Wife bringing portable O2 for discharge.

## 2021-04-24 NOTE — PLAN OF CARE
Problem: Falls - Risk of:  Goal: Will remain free from falls  Description: Will remain free from falls  Outcome: Met This Shift  Goal: Absence of physical injury  Description: Absence of physical injury  Outcome: Met This Shift     Problem: Grieving:  Goal: Verbalizes feelings, concerns and thoughts  Description: Verbalizes feelings, concerns and thoughts  Outcome: Ongoing     Problem: Skin Integrity:  Goal: Will show no infection signs and symptoms  Description: Will show no infection signs and symptoms  Outcome: Met This Shift  Goal: Absence of new skin breakdown  Description: Absence of new skin breakdown  Outcome: Met This Shift     Problem: Respiratory:  Goal: Ability to maintain vital signs within normal range will improve  Description: Ability to maintain vital signs within normal range will improve  Outcome: Ongoing  Goal: Respiratory status will improve  Description: Respiratory status will improve  Outcome: Ongoing     Problem: Discharge Planning:  Goal: Discharged to appropriate level of care  Description: Discharged to appropriate level of care  Outcome: Ongoing     Problem: Musculor/Skeletal Functional Status  Goal: Highest potential functional level  Outcome: Ongoing     Problem: Nutrition  Goal: Optimal nutrition therapy  Outcome: Ongoing     Problem: Pain:  Goal: Pain level will decrease  Description: Pain level will decrease  Outcome: Ongoing  Goal: Control of acute pain  Description: Control of acute pain  Outcome: Ongoing  Goal: Control of chronic pain  Description: Control of chronic pain  Outcome: Ongoing

## 2021-04-25 NOTE — DISCHARGE SUMMARY
[address] [place] [address]                               DISCHARGE SUMMARY    PATIENT NAME: Melida Field                :        1970  MED REC NO:   [id number]                           ROOM:       0001  ACCOUNT NO:   [de-identified]                           ADMIT DATE: 2021  PROVIDER:     Leelee Chris M.D.            [place]: 2021    CLINICAL SUMMARY    Seen for Dr. Lauro Ho. HOSPITAL COURSE:  This is a 78-year-old male with past medical history  of metastatic renal cell carcinoma to the lung and brain followed by the  [facility] along with a history of hypertension, prior history  of COVID pneumonia, hypertension, presenting with increasing shortness  of breath. The patient was admitted for pneumonia with sepsis, pleural  effusion . Pulmonary was consulted. Palliative care was consulted. His oncologist was consulted. The patient was seen by Infectious  Disease. Recommended continuing antibiotics. Oncology started  treatment with nivolumab. Cardiothoracic Surgery was also consulted for  his recurrent right pleural effusion with a malfunctioning Pleurx  catheter for which TPA was injected. The patient's nasal oxygen  requirement was at 6 liters and is being slowly weaned. Nephrology was  consulted for acute kidney injury with hyponatremia and they were  managing his fluid status. There was a concern if Opdivo caused his  acute interstitial nephritis. The patient also underwent PICC line  placement. The patient did have significant amount of drainage to  Pleurx catheter once it was injected with TPA. The patient required two  units of packed RBC's transfused for low hemoglobin. The patient also  had rapid response called for stroke like symptoms, but he denied having  any focal deficits and stated he was sleepy.   The patient was started on  empiric steroids, as his ATN was

## 2021-04-26 NOTE — CARE COORDINATION
[place] [facility] Transitions Initial Follow Up Call    Call within 2 business days of discharge: Yes    Patient: Allison Dhaliwal Patient : 1970   MRN: [id number]  Reason for Admission: Pneumonia  Discharge Date: 21 RARS: Readmission Risk Score: 30      Last Discharge [address] [address]       Complaint Diagnosis Description Type Department Provider    21 EXPECTED: PCP sent for eval for pallitive care. Cancer mets to brain, lung and bone Pneumonia due to organism . .. ED to Hosp-Admission (Discharged) (ADMITTED) VANESSA CanasK Jamil Quintero MD; Jamari Hunter... Non-face-to-face services provided:  Obtained and reviewed discharge summary and/or continuity of care documents       Challenges to be reviewed by the provider   Additional needs identified to be addressed with provider Yes  medications-Phenergan             Method of communication with provider : chart routing ; phone call to María Sharpe    Discussed COVID-19 related testing which was not done at this time. Test results were not done. Patient informed of results, if available? No    Advance Care Planning:   Does patient have an Advance Directive:  reviewed and current. Was this a readmission? Yes  Patient stated reason for admission:  Pneumonia  Patients top risk factors for readmission: medical condition-CANCER    Care Transition Nurse (CTN) contacted the patient by telephone to perform post hospital discharge assessment. Verified name and  with patient as identifiers. Provided introduction to self, and explanation of the CTN role. CTN reviewed discharge instructions, medical action plan and red flags with patient who verbalized understanding. Patient given an opportunity to ask questions and does not have any further questions or concerns at this time. Were discharge instructions available to patient? Yes.  Reviewed appropriate site of care based on symptoms and resources available to patient including: PCP and Specialist. The patient agrees to contact the PCP office for questions related to their healthcare. Medication reconciliation was performed with patient, who verbalizes understanding of administration of home medications. Advised obtaining a 90-day supply of all daily and as-needed medications. Covid Risk Education    Patient has following risk factors of: immunocompromised. Education provided regarding infection prevention, and signs and symptoms of COVID-19 and when to seek medical attention with patient who verbalized understanding. Discussed exposure protocols and quarantine From CDC: Are you at higher risk for severe illness?   and given an opportunity for questions and concerns. The patient agrees to contact the COVID-19 hotline [phone] or PCP office for questions related to COVID-19. For more information on steps you can take to protect yourself, see CDC's How to Protect Yourself     Was patient discharged with a pulse oximeter? No Discussed and confirmed pulse oximeter discharge instructions and when to notify provider or seek emergency care. Patient/family/caregiver given information for [facility] and agrees to enroll no  Patient's preferred e-mail: declines  Patient's preferred phone number: declines    Discussed follow-up appointments. If no appointment was previously scheduled, appointment scheduling offered: Yes. Is follow up appointment scheduled within 7 days of discharge? Yes  Non-[facility] follow up appointment(s):       Plan for follow-up call in 3-5 days based on severity of symptoms and risk factors. Plan for next call: symptom management-nausea, shortness of breath and follow up appointment-PCP/ Oncology / Palliative  CTN provided contact information for future needs.        Care Transitions 24 Hour Call    Do you have any ongoing symptoms?: Yes  Patient-reported symptoms: Fatigue, Weakness, Nausea  Interventions for patient-reported symptoms:  (Comment: Pt is on his way to f/u appt)  Do you have a copy of your discharge instructions?: Yes  Do you have all of your prescriptions and are they filled?: No  Have you been contacted by a [facility]?: No  Have you scheduled your follow up appointment?: Yes  How are you going to get to your appointment?: Car - family or friend to transport  Were you discharged with any Home Care or Post Acute Services: No  Do you feel like you have everything you need to keep you well at home?: Yes  Care Transitions Interventions       Spoke with Juana Low he was on his way to a follow up appt states the only thing he needs is the RX for Phenergan, they did not send him home with that. Otherwise, he has his f/u appts. Will route to PCP and oncology for assistance with RX. Call place to oncology center for Dr. Karri Rey, they will reach out to him and his team for Phenergan.       Follow Up  Future Appointments   Date Time Provider Neo Gracia   5/12/2021 10:40 AM [address] DO [facility] - Betty Joya, GWYN

## 2021-04-26 NOTE — CARE COORDINATION
4/26/21, 9:32 AM EDT  Late entry, SW made referral to [facility]. Patient goals/plan/ treatment preferences discussed by  and . Patient goals/plan/ treatment preferences reviewed with patient/ family. Patient/ family verbalize understanding of discharge plan and are in agreement with goal/plan/treatment preferences. Understanding was demonstrated using the teach back method. AVS provided by RN at time of discharge, which includes all necessary medical information pertaining to the patients current course of illness, treatment, post-discharge goals of care, and treatment preferences.     Services After Discharge  Services At/After Discharge: Nursing Services, Aide Services, [place], PT([facility])

## 2021-05-06 PROBLEM — K62.5 RECTAL HEMORRHAGE: Status: ACTIVE | Noted: 2019-10-19

## 2021-05-06 PROBLEM — C79.31: Status: ACTIVE | Noted: 2021-01-01

## 2021-05-06 PROBLEM — R19.7 DIARRHEA: Status: ACTIVE | Noted: 2019-11-16

## 2021-05-06 PROBLEM — D49.519 RENAL NEOPLASM: Status: ACTIVE | Noted: 2021-01-01

## 2021-05-06 PROBLEM — R06.00 DYSPNEA: Status: ACTIVE | Noted: 2021-01-01

## 2021-05-06 PROBLEM — E04.1 THYROID NODULE: Status: ACTIVE | Noted: 2021-01-01

## 2021-05-06 PROBLEM — R11.2 NAUSEA VOMITING AND DIARRHEA: Status: ACTIVE | Noted: 2019-12-19

## 2021-05-06 PROBLEM — R94.6 THYROID FUNCTION TEST ABNORMAL: Status: ACTIVE | Noted: 2019-07-22

## 2021-05-06 PROBLEM — R19.7 NAUSEA VOMITING AND DIARRHEA: Status: ACTIVE | Noted: 2019-12-19

## 2021-05-06 PROBLEM — C78.01 SECONDARY MALIGNANT NEOPLASM OF BOTH LUNGS (HCC): Status: ACTIVE | Noted: 2021-01-01

## 2021-05-06 PROBLEM — C64.1 CLEAR CELL RENAL CELL CARCINOMA, RIGHT (HCC): Status: ACTIVE | Noted: 2021-01-01

## 2021-05-06 PROBLEM — C78.02 SECONDARY MALIGNANT NEOPLASM OF BOTH LUNGS (HCC): Status: ACTIVE | Noted: 2021-01-01

## 2021-05-06 PROBLEM — C64.9 RENAL CELL CARCINOMA (HCC): Status: ACTIVE | Noted: 2019-08-20

## 2021-05-06 PROBLEM — C78.2: Status: ACTIVE | Noted: 2021-01-01

## 2021-05-06 PROBLEM — K92.2 GI BLEED: Status: ACTIVE | Noted: 2019-10-19

## 2021-05-09 PROBLEM — E87.5 HYPERKALEMIA: Status: ACTIVE | Noted: 2021-01-01

## 2021-05-09 NOTE — ED TRIAGE NOTES
Patient presents to the Ed via the ED lobby with shortness of breath and vomiting. Patient is a cancer patient and gets his lungs drained daily. Patient lung was drained right before he came in to the hospital. Patient took a Percocet 1600 today. Patient is having extreme discomfort in his abdomen where he says he has a tumor that is cancerous. Patient states he is on chemo treatments every other week. Patient SPO2 was 85 percent on arrival on 2 L. Patient put on 3 L oxygen and is now 99 percent. Patient appears pale and lethargic. Patient alert and oriented x4.

## 2021-05-10 PROBLEM — R06.02 SOB (SHORTNESS OF BREATH): Status: ACTIVE | Noted: 2021-01-01

## 2021-05-10 NOTE — PROGRESS NOTES
[facility]           Progress Note    Patient Name:  Joselin Saenz  Medical Record Number:  [id number]  YOB: 1970    Date:  5/10/2021  Time:  1:52 PM  Completed By:  Linda Alvarez RN    Reason for Palliative Care Evaluation:  Goals of care    Current Issues:  [x]  Pain  []  Fatigue  []  Nausea  []  Anxiety  []  Depression  [x]  Shortness of Breath  []  Constipation  []  Appetite  []  Other:    Advance Directives:none on file  [] [address] [place] [address] Dr DNR Form  [] Living Will  [] Medical POA    Current Code Status  [] Full Resuscitation  [] DNR-Comfort Care-Arrest  [] DNR-Comfort Care  [x] Limited   [] No CPR   [] No shock   [] No ET intubation/reintubation   [] No resuscitative medications   [] Other limitation:    Performance Status:  50    Family/Patient Discussion:  Patient resting in bed. Patient is alert and oriented to all spheres. Patient's, wife Birgit Gagnon is at the bedside. Palliative care introduced. Patient/wife state that Dr. Joyce Ngo was already in to see the patient. When asked what Dr. Joyce Ngo had to say, Birgit Gagnon states that Dr. Natalya Bhatia said that a renal doctor was to see the patient and that maybe the patient's abdomen would need drained but nothing as to if the cancer is progressing. Birgit Gagnon states that the patient has received 2 treatments of immune therapy and is supposed to receive his 3rd dose soon and then the plan was to scan the patient to determine his response to the immune therapy. The patient lets his wife do most of the talking as the patient appears very sob and uncomfortable. Birgit Gagnon shares that the patient seems to be getting worse and in fact she states that the patient's children told her that the patient has 4-6 weeks to live although she is unsure about who gave them this information. Birgit Gagnon states that she believes that the cancer is worsening as the patient's symptoms are only worsening.   Did discuss the importance of conversation with the oncologist to determine if treatment is working and if further treatment is an option. Discussed if/when patient reaches a point that he no longer wishes to pursue any treatment or return to a hospital, then we can discuss other resources to assist with comfort. Patient immediately states that he is not at this point. Did briefly discuss the resource of hospice and when this would be appropriate. Shakila Bill states that the patient's daughter is getting  June 19 and that the patient would like to make it to the wedding. Much emotional support provided. Palliative care contact information provided. Plan/Follow-Up:  Updated primary RN, Elvin Pryor. Please call palliative care if further needs arise.     Peri Braun, GWYN  5/10/2021,  1:52 PM

## 2021-05-10 NOTE — CONSULTS
Nephrology Consult Note  Patient's Name: Yonny Holt  12:20 PM  5/10/2021    Nephrologist: Claudia Villalba    Reason for Consult: Elevated serum creatinine level. Hyperkalemia  Requesting Physician:  Jonn Zhu MD  PCP: CAITY Soto - CNP    Chief Complaint: Abdominal pain. Back pain. Assessment  1. Stage IV chronic kidney disease which is stable  2. Renal cell carcinoma of the right lung with metastatic disease  3. Bilateral pleural effusion right greater than the left  4. Hyperkalemia  5. Hyponatremia stable and chronic  6. Elevated CPK level but does not meet the criteria for rhabdomyolysis. 7. Elevated troponin level  8. Hypoalbuminemia  9. Ascites  10. Leukocytosis  11. Normocytic anemia  12. Abdominal/back pain chronic and likely due to metastatic disease compounded by ascites and pressure symptoms  13. Diabetes mellitus type 2  14. 14.  Hypotension improved    Plan    1. I discussed my thoughts at length with the patient. 2.  He understood. 3.  I addressed his questions  4. Labs reviewed  5. CT of  abdomen pelvis report reviewed  6. Checks x-ray report reviewed  7. Recent echocardiogram report reviewed  8. Medications reviewed  9. Oxycodone with acetaminophen 5/325 mg 1 tablet every 6 hours as needed for pain  10.  0.9 saline 50 mils per hour  11. Sodium zirconium cyclosilicate 10 g orally once for hyperkalemia  12. Repeat potassium level 1500 hrs. Today  13. Pain management consult  14. May need paracentesis  15. May need thoracentesis as well  16 labs in the morning  17. Midodrine if blood pressure does not improve with fluid  18. We will follow        History Obtained From: Patient, staff and electronic medical record  History of Present Ilness:     Yonny Holt is a 44 y.o. male with history of chronic kidney disease, renal cell carcinoma of the right kidney with metastases, diabetes mellitus type 2, anemia of chronic disease among other multiple medical entities

## 2021-05-10 NOTE — FLOWSHEET NOTE
05/10/21 1143   Provider Notification   Reason for Communication Review case   Provider Name Dr. Luis Jimenez (Pulmonary medicine group)   Provider Notification Physician   Method of Communication Call   Response Waiting for response   Notification Time [phone]     Message left with Dr. Merary Orlando  regarding pulmonary consult.

## 2021-05-10 NOTE — PROGRESS NOTES
Pt admitted to  5K3 via stretcher from ED. Complaints: Shortness of breath. IV none infusing into the antecubital left, condition patent and no redness site. IV site free of s/s of infection or infiltration. Vital signs obtained. Assessment and data collection initiated. Two nurse skin assessment performed by Christian PASCAL and Sabina Oswald RN. Oriented to room. Policies and procedures for  explained. All questions answered with no further questions at this time. Fall prevention and safety brochure discussed with patient. Bed alarm on. Call light in reach. Oriented to room. Rosalio Galvan RN 5/10/2021 5:43 AM     Explained patients right to have family, representative or physician notified of their admission. Patient has Requested for physician to be notified. Patient has Declined for family/representative to be notified.

## 2021-05-10 NOTE — ED NOTES
Patient transported to [facility]  by cart in stable condition. Patient monitored on cardiac telemetry. Patient on 3 LPM O2 via nasal canula. IV line is patent.       Regulo Gary, EMT-P  05/09/21 [id number]

## 2021-05-10 NOTE — CARE COORDINATION
5/10/21, 9:35 AM EDT  DISCHARGE PLANNING EVALUATION:    Josefina Constantino       Admitted: [facility] day: 1   Location: -03/003-A Reason for admit: Hyperkalemia [E87.5]  SOB (shortness of breath) [R06.02]   PMH:  has a past medical history of Cancer ([address] Chronic kidney disease, unspecified, Collapsed lung, IDDM (insulin dependent diabetes mellitus), Kidney mass, Lung nodules, Sinusitis, Thyroid nodule, and Unspecified essential hypertension. Barriers to Discharge:  Patient presents with shortness of breath, fatigue, nausea and vomiting. Patient has a history of renal cell cancer with met's to brain and lungs. He follows with Dr. Irina Reeves whom is consulted on his case. He is on chemotherapy with Opdivo. Na 133, Potassium 5.7, Creatinine 2.6, WBC 18.6, H/H 7.3/24. 4. Consults to Nephrology and Oncology, Lokelma x 1 dose on Sunday and one dose today, Kayexalate x 1 dose, prn pain medications and antiemetics, renal diet, telemetry. Met with Roberta Ocampo. He is from home with his wife and current with [facility] for skilled nursing and therapies. PCP: CAITY Mancuso CNP  Readmission Risk Score: 32%    Patient Goals/Plan/Treatment Preferences: Met with Orlando. He is from home with his wife and current with [facility] for skilled nursing and therapies. He wears oxygen at home supplied by [employer]. Roberta Ocampo states he is able to complete his personal care needs on his own. Orlanod plans to return home and resume  Services through [facility]) at discharge. He denies a need for additional services or DME at discharge. Transportation/Food Security/Housekeeping Addressed:  No issues identified.

## 2021-05-10 NOTE — PROGRESS NOTES
Patient's pleurex drained with Resource RN. Patient had 120 mL of bloody drainage - documented in Output. Small clots and some yellow sediment present. Fluid obatined for specimen and sent to lab. Patient tolerate it well. VSS obtained after.

## 2021-05-10 NOTE — FLOWSHEET NOTE
05/10/21 1241   Provider Notification   Reason for Communication Review case   Provider Name Dr. Janis Gutierrez   Provider Notification Physician   Method of Communication Secure Message   Response Waiting for response   Notification Time [phone]     Infectious disease consult completed per order.

## 2021-05-10 NOTE — CONSULTS
[facility]    Patient - Judith Delacruz   MRN -  [de-identified]   Acct # - [de-identified]   - 1970      Date of Admission -  2021  6:52 PM  Date of evaluation -  5/10/2021  Room - --A   Hospital Day - [address] [facility], [place] Primary Care Physician - Gail Chaparro, CAITY - CNP   Chief Complaint   Shortness of breath  Active Hospital Problem List      Active Hospital Problems    Diagnosis Date Noted    SOB (shortness of breath) [R06.02] 05/10/2021    Hyperkalemia [E87.5] 2021     HPI   Judith Delacruz is a 44 y.o. male admitted for SOB. Extensive PMH including DM, HTN, CKD, Covid-19 pneumonia in 2021, gout and metastatic renal cell carcinoma with distant metastasis following with Dr. Awilda Presley. Of note has had recurent pleural effsuion found to be related to malignancy. Underwent pleurx catheter placement at [facility] 2021. Was recently treated at [facility] 2021-2021 for pneumonia with sepsis and loculated pleural effusion. CTS was consulted and instilled TPA via existing pleurx due to lack of output and CT changes after therapy significant amount of fluid was obtained and pt required 2 units PRBC for low hemoglobin. Patient oncologist has started on nivolumab since discharge. Today pt reports symptoms have been gradually increasing for the past week associated symptoms include dry cough, lack of appetite, and abdominal distension. Reports compliance with Home Health draining Daily with tapering amount approx 280-300 cc last week down 100-140cc lately. Was scheduled to see established Pulmonologist 2021.           Past Medical History         Diagnosis Date    Cancer [facility])     kidney    Chronic kidney disease, unspecified     Collapsed lung     IDDM (insulin dependent diabetes mellitus)     Kidney mass     Lung nodules     Sinusitis     Thyroid nodule 2021    Unspecified essential hypertension       Past Surgical History           Procedure Laterality Date    BACK SURGERY      CHOLECYSTECTOMY       Diet    DIET RENAL;  Allergies    Patient has no known allergies. Social History     Social History     Socioeconomic History    Marital status:      Spouse name: Not on file    Number of children: Not on file    Years of education: Not on file    Highest education level: Not on file   Occupational History    Not on file   Social Needs    Financial resource strain: Not on file    Food insecurity     Worry: Not on file     Inability: Not on file    Transportation needs     Medical: Not on file     Non-medical: Not on file   Tobacco Use    Smoking status: Never Smoker    Smokeless tobacco: Never Used   Substance and Sexual Activity    Alcohol use: Not Currently     Alcohol/week: 1.0 standard drinks     Types: 1 Cans of beer per week    Drug use: Never    Sexual activity: Not on file   Lifestyle    Physical activity     Days per week: Not on file     Minutes per session: Not on file    Stress: Not on file   Relationships    Social connections     Talks on phone: Not on file     Gets together: Not on file     Attends Rastafarian service: Not on file     Active member of club or organization: Not on file     Attends meetings of clubs or organizations: Not on file     Relationship status: Not on file    Intimate partner violence     Fear of current or ex partner: Not on file     Emotionally abused: Not on file     Physically abused: Not on file     Forced sexual activity: Not on file   Other Topics Concern    Not on file   Social History Narrative    Not on file     Family History    History reviewed. No pertinent family history. Sleep History     n/a  ROS    General/Constitutional: appetite changes. No fever or chills. HENT: Negative. Eyes: Negative. Upper respiratory tract: No nasal stuffiness or post nasal drip. Lower respiratory tract/ lungs: Shortness of breath.  No cough or sputum production. No hemoptysis. Cardiovascular: No palpitations, chest pain or edema. Gastrointestinal: Bloating, and distension. No nausea or vomiting. Neurological: No focal neurological weakness. Extremities: No tenderness. Musculoskeletal: no complaints  Genitourinary: No complaints. Hematological: Negative. Denies easy buising  Skin: No itching. Meds    Current Medications    famotidine  20 mg Oral Daily    midodrine  2.5 mg Oral TID     heparin (porcine)  5,000 Units Subcutaneous 3 times per day    sodium polystyrene  15 g Oral Once    levothyroxine  25 mcg Oral Daily    [START ON 5/11/2021] predniSONE  20 mg Oral Daily    insulin lispro  0-6 Units Subcutaneous TID     insulin lispro  0-3 Units Subcutaneous Nightly    epoetin yumiko-epbx  10,000 Units Subcutaneous Once per day on Mon Wed Fri     ipratropium-albuterol, acetaminophen, promethazine, traMADol, oxyCODONE-acetaminophen, glucose, dextrose, glucagon (rDNA), dextrose  IV Drips/Infusions   sodium chloride 50 mL/hr at 05/10/21 0825    dextrose       Vitals    Vitals    height is 6' (1.829 m) and weight is 203 lb (92.1 kg). His oral temperature is 97.6 °F (36.4 °C). His blood pressure is 113/71 and his pulse is 114. His respiration is 24 and oxygen saturation is 95%. O2 Flow Rate (L/min): 3 L/min  I/O    Intake/Output Summary (Last 24 hours) at 5/10/2021 1408  Last data filed at 5/10/2021 1348  Gross per 24 hour   Intake 796.51 ml   Output 100 ml   Net 696.51 ml     Patient Vitals for the past 96 hrs (Last 3 readings):   Weight   05/09/21 1856 203 lb (92.1 kg)     Exam   Physical Exam   Constitutional: No distress on O2 per NC. Patient appears weak and chronically ill. Head: Normocephalic and atraumatic. Mouth/Throat: Oropharynx is clear and moist.  No oral thrush. Eyes: Conjunctivae are normal. Pupils are equal, round. No scleral icterus. Neck: Neck supple. No tracheal deviation present.    Cardiovascular: S1 and S2 with no murmur. No peripheral edema  Pulmonary/Chest: Normal effort with bilateral air entry, faint rales diminished at bases. No stridor. No respiratory distress. Patient exhibits no tenderness. Pleurx to Right lateral chest dressed with gauze and clear occlusive dressing  Abdominal: Bowel sounds audible. Noted distension with mild tenderness to palp. Musculoskeletal: Moves all extremities  Neurological: Patient is alert and oriented to person, place, and time. Skin: Warm and dry. Labs   ABG  Lab Results   Component Value Date    PH 7.38 04/17/2021    PH 6.0 12/14/2020    PO2 94 04/17/2021    PCO2 41 04/17/2021    HCO3 25 04/17/2021    O2SAT 97 04/17/2021     Lab Results   Component Value Date    IFIO2 5 04/17/2021     CBC  Recent Labs     05/09/21  1909 05/10/21  0550   WBC 21.4* 18.6*   RBC 2.93* 2.63*   HGB 8.0* 7.3*   HCT 27.0* 24.4*   MCV 92.2 92.8   MCH 27.3 27.8   MCHC 29.6* 29.9*    213   MPV 11.5 11.9      BMP  Recent Labs     05/09/21  1909 05/10/21  0550   * 133*   K 6.0* 5.7*   CL 91* 94*   CO2 27 26   BUN 61* 61*   CREATININE 2.5* 2.6*   GLUCOSE 183* 139*   MG 2.0  --    CALCIUM 10.0 9.6     LFT  Recent Labs     05/09/21 1909   AST 14   ALT 8*   BILITOT 0.4   ALKPHOS 62     TROP  Lab Results   Component Value Date    TROPONINT 0.022 05/10/2021    TROPONINT 0.043 05/09/2021    TROPONINT < 0.010 04/13/2021     BNP  Lab Results   Component Value Date    PROBNP 1182.0 05/09/2021    PROBNP 416.1 04/13/2021    PROBNP 63.4 03/23/2021     D-Dimer  Lab Results   Component Value Date    DDIMER 93922.00 04/14/2021     Lactic Acid  No results for input(s): LACTA in the last 72 hours. INR  No results for input(s): INR, PROTIME in the last 72 hours. PTT  No results for input(s): APTT in the last 72 hours.   Glucose  Recent Labs     05/10/21  1148   POCGLU 157*     UA No results for input(s): [facility] 27, [address], CLARITYU, MUCUS, PROTEINU, BLOODU, RBCUA, WBCUA, BACTERIA, NITRU, [address], BILIRUBINUR, There is no evidence of a bowel obstruction. There is no free air. Colonic diverticula are noted, without evidence of acute diverticulitis. Subcutaneous edema/anasarca is noted. Impression:  1. No evidence of a bowel obstruction or free air. 2.  Moderate volume of ascites, increased since the prior study. Changes of peritoneal carcinomatosis are more prominent on this exam.   3.  Right renal mass is again noted. Worsening metastatic disease within the adrenal glands. 4.  Small to moderate loculated right pleural effusion. Small loculated left pleural effusion. Bibasilar parenchymal metastases are noted. 5.  Multifocal bone metastases are again noted. 6.  Additional findings are detailed above. (See actual reports for details)    Assessment   -Acute on chronic respiratory failure with hypoxia baseline 2 LPM currently on 3 LPM  -SOB multifactorial metastasis, malignant pleural effusion, abdominal distension restricting movement of diaphragm  -Leukocytosis-Recently treated for pneumonia will obtain cultures  -Malignant pleural effusion-Previous Vats with pleurodesis to Left, Right pleurx in place  -Metastatic renal cell carcinoma-CT abdomen with worsening peritoneal carcinomatosis  -Hyperkalemia  -CKD  Recommendations   -Monitor SpO2 wean supplemental O2 to maintain SpO2 >90%  -Send Sputum for culture  -Send urine for strep pneumoniae antigen and legionella antigen  -Advised RN to continue to drain Pleurx daily send fluid for culture  -Obtain CT chest WO contrast after draining pleurx to further evaluate reoccurrence of loculation vs progression of neoplastic process    Thank you for the consult and allowing us to participate in the care of your patient. Case discussed with nurse and patient/family. Questions and concerns addressed. Meds and Orders reviewed.     Electronically signed by     CAITY Farfan CNP on 5/10/2021 at 2:08 PM   Addendum by Dr. Jose De Jesus Marrero MD:  I have seen and examined the patient independently. Face to face evaluation and examination was performed. The above evaluation and note has been reviewed. Labs and radiographs were reviewed. I Have discussed with Mr. Devin Pak CNP about this patient in detail. The above assessment and plan has been reviewed. Please see my modifications mentioned below. My modifications:  He is on 3 L of oxygen via nasal cannula. Not in distress. We will continue pleural fluid drainage via Pleurx catheter on daily basis.  -Discussed with RN taking care of patient regarding patient condition and my management plan at bed side.       Maggi Boyer MD 5/10/2021 6:26 PM

## 2021-05-10 NOTE — FLOWSHEET NOTE
05/10/21 1358   Provider Notification   Reason for Communication Review case   Provider Name Dr. Belkis Moseley   Provider Notification Physician   Method of Communication Secure Message   Response Waiting for response   Notification Time [phone]    Dr. Belkis Moseley sent consult notification via perfect serve. He has already seen patient today. He called this RN to update that he is agreeable to paracentesis - he feels it would give the patient relief.

## 2021-05-10 NOTE — CONSULTS
respirations 24, pulse 114, blood  pressure 113/71. HEENT:  Slightly pale conjunctivae. CHEST:  He has right-sided PleurX catheter. Diminished breath sounds. CARDIOVASCULAR SYSTEM:  Regular. ABDOMEN:  Distended, flank fullness, he is very dull. EXTREMITIES:  He has some edema on both lower extremities. CNS:  He is conscious, oriented to person, place and time. DIAGNOSTICS:  WBC 18.6, hemoglobin 7.3, hematocrit 24.4, platelets of  141. Sodium 133, potassium 5.7, chloride 94, bicarb 26, BUN 61 and  creatinine 2.6. CT scan of the abdomen and pelvis was noted. IMPRESSION:  He is a 58-year-old male patient, admitted with shortness  of breath and generalized weakness. He had metastatic renal cell  carcinoma. He has progressive deconditioning with poor functional  class. Leukocytosis likely reactive. At the present time, he does not  appears to be septic. We will continue supportive treatment. Again  long-term prognosis appears to be very poor.         Dhruv Light M.D.    D: 05/10/2021 15:28:54       T: 05/10/2021 17:23:18     [email or web address]  Job#: [id number]     Doc#: [id number]    CC:

## 2021-05-10 NOTE — H&P
Internal Medicine Specialties  H&P  5/10/2021  12:03 PM    Patient:  Saul Fletcher  YOB: 1970    MRN: [id number]   Acct:  [de-identified]   [id number]  Primary Care Physician: CAITY Alves - FLORENTIN    Chief Complaint:  Chief Complaint   Patient presents with    Shortness of Breath    Emesis    Fatigue       History of Present Illness: The patient is a 44 y.o. male with pmhx of metastatic renal carcinoma with mets to lungs and brain followed by [facility], HTN, COVID pneumonia in Jan 2021, gout, HTN and cholecystectomy who presents with increasing SOB. Pt was recently discharged from the hospital on 4/24 due to pneumonia with sepsis. He received TPA through pleurx catheter and O2 was weaned down during that stay; he developed an CAMILLE and nephrology followed him for that; he received 2u RBC transfusion due to low hemoglobin and midodrine was added for hypotension. Since being discharged he has been getting progressively more short of breath and dizzy. Yesterday he notes a pre-syncopal episode getting into the car before coming to the ED. In the emergency department Na 132, K+ 6.0, Cr 2.5, BNP 1182, troponin 0.043, WBC 21.4, Hgb 8, CT abdomen/pelvis shows moderate volume ascites with increased peritoneal carcinomatosis and worsening metastatic disease of adrenal glands with small BL pleural effusions, CXR negative for pneumonia. 1L Bolus NS given in ED, along with insulin/ Lokelma/Ca gluconate/D50 for the hyperkalemia. Pt has a right side pleur-x catheter and states that drainage has decreased from 300cc/day to 100cc/day over the past few days. He also reports that his abdomen has become more distended. Pt denies chest pain. Pt is tachycardic and tachypneic on exam but afebrile. Discussed code status with pt and wife and he wishes to remain a limited code X4. Patient admitted for CAMILLE with acute respiratory failure on Chronic respiratory failure .        Past Medical History: S1, S2, no murmurs, rubs, clicks or gallops  Abdomen - Distended, painful to palpation  Obese: No;   Neurological - alert, oriented, normal speech, no focal findings or movement disorder noted  Musculoskeletal - no joint tenderness, deformity or swelling  Extremities - peripheral pulses normal, no pedal edema, no clubbing or cyanosis  Skin - Pallor of skin   Review of Labs and Diagnostic Testing:    CBC:   Recent Labs     05/10/21  0550   WBC 18.6*   HGB 7.3*   HCT 24.4*   MCV 92.8        BMP:   Recent Labs     05/10/21  0550   *   K 5.7*   CL 94*   CO2 26   BUN 61*   CREATININE 2.6*   CALCIUM 9.6   GLUCOSE 139*     PT/INR: No results for input(s): PROTIME, INR in the last 72 hours. APTT: No results for input(s): APTT in the last 72 hours. Lipids:   Recent Labs     05/09/21 1909   ALKPHOS 62   ALT 8*   AST 14   BILITOT 0.4   LABALBU 3.1*     Troponin:   Recent Labs     05/09/21 1909   TROPONINT 0.043*        Imaging:  Echo Complete 2d W Doppler W Color    Result Date: 4/13/2021  Transthoracic Echocardiography Report (TTE)  Demographics   Patient Name   [address] [place] [address],[address] Gender              Male                 P   MR #           [id number]         Race                                                    Ethnicity   Account #      [de-identified]         Room Number         6219   Accession      [id number]        Date of Study       04/13/2021  Number   Date of Birth  1970        Referring Physician FLORENTIN Ordoñez CNP   Age            44 year(s)        [address]ke[address] RDCS                                    Interpreting        Echo reader of the                                   Physician           martinez Carroll MD  Procedure Type of Study   TTE procedure:ECHOCARDIOGRAM COMPLETE 2D W DOPPLER W COLOR.   Procedure Date Date: 04/13/2021 Start: 02:57 PM Study Location: Bedside Technical Quality: Limited visualization due to restricted mobility. Indications:Shortness of breath. Additional Medical History:Recent 15 pound weight gain, diabetic, hypertension, renal cell cancer with mets Patient Status: Routine Height: 72.05 inches Weight: 207.24 pounds BSA: 2.16 m^2 BMI: 28.07 kg/m^2 BP: 115/76 mmHg  Conclusions   Summary  Normal left ventricle size and systolic function. Ejection fraction was  estimated at 60 to 65%. There were no regional left ventricular wall  motion abnormalities and wall thickness was within normal limits. Left atrial size was normal.   Signature   ----------------------------------------------------------------  Electronically signed by Jessica Weiss MD (Interpreting  physician) on 04/13/2021 at 06:17 PM  ----------------------------------------------------------------   Findings   Mitral Valve  The mitral valve structure was normal with normal leaflet separation. DOPPLER: The transmitral velocity was within the normal range with no  evidence for mitral stenosis. There was no evidence of mitral  regurgitation. Aortic Valve  The aortic valve was trileaflet with normal thickness and cuspal  separation. DOPPLER: Transaortic velocity was within the normal range with  no evidence of aortic stenosis. There was no evidence of aortic  regurgitation. Tricuspid Valve  The tricuspid valve structure was normal with normal leaflet separation. DOPPLER: There was no evidence of tricuspid stenosis. There was no  evidence of tricuspid regurgitation. Pulmonic Valve  The pulmonic valve leaflets exhibited normal thickness, no calcification,  and normal cuspal separation. DOPPLER: The transpulmonic velocity was  within the normal range with no evidence for regurgitation. Left Atrium  Left atrial size was normal.   Left Ventricle  Normal left ventricle size and systolic function. Ejection fraction was  estimated at 60 to 65%.  There were no regional left ventricular wall  motion abnormalities and wall thickness was within normal limits. Right Atrium  Right atrial size was normal.   Right Ventricle  The right ventricular size was normal with normal systolic function and  wall thickness. Pericardial Effusion  The pericardium was normal in appearance with no evidence of a pericardial  effusion. Pleural Effusion  No evidence of pleural effusion. Aorta / Great Vessels  -Aortic root dimension within normal limits.  -The Pulmonary artery is within normal limits. -IVC size is within normal limits with normal respiratory phasic changes.   M-Mode/2D Measurements & Calculations   LV Diastolic    LV Systolic Dimension: 2.7  AV Cusp Separation: 2.3 cmLA  Dimension: 4 cm cm                          Dimension: 3.3 cmAO Root  LV FS:32.5 %    LV Volume Diastolic: 70 ml  Dimension: 3.8 cm  LV PW           LV Volume Systolic: 27 ml  Diastolic: 0.9  LV EDV/LV EDV Index: 70  cm              ml/32 m^2LV ESV/LV ESV  Septum          Index: 27 ml/12 m^2         RV Diastolic Dimension: 2 cm  Diastolic: 1 cm EF Calculated: 61.4 %                                              LA/Aorta: 0.87  Doppler Measurements & Calculations   MV Peak E-Wave: 74.6 cm/s  AV Peak Velocity: 150 LVOT Peak Velocity: 130  MV Peak A-Wave: 98.1 cm/s  cm/s                  cm/s  MV E/A Ratio: 0.76         AV Peak Gradient: 9   LVOT Peak Gradient: 7  MV Peak Gradient: 2.23     mmHg                  mmHg  mmHg                                                   TV Peak E-Wave: 62.6 cm/s  MV Deceleration Time: 345                        TV Peak A-Wave: 88.7 cm/s  msec  MV P1/2t: 101 msec                               TV Peak Gradient: 1.57  MVA by PHT:2.18 cm^2                             mmHg                                                   TR Velocity:247 cm/s  MV E' Septal Velocity: 7.1 AV DVI (Vmax):0.87    TR Gradient:24.4 mmHg  cm/s                                             PV Peak Velocity: 75 cm/s MV A' Septal Velocity:                           PV Peak Gradient: 2.25  10.8 cm/s                                        mmHg  MV E' Lateral Velocity:  8.9 cm/s  MV A' Lateral Velocity:  12.8 cm/s  E/E' septal: 10.51  E/E' lateral: 8.38  [email or web address] [id number]    Ct Abdomen Pelvis Wo Contrast Additional Contrast? None    Result Date: 5/9/2021  CT SCAN OF THE ABDOMEN AND PELVIS WITHOUT CONTRAST COMPARISON: 4/13/2021. TECHNIQUE: A noncontrast CT scan of the abdomen and pelvis was performed. A dose reduction technique was utilized. FINDINGS: CT ABDOMEN: Images of the lung bases demonstrate a small to moderate loculated right pleural effusion with an associated right-sided chest tube. 2.6 cm mass is noted within the right pleural effusion on axial image 1 which is nonspecific but could represent neoplastic disease or perhaps proteinaceous material.  A smaller lesion in the right pleural space measures 8 mm on image 3. There is also a loculated small left pleural effusion. Small pericardial effusion is noted. Numerous nodules/masses are noted within the lung bases, compatible with neoplastic disease. For example in the right lower lobe is a 4.1 cm lesion on axial image 1 minus does not appear to be significantly changed. Lytic destructive change of multiple left lower ribs is again noted, compatible with the patient's known metastatic disease. This does not appear to be significantly changed. There are no focal liver lesions. The patient is status post cholecystectomy. The pancreas is unremarkable. There are calcified granulomas in the spleen. Right adrenal mass measures 2.0 x 1.0 cm, increased since the prior study when it measured 1.7 x 0.9 cm. This is compatible with metastatic disease. Left-sided adrenal metastases are noted.   One of these, seen on axial image 25, is increased in size and measures 1.1 cm on the current exam.  Previously it measured 0.9 cm. Exophytic solid mass arising from the right renal moiety measures 4.4 x 4.0 cm on axial image 38, this does not appear to be significantly changed. Horseshoe kidney is noted. Abdominal aorta is normal in caliber without evidence of an aneurysm. Moderate volume of ascites is noted in the abdomen/pelvis, increased since the prior study. Changes of peritoneal carcinomatosis are more prominent on this exam, for example seen on axial image 77. CT PELVIS: Lytic destructive change in the right iliac bone is again noted on axial image 72, this does not appear to be significantly changed. There is no evidence of a bowel obstruction. There is no free air. Colonic diverticula are noted, without evidence of acute diverticulitis. Subcutaneous edema/anasarca is noted. 1.  No evidence of a bowel obstruction or free air. 2.  Moderate volume of ascites, increased since the prior study. Changes of peritoneal carcinomatosis are more prominent on this exam. 3.  Right renal mass is again noted. Worsening metastatic disease within the adrenal glands. 4.  Small to moderate loculated right pleural effusion. Small loculated left pleural effusion. Bibasilar parenchymal metastases are noted. 5.  Multifocal bone metastases are again noted. 6.  Additional findings are detailed above. This document has been electronically signed by: Lester Campos M.D. on 05/09/2021 09:27 PM All CTs at this facility use dose modulation techniques and iterative reconstructions, and/or weight-based dosing when appropriate to reduce radiation to a low as reasonably achievable. Ct Head Wo Contrast    Result Date: 4/17/2021  PROCEDURE: CT HEAD WO CONTRAST CLINICAL INFORMATION: 57-year-old male with altered mental status. Horald Cousin. COMPARISON: MRI of the brain 7/23/2019. TECHNIQUE: Noncontrast 5 mm axial images were obtained through the brain.  All CT scans at this facility use dose modulation, iterative reconstruction, and/or weight-based dosing when appropriate to reduce radiation dose to as low as reasonably achievable. FINDINGS: There is redemonstration of a cystic fluid collection in the posterior fossa. This is unchanged since previous imaging. There is no hemorrhage. There is no hydrocephalus or midline shift. The gray-white matter differentiation is preserved. The paranasal sinuses and mastoid air cells are normally aerated. There is no suspicious calvarial abnormality. 1. No acute intracranial hemorrhage, mass effect or midline shift. 2. Redemonstration of a cystic abnormality in the posterior fossa. This is unchanged. **This report has been created using voice recognition software. It may contain minor errors which are inherent in voice recognition technology. ** Final report electronically signed by Dr Summer Calderón on 4/17/2021 4:57 PM    Cta Chest W Wo Contrast    Result Date: 4/14/2021  PROCEDURE: CTA CHEST W WO CONTRAST CLINICAL INFORMATION: Suspected PE; worsening SOB; history of cancer; Estimated Wells Score of 2.5; cannot rule out with D-Dimer . COMPARISON: 3/23/2021 TECHNIQUE: 2-D multiplanar postcontrast images of the chest with 3-D MIPS. Isovue-370 IV contrast All CT scans at this facility use dose modulation, iterative reconstruction, and/or weight-based dosing when appropriate to reduce radiation dose to as low as reasonably achievable. FINDINGS: There is no pulmonary embolism. There is no aneurysm or dissection. Loculated fluid collections throughout both lungs. Anterior left upper lung chest wall multi lesion with rim enhancement compatible with metastatic lesion. Small prevascular space nodule. Fluid in both major fissures. Right infrahilar focal consolidation possible drowned lung. Large expansile lytic lesions involving the left sixth and seventh ribs rim-enhancing soft tissue components compatible with metastases. Significantly changed.  Pleurx drainage document has been electronically signed by: Bong Mantilla MD on 04/18/2021 11:59 PM    Xr Chest Portable    Result Date: 4/22/2021  AP chest  Comparison:  CR,SR  - XR CHEST PORTABLE  - 04/21/2021 01:20 AM EDT Findings: Moderate bilateral pleural effusions noted. The right basilar pleural fluid has reaccumulated. Ill-defined opacities in the mid to lower lungs are unchanged. Cardiomegaly unchanged. Right subclavian central venous catheter in SVC. Right basilar Pleurx catheter. Left lower rib fractures again noted. Impression: 1. Bilateral pleural effusions, and lung opacities. The right basilar pleural fluid has reaccumulated since the most recent exam. This document has been electronically signed by: Angi Henderson MD on 04/22/2021 03:14 AM    Xr Chest Portable    Result Date: 4/21/2021  Chest X-ray, 1 View COMPARISON:  CR,SR  - XR CHEST PORTABLE  - 04/20/2021 12:38 AM EDT FINDINGS: Similar bibasilar consolidations. Similar bilateral pleural effusions. No pneumothorax. Stable heart size. No acute fracture. Right internal jugular central line in place with tip projecting over the cavoatrial junction. Lower right chest tube remains in place. No significant interval change from 04/20/2021. This document has been electronically signed by: Acey Schirmer, MD on 04/21/2021 03:57 AM    Xr Chest Portable    Result Date: 4/20/2021  1 view chest x-ray Comparison:  CR,SR  - XR CHEST PORTABLE  - 04/19/2021 12:38 AM EDT Findings: Bilateral pleural effusions and opacities at the lung bases are similar to the prior exam. Right lung base tunneled pleural catheter, and right internal jugular central venous catheter in the SVC again noted. Cardiomegaly. No acute fracture. No acute cardiopulmonary findings. This document has been electronically signed by:  Angi Henderson MD on 04/20/2021 04:06 AM    Xr Chest Portable    Result Date: 4/19/2021  1 view chest x-ray Comparison:  CR,SR  - XR CHEST PORTABLE - 04/18/2021 02:02 AM EDT Findings: No definite residual right-sided pneumothorax. Moderate bilateral pleural effusions with interval worsening on the right. Bandlike and airspace opacities within the bilateral mid and lower lungs without change. Focal airspace filling of the right lung apex without change. Right-sided chest tube within the inferolateral pleural space. No acute fracture. Prominent cardiomediastinal silhouette without change. 1. Bilateral pulmonary infiltrates and atelectasis without gross interval change. 2. Moderate bilateral pleural effusions with interval worsening on the right. No definite residual pneumothorax. This document has been electronically signed by: Shania Bourne MD on 04/19/2021 02:15 AM    Xr Chest Portable    Result Date: 4/18/2021  Chest X-ray, 1 View COMPARISON:  CR  - XR CHEST PORTABLE  - 04/17/2021 07:13 PM EDT FINDINGS: Similar bilateral lower lung atelectasis and/or consolidations. Unchanged rounded density near the right lung apex, which could represent loculated pleural effusion, consolidation, or mass. Similar left pleural effusion. Possible small right pneumothorax, similar to prior. No cardiomegaly. No acute fracture. Right subclavian central line in place with tip projecting over the cavoatrial junction. Right chest tube remains in place. Similar possible small right pneumothorax. Right chest tube remains in place. Similar bilateral lower lung atelectasis and/or consolidations. Unchanged left pleural effusion. Unchanged right lung apex density. This document has been electronically signed by: Coleman Guillaume MD on 04/18/2021 04:45 AM    Xr Chest Portable    Result Date: 4/17/2021  PROCEDURE: XR CHEST PORTABLE CLINICAL INFORMATION: 18-year-old male with hypoxia. COMPARISON: Chest x-ray 4/17/2021. TECHNIQUE: AP upright view of the chest was obtained.  FINDINGS: There has been interval placement of a right internal jugular access central venous catheter which terminates in the SVC. Loculated appearing bilateral pleural effusions are again seen. Masses are again noted. There is cardiomegaly. Aeration of the lungs is actually slightly improved when compared to the prior exam. There is no pneumothorax. Visualized portions of the upper abdomen are within normal limits. The osseous structures are intact. No acute fractures or suspicious osseous lesions. Interval placement of a right IJ central venous catheter which appears appropriately positioned. There is slightly improved aeration of the lungs bilaterally. **This report has been created using voice recognition software. It may contain minor errors which are inherent in voice recognition technology. ** Final report electronically signed by Dr Saloni Wright on 4/17/2021 7:25 PM    Xr Chest Portable    Result Date: 4/17/2021   ADDENDUM #1Receipt of this report by the clinical staff was confirmed with Bridget Garrison RN on Apr 17, 2021 02:25:00 EDT. This document has been electronically signed by: Sue Johnson on 04/17/2021 02:25 AM  ORIGINAL REPORT1 view chest x-ray Comparison:  CR  - XR CHEST PORTABLE  - 04/16/2021 04:39 PM EDT Findings: Loculated appearing bilateral pleural collections without significant change. Extensive airspace filling within the bilateral lungs without change. Masslike opacity at the right lung apex without change. Focal lucency within the right inferolateral pleural space. Enlarged heart and mediastinum without change. No acute fracture. Destructive changes of multiple left-sided ribs without change. A right-sided chest tube remains in place. 1. Cannot exclude a small right basilar pneumothorax. Recommend follow-up evaluation. A right-sided chest tube is noted without change. 2.  Extensive severe pleural and parenchymal abnormalities are otherwise unchanged.  This document has been electronically signed by: Shania Bourne MD on 04/17/2021 02:05 AM     Xr Chest Portable    Result Date: and lower lungs. There is a new density at the right lung apex, likely a  focal infiltrate. Multiple nodules seen on CT of the chest from 3/23/2021 are not clearly visible on the current study. Bilateral pleural thickening is noted. Degenerative changes in the thoracic spine are poorly visualized. 1. Small to moderate-sized bilateral pleural effusions with adjacent atelectasis/infiltrate. 2. New density at the right lung apex, likely focal infiltrate. **This report has been created using voice recognition software. It may contain minor errors which are inherent in voice recognition technology. ** Final report electronically signed by Dr. Cynthia Pastrana on 4/13/2021 11:28 AM    Xr Chest 1 View    Result Date: 5/9/2021  1 view chest x-ray Comparison:  CR,SR  - XR CHEST PORTABLE  - 04/22/2021 01:09 AM EDT Findings: Bibasilar large bilateral pleural effusions are similar to the prior exam. Right basilar tunneled pleural catheter is unchanged. Mass versus loculated effusion at the right apex. Increased in size since the prior exam. Ill-defined bilateral lung opacities similar to the prior exam. Cardiomegaly. No change. No acute fracture. Loculated right apex pleural effusion versus mass has increased in size since the prior exam. Otherwise no acute findings. This document has been electronically signed by: Orma Curling, MD on 05/09/2021 09:02 PM    Xr Chest 1 View    Result Date: 4/15/2021  PROCEDURE: XR CHEST 1 VIEW CLINICAL INFORMATION: post thoracentesis . COMPARISON: Portable chest of 4/14/2021. TECHNIQUE: AP upright view of the chest. FINDINGS: Tubes/Lines: Stable lower lateral right chest pigtail catheter with surrounding pleural opacity. [employer] Heart: The heart size is normal. Mediastinum: The mediastinum is not widened. Lungs: Bilateral lower lung field opacities remain. . Pleura: Obscured bilateral diaphragms. Pleural thickening at the lower lateral chest seen bilaterally.  Focal opacity lateral mid

## 2021-05-11 NOTE — CARE COORDINATION
DISCHARGE/PLANNING EVALUATION  5/11/21, 1:58 PM EDT    Reason for Referral: Patient is current with [facility]  Mental Status: alert and oriented  Decision Making: patient is able to make his own decisions. Family/Social/Home Environment: Spoke with patient re: home situation and discharge needs. Patient states that he resides at home with his spouse and her 13year old son. Spouse is employed full time. Patient states that while she is gone, he is able to manage his needs. He states that he is able to do his own personal care. Family assists with other needs and will also transport him to his medical appointments. Patient states that he is current with [facility] for nursing and therapy. Current Services including food security, transportation and housekeeping: family assists  Current Equipment: 02 from [address] [place] [address] Street, walker if needed, shower seat  Payment Source: [employer]  Concerns or Barriers to Discharge: none indicated  Post acute provider list with quality measures, geographic area and applicable managed care information provided. Questions regarding selection process answered: already current with [facility]. Teach Back Method used with patient regarding care plan   Patient verbalize understanding of the plan of care and contribute to goal setting. Patient goals, treatment preferences and discharge plan: home as PTA with current services through [facility].      Update: spoke with Oleksandr Barahona with [facility] and confirmed services (nursing and PT/OT)    Electronically signed by ADARSH Prakash on 5/11/2021 at 1:58 PM

## 2021-05-11 NOTE — PROGRESS NOTES
systems reviewed    Past Medical History         Diagnosis Date    Cancer [facility])     kidney    Chronic kidney disease, unspecified     Collapsed lung     IDDM (insulin dependent diabetes mellitus)     Kidney mass     Lung nodules     Sinusitis     Thyroid nodule 5/6/2021    Unspecified essential hypertension       Past Surgical History           Procedure Laterality Date    BACK SURGERY      CHOLECYSTECTOMY       Diet    DIET RENAL;  Dietary Nutrition Supplements: Renal Oral Supplement  Allergies    Patient has no known allergies.   Social History     Social History     Socioeconomic History    Marital status:      Spouse name: Not on file    Number of children: Not on file    Years of education: Not on file    Highest education level: Not on file   Occupational History    Not on file   Social Needs    Financial resource strain: Not on file    Food insecurity     Worry: Not on file     Inability: Not on file    Transportation needs     Medical: Not on file     Non-medical: Not on file   Tobacco Use    Smoking status: Never Smoker    Smokeless tobacco: Never Used   Substance and Sexual Activity    Alcohol use: Not Currently     Alcohol/week: 1.0 standard drinks     Types: 1 Cans of beer per week    Drug use: Never    Sexual activity: Not on file   Lifestyle    Physical activity     Days per week: Not on file     Minutes per session: Not on file    Stress: Not on file   Relationships    Social connections     Talks on phone: Not on file     Gets together: Not on file     Attends Denominational service: Not on file     Active member of club or organization: Not on file     Attends meetings of clubs or organizations: Not on file     Relationship status: Not on file    Intimate partner violence     Fear of current or ex partner: Not on file     Emotionally abused: Not on file     Physically abused: Not on file     Forced sexual activity: Not on file   Other Topics Concern    Not on file Social History Narrative    Not on file     Family History    History reviewed. No pertinent family history. Sleep History     n/a  Meds    Current Medications    predniSONE  10 mg Oral Daily    [START ON 5/12/2021] cosyntropin  250 mcg Intravenous Once    [START ON 5/12/2021] nivolumab (OPDIVO) chemo IVPB  240 mg Intravenous Once    famotidine  20 mg Oral Daily    midodrine  2.5 mg Oral TID     heparin (porcine)  5,000 Units Subcutaneous 3 times per day    sodium polystyrene  15 g Oral Once    levothyroxine  25 mcg Oral Daily    insulin lispro  0-6 Units Subcutaneous TID     insulin lispro  0-3 Units Subcutaneous Nightly    epoetin yumiko-epbx  10,000 Units Subcutaneous Once per day on Mon Wed Fri    vancomycin (VANCOCIN) intermittent dosing (placeholder)   Other RX Placeholder     sodium chloride, ipratropium-albuterol, acetaminophen, promethazine, traMADol, oxyCODONE-acetaminophen, glucose, dextrose, glucagon (rDNA), dextrose  IV Drips/Infusions   sodium chloride      sodium chloride 40 mL/hr at 05/11/21 0753    dextrose       Vitals    Vitals    height is 6' (1.829 m) and weight is 203 lb (92.1 kg). His axillary temperature is 96.9 °F (36.1 °C). His blood pressure is 117/74 and his pulse is 112. His respiration is 18 and oxygen saturation is 95%. O2 Flow Rate (L/min): 2 L/min  I/O    Intake/Output Summary (Last 24 hours) at 5/11/2021 1430  Last data filed at 5/11/2021 0402  Gross per 24 hour   Intake 1623 ml   Output 900 ml   Net 723 ml     Patient Vitals for the past 96 hrs (Last 3 readings):   Weight   05/09/21 1856 203 lb (92.1 kg)     Exam   Physical Exam   Constitutional: No distress on O2 per NC. Patient appears chronically ill and tired. Head: Normocephalic and atraumatic. Mouth/Throat: Oropharynx is clear and moist.  No oral thrush. Eyes: Conjunctivae are normal. Pupils are equal, round. No scleral icterus. Neck: Neck supple. No tracheal deviation present. Cardiovascular: S1 and S2 with no murmur. No peripheral edema  Pulmonary/Chest: Normal effort with bilateral air entry, faint rales diminished sounds at bases. No stridor. No respiratory distress. Pleurx to Right lateral chest dressed with gauze and clear occlusive dressing   Abdominal: Soft. Bowel sounds audible. No distension or tenderness to palp. Musculoskeletal: Moves all extremities  Neurological: Patient is alert and follow simple commands. .   Labs   ABG  Lab Results   Component Value Date    PH 7.38 04/17/2021    PH 6.0 12/14/2020    PO2 94 04/17/2021    PCO2 41 04/17/2021    HCO3 25 04/17/2021    O2SAT 97 04/17/2021     Lab Results   Component Value Date    IFIO2 5 04/17/2021     CBC  Recent Labs     05/09/21  1909 05/10/21  0550 05/11/21  0536   WBC 21.4* 18.6* 18.1*   RBC 2.93* 2.63* 2.43*   HGB 8.0* 7.3* 6.6*   HCT 27.0* 24.4* 22.4*   MCV 92.2 92.8 92.2   MCH 27.3 27.8 27.2   MCHC 29.6* 29.9* 29.5*    213 225   MPV 11.5 11.9 11.0      BMP  Recent Labs     05/09/21  1909 05/10/21  0550 05/10/21  1452 05/10/21  2017 05/11/21  0536   * 133*  --   --  131*   K 6.0* 5.7* 5.7* 5.5* 5.6*   CL 91* 94*  --   --  93*   CO2 27 26  --   --  27   BUN 61* 61*  --   --  56*   CREATININE 2.5* 2.6*  --   --  2.2*   GLUCOSE 183* 139*  --   --  139*   MG 2.0  --   --   --   --    CALCIUM 10.0 9.6  --   --  8.9     LFT  Recent Labs     05/09/21 1909   AST 14   ALT 8*   BILITOT 0.4   ALKPHOS 62     TROP  Lab Results   Component Value Date    TROPONINT 0.012 05/10/2021    TROPONINT 0.022 05/10/2021    TROPONINT 0.043 05/09/2021     BNP  Lab Results   Component Value Date    PROBNP 1182.0 05/09/2021    PROBNP 416.1 04/13/2021    PROBNP 63.4 03/23/2021     D-Dimer  Lab Results   Component Value Date    DDIMER 00796.00 04/14/2021     Lactic Acid  No results for input(s): LACTA in the last 72 hours. INR  No results for input(s): INR, PROTIME in the last 72 hours. PTT  No results for input(s):  APTT in the last 72 hours. Glucose  Recent Labs     05/10/21  2039 05/11/21  0756 05/11/21  1138   POCGLU 177* 121* 135*     UA   Recent Labs     05/10/21  [facility]   . PFTs   N/A  Echo      ECHO completed 5/10/2021-Read Pending    Conclusions      Summary   Normal left ventricle size and systolic function. Ejection fraction was   estimated at 60 to 65%. There were no regional left ventricular wall   motion abnormalities and wall thickness was within normal limits. Left atrial size was normal.      Signature      ----------------------------------------------------------------   Electronically signed by Millie Rainey MD (Interpreting   physician) on 04/13/2021 at 06:17 PM   ----------------------------------------------------------------       Cultures    Procalcitonin  Lab Results   Component Value Date    PROCAL 2.31 05/10/2021    PROCAL 0.49 04/14/2021     Body fluid culture-(paracentesis)-No prelim growth  Strep pneumoniae-Pending  Legionella-Pending  Sputum Cx-awaiting sample  Radiology    CXR  1 view chest x-ray   05/09/2021   Findings: Bibasilar large bilateral pleural effusions are similar to the prior exam. Right basilar tunneled pleural catheter is unchanged. Mass versus loculated effusion at the right apex. Increased in size since the prior exam. Ill-defined bilateral lung opacities similar to the prior exam. Cardiomegaly. No change. No acute fracture. Impression:  Loculated right apex pleural effusion versus mass has increased in size since the prior exam. Otherwise no acute findings. XR CHEST PORTABLE    04/22/2021   Findings: Moderate bilateral pleural effusions noted. The right basilar pleural fluid has reaccumulated. Ill-defined opacities in the mid to lower lungs are unchanged. Cardiomegaly unchanged. Right subclavian central venous catheter in SVC. Right basilar Pleurx catheter. Left lower rib fractures again noted. Impression:  1. Bilateral pleural effusions, and lung opacities.  The right basilar pleural fluid has reaccumulated since the most recent exam.     CT Scans    CT SCAN OF THE ABDOMEN AND PELVIS WITHOUT CONTRAST   05/09/2021   FINDINGS: CT ABDOMEN: Images of the lung bases demonstrate a small to moderate loculated right pleural effusion with an associated right-sided chest tube. 2.6 cm mass is noted within the right pleural effusion on axial image 1 which is nonspecific but could represent neoplastic disease or perhaps proteinaceous material.  A smaller lesion in the right pleural space measures 8 mm on image 3. There is also a loculated small left pleural effusion. Small pericardial effusion is noted. Numerous nodules/masses are noted within the lung bases, compatible with neoplastic disease. For example in the right lower lobe is a 4.1 cm lesion on axial image 1 minus does not appear to be significantly changed. Lytic destructive change of multiple left lower ribs is again noted, compatible with the patient's known metastatic disease. This does not appear to be significantly changed. There are no focal liver lesions. The patient is status post cholecystectomy. The pancreas is unremarkable. There are calcified granulomas in the spleen. Right adrenal mass measures 2.0 x 1.0 cm, increased since the prior study when it measured 1.7 x 0.9 cm. This is compatible with metastatic disease. Left-sided adrenal metastases are noted. One of these, seen on axial image 25, is increased in size and measures 1.1 cm on the current exam.  Previously it measured 0.9 cm. Exophytic solid mass arising from the right renal moiety measures 4.4 x 4.0 cm on axial image 38, this does not appear to be significantly changed. Horseshoe kidney is noted. Abdominal aorta is normal in caliber without evidence of an aneurysm. Moderate volume of ascites is noted in the abdomen/pelvis, increased since the prior study.   Changes of peritoneal carcinomatosis are more prominent on this exam, for example seen on axial image 77. CT PELVIS: Lytic destructive change in the right iliac bone is again noted on axial image 72, this does not appear to be significantly changed. There is no evidence of a bowel obstruction. There is no free air. Colonic diverticula are noted, without evidence of acute diverticulitis. Subcutaneous edema/anasarca is noted. Impression:  1. No evidence of a bowel obstruction or free air. 2.  Moderate volume of ascites, increased since the prior study. Changes of peritoneal carcinomatosis are more prominent on this exam.   3.  Right renal mass is again noted. Worsening metastatic disease within the adrenal glands. 4.  Small to moderate loculated right pleural effusion. Small loculated left pleural effusion. Bibasilar parenchymal metastases are noted. 5.  Multifocal bone metastases are again noted. 6.  Additional findings are detailed above.        (See actual reports for details)    Assessment   -Acute on chronic respiratory failure with hypoxia baseline 2 LPM currently on 3 LPM  -SOB multifactorial metastasis, malignant pleural effusion, abdominal distension restricting movement of diaphragm  -Leukocytosis-Recently treated for pneumonia will obtain cultures  -Malignant pleural effusion-Previous Vats with pleurodesis to Left, Right pleurx in place  -Metastatic renal cell carcinoma-CT abdomen with worsening peritoneal carcinomatosis  -Hyperkalemia  -CKD  Recommendations   -Monitor SpO2 wean supplemental O2 to maintain SpO2 >90%  -Send Sputum for culture-awaiting sample   -Follow pending strep pneumoniae antigen and legionella antigen  -Follow pleural fluid culture   -Advised RN to continue to drain Pleurx daily using vaccutainer bottle and redress with gauze and occlusive dressing after completed  -Obtain CT chest WO contrast after draining pleurx to further evaluate reoccurrence of loculation vs progression of neoplastic process, discussed with RN Genna  -ID Dr Jairo Jenkins following Case discussed with nurse and patient/family. Questions and concerns addressed. Meds and Orders reviewed. Electronically signed by     CAITY Marie CNP on 5/11/2021 at 2:30 PM     Addendum by Dr. Lulu Carbajal MD:  I have seen and examined the patient independently. Face to face evaluation and examination was performed. The above evaluation and note has been reviewed. Labs and radiographs were reviewed. I Have discussed with . Sonia Quinones CNP about this patient in detail. The above assessment and plan has been reviewed. Please see my modifications mentioned below. My modifications:  Due to rare gram-positive cocci noted on a pleural fluid from Pleurx catheter on right side-we will remove Pleurx catheter as recommended by Dr. Tiara Alamo MD from infectious disease service. We will continue incentive spirometer every 2 hourly as tolerated. CT surgery consult appreciated-planning to remove Pleurx catheter on right side. We will follow CT scan of chest without contrast.  - Mark Dhaliwal educated about my impression and plan.        Luzma Fried MD 5/11/2021 4:45 PM

## 2021-05-11 NOTE — PROGRESS NOTES
Progress note: Infectious diseases    Patient - Brittany Arriola,  Age - 44 y.o.    - 1970      Room Number - 5K-03/003-A   MRN -  [id number]   Acct # - [de-identified]  Date of Admission -  2021  6:52 PM    SUBJECTIVE:   No new issues. He had paracentesis  OBJECTIVE   VITALS    height is 6' (1.829 m) and weight is 203 lb (92.1 kg). His axillary temperature is 97 °F (36.1 °C). His blood pressure is 118/79 and his pulse is 103. His respiration is 18 and oxygen saturation is 98%. Wt Readings from Last 3 Encounters:   21 203 lb (92.1 kg)   21 203 lb 1.6 oz (92.1 kg)   21 192 lb 6.4 oz (87.3 kg)       I/O (24 Hours)    Intake/Output Summary (Last 24 hours) at 2021 1340  Last data filed at 2021 0402  Gross per 24 hour   Intake 2169.51 ml   Output 900 ml   Net 1269.51 ml       General Appearance  Awake, alert, oriented,  Chronically sick looking.   HEENT - normocephalic, atraumatic, pale conjunctiva,  anicteric sclera  Neck - Supple, no mass  Lungs -  Bilateral  air entry, no rhonchi, no wheeze  Cardiovascular - Heart sounds are normal.     Abdomen - distended, flank dullness  Neurologic -oriented  Skin - No bruising or bleeding  Extremities - + edema, no cyanosis, clubbing     MEDICATIONS:      predniSONE  10 mg Oral Daily    [START ON 2021] cosyntropin  250 mcg Intravenous Once    [START ON 2021] nivolumab (OPDIVO) chemo IVPB  240 mg Intravenous Once    famotidine  20 mg Oral Daily    midodrine  2.5 mg Oral TID WC    heparin (porcine)  5,000 Units Subcutaneous 3 times per day    sodium polystyrene  15 g Oral Once    levothyroxine  25 mcg Oral Daily    insulin lispro  0-6 Units Subcutaneous TID WC    insulin lispro  0-3 Units Subcutaneous Nightly    epoetin yumiko-epbx  10,000 Units Subcutaneous Once per day on     vancomycin [facility]) intermittent dosing (placeholder)   Other RX Placeholder      sodium chloride      sodium chloride 40 mL/hr at 05/11/21 0753    dextrose       sodium chloride, ipratropium-albuterol, acetaminophen, promethazine, traMADol, oxyCODONE-acetaminophen, glucose, dextrose, glucagon (rDNA), dextrose      LABS:     CBC:   Recent Labs     05/09/21  1909 05/10/21  0550 05/11/21  0536   WBC 21.4* 18.6* 18.1*   HGB 8.0* 7.3* 6.6*    213 225     BMP:    Recent Labs     05/09/21  1909 05/10/21  0550 05/10/21  1452 05/10/21  2017 05/11/21  0536   * 133*  --   --  131*   K 6.0* 5.7* 5.7* 5.5* 5.6*   CL 91* 94*  --   --  93*   CO2 27 26  --   --  27   BUN 61* 61*  --   --  56*   CREATININE 2.5* 2.6*  --   --  2.2*   GLUCOSE 183* 139*  --   --  139*     Calcium:  Recent Labs     05/11/21 0536   CALCIUM 8.9     Ionized Calcium:No results for input(s): IONCA in the last 72 hours. Magnesium:  Recent Labs     05/09/21 1909   MG 2.0     Phosphorus:No results for input(s): PHOS in the last 72 hours. BNP:No results for input(s): BNP in the last 72 hours. Glucose:  Recent Labs     05/10/21  2039 05/11/21  0756 05/11/21  1138   POCGLU 177* 121* 135*     HgbA1C: No results for input(s): LABA1C in the last 72 hours. INR: No results for input(s): INR in the last 72 hours. Hepatic:   Recent Labs     05/09/21  1909 05/10/21  1452 05/11/21  0536   ALKPHOS 62  --   --    ALT 8*  --   --    AST 14  --   --    PROT 5.9*  --  5.6*   BILITOT 0.4  --   --    LABALBU 3.1* 2.8*  --      Amylase and Lipase:  Recent Labs     05/10/21  1452   AMYLASE 25     Lactic Acid: No results for input(s): LACTA in the last 72 hours. Troponin: No results for input(s): CKTOTAL, CKMB, TROPONINI in the last 72 hours. BNP: No results for input(s): BNP in the last 72 hours.     CULTURES:   UA:   Recent Labs     05/10/21  1415   COLORU RED     Micro:   Lab Results   Component Value Date    BC No growth-preliminary No growth  04/14/2021         IMAGING:         Problem list of patient:     Patient Active Problem List   Diagnosis Code    Type 2 diabetes mellitus treated without insulin ([facility] [address] E11.9    Essential hypertension I10    Chronic kidney disease N18.9    Chronic renal insufficiency, stage III (moderate) N18.30    Intracranial arachnoid cyst G93.0    Headache R51.9    Numbness of face R20.0    Renal mass N28.89    Thyroid function test abnormal R94.6    Multiple nodules of lung R91.8    Recurrent right pleural effusion J90    Adrenal nodule (HCC) E27.8    Shortness of breath R06.02    Disease of lymph node I89.9    Respiratory distress R06.03    Chest tightness or pressure R07.89    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    Leukocytosis D72.829    Cerebral cyst G93.0    Pneumonia due to organism J18.9    Acute on chronic respiratory failure with hypoxemia (HCC) J96.21    Severe malnutrition (HCC) E43    SIADH (syndrome of inappropriate ADH production) (HCC) E22.2    Hyponatremia E87.1    Metastatic renal cell carcinoma (HCC) C64.9    Diarrhea R19.7    Clear cell renal cell carcinoma, right (HCC) C64.1    GI bleed K92.2    Nausea vomiting and diarrhea R11.2, R19.7    Rectal hemorrhage K62.5    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       Mihaela Eaton MD, FACP 5/11/2021 1:40 PM

## 2021-05-11 NOTE — PLAN OF CARE
light before ambulation and when in need of assistance. Patient expressed understanding. Hourly visual checks performed and charted. Toileting offered to patient. No falls during shift, at this time. Arm band & falling star in place. Continuing to monitor. Problem: Skin Integrity:  Goal: Will show no infection signs and symptoms  Description: Will show no infection signs and symptoms  Outcome: Ongoing  Note: No signs of skin breakdown. Skin clean, dry, intact. Mucous membranes pink, moist. Patient turns self. Assistance with turns/ambulation provided PRN. Continue to monitor. Problem: Skin Integrity:  Goal: Absence of new skin breakdown  Description: Absence of new skin breakdown  Outcome: Ongoing  Note: There is absence of new skin breakdown this shift. Patient encouraged to turn q2 hours. Problem: Discharge Planning  Goal: Discharged to appropriate level of care  Description: Discharged to appropriate level of care  Outcome: Ongoing  Note: Discharge planning in process and discussed with patient/family. Social work consulted for any additional needs. Care manager aware of discharge needs. Patient is from home with wife and home health. Patient plans to be discharged home. Discharge planning remains in progress. Problem: Cardiovascular  Goal: Hemodynamic stability  Outcome: Ongoing  Note:   Vitals:    05/10/21 1826 05/10/21 2030 05/11/21 0028 05/11/21 0153   BP: (!) 109/59 110/66 111/71    Pulse: 111 105 105    Resp: 20 22 20 20   Temp: 98.7 °F (37.1 °C) 97.7 °F (36.5 °C) 97.4 °F (36.3 °C)    TempSrc: Oral Oral Oral    SpO2: 94% 99% 99% 98%   Weight:       Height:          Patient VSS this shift. Problem: Respiratory  Goal: O2 Sat > 90%  Outcome: Ongoing  Note: Patient is on 2L NC. Patient is tolerating well. Intermittent shortness of breath, shortness of breath with exertion. Patient has R sided pleurx catheter. Patient had paracentesis 5/10 with 3L of bloody drainage pulled.  Daily drains from pleurx catheter. Care plan reviewed with patient and RN. Patient and RN verbalize understanding of the plan of care and contribute to goal setting.

## 2021-05-11 NOTE — PROGRESS NOTES
Pharmacy Note  Vancomycin Consult    Gary Sparrow is a 44 y.o. male started on Vancomycin for intra-abdominal infection; consult received from Dr. Kaylee Pearson to manage therapy. Also receiving the following antibiotics: none. Patient Active Problem List   Diagnosis    Type 2 diabetes mellitus treated without insulin ([facility] [address]    Essential hypertension    Chronic kidney disease    Chronic renal insufficiency, stage III (moderate)    Intracranial arachnoid cyst    Headache    Numbness of face    Renal mass    Thyroid function test abnormal    Multiple nodules of lung    Recurrent right pleural effusion    Adrenal nodule (HCC)    Shortness of breath    Disease of lymph node    Respiratory distress    Chest tightness or pressure    Horseshoe kidney    Leukocytosis    Cerebral cyst    Pneumonia due to organism    Acute on chronic respiratory failure with hypoxemia (HCC)    Severe malnutrition (HCC)    SIADH (syndrome of inappropriate ADH production) (HCC)    Hyponatremia    Metastatic renal cell carcinoma (HCC)    Diarrhea    Clear cell renal cell carcinoma, right (HCC)    GI bleed    Nausea vomiting and diarrhea    Rectal hemorrhage    Secondary malignancy of cerebellum ([facility] [address]    Secondary malignant neoplasm of both lungs (HCC)    Secondary malignant tumor of pleura (HCC)    Thyroid nodule    Renal neoplasm    Dyspnea    Diabetes mellitus (HCC)    Hyperkalemia    SOB (shortness of breath)     Allergies:  Patient has no known allergies.      Temp max: 97.7    Recent Labs     05/09/21  1909 05/10/21  0550   BUN 61* 61*   CREATININE 2.5* 2.6*   WBC 21.4* 18.6*       Intake/Output Summary (Last 24 hours) at 5/10/2021 2210  Last data filed at 5/10/2021 2058  Gross per 24 hour   Intake 1349.51 ml   Output 400 ml   Net 949.51 ml       Culture Date Source Results   5/10/21 Pleural fluid GPC singly & in pairs   -- -- --   -- -- --       Ht Readings from Last 1 Encounters:   05/09/21 6' (1.829 m)        Wt Readings from Last 1 Encounters:   05/09/21 203 lb (92.1 kg)       Body mass index is 27.53 kg/m². Estimated Creatinine Clearance: 36 mL/min (A) (based on SCr of 2.6 mg/dL (H)). Goal Trough Level: 10-20 mcg/mL    Assessment/Plan:  Vancomycin one time dose of 1500 mg. Random vancomycin level will be drawn at 2300 5/11/21. Thank you for the consult. Will continue to follow.     Ammy Alamo RPh  5/10/2021  10:14 PM

## 2021-05-11 NOTE — FLOWSHEET NOTE
05/10/21 2032   Provider Notification   Reason for Communication Review case   Provider Name Dr. Franki La   Provider Notification Physician   Method of Communication Secure Message   Response See orders   Notification Time 2032     RN perfect serve messaged Dr. Franki La. Dr. Franki La called this RN. Notified her that patient admitted for metastatic R kidney cancer with mets to the lung has pleurx catheter. we sent the pleural fluid down and It showed few segmented neutrophils observed. rare gram positive cocci occurring singly in pairs. Dr. Franki La ordered Vancomycin, pharmacy to dose. Consult to cardiothoracic surgery ordered. Dr. Franki La stated to notify Dr. Debra Patricio and Dr. Jagdish Juárez of results.

## 2021-05-11 NOTE — FLOWSHEET NOTE
05/11/21 0630   Provider Notification   Reason for Communication Review case   Provider Name Dr. Damian Thakkar   Provider Notification Physician   Method of Communication Secure Message   Response See orders   Notification Time 0630     RN sent perfect serve message to Dr. Damian Thakkar STAT regarding patient hemoglobin being 6.6 and critically low. No response at this time.

## 2021-05-11 NOTE — FLOWSHEET NOTE
05/11/21 6665   Provider Notification   Reason for Communication Review case   Provider Name Dr. Saul Wright   Provider Notification Physician   Method of Communication Secure Message   Response See orders   Notification Time 6523     RN perfect serve messaged Dr. Saul Wright to notify patient had critical lab this morning. hemoglobin 6.6 hematocrit 22.4 Dr. Saul Wright called this RN and ordered 1 unit RBC with type and screen. Stated to notify Dr. Perez Records and make sure okay to transfuse the RBC prior to administering.

## 2021-05-11 NOTE — CONSULTS
[address] [id number]                                  CONSULTATION    PATIENT NAME: Ming Pruett                :        1970  MED REC NO:   [id number]                           ROOM:       0003  ACCOUNT NO:   [de-identified]                           ADMIT DATE: 2021  PROVIDER:     HERIBERTO Guevara DATE:  05/10/2021    ONCOLOGY CONSULTATION    HISTORY OF PRESENT ILLNESS:  The patient is a 57-year-old gentleman, I  have been following him for metastatic colon cancer diagnosed in  2019. The patient was getting treatment with [facility], but he  progressed. The patient was started on cabozantinib at [facility] in 2021. He could not tolerate it. He progressed. The  patient was started on treatment with OPDIVO. He did receive two  cycles. He due for cycle number three on 2021. The patient's  disease involves the pleural spaces, peritoneal carcinomatosis, and the  bone. The patient apparently was not eating well. His belly started to  be distended. He developed ascites. He was seen in the emergency room  and he was admitted. The patient is currently being seen by Internal  Medicine Service and Kidney Service. It was noted that the patient's  creatinine is going up. The patient was started on Percocet for pain  control recently. PAST MEDICAL HISTORY:  1. Metastatic kidney cancer. 2.  Diabetes. 3.  Thyroid nodules. PAST SURGICAL HISTORY:  Back surgery, cholecystectomy. ALLERGIES:  No known allergies. SOCIAL HISTORY:  He is . He has three grown children. He does  not smoke. He does not drink alcohol. He used to work as a  salesperson. FAMILY HISTORY:  He is being adopted. PHYSICAL EXAMINATION:  GENERAL:  He is a pleasant gentleman who is wearing a nasal cannula. His weight is 200 pounds.   VITAL SIGNS:  His temp is 97.5, respirations 20, pulse 108, blood  pressure 121/66. His oxygen saturation is 98% on 2 liters. HEENT:  Normocephalic, atraumatic. NECK:  Supple. No JVD. No bruit. No thyromegaly. CHEST:  Bilateral decreased air entry. HEART:  S1, S2.  ABDOMEN:  Distended. Positive for ascites. EXTREMITIES:  2+ edema. SKIN:  No petechiae and no ecchymosis. LABORATORY DATA:  His potassium is 5.7. His creatinine is 2.6. His  albumin 2.8. Liver enzymes are normal.  CBC:  White blood cells 18.6,  hemoglobin 7.3 gm, hematocrit 24.4%, and platelets 196. CAT scan of the abdomen without IV contrast, moderate volume ascites,  increased since prior study. Peritoneal carcinomatosis which is more  prominent. Right renal mass. Small-to-moderate loculated right pleural  effusion, small loculated left pleural effusion. IMPRESSION:  This is a 53-year-old gentleman with metastatic renal  carcinoma, he was diagnosed in 07/2019. He responded initially to  Eugune Baston and then he progressed. He was given cabozantinib, but he  could not tolerate it. The patient was started, his first line  treatment was OPDIVO four weeks back. He did receive two treatments,  one treatment every two weeks. The patient is due treatment in a couple  of days. The patient seems to be progressing. The medium time for  response with Eugune Baston is almost four weeks. I will watch the patient,  and we are going to discuss giving him #3 on Wednesday. We thank you for the consult of this patient.         Lamar Mclaughlin M.D.    D: 05/10/2021 16:45:58       T: 05/10/2021 18:38:39     [email or web address]  Job#: [id number]     Doc#: [id number]    CC:

## 2021-05-11 NOTE — PROGRESS NOTES
IM Progress Note  Dr. Bebeto Bolden for Dr Renetta Kraus  5/11/2021 10:32 AM      Patient name Chery Dhaliwal  [id number]  PCP: CAITY De La Fuente CNP  Admit Date: 5/9/2021  Acct No. [de-identified]    Subjective: Interval History:   Pt  Receiving blood   Pain meds helping with his pain   Denies being anxious but appears anxious  Informed of gram + cocci in fluid and await cult rewsults   [facility] consulted  D/w Dr Mello Dears today regarding gram stain and pleurx cath  Cont antibiotics    Diet: DIET RENAL;    I/O last 3 completed shifts: In: 2169.5 [P.O.:940; I.V.:1229.5]  Out: 900 [Urine:900]  No intake/output data recorded. Admission weight: 203 lb (92.1 kg) as of 5/9/2021  6:52 PM  Wt Readings from Last 3 Encounters:   05/09/21 203 lb (92.1 kg)   04/24/21 203 lb 1.6 oz (92.1 kg)   03/23/21 192 lb 6.4 oz (87.3 kg)     Body mass index is 27.53 kg/m².     ROS   CVS;  no cp or palpitation  Resp: +SOB no cough  Neuro:  No numbness or weakness or dizziness  Abd: no nausea or vomiting or abd pain      Medications:   Scheduled Meds:   predniSONE  10 mg Oral Daily    sodium zirconium cyclosilicate  10 g Oral Once    [START ON 5/12/2021] cosyntropin  250 mcg Intravenous Once    famotidine  20 mg Oral Daily    midodrine  2.5 mg Oral TID WC    heparin (porcine)  5,000 Units Subcutaneous 3 times per day    sodium polystyrene  15 g Oral Once    levothyroxine  25 mcg Oral Daily    insulin lispro  0-6 Units Subcutaneous TID WC    insulin lispro  0-3 Units Subcutaneous Nightly    epoetin yumiko-epbx  10,000 Units Subcutaneous Once per day on Mon Wed Fri    vancomycin (VANCOCIN) intermittent dosing (placeholder)   Other RX Placeholder     Continuous Infusions:   sodium chloride      sodium chloride 40 mL/hr at 05/11/21 0753    dextrose         Labs :     CBC:   Recent Labs     05/09/21  1909 05/10/21  0550 05/11/21  0536   WBC 21.4* 18.6* 18.1*   HGB 8.0* 7.3* 6.6*    213 225     BMP:    Recent Labs

## 2021-05-11 NOTE — PROGRESS NOTES
Renal Progress Note  [facility]    Patient :  Cody Ramirez; 44 y.o. MRN# [id number]  Location:  5-03/003-A  Attending:  Jonel Meyer MD  Admit Date:  5/9/2021   Hospital Day: 2      Subjective:     Nephrology is following the patient for CAMILLE, hyperkalemia. Patient seen and examined. He had paracentesis yesterday with 3 L removed. Feeling ok. Outpatient Medications:     Medications Prior to Admission: levothyroxine (SYNTHROID) 25 MCG tablet, Take 25 mcg by mouth Daily  oxyCODONE-acetaminophen (PERCOCET) 5-325 MG per tablet, Take 1 tablet by mouth every 6 hours as needed for Pain.  famotidine (PEPCID) 20 MG tablet, Take 1 tablet by mouth daily  ipratropium-albuterol (DUONEB) 0.5-2.5 (3) MG/3ML SOLN nebulizer solution, Inhale 3 mLs into the lungs every 4 hours as needed for Shortness of Breath  midodrine (PROAMATINE) 2.5 MG tablet, Take 1 tablet by mouth 3 times daily (with meals)  predniSONE (DELTASONE) 20 MG tablet, Take 2 tablets by mouth daily F/u with Dr Sky Pina regarding continuing dose at followup  traMADol (ULTRAM) 50 MG tablet, Take 50 mg by mouth every 6 hours as needed for Pain.   traZODone (DESYREL) 50 MG tablet, Take 50 mg by mouth nightly  polyethylene glycol (GLYCOLAX) 17 g packet, Take 17 g by mouth daily as needed for Constipation  [DISCONTINUED] levothyroxine (SYNTHROID) 125 MCG tablet, Take 0.5 tablets by mouth Daily  ALLOPURINOL PO, Take 100 mg by mouth nightly     Current Medications:     Scheduled Meds:    predniSONE  10 mg Oral Daily    sodium zirconium cyclosilicate  10 g Oral Once    [START ON 5/12/2021] cosyntropin  250 mcg Intravenous Once    famotidine  20 mg Oral Daily    midodrine  2.5 mg Oral TID WC    heparin (porcine)  5,000 Units Subcutaneous 3 times per day    sodium polystyrene  15 g Oral Once    levothyroxine  25 mcg Oral Daily    insulin lispro  0-6 Units Subcutaneous TID WC    insulin lispro  0-3 Units Subcutaneous Nightly    epoetin yumiko-epbx  10,000 Units Subcutaneous Once per day on     vancomycin Phoenix Bowen) intermittent dosing (placeholder)   Other RX Placeholder     Continuous Infusions:    sodium chloride      sodium chloride 50 mL/hr at 05/10/21 0825    dextrose       PRN Meds:  sodium chloride, ipratropium-albuterol, acetaminophen, promethazine, traMADol, oxyCODONE-acetaminophen, glucose, dextrose, glucagon (rDNA), dextrose    Input/Output:       I/O last 3 completed shifts: In: 2169.5 [P.O.:940; I.V.:1229.5]  Out: 900 [Urine:900]. Patient Vitals for the past 96 hrs (Last 3 readings):   Weight   21 1856 203 lb (92.1 kg)       Vital Signs:   Temperature:  Temp: 97.4 °F (36.3 °C)  TMax:   Temp (24hrs), Av.7 °F (36.5 °C), Min:97.4 °F (36.3 °C), Max:98.7 °F (37.1 °C)    Respirations:  Resp: 18  Pulse:   Pulse: 110  BP:    BP: 121/72  BP Range: Systolic (45NNB), EUK:553 , Min:109 , UVB:831       Diastolic (14JIC), UNQ:81, Min:59, Max:76      Physical Examination:     General:  Awake, alert, frail appearing  HEENT: NC/AT/ MMM   Chest:               Right pleurx catheter, mild rales bilateral bases  Cardiac:  S1 S2   Abdomen: Distended, nontender  Neuro:  No facial droop, No Asterixis  SKIN:  No rashes, good skin turgor.   Extremities:  Trace ankle edema noted    Labs:       Recent Labs     21  1909 05/10/21  0550 21  0536   WBC 21.4* 18.6* 18.1*   RBC 2.93* 2.63* 2.43*   HGB 8.0* 7.3* 6.6*   HCT 27.0* 24.4* 22.4*   MCV 92.2 92.8 92.2   MCH 27.3 27.8 27.2   MCHC 29.6* 29.9* 29.5*    213 225   MPV 11.5 11.9 11.0      BMP:   Recent Labs     21  1909 05/10/21  0550 05/10/21  1452 05/10/21  2017 05/11/21  0536   * 133*  --   --  131*   K 6.0* 5.7* 5.7* 5.5* 5.6*   CL 91* 94*  --   --  93*   CO2 27 26  --   --  27   BUN 61* 61*  --   --  56*   CREATININE 2.5* 2.6*  --   --  2.2*   GLUCOSE 183* 139*  --   --  139*   CALCIUM 10.0 9.6  --   --  8.9      Phosphorus:   No results for receiving another Opdivo dose tomorrow  2. Hyperkalemia: Lokelma 10 grams again today, repeat K at noon. Low K diet. ? Adrenal insufficiency . Check cosyntropin stim test  3. Hyponatremia  4. Hypotension, on Midodrine  5. Anemia: PRBC transfusion  6. Metastatic renal cell ca with peritoneal carcinomatosos  7. Malignant pleural effusions  8. Ascites s/p paracentesis        Please don't hesitate to call with any questions.   Electronically signed by Chikis Douglas DO on 5/11/2021 at 7:39 AM

## 2021-05-11 NOTE — FLOWSHEET NOTE
05/11/21 6271   Provider Notification   Reason for Communication Review case   Provider Name Dr. Evan Kayser   Provider Notification Physician   Method of Communication Secure Message   Response No new orders   Notification Time 5783     New patient consult sent to Dr. Evan Kayser by Dr. Jose A Douglas. new patient consult for patient admitted for metastatic R kidney cancer with mets to the lung has pleurx catheter on R side. The culture from the pleurx catheter showed bacteria growing.

## 2021-05-11 NOTE — PROGRESS NOTES
Progress Note  Date:2021       Room:[facility]03/003-A  Patient Name:Orlando Dhaliwal     YOB: 1970     Age:50 y.o. Subjective    Subjective:  Symptoms:  Improved. He reports shortness of breath and malaise. Diet:  Poor intake. Activity level: Impaired due to weakness. Pain:  He reports no pain. Review of Systems   Respiratory: Positive for shortness of breath. Objective         Vitals Last 24 Hours:  TEMPERATURE:  Temp  Av.3 °F (36.3 °C)  Min: 96.8 °F (36 °C)  Max: 98.7 °F (37.1 °C)  RESPIRATIONS RANGE: Resp  Av  Min: 18  Max: 22  PULSE OXIMETRY RANGE: SpO2  Av.5 %  Min: 94 %  Max: 99 %  PULSE RANGE: Pulse  Av.4  Min: 103  Max: 112  BLOOD PRESSURE RANGE: Systolic (69NDO), LYNNE:834 , Min:101 , ANNELIESE:419   ; Diastolic (78NJM), PVM:40, Min:59, Max:79    I/O (24Hr): Intake/Output Summary (Last 24 hours) at 2021 1417  Last data filed at 2021 0402  Gross per 24 hour   Intake 1623 ml   Output 900 ml   Net 723 ml     Objective:  General Appearance: In no acute distress. Vital signs: (most recent): Blood pressure 117/74, pulse 112, temperature 96.9 °F (36.1 °C), temperature source Axillary, resp. rate 18, height 6' (1.829 m), weight 203 lb (92.1 kg), SpO2 95 %. Vital signs are normal.    Output: Producing stool. HEENT: Normal HEENT exam.    Lungs:  Normal respiratory rate. Breath sounds clear to auscultation. Heart: Normal rate. Regular rhythm. S1 normal and S2 normal.    Abdomen: Abdomen is soft and distended. There is no abdominal tenderness. Neurological: Patient is alert and oriented to person, place and time. Normal strength.       Labs/Imaging/Diagnostics    Labs:  CBC:  Recent Labs     21  1909 05/10/21  0550 21  0536   WBC 21.4* 18.6* 18.1*   RBC 2.93* 2.63* 2.43*   HGB 8.0* 7.3* 6.6*   HCT 27.0* 24.4* 22.4*   MCV 92.2 92.8 92.2    213 225     CHEMISTRIES:  Recent Labs     21  1909 05/10/21  0550 05/10/21  1452 05/10/21  2017 05/11/21  0536   * 133*  --   --  131*   K 6.0* 5.7* 5.7* 5.5* 5.6*   CL 91* 94*  --   --  93*   CO2 27 26  --   --  27   BUN 61* 61*  --   --  56*   CREATININE 2.5* 2.6*  --   --  2.2*   GLUCOSE 183* 139*  --   --  139*   MG 2.0  --   --   --   --      PT/INR:No results for input(s): PROTIME, INR in the last 72 hours. APTT:No results for input(s): APTT in the last 72 hours. LIVER PROFILE:  Recent Labs     05/09/21 1909   AST 14   ALT 8*   BILITOT 0.4   ALKPHOS 62       Imaging Last 24 Hours:  Echocardiogram Limited    Result Date: 5/10/2021  Transthoracic Echocardiography Report (TTE)  Demographics   Patient Name   [address] [place] [address],[address] Gender              Male                 P   MR #           [id number]         Race                                                    Ethnicity   Account #      [de-identified]         Room Number         0003   Accession      [id number]        Date of Study       05/10/2021  Number   Date of Birth  1970        Referring Physician Merline Lederer MD                                                       [facility], [address] [facility]   Age            44 year(s)        [facility]                                    Interpreting        Echo reader of the                                   Physician           martinez Carroll MD  Procedure Type of Study   TTE procedure:ECHOCARDIOGRAM LIMITED. Procedure Date Date: 05/10/2021 Start: 12:53 PM Study Location: Bedside Technical Quality: Limited visualization due to poor acoustical window. Indications:Pericardial effusion.  Additional Medical History:Shortness of breath, diabetic, hypertension, chronic kidney disease, renal cell cancer with METS, recent Covid, hypothyroidism Patient Status: Routine Height: 72 inches Weight: 203 pounds BSA: 2.14 m^2 BMI: 27.53 kg/m^2 BP: 113/71 mmHg  Conclusions   Summary  Normal left ventricle size and systolic function. Ejection fraction was  estimated at 65 %. There were no regional left ventricular wall motion  abnormalities and wall thickness was within normal limits. There is trivial posterior pericardial effusion with no evidence of  hemodynamic compromise. Signature   ----------------------------------------------------------------  Electronically signed by Sulema Ortega MD (Interpreting  physician) on 05/10/2021 at 06:35 PM  ----------------------------------------------------------------   Findings   Mitral Valve  The mitral valve structure was normal with normal leaflet separation. Aortic Valve  The aortic valve was trileaflet with normal thickness and cuspal  separation. Tricuspid Valve  The tricuspid valve structure was normal with normal leaflet separation. Pulmonic Valve  The pulmonic valve leaflets exhibited normal thickness, no calcification,  and normal cuspal separation. Left Atrium  Left atrial size was normal.   Left Ventricle  Normal left ventricle size and systolic function. Ejection fraction was  estimated at 65 %. There were no regional left ventricular wall motion  abnormalities and wall thickness was within normal limits. Right Atrium  Right atrial size was normal.   Right Ventricle  The right ventricular size was normal with normal systolic function and  wall thickness. Pericardial Effusion  There is trivial posterior pericardial effusion with no evidence of  hemodynamic compromise. Pleural Effusion  No evidence of pleural effusion. Aorta / Great Vessels  -Aortic root dimension within normal limits.  -The Pulmonary artery is within normal limits. -IVC size is within normal limits with normal respiratory phasic changes.   M-Mode/2D Measurements & Calculations   LV Diastolic    LV Systolic Dimension: 3.5   AV Cusp Separation: 2.3 cmLA  Dimension: 5 cm cm                           Dimension: 3.1 cmAO Root  LV FS:30 %      LV Volume Diastolic: 958 ml  Dimension: 3.9 cm  LV PW           LV Volume Systolic: 67.0 ml  Diastolic: 0.8  LV EDV/LV EDV Index: 118  cm              ml/55 m^2LV ESV/LV ESV  Septum          Index: 50.9 ml/24 m^2        RV Diastolic Dimension: 2.7  Diastolic: 0.8  EF Calculated: 56.9 %        cm  cm                                               LA/Aorta: 0.79  [id number]    Ct Abdomen Pelvis Wo Contrast Additional Contrast? None    Result Date: 5/9/2021  CT SCAN OF THE ABDOMEN AND PELVIS WITHOUT CONTRAST COMPARISON: 4/13/2021. TECHNIQUE: A noncontrast CT scan of the abdomen and pelvis was performed. A dose reduction technique was utilized. FINDINGS: CT ABDOMEN: Images of the lung bases demonstrate a small to moderate loculated right pleural effusion with an associated right-sided chest tube. 2.6 cm mass is noted within the right pleural effusion on axial image 1 which is nonspecific but could represent neoplastic disease or perhaps proteinaceous material.  A smaller lesion in the right pleural space measures 8 mm on image 3. There is also a loculated small left pleural effusion. Small pericardial effusion is noted. Numerous nodules/masses are noted within the lung bases, compatible with neoplastic disease. For example in the right lower lobe is a 4.1 cm lesion on axial image 1 minus does not appear to be significantly changed. Lytic destructive change of multiple left lower ribs is again noted, compatible with the patient's known metastatic disease. This does not appear to be significantly changed. There are no focal liver lesions. The patient is status post cholecystectomy. The pancreas is unremarkable. There are calcified granulomas in the spleen. Right adrenal mass measures 2.0 x 1.0 cm, increased since the prior study when it measured 1.7 x 0.9 cm.   This is compatible with metastatic disease. Left-sided adrenal metastases are noted. One of these, seen on axial image 25, is increased in size and measures 1.1 cm on the current exam.  Previously it measured 0.9 cm. Exophytic solid mass arising from the right renal moiety measures 4.4 x 4.0 cm on axial image 38, this does not appear to be significantly changed. Horseshoe kidney is noted. Abdominal aorta is normal in caliber without evidence of an aneurysm. Moderate volume of ascites is noted in the abdomen/pelvis, increased since the prior study. Changes of peritoneal carcinomatosis are more prominent on this exam, for example seen on axial image 77. CT PELVIS: Lytic destructive change in the right iliac bone is again noted on axial image 72, this does not appear to be significantly changed. There is no evidence of a bowel obstruction. There is no free air. Colonic diverticula are noted, without evidence of acute diverticulitis. Subcutaneous edema/anasarca is noted. 1.  No evidence of a bowel obstruction or free air. 2.  Moderate volume of ascites, increased since the prior study. Changes of peritoneal carcinomatosis are more prominent on this exam. 3.  Right renal mass is again noted. Worsening metastatic disease within the adrenal glands. 4.  Small to moderate loculated right pleural effusion. Small loculated left pleural effusion. Bibasilar parenchymal metastases are noted. 5.  Multifocal bone metastases are again noted. 6.  Additional findings are detailed above. This document has been electronically signed by: Socrates Álvarez M.D. on 05/09/2021 09:27 PM All CTs at this facility use dose modulation techniques and iterative reconstructions, and/or weight-based dosing when appropriate to reduce radiation to a low as reasonably achievable.     Xr Chest 1 View    Result Date: 5/9/2021  1 view chest x-ray Comparison:  CR,SR  - XR CHEST PORTABLE  - 04/22/2021 01:09 AM EDT Findings: Bibasilar large bilateral pleural effusions are similar to the prior exam. Right basilar tunneled pleural catheter is unchanged. Mass versus loculated effusion at the right apex. Increased in size since the prior exam. Ill-defined bilateral lung opacities similar to the prior exam. Cardiomegaly. No change. No acute fracture. Loculated right apex pleural effusion versus mass has increased in size since the prior exam. Otherwise no acute findings. This document has been electronically signed by: Susan Gamble MD on 05/09/2021 09:02 PM    Us Guided Paracentesis    Result Date: 5/10/2021  PROCEDURE: US GUIDED PARACENTESIS CLINICAL INFORMATION: ascites with SOB, BOTH diagnositc and therapeutic . COMPARISON: CT abdomen pelvis dated 5/9/2021. TECHNIQUE: Risks and benefits of the procedure were thoroughly explained and oral and written informed consent obtained. The patient was placed supine on the ultrasound gurney. Following patient and site verification, the patient was prepped and draped in usual fashion and 2% lidocaine was infiltrated in the subcutaneous tissues at the designated puncture site. A 5 [place] 1 stick needle/catheter system was introduced into the fluid collection under ultrasound guidance. The catheter was advanced while the inner needle was removed. 70 mL bloody fluid was removed and sent to laboratory for further analysis. A total of 3 L of bloody fluid was withdrawn and the catheter removed and a bandage placed. The patient tolerated the procedure well and no immediate postprocedural complication was identified. There are 7 saved ultrasound images. FINDINGS: Saved ultrasound images show the 1 stick needle within the ascites fluid. Post procedure ultrasound image shows marked decrease in size of ascitic fluid. Successful ultrasound-guided paracentesis. **This report has been created using voice recognition software. It may contain minor errors which are inherent in voice recognition technology. ** Final

## 2021-05-11 NOTE — FLOWSHEET NOTE
05/10/21 2154   Provider Notification   Reason for Communication Review case   Provider Name Dr. Jagjit Ambrocio   Provider Notification Physician   Method of Communication Secure Message   Response No new orders   Notification Time 2154     RN perfect serve messaged Dr. Jagjit Ambrocio to notify patient admitted for metastatic R kidney cancer with mets to the lung has pleurx catheter. RN sent the pleural fluid down and It showed few segmented neutrophils observed. rare gram positive cocci occurring singly in pairs. Dr. Marianne Andersen ordered IV Vancomycin and will notify Dr. Raquel Soto.

## 2021-05-12 NOTE — H&P
Formulation and discussion of sedation / procedure plans, risks, benefits, side effects and alternatives with patient and/or responsible adult completed.     Electronically signed by Annette Renae MD on 5/12/2021 at 9:18 AM

## 2021-05-12 NOTE — PROGRESS NOTES
Wed Fri    vancomycin (VANCOCIN) intermittent dosing (placeholder)   Other RX Placeholder      sodium chloride      dextrose       oxyCODONE-acetaminophen, sodium chloride, ipratropium-albuterol, acetaminophen, promethazine, glucose, dextrose, glucagon (rDNA), dextrose      LABS:     CBC:   Recent Labs     05/10/21  0550 05/11/21  0536 05/11/21  1448 05/12/21  0814   WBC 18.6* 18.1*  --  15.8*   HGB 7.3* 6.6* 8.3* 9.0*    225  --  256     BMP:    Recent Labs     05/10/21  0550 05/10/21  0550 05/11/21  0536 05/11/21  1448 05/12/21  0814   *  --  131*  --  133*   K 5.7*   < > 5.6* 4.7 5.0   CL 94*  --  93*  --  94*   CO2 26  --  27  --  28   BUN 61*  --  56*  --  53*   CREATININE 2.6*  --  2.2*  --  1.9*   GLUCOSE 139*  --  139*  --  117*    < > = values in this interval not displayed. Calcium:  Recent Labs     05/12/21  0814   CALCIUM 9.3     Ionized Calcium:No results for input(s): IONCA in the last 72 hours.   Magnesium:  Recent Labs     05/09/21  1909   MG 2.0      Recent Labs     05/11/21 2010 05/12/21  0811 05/12/21  1154   POCGLU 143* 118* 135*      Recent Labs     05/09/21  1909 05/10/21  1452 05/11/21  0536   ALKPHOS 62  --   --    ALT 8*  --   --    AST 14  --   --    PROT 5.9*  --  5.6*   BILITOT 0.4  --   --    LABALBU 3.1* 2.8*  --      Amylase and Lipase:  Recent Labs     05/10/21  1452   AMYLASE 25        CULTURES:   UA:   Recent Labs     05/10/21  1415   COLORU RED     Micro:   Lab Results   Component Value Date    BC No growth-preliminary No growth  04/14/2021          Problem list of patient:     Patient Active Problem List   Diagnosis Code    Type 2 diabetes mellitus treated without insulin ([facility] [address] E11.9    Essential hypertension I10    Chronic kidney disease N18.9    Chronic renal insufficiency, stage III (moderate) N18.30    Intracranial arachnoid cyst G93.0    Headache R51.9    Numbness of face R20.0    Renal mass N28.89    Thyroid function test abnormal R94.6   

## 2021-05-12 NOTE — PROGRESS NOTES
78-year-old white male who is carrying diagnosis of metastatic kidney cancer. He was diagnosed in June 2019. The patient was treated initially with PDL-1 inhibitor Keytruda. He did respond for one year. The patient was treated with second line treatment with Cabozantinib. He could not tolerate it. The patient was started on third line treatment with Opvivo4 weeks back. He will get  cycle #3 today.   The average  time to have a response is 4-6 weeks  The patient and his wife requested the treatment.

## 2021-05-12 NOTE — PROGRESS NOTES
Chemotherapy verified with Zunilda Gonzalez RN prior to administration.  Original order, appropriateness of regimen, drug supplied, height, weight, BSA, dose calculations, expiration dates/times, drug appearance, and two patient identifiers were verified by both RNs.  Most recent laboratory values and allergies, were reviewed.  Positive, brisk blood return via CVC was confirmed prior to administration. Chest x-ray for correct line placement reviewed.    Angela Ramirez and Zunilda Gonzalez RN  verified correct rate of chemotherapy and maintenance IV fluids

## 2021-05-12 NOTE — PROGRESS NOTES
[facility]      Patient - Favio Valderrama   MRN -  [de-identified]   Acct # - [de-identified]   - 1970      Date of Admission -  2021  6:52 PM  Date of evaluation -  2021  Room - --[address] MD [facility] Primary Care Physician - Sohail Sheets, APRN - CNP   Chief Complaint   Shortness of breath  Active Hospital Problem List      Active Hospital Problems    Diagnosis Date Noted    SOB (shortness of breath) [R06.02] 05/10/2021    Hyperkalemia [E87.5] 2021    Severe malnutrition ([address] [E43] 2021     Class: Acute    Metastatic renal cell carcinoma ([facility] [address] [C64.9] 2019    Recurrent right pleural effusion [J90] 2019     HPI   Favio Valderrama is a 44 y.o. male admitted for SOB. Extensive PMH including DM, HTN, CKD, Covid-19 pneumonia in 2021, gout and metastatic renal cell carcinoma with distant metastasis following with Dr. Damian Thakkar. Of note has had recurent pleural effsuion found to be related to malignancy. Underwent pleurx catheter placement at [facility] 2021. Was recently treated at [address] [place] [address] 2021-2021 for pneumonia with sepsis and loculated pleural effusion. CTS was consulted and instilled TPA via existing pleurx due to lack of output and CT changes after therapy significant amount of fluid was obtained and pt required 2 units PRBC for low hemoglobin. Patient oncologist has started on nivolumab since discharge. Today pt reports symptoms have been gradually increasing for the past week associated symptoms include dry cough, lack of appetite, and abdominal distension. Reports compliance with Home Health draining Daily with tapering amount approx 280-300 cc last week down 100-140cc lately. Was scheduled to see established Pulmonologist 2021.        Past 24 hrs   -On 3 LPM via NC  -Reports persistent SOB   -Pleurx removed  -Drained 300 cc yesterday  All other systems reviewed    Past Medical History         Diagnosis Date    Cancer [facility])     kidney    Chronic kidney disease, unspecified     Collapsed lung     IDDM (insulin dependent diabetes mellitus)     Kidney mass     Lung nodules     Sinusitis     Thyroid nodule 5/6/2021    Unspecified essential hypertension       Past Surgical History           Procedure Laterality Date    BACK SURGERY      CHOLECYSTECTOMY      IR REMOVAL OF TUNNELED PLEURAL CATH W CUFF  5/12/2021    IR REMOVAL OF TUNNELED PLEURAL CATH W CUFF 5/12/2021 [facility] SPECIAL PROCEDURES     Diet    DIET RENAL;  Dietary Nutrition Supplements: Renal Oral Supplement  Allergies    Patient has no known allergies.   Social History     Social History     Socioeconomic History    Marital status:      Spouse name: Not on file    Number of children: Not on file    Years of education: Not on file    Highest education level: Not on file   Occupational History    Not on file   Social Needs    Financial resource strain: Not on file    Food insecurity     Worry: Not on file     Inability: Not on file    Transportation needs     Medical: Not on file     Non-medical: Not on file   Tobacco Use    Smoking status: Never Smoker    Smokeless tobacco: Never Used   Substance and Sexual Activity    Alcohol use: Not Currently     Alcohol/week: 1.0 standard drinks     Types: 1 Cans of beer per week    Drug use: Never    Sexual activity: Not on file   Lifestyle    Physical activity     Days per week: Not on file     Minutes per session: Not on file    Stress: Not on file   Relationships    Social connections     Talks on phone: Not on file     Gets together: Not on file     Attends [employer] service: Not on file     Active member of club or organization: Not on file     Attends meetings of clubs or organizations: Not on file     Relationship status: Not on file    Intimate partner violence     Fear of current or ex partner: Not on file     Emotionally abused: Not on file Physically abused: Not on file     Forced sexual activity: Not on file   Other Topics Concern    Not on file   Social History Narrative    Not on file     Family History    History reviewed. No pertinent family history. Sleep History     n/a  Meds    Current Medications    morphine  15 mg Oral 2 times per day    senna  2 tablet Oral Nightly    docusate sodium  100 mg Oral BID    polyethylene glycol  17 g Oral Daily    predniSONE  10 mg Oral Daily    nivolumab (OPDIVO) chemo IVPB  240 mg Intravenous Once    famotidine  20 mg Oral Daily    midodrine  2.5 mg Oral TID WC    [Held by provider] heparin (porcine)  5,000 Units Subcutaneous 3 times per day    sodium polystyrene  15 g Oral Once    levothyroxine  25 mcg Oral Daily    insulin lispro  0-6 Units Subcutaneous TID WC    insulin lispro  0-3 Units Subcutaneous Nightly    epoetin yumiko-epbx  10,000 Units Subcutaneous Once per day on Mon Wed Fri    vancomycin (VANCOCIN) intermittent dosing (placeholder)   Other RX Placeholder     oxyCODONE-acetaminophen, sodium chloride, ipratropium-albuterol, acetaminophen, promethazine, glucose, dextrose, glucagon (rDNA), dextrose  IV Drips/Infusions   sodium chloride      dextrose       Vitals    Vitals    height is 6' (1.829 m) and weight is 203 lb (92.1 kg). His oral temperature is 97.6 °F (36.4 °C). His blood pressure is 110/63 and his pulse is 106. His respiration is 18 and oxygen saturation is 98%. O2 Flow Rate (L/min): 3 L/min  I/O    Intake/Output Summary (Last 24 hours) at 5/12/2021 1512  Last data filed at 5/12/2021 1423  Gross per 24 hour   Intake 400.21 ml   Output 975 ml   Net -574.79 ml     Patient Vitals for the past 96 hrs (Last 3 readings):   Weight   05/09/21 1856 203 lb (92.1 kg)     Exam   Physical Exam   Constitutional: No distress on O2 per NC. Patient appears chronically ill and tired. Head: Normocephalic and atraumatic.    Mouth/Throat: Oropharynx is clear and moist.  No oral thrush. Eyes: Conjunctivae are normal. Pupils are equal, round. No scleral icterus. Neck: Neck supple. No tracheal deviation present. Cardiovascular: S1 and S2 with no murmur. No peripheral edema  Pulmonary/Chest: Normal effort with bilateral air entry, faint rales diminished at bases. No stridor. No respiratory distress. Patient exhibits no tenderness. Dressing to R lateral chest. Pleurx removed. Abdominal: Soft. Bowel sounds audible. No distension or tenderness to palp. Musculoskeletal: Moves all extremities  Neurological: Patient is alert and oriented to person, place, and time. Labs   ABG  Lab Results   Component Value Date    PH 7.38 04/17/2021    PH 6.0 12/14/2020    PO2 94 04/17/2021    PCO2 41 04/17/2021    HCO3 25 04/17/2021    O2SAT 97 04/17/2021     Lab Results   Component Value Date    IFIO2 5 04/17/2021     CBC  Recent Labs     05/10/21  0550 05/11/21  0536 05/11/21  1448 05/12/21  0814   WBC 18.6* 18.1*  --  15.8*   RBC 2.63* 2.43*  --  3.24*   HGB 7.3* 6.6* 8.3* 9.0*   HCT 24.4* 22.4* 26.2* 30.0*   MCV 92.8 92.2  --  92.6   MCH 27.8 27.2  --  27.8   MCHC 29.9* 29.5*  --  30.0*    225  --  256   MPV 11.9 11.0  --  10.5      BMP  Recent Labs     05/09/21  1909 05/10/21  0550 05/10/21  0550 05/11/21  0536 05/11/21  1448 05/12/21  0814   * 133*  --  131*  --  133*   K 6.0* 5.7*   < > 5.6* 4.7 5.0   CL 91* 94*  --  93*  --  94*   CO2 27 26  --  27  --  28   BUN 61* 61*  --  56*  --  53*   CREATININE 2.5* 2.6*  --  2.2*  --  1.9*   GLUCOSE 183* 139*  --  139*  --  117*   MG 2.0  --   --   --   --   --    CALCIUM 10.0 9.6  --  8.9  --  9.3    < > = values in this interval not displayed.      LFT  Recent Labs     05/09/21 1909   AST 14   ALT 8*   BILITOT 0.4   ALKPHOS 62     TROP  Lab Results   Component Value Date    TROPONINT 0.012 05/10/2021    TROPONINT 0.022 05/10/2021    TROPONINT 0.043 05/09/2021     BNP  Lab Results   Component Value Date    PROBNP 1182.0 05/09/2021    PROBNP 416.1 04/13/2021    PROBNP 63.4 03/23/2021     D-Dimer  Lab Results   Component Value Date    DDIMER 38062.00 04/14/2021     Lactic Acid  No results for input(s): LACTA in the last 72 hours. INR  No results for input(s): INR, PROTIME in the last 72 hours. PTT  No results for input(s): APTT in the last 72 hours. Glucose  Recent Labs     05/11/21 2010 05/12/21  0811 05/12/21  1154   POCGLU 143* 118* 135*     UA   Recent Labs     05/10/21  1415   COLORU RED   . PFTs   N/A  Echo      ECHO completed 5/10/2021-Read Pending    Conclusions      Summary   Normal left ventricle size and systolic function. Ejection fraction was   estimated at 60 to 65%. There were no regional left ventricular wall   motion abnormalities and wall thickness was within normal limits. Left atrial size was normal.      Signature      ----------------------------------------------------------------   Electronically signed by Sulema Ortega MD (Interpreting   physician) on 04/13/2021 at 06:17 PM   ----------------------------------------------------------------       Cultures    Procalcitonin  Lab Results   Component Value Date    PROCAL 2.31 05/10/2021    PROCAL 0.49 04/14/2021     Body fluid culture-(Pleurx)- Gram positive Cocci  Strep pneumoniae-Negative  Legionella-Negative  Sputum Cx-awaiting sample  Radiology    CXR  1 view chest x-ray   05/09/2021   Findings: Bibasilar large bilateral pleural effusions are similar to the prior exam. Right basilar tunneled pleural catheter is unchanged. Mass versus loculated effusion at the right apex. Increased in size since the prior exam. Ill-defined bilateral lung opacities similar to the prior exam. Cardiomegaly. No change. No acute fracture. Impression:  Loculated right apex pleural effusion versus mass has increased in size since the prior exam. Otherwise no acute findings. XR CHEST PORTABLE    04/22/2021   Findings: Moderate bilateral pleural effusions noted.  The right basilar pleural fluid has reaccumulated. Ill-defined opacities in the mid to lower lungs are unchanged. Cardiomegaly unchanged. Right subclavian central venous catheter in SVC. Right basilar Pleurx catheter. Left lower rib fractures again noted. Impression:  1. Bilateral pleural effusions, and lung opacities. The right basilar pleural fluid has reaccumulated since the most recent exam.     CT Scans    CT Chest   5/12/2021  FINDINGS:    Lungs: There has been interval removal of the right pleural drainage catheter compared with prior examination. There is a residual moderate multiloculated right pleural effusion, which demonstrates mild decrease in size when compared to prior examination. There is again thickening of the visceral and parietal pleura on the right. There is a smaller loculated left pleural effusion, stable in size from prior examination, with thickening of the visceral and parietal pleura. Irregular nodular thickening is also again noted along the pleural surface of bilateral lungs. There is again a multiloculated lesion/collection along the anterior left chest wall, similar to prior examination. There are again innumerable noncalcified nodules and masses throughout both lungs, demonstrating gross stability from prior examination. The largest of these in the right lower lobe measures approximately 2.4 x 3 cm in axial dimension (series 2 image 41). There is patchy atelectasis in bilateral lower lobes. There is no pneumothorax. There are mild retained secretions dependently within the trachea. Proximal tracheobronchial tree is otherwise widely patent. Mediastinum and breanne: There is no axillary lymphadenopathy. There is similar subcarinal mediastinal lymphadenopathy measuring up to 2.2 cm short axis. There is limited evaluation of the breanne on this noncontrast enhanced examination, however there is fullness of the breanne bilaterally suggesting lymphadenopathy, which was seen on prior examination.   Heart size is normal. There is small pericardial effusion. There is atherosclerotic ossification of the thoracic aorta. Thoracic aorta is normal in caliber. There are coronary artery calcifications. There is similar infiltration of the pericardial fat along the right cardiophrenic angle. There is again a nodule within the pericardial fat anterior to the heart extending into the anterior chest wall, which measures approximately 3 cm in diameter. There is again a mildly enlarged left cardiophrenic lymph  node measuring 12 mm in short axis. Upper abdomen: There is moderate ascites within the visualized upper abdomen, increased from prior examination. Multiple calcified granulomata are seen within the visualized spine. Bones: There is an 8 mm lytic lesion within the right lateral ninth rib (series 2 image 66). There are again large expansile lytic lesions involving the left lateral seventh and eighth ribs, with large soft tissue components infiltrating the left chest wall. Vertebral body heights are maintained. There are also smaller lytic lesions within the posterior lateral left ninth rib and posterior left 11th rib. Impression:  1. Moderate loculated right and small loculated left pleural effusions, with the right pleural effusion demonstrated mild decrease in size from prior examination. Malignant pleural effusions are not excluded. 2. Metastatic disease within bilateral lungs, with multiple pulmonary nodules and masses bilaterally and irregular lobulated mass along the anterior left chest.    3. Enlarged subcarinal mediastinal lymph node. Probable hilar lymphadenopathy, however evaluation is limited on the current noncontrast enhanced examination. 4. Small pericardial effusion. 5. Bilateral lytic rib metastases, with large soft tissue component on the left involving the chest wall. 6. Moderate ascites within the visualized upper abdomen, increased from prior examination.            CT SCAN OF THE ABDOMEN AND PELVIS WITHOUT CONTRAST   05/09/2021   FINDINGS: CT ABDOMEN: Images of the lung bases demonstrate a small to moderate loculated right pleural effusion with an associated right-sided chest tube. 2.6 cm mass is noted within the right pleural effusion on axial image 1 which is nonspecific but could represent neoplastic disease or perhaps proteinaceous material.  A smaller lesion in the right pleural space measures 8 mm on image 3. There is also a loculated small left pleural effusion. Small pericardial effusion is noted. Numerous nodules/masses are noted within the lung bases, compatible with neoplastic disease. For example in the right lower lobe is a 4.1 cm lesion on axial image 1 minus does not appear to be significantly changed. Lytic destructive change of multiple left lower ribs is again noted, compatible with the patient's known metastatic disease. This does not appear to be significantly changed. There are no focal liver lesions. The patient is status post cholecystectomy. The pancreas is unremarkable. There are calcified granulomas in the spleen. Right adrenal mass measures 2.0 x 1.0 cm, increased since the prior study when it measured 1.7 x 0.9 cm. This is compatible with metastatic disease. Left-sided adrenal metastases are noted. One of these, seen on axial image 25, is increased in size and measures 1.1 cm on the current exam.  Previously it measured 0.9 cm. Exophytic solid mass arising from the right renal moiety measures 4.4 x 4.0 cm on axial image 38, this does not appear to be significantly changed. Horseshoe kidney is noted. Abdominal aorta is normal in caliber without evidence of an aneurysm. Moderate volume of ascites is noted in the abdomen/pelvis, increased since the prior study. Changes of peritoneal carcinomatosis are more prominent on this exam, for example seen on axial image 77.   CT PELVIS: Lytic destructive change in the right iliac bone is again noted on axial image 72,

## 2021-05-12 NOTE — CONSULTS
Pain Consult Note      Admitting Physician: Huma Valdovinos MD    Primary Care Provider: CAITY Caputo CNP     Reason for Consult:  Pain management consult requested by Dr. Benitez Harvey for patient with cancer pain     History of Present Illness: Ruth Turner is a 44 y.o. male admitted to [facility] on 5/9/2021. This patient with a medical history significant for insulin dependent diabetes, CKD, stage IV renal cancer with metastases to his long and brain with associated severe pain who presented to [facility] with a chief complaint of shortness of breath, fatigue, and weakness along with increased pain, He has a main complaint of pain in his mid back left side plural areas radiating around his left side and across his chest. His pain has been increasing since January and has been severe. At home he has been prescribed Ultram and Percocet and this is what he is on currently in the hospital alternating them so that he can take something every 3 hours because they are helping but not lasting. His pain averages at 6-7/10. His daughter was present during my evaluation today. The patient was seen by the pain team today and  at the time of our evaluation the patient complains of pain that has increased since January in his mid back left side and left flank radiating across his left side and across his chest. He describes his pain as a constant sharp tight pain with grabbing. His pain is averaged at 7/10 before pain medications and aggravated with movement and when pain medication wear off. His pain decreases to 3-4/10 at best with current Ultram and Percocet regimen but main complaint is that they are not lasting. He does have complaints of shortness of breath and he is on 4 liters of oxygen per nasal cannula.      Past Medical History:        Diagnosis Date    Cancer [facility])     kidney    Chronic kidney disease, unspecified     Collapsed lung     IDDM (insulin dependent diabetes mellitus)     Kidney mass     Lung nodules     Sinusitis     Thyroid nodule 5/6/2021    Unspecified essential hypertension        Past Surgical History:        Procedure Laterality Date    BACK SURGERY      CHOLECYSTECTOMY      IR REMOVAL OF TUNNELED PLEURAL CATH W CUFF  5/12/2021    IR REMOVAL OF TUNNELED PLEURAL CATH W CUFF 5/12/2021 [facility] SPECIAL PROCEDURES       Allergies:    No Known Allergies     Current Medications:   Current Facility-Administered Medications: oxyCODONE-acetaminophen (PERCOCET) 5-325 MG per tablet 1 tablet, 1 tablet, Oral, Q4H PRN  morphine (MS CONTIN) extended release tablet 15 mg, 15 mg, Oral, 2 times per day  senna (SENOKOT) tablet 17.2 mg, 2 tablet, Oral, Nightly  docusate sodium (COLACE) capsule 100 mg, 100 mg, Oral, BID  polyethylene glycol (GLYCOLAX) packet 17 g, 17 g, Oral, Daily  0.9 % sodium chloride infusion, , Intravenous, PRN  predniSONE (DELTASONE) tablet 10 mg, 10 mg, Oral, Daily  nivolumab (OPDIVO) 240 mg in sodium chloride 0.9 % 100 mL chemo IVPB, 240 mg, Intravenous, Once  famotidine (PEPCID) tablet 20 mg, 20 mg, Oral, Daily  ipratropium-albuterol (DUONEB) nebulizer solution 3 mL, 3 mL, Inhalation, Q4H PRN  midodrine (PROAMATINE) tablet 2.5 mg, 2.5 mg, Oral, TID WC  acetaminophen (TYLENOL) tablet 650 mg, 650 mg, Oral, Q4H PRN  [Held by provider] heparin (porcine) injection 5,000 Units, 5,000 Units, Subcutaneous, 3 times per day  sodium polystyrene (KAYEXALATE) 15 GM/60ML suspension 15 g, 15 g, Oral, Once  promethazine (PHENERGAN) tablet 25 mg, 25 mg, Oral, Q6H PRN  levothyroxine (SYNTHROID) tablet 25 mcg, 25 mcg, Oral, Daily  insulin lispro (HUMALOG) injection vial 0-6 Units, 0-6 Units, Subcutaneous, TID WC  insulin lispro (HUMALOG) injection vial 0-3 Units, 0-3 Units, Subcutaneous, Nightly  epoetin yumiko-epbx (RETACRIT) injection 10,000 Units, 10,000 Units, Subcutaneous, Once per day on Mon Wed Fri  vancomycin (VANCOCIN) intermittent dosing (placeholder), , Other, RX Placeholder  glucose (GLUTOSE) 40 % oral gel 15 g, 15 g, Oral, PRN  dextrose 50 % IV solution, 12.5 g, Intravenous, PRN  glucagon (rDNA) injection 1 mg, 1 mg, Intramuscular, PRN  dextrose 5 % solution, 100 mL/hr, Intravenous, PRN    Social History:  Social History     Socioeconomic History    Marital status:      Spouse name: Not on file    Number of children: Not on file    Years of education: Not on file    Highest education level: Not on file   Occupational History    Not on file   Social Needs    Financial resource strain: Not on file    Food insecurity     Worry: Not on file     Inability: Not on file   [employer] needs     Medical: Not on file     Non-medical: Not on file   Tobacco Use    Smoking status: Never Smoker    Smokeless tobacco: Never Used   Substance and Sexual Activity    Alcohol use: Not Currently     Alcohol/week: 1.0 standard drinks     Types: 1 Cans of beer per week    Drug use: Never    Sexual activity: Not on file   Lifestyle    Physical activity     Days per week: Not on file     Minutes per session: Not on file    Stress: Not on file   Relationships    Social connections     Talks on phone: Not on file     Gets together: Not on file     Attends Muslim service: Not on file     Active member of club or organization: Not on file     Attends meetings of clubs or organizations: Not on file     Relationship status: Not on file    Intimate partner violence     Fear of current or ex partner: Not on file     Emotionally abused: Not on file     Physically abused: Not on file     Forced sexual activity: Not on file   Other Topics Concern    Not on file   Social History Narrative    Not on file     Family History:   History reviewed. No pertinent family history.     Review of Systems:  CONSTITUTIONAL:  positive for  fatigue, weight loss and decraesed appetite   EYES:  negative  HEENT:  negative  RESPIRATORY:  positive for  dry cough, chest pain, pleuritic pain and shortness of breath on oxygen   CARDIOVASCULAR:  positive for  chest pain  GASTROINTESTINAL:  Constipation, last bowel movement was yesterday  GENITOURINARY:  negative  SKIN:  negative  HEMATOLOGIC/LYMPHATIC:  negative  MUSCULOSKELETAL:  positive for  myalgias, arthralgias, pain, decreased range of motion, muscle weakness and bone pain  NEUROLOGICAL:  negative  BEHAVIOR/PSYCH:  negative  System review otherwise negative      Physical Exam:  /75   Pulse 100   Temp 97.7 °F (36.5 °C) (Oral)   Resp 18   Ht 6' (1.829 m)   Wt 203 lb (92.1 kg)   SpO2 97%   BMI 27.53 kg/m²      awake  Orientation:   person, place, time  Mood: within normal limits  Affect: calm and quiet  General appearance: appearing stated age, mild distress, ill appearing     Memory:   normal,   Attention/Concentration: normal  Language:  normal    Cranial Nerves:  cranial nerves II-XII are grossly intact  ROM:  Normal all 4 extremities   Motor Exam:  Motor exam is 5 out of 5 all extremities with the exception of 4/5 lower extremities   Tone:  normal  + tenderness, left side, mid back and flank     Heart: normal rate, regular rhythm, normal S1, S2, no murmurs, rubs, clicks or gallops, + chest wall tenderness   Lungs: Diminished, clear to auscultation without wheezes or rales.  On 4 liters of oxygen per nasal cannula   Abdomen: soft, distended, , firm, normal bowel sounds, no masses or organomegaly    Skin: warm and dry, no rash or erythema, right side dressing where plural drain was   Peripheral vascular: Pulses: Normal upper and lower extremity pulses; Edema: trace      Diagnostics:  Recent Results (from the past 24 hour(s))   POCT Glucose    Collection Time: 05/11/21  4:40 PM   Result Value Ref Range    POC Glucose 149 (H) 70 - 108 mg/dl   POCT Glucose    Collection Time: 05/11/21  8:10 PM   Result Value Ref Range    POC Glucose 143 (H) 70 - 108 mg/dl   Vancomycin, Random    Collection Time: 05/12/21 12:06 AM Result Value Ref Range    Vancomycin Rm 12.7 0.1 - 39.9 ug/mL   POCT Glucose    Collection Time: 05/12/21  8:11 AM   Result Value Ref Range    POC Glucose 118 (H) 70 - 108 mg/dl   Basic Metabolic Panel    Collection Time: 05/12/21  8:14 AM   Result Value Ref Range    Sodium 133 (L) 135 - 145 meq/L    Potassium 5.0 3.5 - 5.2 meq/L    Chloride 94 (L) 98 - 111 meq/L    CO2 28 23 - 33 meq/L    Glucose 117 (H) 70 - 108 mg/dL    BUN 53 (H) 7 - 22 mg/dL    CREATININE 1.9 (H) 0.4 - 1.2 mg/dL    Calcium 9.3 8.5 - 10.5 mg/dL   CBC    Collection Time: 05/12/21  8:14 AM   Result Value Ref Range    WBC 15.8 (H) 4.8 - 10.8 thou/mm3    RBC 3.24 (L) 4.70 - 6.10 mill/mm3    Hemoglobin 9.0 (L) 14.0 - 18.0 gm/dl    Hematocrit 30.0 (L) 42.0 - 52.0 %    MCV 92.6 80.0 - 94.0 fL    MCH 27.8 26.0 - 33.0 pg    MCHC 30.0 (L) 32.2 - 35.5 gm/dl    RDW-CV 18.2 (H) 11.5 - 14.5 %    RDW-SD 61.1 (H) 35.0 - 45.0 fL    Platelets 111 315 - 821 thou/mm3    MPV 10.5 9.4 - 12.4 fL   Cortisol Total (baseline)    Collection Time: 05/12/21  8:14 AM   Result Value Ref Range    Cortisol 12.74 ug/dL    Cortisol Collection Info Baseline    Anion Gap    Collection Time: 05/12/21  8:14 AM   Result Value Ref Range    Anion Gap 11.0 8.0 - 16.0 meq/L   Glomerular Filtration Rate, Estimated    Collection Time: 05/12/21  8:14 AM   Result Value Ref Range    Est, Glom Filt Rate 37 (A) ml/min/1.73m2   Cortisol, 30 minutes    Collection Time: 05/12/21  9:01 AM   Result Value Ref Range    Cortisol 15.46 ug/dL    Cortisol Collection Info 30 Minute    Cortisol, 60 minutes    Collection Time: 05/12/21 10:06 AM   Result Value Ref Range    Cortisol 17.83 ug/dL    Cortisol Collection Info see below    POCT glucose    Collection Time: 05/12/21 11:54 AM   Result Value Ref Range    POC Glucose 135 (H) 70 - 108 mg/dl       CT of chest:   FINDINGS:     Lungs: There has been interval removal of the right pleural drainage catheter compared with prior examination.  There is a residual moderate multiloculated right pleural effusion, which demonstrates mild decrease in size when compared to prior   examination. There is again thickening of the visceral and parietal pleura on the right. There is a smaller loculated left pleural effusion, stable in size from prior examination, with thickening of the visceral and parietal pleura. Irregular nodular   thickening is also again noted along the pleural surface of bilateral lungs. There is again a multiloculated lesion/collection along the anterior left chest wall, similar to prior examination. There are again innumerable noncalcified nodules and masses throughout both lungs, demonstrating gross stability from prior examination. The largest of these in the right lower lobe measures approximately 2.4 x 3 cm in axial dimension (series 2 image   41). There is patchy atelectasis in bilateral lower lobes. There is no pneumothorax. There are mild retained secretions dependently within the trachea. Proximal tracheobronchial tree is otherwise widely patent. Mediastinum and breanne: There is no axillary lymphadenopathy. There is similar subcarinal mediastinal lymphadenopathy measuring up to 2.2 cm short axis. There is limited evaluation of the breanne on this noncontrast enhanced examination, however there is   fullness of the breanne bilaterally suggesting lymphadenopathy, which was seen on prior examination. Heart size is normal. There is small pericardial effusion. There is atherosclerotic ossification of the thoracic aorta. Thoracic aorta is normal in caliber. There are coronary artery calcifications. There is similar infiltration of the pericardial fat   along the right cardiophrenic angle. There is again a nodule within the pericardial fat anterior to the heart extending into the anterior chest wall, which measures approximately 3 cm in diameter. There is again a mildly enlarged left cardiophrenic lymph    node measuring 12 mm in short axis.      Upper abdomen: There is moderate ascites within the visualized upper abdomen, increased from prior examination. Multiple calcified granulomata are seen within the visualized spine. Bones: There is an 8 mm lytic lesion within the right lateral ninth rib (series 2 image 66). There are again large expansile lytic lesions involving the left lateral seventh and eighth ribs, with large soft tissue components infiltrating the left chest   wall. Vertebral body heights are maintained. There are also smaller lytic lesions within the posterior lateral left ninth rib and posterior left 11th rib.       Impression:         1. Moderate loculated right and small loculated left pleural effusions, with the right pleural effusion demonstrated mild decrease in size from prior examination. Malignant pleural effusions are not excluded. 2. Metastatic disease within bilateral lungs, with multiple pulmonary nodules and masses bilaterally and irregular lobulated mass along the anterior left chest.     3. Enlarged subcarinal mediastinal lymph node. Probable hilar lymphadenopathy, however evaluation is limited on the current noncontrast enhanced examination. 4. Small pericardial effusion. 5. Bilateral lytic rib metastases, with large soft tissue component on the left involving the chest wall. 6. Moderate ascites within the visualized upper abdomen, increased from prior examination. Impression:  1. Cancer associated pain   2. Metastatic renal cell cancer  3. Pleural effusion   4. Shortness of breath   5. Bone pain   6. Constipation     Met with today's pain team as above. Discussed patient symptoms, reviewed medications and possible drug interactions, medication side effect profiles, previous medications and their efficacies, medical concerns regarding each pain management option (ie blood pressure, GI concerns, etc). Also reviewed labs (including creatinine clearance and LFT's regarding medication metabolism).  Also reviewed all work-up studies including radiologic and other consultants. OARRS report was obtained and reviewed. Recommendations:  1. Discontinued Ultram   2. Started MS Contin 15 mg scheduled BID for better maintenance pain relief. 3. Continue tylenol 650 mg every 4 hours as needed for mild pain of 1-3/10. Continue Percocet 5/325 mg tabs but increased frequency to every 4 hours as needed for moderate to sever breakthrough pain of 4-10/10.   4. Started Lidoderm patches- 3 patches daily to painful areas on chest and back   5. Bowel program- senna, colace, and miralax  6. Will continue to follow this patient while in the hospital and make any changes that are needed. Education provided to patient and family and discussed that I would be willing to follow up outpatient after discharge for ongoing pain management needs. I spent over 60 minutes evaluating this patient and completing documentation. It was my pleasure to evaluate [facility] today. Please call with questions.     Madie Salas, CAITY - CNP

## 2021-05-12 NOTE — PROGRESS NOTES
05/10/21  0550 05/11/21  0536 05/11/21  1448 05/12/21  0814   *  --  131*  --  133*   K 5.7*   < > 5.6* 4.7 5.0   CL 94*  --  93*  --  94*   CO2 26  --  27  --  28   BUN 61*  --  56*  --  53*   CREATININE 2.6*  --  2.2*  --  1.9*   GLUCOSE 139*  --  139*  --  117*    < > = values in this interval not displayed. Hepatic:   Recent Labs     05/09/21  1909   AST 14   ALT 8*   BILITOT 0.4   ALKPHOS 62     Troponin: No results for input(s): TROPONINI in the last 72 hours. BNP: No results for input(s): BNP in the last 72 hours. Lipids: No results for input(s): CHOL, HDL in the last 72 hours. Invalid input(s): LDLCALCU  INR: No results for input(s): INR in the last 72 hours.     Radiology    Objective:   Vitals: /63   Pulse 106   Temp 97.6 °F (36.4 °C) (Oral)   Resp 18   Ht 6' (1.829 m)   Wt 203 lb (92.1 kg)   SpO2 98%   BMI 27.53 kg/m²   HEENT: Head:pupils react  Neck: supple  Lungs: clear to auscultation no rales, pleurx cath noted on Rt lower ribs  Heart: regular rhythm tachy  Abdomen: distended but still soft BS heard NG NT  Extremities: warm no edema  Neurologic:  Alert, oriented X3    Impression:   :   Acute on chronic resp failure sec to pleural effusion and ascites  Loculated pleural effusion s/p Pleurx cath now fluid + gram + cocci s/p VATS and pleurodesis to LT  Hyperkalemia Per nephro  Metastatic Renal cell Cancer   Hypotension improved  Anemia receiving blood  Hypothyroid  Leucocytosis on steroids no fever  Recent AIN on steroids  Chronic pain sec to mets  Ascites s/p #l of fluid drained     Plan:    pleurx cath removed  Await repeat CT  Fluid cult no growth so far gram stain +  Monitor renal function  Inform Dr Kristopher Briones of possible inf plurex cath as pt was scheduled for chemo today  Palliative care consulted   Pt aware of his long term prognosis          Miryam Smith MD

## 2021-05-12 NOTE — PROGRESS NOTES
CT/CV Surgery Progress Note    2021 12:38 PM  Surgeon:  Dr. Ally Painting     Subjective:  Mr. Stalin Lane  is a 48year old male with a history of metastatic kidney cancer, status post right pleural Pleurx catheter insertion at [facility] in  for recurrent right pleural effusion. Mr. Stalin Lane was admitted to [address] [place] [address] one month ago due to reaccumulation of right pleural effusion with malfunctioning of right Pleurx cath and SOB. He was readmitted on 2021 for pneumonia with sepsis, pleural effusion with malfunctioning Pleurx catheter. The Pleurx catheter was removed this morning. Vital Signs: /63   Pulse 106   Temp 97.6 °F (36.4 °C) (Oral)   Resp 18   Ht 6' (1.829 m)   Wt 203 lb (92.1 kg)   SpO2 98%   BMI 27.53 kg/m²    Temp (24hrs), Av.3 °F (36.3 °C), Min:96.9 °F (36.1 °C), Max:97.7 °F (36.5 °C)    Labs:   CBC:   Recent Labs     05/10/21  0550 21  0536 21  1448 21  0814   WBC 18.6* 18.1*  --  15.8*   HGB 7.3* 6.6* 8.3* 9.0*   HCT 24.4* 22.4* 26.2* 30.0*   MCV 92.8 92.2  --  92.6    225  --  256     BMP:   Recent Labs     21  1909 05/10/21  0550 05/10/21  0550 21  0536 21  0536 21  1448 21  1448 21  0814 21  1154   * 133*  --  131*  --   --   --  133*  --    K 6.0* 5.7*   < > 5.6*  --  4.7  --  5.0  --    CL 91* 94*  --  93*  --   --   --  94*  --    CO2 27 26  --  27  --   --   --  28  --    BUN 61* 61*  --  56*  --   --   --  53*  --    CREATININE 2.5* 2.6*  --  2.2*  --   --   --  1.9*  --    MG 2.0  --   --   --   --   --   --   --   --    POCGLU  --   --    < >  --    < >  --    < >  --  135*    < > = values in this interval not displayed. Imaging:  CT Chest WO Contrast:   1. Moderate loculated right and small loculated left pleural effusions, with the right pleural effusion demonstrated mild decrease in size from prior examination.  Malignant pleural effusions are not excluded.       2. Metastatic

## 2021-05-12 NOTE — PROGRESS NOTES
Called Dr. Arsalan Kauffman to verify that he was aware patient had drain removed. Dr. Arsalan Kauffman ok with patient to receive Opdivo dose today.  Notified Dr. Sue Gonzalez.

## 2021-05-12 NOTE — PROGRESS NOTES
Units Subcutaneous TID     insulin lispro  0-3 Units Subcutaneous Nightly    epoetin yumiko-epbx  10,000 Units Subcutaneous Once per day on     vancomycin (VANCOCIN) intermittent dosing (placeholder)   Other RX Placeholder     Continuous Infusions:    sodium chloride      dextrose       PRN Meds:  sodium chloride, ipratropium-albuterol, acetaminophen, promethazine, traMADol, oxyCODONE-acetaminophen, glucose, dextrose, glucagon (rDNA), dextrose    Input/Output:       I/O last 3 completed shifts:  In: -   Out: 475 [Urine:475]. Patient Vitals for the past 96 hrs (Last 3 readings):   Weight   21 1856 203 lb (92.1 kg)       Vital Signs:   Temperature:  Temp: 97.6 °F (36.4 °C)  TMax:   Temp (24hrs), Av.2 °F (36.2 °C), Min:96.9 °F (36.1 °C), Max:97.7 °F (36.5 °C)    Respirations:  Resp: 18  Pulse:   Pulse: 106  BP:    BP: 110/63  BP Range: Systolic (85DVE), WP , Min:107 , PJT:745       Diastolic (00WMM), NMV:16, Min:63, Max:81      Physical Examination:     General:  Awake, alert, frail appearing  HEENT: NC/AT/ MMM   Chest:               Right pleurx catheter, rales b/L bases  Cardiac:  S1 S2   Abdomen: Distended, nontender  Neuro:  No facial droop, No Asterixis  SKIN:  No rashes, good skin turgor.   Extremities:  Trace ankle edema noted    Labs:       Recent Labs     05/10/21  0550 21  0536 21  1448 21  0814   WBC 18.6* 18.1*  --  15.8*   RBC 2.63* 2.43*  --  3.24*   HGB 7.3* 6.6* 8.3* 9.0*   HCT 24.4* 22.4* 26.2* 30.0*   MCV 92.8 92.2  --  92.6   MCH 27.8 27.2  --  27.8   MCHC 29.9* 29.5*  --  30.0*    225  --  256   MPV 11.9 11.0  --  10.5      BMP:   Recent Labs     05/10/21  0550 05/10/21  0550 21  0536 21  1448 21  0814   *  --  131*  --  133*   K 5.7*   < > 5.6* 4.7 5.0   CL 94*  --  93*  --  94*   CO2 26  --  27  --  28   BUN 61*  --  56*  --  53*   CREATININE 2.6*  --  2.2*  --  1.9*   GLUCOSE 139*  --  139*  --  117*   CALCIUM 9.6 --  8.9  --  9.3    < > = values in this interval not displayed. Phosphorus:   No results for input(s): PHOS in the last 72 hours. Magnesium:    Recent Labs     05/09/21  1909   MG 2.0     Albumin:    Recent Labs     05/09/21  1909 05/10/21  1452   LABALBU 3.1* 2.8*     BNP:    No results found for: BNP  MICHAEL:    No results found for: MICHAEL  SPEP:  Lab Results   Component Value Date    PROT 5.6 05/11/2021     UPEP:   No results found for: LABPE  C3:   No results found for: C3  C4:   No results found for: C4  MPO ANCA:   No results found for: MPO  PR3 ANCA:   No results found for: PR3  Anti-GBM:   No results found for: GBMABIGG  Hep BsAg:       No results found for: HEPBSAG  Hep C AB:        No results found for: HEPCAB    Urinalysis/Chemistries:      Lab Results   Component Value Date    NITRU NEGATIVE 04/18/2021    COLORU RED 05/10/2021    COLORU YELLOW 04/18/2021    PHUR 5.0 04/18/2021    LABCAST 4-8 HYALINE 04/18/2021    LABCAST 0-4 C. GRAN 04/18/2021    WBCUA 0-2 04/18/2021    RBCUA NONE 04/18/2021    MUCUS THREADS 07/22/2019    YEAST NONE SEEN 04/18/2021    BACTERIA NONE SEEN 04/18/2021    SPECGRAV 1.021 04/18/2021    LEUKOCYTESUR NEGATIVE 04/18/2021    UROBILINOGEN 0.2 04/18/2021    BILIRUBINUR NEGATIVE 04/18/2021    BLOODU NEGATIVE 04/18/2021    GLUCOSEU NEGATIVE 07/22/2019    KETUA TRACE 04/18/2021    AMORPHOUS DEBRIS 04/18/2021     Urine Sodium:   No results found for: NEVAEH  Urine Potassium:  No results found for: KUR  Urine Chloride:  No results found for: CLUR  Urine Osmolarity:   Lab Results   Component Value Date    OSMOU 796 04/13/2021     Urine Protein:   No components found for: TOTALPROTEIN, URINE   Urine Creatinine:   No results found for: LABCREA  Urine Eosinophils:  No components found for: UEOS        Impression and Plan:  1. CAMILLE: due to ATN vs AIN. started on empiric steroids last admission for possible AIN from [address]. Renal function is overall stable and slightly improved this admission. Reduce Pred to 10 mg and will wean. He is receiving Opdivo today. He does have crackles noted B/L will give dose of  bumex 1 mg IV  2. Hyperkalemia: better. Cosyntropin stim test pending  3. Hyponatremia  4. Hypotension, on Midodrine  5. Anemia: s/p blood transfusion  6. Metastatic renal cell ca with peritoneal carcinomatosos  7. Malignant pleural effusions  8. Ascites s/p paracentesis          Please don't hesitate to call with any questions.   Electronically signed by Phoebe Arredondo DO on 5/12/2021 at 10:29 AM

## 2021-05-12 NOTE — PROGRESS NOTES
Chemotherapy Teaching:  Opdivo    What is Chemotherapy   Drug action [x]   Method of Administration [x]   Handouts given []     Side Effects  Nausea/vomiting [x]   Diarrhea [x]   Fatigue [x]   Signs / Symptoms of infection [x]   Neutropenia [x]   Thrombocytopenia [x]   Alopecia [x]   neuropathy [x]   [place] diet &  the importance of fluids [x]       Micellaneous  Importance of nutrition [x]   Importance of oral hygiene [x]   When to call the MD [x]   Monitoring labs [x]   Use of supportive services []     Explanation of Drug Regimen / Frequency       Comments  Verbalized understanding to drug,action,side effects and when to call MD

## 2021-05-12 NOTE — PROGRESS NOTES
7949 Patient received in IR for removal of right pleurx catheter. 1575 This procedure has been fully reviewed with the patient and written informed consent has been obtained. 9830 Procedure started with Dr. Wellington White. 9999 Procedure completed; patient tolerated well. Dressing to right chest; no bleeding noted. 4576 Patient on bed; comfort ensured. 6010 Patient taken to diagnostic xray for post procedure films.

## 2021-05-13 NOTE — PROGRESS NOTES
pleural effusions are not excluded.       2. Metastatic disease within bilateral lungs, with multiple pulmonary nodules and masses bilaterally and irregular lobulated mass along the anterior left chest.       3. Enlarged subcarinal mediastinal lymph node. Probable hilar lymphadenopathy, however evaluation is limited on the current noncontrast enhanced examination.       4. Small pericardial effusion.       5. Bilateral lytic rib metastases, with large soft tissue component on the left involving the chest wall.       6. Moderate ascites within the visualized upper abdomen, increased from prior examination. Intake/Output Summary (Last 24 hours) at 5/13/2021 0912  Last data filed at 5/13/2021 0408  Gross per 24 hour   Intake 1020.21 ml   Output 1275 ml   Net -254.79 ml     Scheduled Meds:    sodium zirconium cyclosilicate  10 g Oral Once    morphine  15 mg Oral 2 times per day    senna  2 tablet Oral Nightly    docusate sodium  100 mg Oral BID    polyethylene glycol  17 g Oral Daily    lidocaine  1 patch Transdermal Daily    predniSONE  10 mg Oral Daily    famotidine  20 mg Oral Daily    midodrine  2.5 mg Oral TID WC    [Held by provider] heparin (porcine)  5,000 Units Subcutaneous 3 times per day    sodium polystyrene  15 g Oral Once    levothyroxine  25 mcg Oral Daily    insulin lispro  0-6 Units Subcutaneous TID WC    insulin lispro  0-3 Units Subcutaneous Nightly    epoetin yumiko-epbx  10,000 Units Subcutaneous Once per day on Mon Wed Fri    vancomycin (VANCOCIN) intermittent dosing (placeholder)   Other RX Placeholder     ROS: All neg unless specifically mentioned in subjective section. Exam:  General Appearance: alert ,conversing, in no acute distress  Cardiovascular: normal rate, regular rhythm.   Pulmonary/Chest: decreased BS right base  Neurological: alert, oriented, normal speech, no focal findings or movement disorder noted    Assessment:   Patient Active Problem List   Diagnosis  Type 2 diabetes mellitus treated without insulin (HCC)    Essential hypertension    Chronic kidney disease    Chronic renal insufficiency, stage III (moderate)    Intracranial arachnoid cyst    Headache    Numbness of face    Renal mass    Thyroid function test abnormal    Multiple nodules of lung    Recurrent right pleural effusion    Adrenal nodule (HCC)    Shortness of breath    Disease of lymph node    Respiratory distress    Chest tightness or pressure    Horseshoe kidney    Leukocytosis    Cerebral cyst    Pneumonia due to organism    Acute on chronic respiratory failure with hypoxemia (HCC)    Severe malnutrition (HCC)    SIADH (syndrome of inappropriate ADH production) (HCC)    Hyponatremia    Metastatic renal cell carcinoma (HCC)    Diarrhea    Clear cell renal cell carcinoma, right (HCC)    GI bleed    Nausea vomiting and diarrhea    Rectal hemorrhage    Secondary malignancy of cerebellum (HCC)    Secondary malignant neoplasm of both lungs (HCC)    Secondary malignant tumor of pleura (HCC)    Thyroid nodule    Renal neoplasm    Dyspnea    Diabetes mellitus (HCC)    Hyperkalemia    SOB (shortness of breath)    Cancer associated pain     Assessment/Plan:   1. D/C pleurx catheter due to infection yesterday - will obtain CXR today  2. If pleural effussion increases consider tap.     The plan of care was discussed in detail with Dr. Krystina Lassiter     Electronically signed by Joselin Faria PA-C on 5/13/2021 at 9:12 AM

## 2021-05-13 NOTE — PROGRESS NOTES
Pain Management    Progress Note      5/13/2021 7:15 AM     · SUBJECTIVE:    · Chief complaint: Cancer pain, pleural pain, chest pain   · Patient seen in his room resting in his bed. Reports that pain is not bad at this time \"3/10\" feels that the addition of MS Contin is helping and he has only used 1 dose of prn Percocet since it was started. Education provided on what is available and that he can receive the prn Percocet every 4 hours prn.    · Denies any new needs or concerns at this time   · Medications effective:  yes  · Pain rating is \"3/10\" mid back left side and left flank radiating across his left side and across his chest  · Constipation: + BM 5/12/2021    Current medications:    sodium zirconium cyclosilicate  10 g Oral Once    morphine  15 mg Oral 2 times per day    senna  2 tablet Oral Nightly    docusate sodium  100 mg Oral BID    polyethylene glycol  17 g Oral Daily    lidocaine  1 patch Transdermal Daily    predniSONE  10 mg Oral Daily    famotidine  20 mg Oral Daily    midodrine  2.5 mg Oral TID WC    [Held by provider] heparin (porcine)  5,000 Units Subcutaneous 3 times per day    sodium polystyrene  15 g Oral Once    levothyroxine  25 mcg Oral Daily    insulin lispro  0-6 Units Subcutaneous TID WC    insulin lispro  0-3 Units Subcutaneous Nightly    epoetin yumiko-epbx  10,000 Units Subcutaneous Once per day on Mon Wed Fri    vancomycin (VANCOCIN) intermittent dosing (placeholder)   Other RX Placeholder   ·   ·                                    oxyCODONE-acetaminophen, ondansetron, sodium chloride, ipratropium-albuterol, acetaminophen, promethazine, glucose, dextrose, glucagon (rDNA), dextrose  ·                                    @MEDIANUS        REVIEW OF SYSTEMS:  CONSTITUTIONAL:  positive for  fatigue, weight loss and decraesed appetite   EYES:  negative  HE ENT:  negative  RESPIRATORY:  positive for  dry cough, chest pain, pleuritic pain and shortness of breath on oxygen CARDIOVASCULAR:  positive for chest pain  GASTROINTESTINAL:  Constipation, + BM 5/12/2021  GENITOURINARY:  negative  SKIN:  negative  HEMATOLOGIC/LYMPHATIC:  negative  MUSCULOSKELETAL:  positive for  myalgias, arthralgias, pain, decreased range of motion, muscle weakness and bone pain  NEUROLOGICAL:  negative  BEHAVIOR/PSYCH:  negative  System review otherwise negative      PHYSICAL EXAM:  /68   Pulse 114   Temp 98 °F (36.7 °C) (Oral)   Resp 20   Ht 6' (1.829 m)   Wt 200 lb 4.8 oz (90.9 kg)   SpO2 96%   BMI 27.17 kg/m²  I Body mass index is 27.17 kg/m². I   Wt Readings from Last 1 Encounters:   05/13/21 200 lb 4.8 oz (90.9 kg)      awake  Orientation:   person, place, time  Mood: within normal limits  Affect: calm and quiet  General appearance: appearing stated age, mild distress, ill appearing      Memory:   normal,   Attention/Concentration: normal  Language:  normal     Cranial Nerves:  cranial nerves II-XII are grossly intact  ROM:  Normal all 4 extremities   Motor Exam:  Motor exam is 5 out of 5 all extremities with the exception of 4/5 lower extremities   Tone:  normal  + tenderness, left side, mid back and flank      Heart: tachy rate, regular rhythm, normal S, S, no murmurs, rubs, clicks or gallops, + chest wall tenderness   Lungs: Diminished, clear to auscultation without wheezes or rales.  On 4 liters of oxygen per nasal cannula   Abdomen: soft, distended, , firm, normal bowel sounds, no masses or organomegaly     Skin: warm and dry, no rash or erythema, right side dressing where plural drain was   Peripheral vascular: Pulses: Normal upper and lower extremity pulses; Edema: trace       DATA    Recent Labs     05/11/21  0536 05/11/21  1448 05/12/21  0814   WBC 18.1*  --  15.8*   RBC 2.43*  --  3.24*   HGB 6.6* 8.3* 9.0*   HCT 22.4* 26.2* 30.0*   MCV 92.2  --  92.6   MCH 27.2  --  27.8   MCHC 29.5*  --  30.0*     --  256   MPV 11.0  --  10.5     Recent Labs     05/11/21  0536

## 2021-05-13 NOTE — PROGRESS NOTES
Notified Dr. Saul Silvestre that patient is having increased anxiety and wanting to take something to help relax. Order for xanax 0.25 mg every 8 hours PRN received. Verified that orders are ok with Stacy Butler also.

## 2021-05-13 NOTE — PROGRESS NOTES
Patient is having significant pain rated at 8/10. PRN percocet given at 0740. Heating pad applied to pain site. Patient states heating pad is not helping.

## 2021-05-13 NOTE — PROGRESS NOTES
[facility]      Patient - Maryann Rincon   MRN -  [de-identified]   Acct # - [de-identified]   - 1970      Date of Admission -  2021  6:52 PM  Date of evaluation -  2021  Room - --GIULIANA Zhang MD Primary Care Physician - CAITY Zambrano - [place]   Chief Complaint   Shortness of breath  Active Hospital Problem List      Active Hospital Problems    Diagnosis Date Noted    Cancer associated pain [G89.3]     SOB (shortness of breath) [R06.02] 05/10/2021    Hyperkalemia [E87.5] 2021    Severe malnutrition ([address] [E43] 2021     Class: Acute    Metastatic renal cell carcinoma ([address] [C64.9] 2019    Recurrent right pleural effusion [J90] 2019     HPI   Maryann Rincon is a 44 y.o. male admitted for SOB. Extensive PMH including DM, HTN, CKD, Covid-19 pneumonia in 2021, gout and metastatic renal cell carcinoma with distant metastasis following with Dr. Kathia Perera. Of note has had recurent pleural effsuion found to be related to malignancy. Underwent pleurx catheter placement at [address] [place] [address] 2021. Was recently treated at [facility] 2021-2021 for pneumonia with sepsis and loculated pleural effusion. CTS was consulted and instilled TPA via existing pleurx due to lack of output and CT changes after therapy significant amount of fluid was obtained and pt required 2 units PRBC for low hemoglobin. Patient oncologist has started on nivolumab since discharge. Today pt reports symptoms have been gradually increasing for the past week associated symptoms include dry cough, lack of appetite, and abdominal distension. Reports compliance with Home Health draining Daily with tapering amount approx 280-300 cc last week down 100-140cc lately. Was scheduled to see established Pulmonologist 2021.        Past 24 hrs   -On 3 LPM via NC  -abdominal discomfort  -SOB persistent stable  All other systems reviewed    Past Medical History         Diagnosis Date    Cancer [facility])     kidney    Chronic kidney disease, unspecified     Collapsed lung     IDDM (insulin dependent diabetes mellitus)     Kidney mass     Lung nodules     Sinusitis     Thyroid nodule 5/6/2021    Unspecified essential hypertension       Past Surgical History           Procedure Laterality Date    BACK SURGERY      CHOLECYSTECTOMY      IR REMOVAL OF TUNNELED PLEURAL CATH W CUFF  5/12/2021    IR REMOVAL OF TUNNELED PLEURAL CATH W CUFF 5/12/2021 [facility] PROCEDURES     Diet    DIET RENAL;  Dietary Nutrition Supplements: Renal Oral Supplement  Allergies    Patient has no known allergies.   Social History     Social History     Socioeconomic History    Marital status:      Spouse name: Not on file    Number of children: Not on file    Years of education: Not on file    Highest education level: Not on file   Occupational History    Not on file   Social Needs    Financial resource strain: Not on file    Food insecurity     Worry: Not on file     Inability: Not on file    Transportation needs     Medical: Not on file     Non-medical: Not on file   Tobacco Use    Smoking status: Never Smoker    Smokeless tobacco: Never Used   Substance and Sexual Activity    Alcohol use: Not Currently     Alcohol/week: 1.0 standard drinks     Types: 1 Cans of beer per week    Drug use: Never    Sexual activity: Not on file   Lifestyle    Physical activity     Days per week: Not on file     Minutes per session: Not on file    Stress: Not on file   Relationships    Social connections     Talks on phone: Not on file     Gets together: Not on file     Attends Yarsani service: Not on file     Active member of club or organization: Not on file     Attends meetings of clubs or organizations: Not on file     Relationship status: Not on file    Intimate partner violence     Fear of current or ex partner: Not on file     Emotionally abused: Not on file     Physically abused: Not on file     Forced sexual activity: Not on file   Other Topics Concern    Not on file   Social History Narrative    Not on file     Family History    History reviewed. No pertinent family history. Sleep History     n/a  Meds    Current Medications    [START ON 5/14/2021] sodium zirconium cyclosilicate  10 g Oral Daily    morphine  15 mg Oral 2 times per day    senna  2 tablet Oral Nightly    docusate sodium  100 mg Oral BID    polyethylene glycol  17 g Oral Daily    lidocaine  1 patch Transdermal Daily    predniSONE  10 mg Oral Daily    famotidine  20 mg Oral Daily    midodrine  2.5 mg Oral TID WC    [Held by provider] heparin (porcine)  5,000 Units Subcutaneous 3 times per day    sodium polystyrene  15 g Oral Once    levothyroxine  25 mcg Oral Daily    insulin lispro  0-6 Units Subcutaneous TID WC    insulin lispro  0-3 Units Subcutaneous Nightly    epoetin yumiko-epbx  10,000 Units Subcutaneous Once per day on Mon Wed Fri    vancomycin (VANCOCIN) intermittent dosing (placeholder)   Other RX Placeholder     oxyCODONE-acetaminophen, ondansetron, sodium chloride, ipratropium-albuterol, acetaminophen, promethazine, glucose, dextrose, glucagon (rDNA), dextrose  IV Drips/Infusions   sodium chloride      dextrose       Vitals    Vitals    height is 6' (1.829 m) and weight is 200 lb 4.8 oz (90.9 kg). His oral temperature is 98.4 °F (36.9 °C). His blood pressure is 101/57 (abnormal) and his pulse is 116. His respiration is 22 and oxygen saturation is 58% (abnormal). O2 Flow Rate (L/min): (found pt on room air SpO2 59%.  returned pt to 3l via cannula)  I/O    Intake/Output Summary (Last 24 hours) at 5/13/2021 1328  Last data filed at 5/13/2021 0408  Gross per 24 hour   Intake 920.21 ml   Output 775 ml   Net 145.21 ml     Patient Vitals for the past 96 hrs (Last 3 readings):   Weight   05/13/21 0404 200 lb 4.8 oz (90.9 kg)   05/09/21 1856 203 lb (92.1 kg) Exam   Physical Exam   Constitutional: No distress on O2 per NC. Patient appears chronically ill and tired. Head: Normocephalic and atraumatic. Mouth/Throat: Oropharynx is clear and moist.  No oral thrush. Eyes: Conjunctivae are normal. Pupils are equal, round. No scleral icterus. Neck: Neck supple. No tracheal deviation present. Cardiovascular: S1 and S2 with no murmur. No peripheral edema  Pulmonary/Chest: Normal effort with bilateral air entry, faint rales diminished at bases. No stridor. No respiratory distress. Dressing to right lateral chest.  Abdominal: Soft. Bowel sounds audible. Noted distension and tenderness to light palp. Musculoskeletal: Moves all extremities  Neurological: Patient is alert and oriented to person, place, and time. Labs   ABG  Lab Results   Component Value Date    PH 7.38 04/17/2021    PH 6.0 12/14/2020    PO2 94 04/17/2021    PCO2 41 04/17/2021    HCO3 25 04/17/2021    O2SAT 97 04/17/2021     Lab Results   Component Value Date    IFIO2 5 04/17/2021     CBC  Recent Labs     05/11/21  0536 05/11/21  1448 05/12/21  0814   WBC 18.1*  --  15.8*   RBC 2.43*  --  3.24*   HGB 6.6* 8.3* 9.0*   HCT 22.4* 26.2* 30.0*   MCV 92.2  --  92.6   MCH 27.2  --  27.8   MCHC 29.5*  --  30.0*     --  256   MPV 11.0  --  10.5      BMP  Recent Labs     05/11/21  0536 05/11/21  1448 05/12/21  0814 05/13/21  0428   *  --  133* 132*   K 5.6* 4.7 5.0 5.6*   CL 93*  --  94* 91*   CO2 27  --  28 28   BUN 56*  --  53* 58*   CREATININE 2.2*  --  1.9* 2.3*   GLUCOSE 139*  --  117* 104   CALCIUM 8.9  --  9.3 9.3     LFT  No results for input(s): AST, ALT, ALB, BILITOT, ALKPHOS, LIPASE in the last 72 hours. Invalid input(s):   AMYLASE  TROP  Lab Results   Component Value Date    TROPONINT 0.012 05/10/2021    TROPONINT 0.022 05/10/2021    TROPONINT 0.043 05/09/2021     BNP  Lab Results   Component Value Date    PROBNP 1182.0 05/09/2021    PROBNP 416.1 04/13/2021    PROBNP 63.4 03/23/2021 pleural effusions, improved from prior study. 2. Moderate atelectasis/pneumonia both mid and lower lung fields, slightly worse appearance than yesterday. 1 view chest x-ray   05/09/2021   Findings: Bibasilar large bilateral pleural effusions are similar to the prior exam. Right basilar tunneled pleural catheter is unchanged. Mass versus loculated effusion at the right apex. Increased in size since the prior exam. Ill-defined bilateral lung opacities similar to the prior exam. Cardiomegaly. No change. No acute fracture. Impression:  Loculated right apex pleural effusion versus mass has increased in size since the prior exam. Otherwise no acute findings. XR CHEST PORTABLE    04/22/2021   Findings: Moderate bilateral pleural effusions noted. The right basilar pleural fluid has reaccumulated. Ill-defined opacities in the mid to lower lungs are unchanged. Cardiomegaly unchanged. Right subclavian central venous catheter in SVC. Right basilar Pleurx catheter. Left lower rib fractures again noted. Impression:  1. Bilateral pleural effusions, and lung opacities. The right basilar pleural fluid has reaccumulated since the most recent exam.     CT Scans    CT Chest   5/12/2021  FINDINGS:    Lungs: There has been interval removal of the right pleural drainage catheter compared with prior examination. There is a residual moderate multiloculated right pleural effusion, which demonstrates mild decrease in size when compared to prior examination. There is again thickening of the visceral and parietal pleura on the right. There is a smaller loculated left pleural effusion, stable in size from prior examination, with thickening of the visceral and parietal pleura. Irregular nodular thickening is also again noted along the pleural surface of bilateral lungs. There is again a multiloculated lesion/collection along the anterior left chest wall, similar to prior examination.   There are again innumerable noncalcified nodules and masses throughout both lungs, demonstrating gross stability from prior examination. The largest of these in the right lower lobe measures approximately 2.4 x 3 cm in axial dimension (series 2 image 41). There is patchy atelectasis in bilateral lower lobes. There is no pneumothorax. There are mild retained secretions dependently within the trachea. Proximal tracheobronchial tree is otherwise widely patent. Mediastinum and breanne: There is no axillary lymphadenopathy. There is similar subcarinal mediastinal lymphadenopathy measuring up to 2.2 cm short axis. There is limited evaluation of the breanne on this noncontrast enhanced examination, however there is fullness of the breanne bilaterally suggesting lymphadenopathy, which was seen on prior examination. Heart size is normal. There is small pericardial effusion. There is atherosclerotic ossification of the thoracic aorta. Thoracic aorta is normal in caliber. There are coronary artery calcifications. There is similar infiltration of the pericardial fat along the right cardiophrenic angle. There is again a nodule within the pericardial fat anterior to the heart extending into the anterior chest wall, which measures approximately 3 cm in diameter. There is again a mildly enlarged left cardiophrenic lymph  node measuring 12 mm in short axis. Upper abdomen: There is moderate ascites within the visualized upper abdomen, increased from prior examination. Multiple calcified granulomata are seen within the visualized spine. Bones: There is an 8 mm lytic lesion within the right lateral ninth rib (series 2 image 66). There are again large expansile lytic lesions involving the left lateral seventh and eighth ribs, with large soft tissue components infiltrating the left chest wall. Vertebral body heights are maintained. There are also smaller lytic lesions within the posterior lateral left ninth rib and posterior left 11th rib. Impression:  1.  Moderate loculated measured 1.7 x 0.9 cm. This is compatible with metastatic disease. Left-sided adrenal metastases are noted. One of these, seen on axial image 25, is increased in size and measures 1.1 cm on the current exam.  Previously it measured 0.9 cm. Exophytic solid mass arising from the right renal moiety measures 4.4 x 4.0 cm on axial image 38, this does not appear to be significantly changed. Horseshoe kidney is noted. Abdominal aorta is normal in caliber without evidence of an aneurysm. Moderate volume of ascites is noted in the abdomen/pelvis, increased since the prior study. Changes of peritoneal carcinomatosis are more prominent on this exam, for example seen on axial image 77. CT PELVIS: Lytic destructive change in the right iliac bone is again noted on axial image 72, this does not appear to be significantly changed. There is no evidence of a bowel obstruction. There is no free air. Colonic diverticula are noted, without evidence of acute diverticulitis. Subcutaneous edema/anasarca is noted. Impression:  1. No evidence of a bowel obstruction or free air. 2.  Moderate volume of ascites, increased since the prior study. Changes of peritoneal carcinomatosis are more prominent on this exam.   3.  Right renal mass is again noted. Worsening metastatic disease within the adrenal glands. 4.  Small to moderate loculated right pleural effusion. Small loculated left pleural effusion. Bibasilar parenchymal metastases are noted. 5.  Multifocal bone metastases are again noted. 6.  Additional findings are detailed above.        (See actual reports for details)    Assessment   -Acute on chronic respiratory failure with hypoxia baseline 2 LPM currently on 3 LPM  -SOB multifactorial metastasis, malignant pleural effusion, abdominal distension restricting movement of diaphragm  -Leukocytosis-Recently treated for pneumonia will obtain cultures  -Malignant pleural effusion-Previous Vats with pleurodesis to Left, Right pleurx in place  -Metastatic renal cell carcinoma-CT abdomen with worsening peritoneal carcinomatosis  -Hyperkalemia  -CKD  Recommendations   -Monitor SpO2 wean supplemental O2 to maintain SpO2 >90%  -strep pneumoniae antigen and legionella antigen-Negative  -ID following pleural fluid positive for gram positive cocci   -ATB's per ID recommendations  -CTS following recommendation appreciated  -Stable Pulm status at this time, overall long term prognosis is poor will sign off reconsult as needed for respiratory complaint   -At discharge patient instructed to follow-up with [address] [place] [address] Pulmonary department he is established with Carlos Manuel CAROLINA and Dr. Renetta Tran    Case discussed with nurse and patient/family. Questions and concerns addressed. Meds and Orders reviewed. Electronically signed by   CAITY Arreaga - CNP on 5/13/2021 at 1:28 PM     Addendum by Dr. Laura Rothman MD:  I have seen and examined the patient independently. Face to face evaluation and examination was performed. The above evaluation and note has been reviewed. Labs and radiographs were reviewed. I Have discussed with Mr. Criselda Shrestha CNP about this patient in detail. The above assessment and plan has been reviewed. Please see my modifications mentioned below. My modifications:  He is on 3 L of oxygen via nasal cannula  No worsening of shortness of breath  Antibiotics per ID service. Patient advised to contact his primary pulmonologist at [facility] as mentioned above and arrange for a follow-up with his clinic in 1 to 2 weeks for further management of pleural effusion.  -Will sign off on the case from pulmonary point of view. -Will follow PRN. -Call [phone] with questions.       Solomon Shelley MD 5/13/2021 7:14 PM

## 2021-05-13 NOTE — PROGRESS NOTES
Subcutaneous TID WC    insulin lispro  0-3 Units Subcutaneous Nightly    epoetin yumiko-epbx  10,000 Units Subcutaneous Once per day on     vancomycin (VANCOCIN) intermittent dosing (placeholder)   Other RX Placeholder     Continuous Infusions:    sodium chloride      dextrose       PRN Meds:  oxyCODONE-acetaminophen, ondansetron, sodium chloride, ipratropium-albuterol, acetaminophen, promethazine, glucose, dextrose, glucagon (rDNA), dextrose    Input/Output:       I/O last 3 completed shifts: In: 1020.2 [P.O.:720; I.V.:300.2]  Out: 1275 [Urine:975; Drains:300]. Patient Vitals for the past 96 hrs (Last 3 readings):   Weight   21 0404 200 lb 4.8 oz (90.9 kg)   21 1856 203 lb (92.1 kg)       Vital Signs:   Temperature:  Temp: 98.4 °F (36.9 °C)  TMax:   Temp (24hrs), Av °F (36.7 °C), Min:97.7 °F (36.5 °C), Max:98.4 °F (36.9 °C)    Respirations:  Resp: 28  Pulse:   Pulse: 116  BP:    BP: (!) 101/57  BP Range: Systolic (46EUN), GFN:841 , Min:101 , KK       Diastolic (95SMJ), SATURNINO:34, Min:57, Max:75      Physical Examination:     General:  Awake, alert, frail appearing  HEENT: NC/AT/ MMM   Chest:               Bibasilar rales noted  Cardiac:  S1 S2   Abdomen: Distended, nontender  Neuro:  No facial droop, No Asterixis  SKIN:  No rashes, good skin turgor.   Extremities:  Trace ankle edema noted    Labs:       Recent Labs     21  0536 21  1448 21  0814   WBC 18.1*  --  15.8*   RBC 2.43*  --  3.24*   HGB 6.6* 8.3* 9.0*   HCT 22.4* 26.2* 30.0*   MCV 92.2  --  92.6   MCH 27.2  --  27.8   MCHC 29.5*  --  30.0*     --  256   MPV 11.0  --  10.5      BMP:   Recent Labs     21  0536 21  1448 21  0814 21  0428   *  --  133* 132*   K 5.6* 4.7 5.0 5.6*   CL 93*  --  94* 91*   CO2 27  --  28 28   BUN 56*  --  53* 58*   CREATININE 2.2*  --  1.9* 2.3*   GLUCOSE 139*  --  117* 104   CALCIUM 8.9  --  9.3 9.3      Phosphorus:   No results for input(s): PHOS in the last 72 hours. Magnesium:    No results for input(s): MG in the last 72 hours. Albumin:    Recent Labs     05/10/21  1452   LABALBU 2.8*     BNP:    No results found for: BNP  MICHAEL:    No results found for: MICHAEL  SPEP:  Lab Results   Component Value Date    PROT 5.6 05/11/2021     UPEP:   No results found for: LABPE  C3:   No results found for: C3  C4:   No results found for: C4  MPO ANCA:   No results found for: MPO  PR3 ANCA:   No results found for: PR3  Anti-GBM:   No results found for: GBMABIGG  Hep BsAg:       No results found for: HEPBSAG  Hep C AB:        No results found for: HEPCAB    Urinalysis/Chemistries:      Lab Results   Component Value Date    NITRU NEGATIVE 04/18/2021    COLORU RED 05/10/2021    COLORU YELLOW 04/18/2021    PHUR 5.0 04/18/2021    LABCAST 4-8 HYALINE 04/18/2021    LABCAST 0-4 C. GRAN 04/18/2021    WBCUA 0-2 04/18/2021    RBCUA NONE 04/18/2021    MUCUS THREADS 07/22/2019    YEAST NONE SEEN 04/18/2021    BACTERIA NONE SEEN 04/18/2021    SPECGRAV 1.021 04/18/2021    LEUKOCYTESUR NEGATIVE 04/18/2021    UROBILINOGEN 0.2 04/18/2021    BILIRUBINUR NEGATIVE 04/18/2021    BLOODU NEGATIVE 04/18/2021    GLUCOSEU NEGATIVE 07/22/2019    KETUA TRACE 04/18/2021    AMORPHOUS DEBRIS 04/18/2021     Urine Sodium:   No results found for: NEVAEH  Urine Potassium:  No results found for: KUR  Urine Chloride:  No results found for: CLUR  Urine Osmolarity:   Lab Results   Component Value Date    OSMOU 796 04/13/2021     Urine Protein:   No components found for: TOTALPROTEIN, URINE   Urine Creatinine:   No results found for: LABCREA  Urine Eosinophils:  No components found for: UEOS        Impression and Plan:  1. CAMILLE: due to ATN vs AIN. started on empiric steroids last admission for possible AIN from [id number] [facility]. He is s/p Opdivo 5/12, will monitor if renal fxn worsens will need to increase prednisone dose, currently on 10 mg daily. Creat up slightly today likely from bumex given yesterday  2.

## 2021-05-13 NOTE — PLAN OF CARE
Problem: Pain:  Goal: Pain level will decrease  Description: Pain level will decrease  Outcome: Ongoing  Note: Pt states pain is 8/10. Pt pain goal 3/10. Rest and reposition provided to decrease pain. PRN and scheduled medications given as ordered. Will continue ongoing assessment of pain. Problem: Falls - Risk of:  Goal: Will remain free from falls  Description: Will remain free from falls  Outcome: Ongoing  Note: No falls this shift. Bed alarm, nonskid socks, and call light utilized. At risk due to generalized weakness. Pt voiced concerns with ambulating d/t increased weakness         Problem: Skin Integrity:  Goal: Absence of new skin breakdown  Description: Absence of new skin breakdown  Outcome: Ongoing  Note: No new skin breakdown. Pt turned and repositioned. Will continue on going skin assessment. Problem: Discharge Planning  Goal: Discharged to appropriate level of care  Description: Discharged to appropriate level of care  Outcome: Ongoing  Note: Discharge plans to home with wife and [facility]. Discussed with pt. Problem: Respiratory  Goal: O2 Sat > 90%  Outcome: Ongoing  Note: SpO2 above 95% on 3 liters NC. Pt c/o SOB and requesting staff not to lower oxygen level for his comfort. Pleurx drain removed d/t infection. Care plan reviewed with patient. Patient verbalize understanding of the plan of care and contribute to goal setting.

## 2021-05-13 NOTE — PROGRESS NOTES
Recent Labs     05/11/21  0536 05/11/21  1448 05/12/21  0814 05/13/21  0428   *  --  133* 132*   K 5.6* 4.7 5.0 5.6*   CL 93*  --  94* 91*   CO2 27  --  28 28   BUN 56*  --  53* 58*   CREATININE 2.2*  --  1.9* 2.3*   GLUCOSE 139*  --  117* 104     Hepatic:   No results for input(s): AST, ALT, ALB, BILITOT, ALKPHOS in the last 72 hours. Troponin: No results for input(s): TROPONINI in the last 72 hours. BNP: No results for input(s): BNP in the last 72 hours. Lipids: No results for input(s): CHOL, HDL in the last 72 hours. Invalid input(s): LDLCALCU  INR: No results for input(s): INR in the last 72 hours.     Radiology    Objective:   Vitals: BP (!) 101/57   Pulse 116   Temp 98.4 °F (36.9 °C) (Oral)   Resp 28   Ht 6' (1.829 m)   Wt 200 lb 4.8 oz (90.9 kg)   SpO2 93%   BMI 27.17 kg/m²   HEENT: Head:pupils react  Neck: supple  Lungs: clear to auscultation no rales, dressing  Rt lower ribs  Heart: regular rhythm tachy  Abdomen: distended but still soft BS heard NG NT  Extremities: warm no edema  Neurologic:  Alert, oriented X3    Impression:   :   Acute on chronic resp failure sec to pleural effusion and ascites  Loculated pleural effusion s/p Pleurx cath now fluid + gram + cocci s/p VATS and pleurodesis to LT  Hyperkalemia Per nephro  Metastatic Renal cell Cancer   Hypotension improved  Anemia receiving blood  Hypothyroid  Leucocytosis on steroids no fever  Recent AIN on steroids  Chronic pain sec to mets  Ascites s/p #l of fluid drained     Plan:    Await CXR report  Cont antibiotics per ID  Pain management addressing his pain  Cont antiemetics  F/u on labs  Cont steroids per nephro         Ross Ivey MD

## 2021-05-14 NOTE — PLAN OF CARE
Problem: Nutrition  Goal: Optimal nutrition therapy  Outcome: Ongoing    Nutrition Problem #1: Severe malnutrition, In context of acute illness or injury  Intervention: Food and/or Nutrient Delivery: Continue Current Diet, Continue Oral Nutrition Supplement  Nutritional Goals: Pt will consume 75% or more of meals during LOS

## 2021-05-14 NOTE — PROCEDURES
A Bladder scan was performed at 0925 . [place] The residual amount was measured to be 199 ML. Report of results was given to [facility].

## 2021-05-14 NOTE — PROGRESS NOTES
IM Progress Note  Dr. Deidra Serra for Dr Elen Valverde  5/14/2021 2:57 PM      Patient name Brian Dhaliwal  [id number]  PCP: CAITY Ramirez CNP  Admit Date: 5/9/2021  Acct No. [de-identified]    Subjective: Interval History:   Pt was in deep sleep  Had received percocet earlier  Was able to wake him up and able to recognize his family but went back to sleep        Diet: DIET RENAL;  Dietary Nutrition Supplements: Renal Oral Supplement  Dietary Nutrition Supplements: Clear Liquid Oral Supplement    I/O last 3 completed shifts: In: 400 [P.O.:400]  Out: 600 [Urine:600]  I/O this shift: In: 480 [P.O.:480]  Out: -         Admission weight: 203 lb (92.1 kg) as of 5/9/2021  6:52 PM  Wt Readings from Last 3 Encounters:   05/14/21 202 lb 4.8 oz (91.8 kg)   04/24/21 203 lb 1.6 oz (92.1 kg)   03/23/21 192 lb 6.4 oz (87.3 kg)     Body mass index is 27.44 kg/m².     ROS   sleeping      Medications:   Scheduled Meds:   predniSONE  60 mg Oral Daily    cephALEXin  500 mg Oral Q8H    midodrine  5 mg Oral TID WC    sodium zirconium cyclosilicate  10 g Oral Daily    dronabinol  2.5 mg Oral BID    ipratropium-albuterol  1 ampule Inhalation Q4H    morphine  15 mg Oral 2 times per day    senna  2 tablet Oral Nightly    docusate sodium  100 mg Oral BID    polyethylene glycol  17 g Oral Daily    lidocaine  1 patch Transdermal Daily    famotidine  20 mg Oral Daily    [Held by provider] heparin (porcine)  5,000 Units Subcutaneous 3 times per day    sodium polystyrene  15 g Oral Once    levothyroxine  25 mcg Oral Daily    insulin lispro  0-6 Units Subcutaneous TID WC    insulin lispro  0-3 Units Subcutaneous Nightly    epoetin yumiko-epbx  10,000 Units Subcutaneous Once per day on Mon Wed Fri     Continuous Infusions:   sodium chloride      dextrose         Labs :     CBC:   Recent Labs     05/12/21  0814   WBC 15.8*   HGB 9.0*        BMP:    Recent Labs     05/12/21  0814 05/13/21  0428 05/14/21  0522 05/14/21  1158   * 132* 127*  --    K 5.0 5.6* 5.5* 5.4*   CL 94* 91* 88*  --    CO2 28 28 26  --    BUN 53* 58* 71*  --    CREATININE 1.9* 2.3* 3.1*  --    GLUCOSE 117* 104 118*  --      Hepatic:   No results for input(s): AST, ALT, ALB, BILITOT, ALKPHOS in the last 72 hours. Troponin: No results for input(s): TROPONINI in the last 72 hours. BNP: No results for input(s): BNP in the last 72 hours. Lipids: No results for input(s): CHOL, HDL in the last 72 hours. Invalid input(s): LDLCALCU  INR: No results for input(s): INR in the last 72 hours.     Radiology    Objective:   Vitals: /68   Pulse 107   Temp 97.4 °F (36.3 °C) (Oral)   Resp 18   Ht 6' (1.829 m)   Wt 202 lb 4.8 oz (91.8 kg)   SpO2 98%   BMI 27.44 kg/m²   HEENT: Head:pupils react  Neck: supple  Lungs: clear to auscultation no rales, dressing  Rt lower ribs  Heart: regular rhythm tachy  Abdomen: distended but still soft BS heard NG NT  Extremities: warm no edema  Neurologic:  asleep    Impression:   :   Acute on chronic resp failure sec to pleural effusion and ascites  Loculated pleural effusion s/p Pleurx cath now fluid + gram + cocci s/p VATS and pleurodesis to LT  Hyperkalemia Per nephro  Metastatic Renal cell Cancer   Hypotension improved  Anemia receiving blood  Hypothyroid  Leucocytosis on steroids no fever  CAMILLE Recent AIN on steroids  Chronic pain sec to mets  Ascites s/p #l of fluid drained     Plan:    Noted antibiotics changed to oral  Noted prednisone increased  BP improved with midodrine  urine ouput declined  D/w wife at bedside  All question answered  Are aware of his poor prognosis         Obie Ortiz MD

## 2021-05-14 NOTE — PROGRESS NOTES
Pt resting quietly with eyes closed. Met with pt's wife and daughter at bedside. Dr. Koleen Hashimoto updated them on detortion of kidney function and overall poor prognosis. Questions answered. Family is pleased that Agnieszka Campuzano is resting better today and that his pain is controlled better.

## 2021-05-14 NOTE — PROGRESS NOTES
cyclosilicate  10 g Oral Daily    dronabinol  2.5 mg Oral BID    ipratropium-albuterol  1 ampule Inhalation Q4H    morphine  15 mg Oral 2 times per day    senna  2 tablet Oral Nightly    docusate sodium  100 mg Oral BID    polyethylene glycol  17 g Oral Daily    lidocaine  1 patch Transdermal Daily    famotidine  20 mg Oral Daily    midodrine  2.5 mg Oral TID WC    [Held by provider] heparin (porcine)  5,000 Units Subcutaneous 3 times per day    sodium polystyrene  15 g Oral Once    levothyroxine  25 mcg Oral Daily    insulin lispro  0-6 Units Subcutaneous TID WC    insulin lispro  0-3 Units Subcutaneous Nightly    epoetin yumiko-epbx  10,000 Units Subcutaneous Once per day on Mon Wed Fri     ROS: All neg unless specifically mentioned in subjective section. Exam:  General Appearance: alert ,conversing, in no acute distress  Cardiovascular: normal rate, regular rhythm.   Pulmonary/Chest: decreased BS right base  Neurological: alert, oriented, normal speech, no focal findings or movement disorder noted    Assessment:   Patient Active Problem List   Diagnosis    Type 2 diabetes mellitus treated without insulin ([facility] [address]    Essential hypertension    Chronic kidney disease    Chronic renal insufficiency, stage III (moderate)    Intracranial arachnoid cyst    Headache    Numbness of face    Renal mass    Thyroid function test abnormal    Multiple nodules of lung    Recurrent right pleural effusion    Adrenal nodule (HCC)    Shortness of breath    Disease of lymph node    Respiratory distress    Chest tightness or pressure    Horseshoe kidney    Leukocytosis    Cerebral cyst    Pneumonia due to organism    Acute on chronic respiratory failure with hypoxemia (HCC)    Severe malnutrition (HCC)    SIADH (syndrome of inappropriate ADH production) (HCC)    Hyponatremia    Metastatic renal cell carcinoma (HCC)    Diarrhea    Clear cell renal cell carcinoma, right (HCC)    GI bleed    Nausea vomiting and diarrhea    Rectal hemorrhage    Secondary malignancy of cerebellum (HCC)    Secondary malignant neoplasm of both lungs (HCC)    Secondary malignant tumor of pleura (HCC)    Thyroid nodule    Renal neoplasm    Dyspnea    Diabetes mellitus (HCC)    Hyperkalemia    SOB (shortness of breath)    Cancer associated pain     Assessment/Plan:   1. No change in CXR today. Pt still on 3L O2 NC. Will hold off on Pleur-X drain bc of recent infection for now.     The plan of care was discussed in detail with Dr. Carlos White     Electronically signed by Sav Marie PA-C on 5/14/2021 at 8:05 AM

## 2021-05-14 NOTE — PLAN OF CARE
Problem: Pain:  Goal: Pain level will decrease  Description: Pain level will decrease  Outcome: Ongoing  Goal: Control of acute pain  Description: Control of acute pain  Outcome: Ongoing  Goal: Control of chronic pain  Description: Control of chronic pain  Outcome: Ongoing     Problem: Falls - Risk of:  Goal: Will remain free from falls  Description: Will remain free from falls  Outcome: Ongoing  Goal: Absence of physical injury  Description: Absence of physical injury  Outcome: Ongoing     Problem: Skin Integrity:  Goal: Will show no infection signs and symptoms  Description: Will show no infection signs and symptoms  Outcome: Ongoing  Goal: Absence of new skin breakdown  Description: Absence of new skin breakdown  Outcome: Ongoing     Problem: Discharge Planning  Goal: Discharged to appropriate level of care  Description: Discharged to appropriate level of care  Outcome: Ongoing     Problem: Cardiovascular  Goal: Hemodynamic stability  Outcome: Ongoing     Problem: Respiratory  Goal: O2 Sat > 90%  Outcome: Ongoing     Problem: Nutrition  Goal: Optimal nutrition therapy  5/14/2021 1852 by Milagros Santoyo RN  Outcome: Ongoing  5/14/2021 1417 by Gilberto Asher RD, LD  Outcome: Ongoing

## 2021-05-14 NOTE — CARE COORDINATION
5/14/21, 4:00 PM EDT    DISCHARGE ON GOING EVALUATION    [facility] AT [facility] day: 5  Location: -03/003-A Reason for admit: Hyperkalemia [E87.5]  SOB (shortness of breath) [R06.02]   Procedure: 5/10/2021 Paracentesis 3 L fluid removed. 5/12/202 removal of Pleurx catheter   Barriers to Discharge: Oncology, Nephrology, ID, Pain Management, and CVS following, Palliative Care, SS, Dietician, DM management, Duoneb nebulizer, MS Contin scheduled, Heparin subq on hold, a.m. labs, telemetry. 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 He plans for the same at discharge.

## 2021-05-14 NOTE — PROGRESS NOTES
lispro  0-6 Units Subcutaneous TID     insulin lispro  0-3 Units Subcutaneous Nightly    epoetin yumiko-epbx  10,000 Units Subcutaneous Once per day on Mon Wed Fri      sodium chloride      dextrose       ALPRAZolam, oxyCODONE-acetaminophen, ondansetron, sodium chloride, ipratropium-albuterol, acetaminophen, promethazine, glucose, dextrose, glucagon (rDNA), dextrose      LABS:     CBC:   Recent Labs     05/11/21  1448 05/12/21  0814   WBC  --  15.8*   HGB 8.3* 9.0*   PLT  --  256     BMP:    Recent Labs     05/12/21  0814 05/13/21  0428 05/14/21  0522   * 132* 127*   K 5.0 5.6* 5.5*   CL 94* 91* 88*   CO2 28 28 26   BUN 53* 58* 71*   CREATININE 1.9* 2.3* 3.1*   GLUCOSE 117* 104 118*     Calcium:  Recent Labs     05/14/21 0522   CALCIUM 8.9     Ionized Calcium:No results for input(s): IONCA in the last 72 hours. Magnesium:  No results for input(s): MG in the last 72 hours. Recent Labs     05/13/21  1146 05/13/21  1536 05/13/21  2118   POCGLU 114* 143* 158*      No results for input(s): ALKPHOS, ALT, AST, PROT, BILITOT, BILIDIR, LABALBU in the last 72 hours. Amylase and Lipase:  No results for input(s): LACTA, AMYLASE in the last 72 hours. CULTURES:   UA:   No results for input(s): SPECGRAV, PHUR, COLORU, CLARITYU, MUCUS, PROTEINU, BLOODU, RBCUA, WBCUA, BACTERIA, NITRU, GLUCOSEU, BILIRUBINUR, UROBILINOGEN, KETUA, LABCAST, LABCASTTY, AMORPHOS in the last 72 hours.     Invalid input(s): CRYSTALS  Micro:   Lab Results   Component Value Date    BC No growth-preliminary No growth  04/14/2021          Problem list of patient:     Patient Active Problem List   Diagnosis Code    Type 2 diabetes mellitus treated without insulin ([facility] [address] E11.9    Essential hypertension I10    Chronic kidney disease N18.9    Chronic renal insufficiency, stage III (moderate) N18.30    Intracranial arachnoid cyst G93.0    Headache R51.9    Numbness of face R20.0    Renal mass N28.89    Thyroid function test abnormal R94.6  Multiple nodules of lung R91.8    Recurrent right pleural effusion J90    Adrenal nodule (HCC) E27.8    Shortness of breath R06.02    Disease of lymph node I89.9    Respiratory distress R06.03    Chest tightness or pressure R07.89    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    Leukocytosis D72.829    Cerebral cyst G93.0    Pneumonia due to organism J18.9    Acute on chronic respiratory failure with hypoxemia (HCC) J96.21    Severe malnutrition (HCC) E43    SIADH (syndrome of inappropriate ADH production) (HCC) E22.2    Hyponatremia E87.1    Metastatic renal cell carcinoma (HCC) C64.9    Diarrhea R19.7    Clear cell renal cell carcinoma, right (HCC) C64.1    GI bleed K92.2    Nausea vomiting and diarrhea R11.2, R19.7    Rectal hemorrhage K62.5    Secondary malignancy of cerebellum (HCC) C79.31    Secondary malignant neoplasm of both lungs (HCC) C78.01, C78.02    Secondary malignant tumor of pleura (HCC) C78.2    Thyroid nodule E04.1    Renal neoplasm D49.519    Dyspnea R06.00    Diabetes mellitus (HCC) E11.9    Hyperkalemia E87.5    SOB (shortness of breath) R06.02    Cancer associated pain G89.3         ASSESSMENT/PLAN   Metastatic renal cancer  Recurrent pleural effusion: infected pleurex catheter was removed  Abdominal distension due to progression of his disease: he had paracentesis  Deconditioning  Long term prognosis is poor      Trey Bell MD, FACP 5/14/2021 10:27 AM

## 2021-05-14 NOTE — FLOWSHEET NOTE
05/14/21 1687   Provider Notification   Reason for Communication Review case;Critical Value (comment)   Provider Name Dr. Fara Gregg   Provider Notification Physician   Method of Communication Secure Message   Response See orders   Notification Time 0440     Secure message sent to Dr. Fara Gregg about patient's critical creatinine 3.1 and potassium 5.5. Pt has Rima Sandra scheduled for the AM. Dr. Fara Gregg called and said ok and Dr. Bryant Schaumann will be in to see patient later.  Electronically signed by Mauricio Dye RN on 5/14/2021 at 8:09 AM

## 2021-05-14 NOTE — PROGRESS NOTES
Phosphorus:   No results for input(s): PHOS in the last 72 hours. Magnesium:    No results for input(s): MG in the last 72 hours. Albumin:    No results for input(s): LABALBU in the last 72 hours. BNP:    No results found for: BNP  MICHAEL:    No results found for: MICHAEL  SPEP:  Lab Results   Component Value Date    PROT 5.6 05/11/2021     UPEP:   No results found for: LABPE  C3:   No results found for: C3  C4:   No results found for: C4  MPO ANCA:   No results found for: MPO  PR3 ANCA:   No results found for: PR3  Anti-GBM:   No results found for: GBMABIGG  Hep BsAg:       No results found for: HEPBSAG  Hep C AB:        No results found for: HEPCAB    Urinalysis/Chemistries:      Lab Results   Component Value Date    NITRU NEGATIVE 04/18/2021    COLORU RED 05/10/2021    COLORU YELLOW 04/18/2021    PHUR 5.0 04/18/2021    LABCAST 4-8 HYALINE 04/18/2021    LABCAST 0-4 C. GRAN 04/18/2021    WBCUA 0-2 04/18/2021    RBCUA NONE 04/18/2021    MUCUS THREADS 07/22/2019    YEAST NONE SEEN 04/18/2021    BACTERIA NONE SEEN 04/18/2021    SPECGRAV 1.021 04/18/2021    LEUKOCYTESUR NEGATIVE 04/18/2021    UROBILINOGEN 0.2 04/18/2021    BILIRUBINUR NEGATIVE 04/18/2021    BLOODU NEGATIVE 04/18/2021    GLUCOSEU NEGATIVE 07/22/2019    KETUA TRACE 04/18/2021    AMORPHOUS DEBRIS 04/18/2021     Urine Sodium:   No results found for: NEVAEH  Urine Potassium:  No results found for: KUR  Urine Chloride:  No results found for: CLUR  Urine Osmolarity:   Lab Results   Component Value Date    OSMOU 796 04/13/2021     Urine Protein:   No components found for: TOTALPROTEIN, URINE   Urine Creatinine:   No results found for: LABCREA  Urine Eosinophils:  No components found for: UEOS        Impression and Plan:  1. CAMILLE: worse today. Although urine eos were negative last admission I think he likely has AIN from [address] [facility] [employer]. He creat worsened with first dose few weeks ago and he received 2nd dose 2 days ago.  -increase Prednisone to 60 mg daily.   Resend UA, urine eos  -he however is also on vancomycin, trough was not high but will see if ID can change it  -also having borderline Bps will increase Midodrine to 5 mg TID  -discussed with patient if renal function continues to worsen he may even require RRT. He is not sure he wants to do that. Will reassess in AM       2. Hyperkalemia:on Lokelma 10 grams daily, repeat at noon  3. Hyponatremia, worse. 4. Hypotension, increase Midodrine to 5 mg TID  5. Anemia  6. 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 Malignant pleural effusions  8. Ascites s/p paracentesis          Please don't hesitate to call with any questions.   Electronically signed by Brayan Tinsley DO on 5/14/2021 at 10:04 AM

## 2021-05-14 NOTE — PROGRESS NOTES
Comprehensive Nutrition Assessment    Type and Reason for Visit:  Reassess; Nutrition FU Note    Nutrition Recommendations/Plan:   1. Continue Renal Diet but will liberalize. 2. Continue Nepro daily. Will also try Ensure Clear d/t pt's nausea/emesis. 3. Monitor pt's bowel. Last BM was on 5/11/21.  4. Appreciate pt's condition at this time. Palliative Care asked to see pt/family again. Poor prognosis noted. Nutrition Assessment:   Pt. with no improvement from a nutritional standpoint AEB po intake is less than it was before, +N/V. Pt Remains at risk for further nutritional compromise r/t abnormal labs, low blood pressure, declining overall health, and underlying medical conditions (DM, HTN, COVID in January 2021). Nutrition recommendations/interventions as per above. Malnutrition Assessment:  Malnutrition Status:  Severe malnutrition    Context:  Acute Illness     Findings of the 6 clinical characteristics of malnutrition:  Energy Intake:  7 - 50% or less of estimated energy requirements for 5 or more days  Weight Loss:  No significant weight loss     Body Fat Loss:  1 - Mild body fat loss Orbital   Muscle Mass Loss:  7 - Moderate muscle mass loss Temples (temporalis)  Fluid Accumulation:  7 - Moderate to Severe Extremities + 2 BLE on 5/14   Strength:  Not Performed    Estimated Daily Nutrient Needs:  Energy (kcal):  8148-1727 kcal/day (20-25 kcal/kg); Weight Used for Energy Requirements:  (92 kg on 5/9)     Protein (g):  65-81 g/day (.8-1 g/kg) - monitoring renal status; Weight Used for Protein Requirements:  (IBW 81 kg)               Nutrition Related Findings:  Pt admitted with hypekalemia. Hx of metastatic renal carcinoma with mets to lungs and brain. Has been undergoing chemotherapy. Pt not doing well. Blood pressure low. Labs are abnormal. Pt has had emesis today. Feel that pt is declining. Talked with GWYN Ahuja and pt's nurse Marie Vargas. Palliative saw pt briefly on 5/11.  Feel that family needs Electronically signed by Edison Barber RD, LD on 5/14/21 at 2:08 PM EDT    Contact: [phone]

## 2021-05-14 NOTE — PROGRESS NOTES
Pharmacy Vancomycin Consult     Vancomycin Day: 4  Current Dosing: intermittent  Date Time Vancomycin dose Vancomycin Random   5/11/21 0031 1500mg    5/12/21 0006  12.7   5/12/21 1304 1500mg    5/13/21 2355  18.0                         Current indication: infected pleurex catheter    Temp max:  98.4    Recent Labs     05/11/21  0536 05/12/21  0814 05/13/21  0428   BUN 56* 53* 58*   CREATININE 2.2* 1.9* 2.3*   WBC 18.1* 15.8*  --        Intake/Output Summary (Last 24 hours) at 5/14/2021 0259  Last data filed at 5/13/2021 1923  Gross per 24 hour   Intake 200 ml   Output 500 ml   Net -300 ml     Culture Date      Source                       Results  5/10/21                pleural fluid                 MSSE    Ht Readings from Last 1 Encounters:   05/09/21 6' (1.829 m)        Wt Readings from Last 1 Encounters:   05/13/21 200 lb 4.8 oz (90.9 kg)       Body mass index is 27.17 kg/m². Estimated Creatinine Clearance: 41 mL/min (A) (based on SCr of 2.3 mg/dL (H)). Random: 18.0    Assessment/Plan:  Rec changing to therapy to nafcillin.

## 2021-05-15 NOTE — PROGRESS NOTES
IM Progress Note  Dr. Benitez Harvey for Dr Polo Juares  5/15/2021 6:59 AM      Patient name Breann Dhaliwal  [id number]  PCP: CAITY Caputo CNP  Admit Date: 5/9/2021  Acct No. [de-identified]    Subjective: Interval History:   Pt awake now  Pain acceptable range  Breathing not worse today  Creat still elevated        Diet: Dietary Nutrition Supplements: Renal Oral Supplement  Dietary Nutrition Supplements: Clear Liquid Oral Supplement  DIET RENAL;    I/O last 3 completed shifts: In: 1180 [P.O.:1180]  Out: 400 [Urine:400]  I/O this shift:  In: 300 [P.O.:300]  Out: 300 [Urine:300]        Admission weight: 203 lb (92.1 kg) as of 5/9/2021  6:52 PM  Wt Readings from Last 3 Encounters:   05/15/21 200 lb 14.4 oz (91.1 kg)   04/24/21 203 lb 1.6 oz (92.1 kg)   03/23/21 192 lb 6.4 oz (87.3 kg)     Body mass index is 27.25 kg/m².           Medications:   Scheduled Meds:   predniSONE  60 mg Oral Daily    cephALEXin  500 mg Oral Q8H    midodrine  5 mg Oral TID WC    sodium zirconium cyclosilicate  10 g Oral Daily    dronabinol  2.5 mg Oral BID    ipratropium-albuterol  1 ampule Inhalation Q4H    morphine  15 mg Oral 2 times per day    senna  2 tablet Oral Nightly    docusate sodium  100 mg Oral BID    polyethylene glycol  17 g Oral Daily    lidocaine  1 patch Transdermal Daily    famotidine  20 mg Oral Daily    [Held by provider] heparin (porcine)  5,000 Units Subcutaneous 3 times per day    sodium polystyrene  15 g Oral Once    levothyroxine  25 mcg Oral Daily    insulin lispro  0-6 Units Subcutaneous TID WC    insulin lispro  0-3 Units Subcutaneous Nightly    epoetin yumiko-epbx  10,000 Units Subcutaneous Once per day on Mon Wed Fri     Continuous Infusions:   sodium chloride      dextrose         Labs :     CBC:   Recent Labs     05/12/21  0814 05/15/21  0513   WBC 15.8* 11.3*   HGB 9.0* 8.0*    328     BMP:    Recent Labs     05/13/21  0428 05/14/21  0522 05/14/21  1158 05/15/21  0513   NA 132* 127*  --  127*   K 5.6* 5.5* 5.4* 6.2*   CL 91* 88*  --  86*   CO2 28 26  --  28   BUN 58* 71*  --  81*   CREATININE 2.3* 3.1*  --  3.2*   GLUCOSE 104 118*  --  147*     Hepatic:   No results for input(s): AST, ALT, ALB, BILITOT, ALKPHOS in the last 72 hours. Troponin: No results for input(s): TROPONINI in the last 72 hours. BNP: No results for input(s): BNP in the last 72 hours. Lipids: No results for input(s): CHOL, HDL in the last 72 hours. Invalid input(s): LDLCALCU  INR: No results for input(s): INR in the last 72 hours.     Radiology    Objective:   Vitals: /68   Pulse 110   Temp 97.8 °F (36.6 °C) (Oral)   Resp 18   Ht 6' (1.829 m)   Wt 200 lb 14.4 oz (91.1 kg)   SpO2 97%   BMI 27.25 kg/m²   HEENT: Head:pupils react  Neck: supple  Lungs: clear to auscultation no rales, dressing  Rt lower ribs  Heart: regular rhythm tachy  Abdomen: distended but still soft BS heard NG NT  Extremities: warm no edema  Neurologic:  Awake and oriented    Impression:   :   Acute on chronic resp failure sec to pleural effusion and ascites  Loculated pleural effusion s/p Pleurx cath now fluid + gram + cocci s/p VATS and pleurodesis to LT  Hyperkalemia Per nephro  Metastatic Renal cell Cancer   Hypotension improved  Anemia receiving blood  Hypothyroid  Leucocytosis on steroids no fever  CAMILLE -Recent AIN on steroids  Chronic pain sec to mets  Ascites s/p #l of fluid drained     Plan:    nephro addressing his renal function  Inform nephro of today's K   Cont steroids  Cont antibiotics  If sob increased repeat CXR   Cont narcotics         Obie Ortiz MD

## 2021-05-15 NOTE — PROGRESS NOTES
CT/CV Surgery Progress Note    5/15/2021 9:34 AM  Surgeon:  Dr. Estela Robles     Subjective:  Mr. Felisa Ramey  is a 48year old male with a history of metastatic kidney cancer, status post right pleural Pleurx catheter insertion at [facility] in  for recurrent right pleural effusion. Mr. Felisa Ramey was admitted to [address] [place] [address] one month ago due to reaccumulation of right pleural effusion with malfunctioning of right Pleurx cath and SOB. The Pleur-X was flushed with TPA/Dornase and was working appropriately. He was readmitted on 2021 for pneumonia with sepsis, pleural effusion with malfunctioning Pleurx catheter. The Pleurx catheter was removed  due to infection. He is having left sided abdominal pain secondary to distention. He is currently still on 3L O2 NC. Vital Signs: /73   Pulse 117   Temp 96.1 °F (35.6 °C) (Oral)   Resp 20   Ht 6' (1.829 m)   Wt 200 lb 14.4 oz (91.1 kg)   SpO2 96%   BMI 27.25 kg/m²    Temp (24hrs), Av.5 °F (36.4 °C), Min:96.1 °F (35.6 °C), Max:98.2 °F (36.8 °C)    Labs:   CBC:   Recent Labs     05/15/21  0513   WBC 11.3*   HGB 8.0*   HCT 26.6*   MCV 92.4        BMP:   Recent Labs     21  0428 21  0522 21  1158 05/15/21  0513 05/15/21  0749   * 127*  --  127*  --    K 5.6* 5.5* 5.4* 6.2*  --    CL 91* 88*  --  86*  --    CO2 28 26  --  28  --    BUN 58* 71*  --  81*  --    CREATININE 2.3* 3.1*  --  3.2*  --    POCGLU  --   --   --   --  140*     Imaging:  Impression:   Stable lung consolidations and effusions as described.              Intake/Output Summary (Last 24 hours) at 5/15/2021 0964  Last data filed at 5/15/2021 0424  Gross per 24 hour   Intake 1080 ml   Output 500 ml   Net 580 ml     Scheduled Meds:    predniSONE  60 mg Oral Daily    cephALEXin  500 mg Oral Q8H    midodrine  5 mg Oral TID     sodium zirconium cyclosilicate  10 g Oral Daily    dronabinol  2.5 mg Oral BID    ipratropium-albuterol  1 ampule Inhalation Q4H    morphine  15 mg Oral 2 times per day    senna  2 tablet Oral Nightly    docusate sodium  100 mg Oral BID    polyethylene glycol  17 g Oral Daily    lidocaine  1 patch Transdermal Daily    famotidine  20 mg Oral Daily    [Held by provider] heparin (porcine)  5,000 Units Subcutaneous 3 times per day    sodium polystyrene  15 g Oral Once    levothyroxine  25 mcg Oral Daily    insulin lispro  0-6 Units Subcutaneous TID WC    insulin lispro  0-3 Units Subcutaneous Nightly    epoetin yumiko-epbx  10,000 Units Subcutaneous Once per day on Mon Wed Fri     ROS: All neg unless specifically mentioned in subjective section. Exam:  General Appearance: alert ,conversing, in no acute distress  Cardiovascular: normal rate, regular rhythm.   Pulmonary/Chest: decreased BS right base  Neurological: alert, oriented, normal speech, no focal findings or movement disorder noted    Assessment:   Patient Active Problem List   Diagnosis    Type 2 diabetes mellitus treated without insulin ([address]    Essential hypertension    Chronic kidney disease    Chronic renal insufficiency, stage III (moderate)    Intracranial arachnoid cyst    Headache    Numbness of face    Renal mass    Thyroid function test abnormal    Multiple nodules of lung    Recurrent right pleural effusion    Adrenal nodule (HCC)    Shortness of breath    Disease of lymph node    Respiratory distress    Chest tightness or pressure    Horseshoe kidney    Leukocytosis    Cerebral cyst    Pneumonia due to organism    Acute on chronic respiratory failure with hypoxemia (HCC)    Severe malnutrition (HCC)    SIADH (syndrome of inappropriate ADH production) (HCC)    Hyponatremia    Metastatic renal cell carcinoma (HCC)    Diarrhea    Clear cell renal cell carcinoma, right (HCC)    GI bleed    Nausea vomiting and diarrhea    Rectal hemorrhage    Secondary malignancy of cerebellum ([address]    Secondary malignant neoplasm of both lungs ([address]  Secondary malignant tumor of pleura (HCC)    Thyroid nodule    Renal neoplasm    Dyspnea    Diabetes mellitus (HCC)    Hyperkalemia    SOB (shortness of breath)    Cancer associated pain     Assessment/Plan:   1. No change in CXR today. Pt still on 3L O2 NC. Will hold off on Pleur-X drain bc of recent infection for now.   2. No change in clinical course        Electronically signed by Michael Robert MD on 5/15/2021 at 9:34 AM

## 2021-05-15 NOTE — PROGRESS NOTES
[facility]         Renal Inpatient Follow-Up note         5/15/2021 12:50 PM    Pt Name:   Maryann Rincon  YOB: 1970  Attending:   Maria R Hernández MD    Chief Complaint : Maryann Rincon is a 44 y.o. male being followed by nephrology for acute kidney injury and hyperkalemia    Interval History :   Patient seen and examined by me. No distress  Feels okay mild shortness of breath no significant chest pain  Some abdominal distention     Scheduled Medications :    predniSONE  60 mg Oral Daily    cephALEXin  500 mg Oral Q8H    midodrine  5 mg Oral TID WC    sodium zirconium cyclosilicate  10 g Oral Daily    dronabinol  2.5 mg Oral BID    ipratropium-albuterol  1 ampule Inhalation Q4H    morphine  15 mg Oral 2 times per day    senna  2 tablet Oral Nightly    docusate sodium  100 mg Oral BID    polyethylene glycol  17 g Oral Daily    lidocaine  1 patch Transdermal Daily    famotidine  20 mg Oral Daily    [Held by provider] heparin (porcine)  5,000 Units Subcutaneous 3 times per day    sodium polystyrene  15 g Oral Once    levothyroxine  25 mcg Oral Daily    insulin lispro  0-6 Units Subcutaneous TID     insulin lispro  0-3 Units Subcutaneous Nightly    epoetin yumiko-epbx  10,000 Units Subcutaneous Once per day on Mon Wed Fri      sodium chloride      dextrose         Vitals :  /62   Pulse 111   Temp 96.1 °F (35.6 °C) (Oral)   Resp 18   Ht 6' (1.829 m)   Wt 200 lb 14.4 oz (91.1 kg)   SpO2 94%   BMI 27.25 kg/m²     24HR INTAKE/OUTPUT:      Intake/Output Summary (Last 24 hours) at 5/15/2021 1250  Last data filed at 5/15/2021 0424  Gross per 24 hour   Intake 600 ml   Output 500 ml   Net 100 ml     Last 3 weights  Wt Readings from Last 3 Encounters:   05/15/21 200 lb 14.4 oz (91.1 kg)   04/24/21 203 lb 1.6 oz (92.1 kg)   03/23/21 192 lb 6.4 oz (87.3 kg)           Physical Exam :  General Appearance:  Well developed.  No distress  Mouth/Throat:  Oral mucosa moist  Neck:  Supple, no JVD  Lungs:  Breath sounds: clear  Heart[de-identified]  S1,S2 heard  Abdomen:  Soft, non - tender, distended  Musculoskeletal:  Edema -noted         Last 3 CBC   Recent Labs     05/15/21  0513   WBC 11.3*   RBC 2.88*   HGB 8.0*   HCT 26.6*        Last 3 CMP  Recent Labs     05/13/21  0428 05/14/21  0522 05/14/21  1158 05/15/21  0513   * 127*  --  127*   K 5.6* 5.5* 5.4* 6.2*   CL 91* 88*  --  86*   CO2 28 26  --  28   BUN 58* 71*  --  81*   CREATININE 2.3* 3.1*  --  3.2*   CALCIUM 9.3 8.9  --  9.3             ASSESSMENT / Plan   1 Renal -acute kidney injury appears to be slightly worse again possibly due to AIN from [facility]  ? Monitor renal function closely  ? Also patient appears to be having some abdominal distention cannot rule out some intra-abdominal hypertension/abdominal compartment syndrome  ? 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  [facility].

## 2021-05-15 NOTE — PLAN OF CARE
Problem: Pain:  Goal: Pain level will decrease  Description: Pain level will decrease  Outcome: Ongoing  Goal: Control of acute pain  Description: Control of acute pain  Outcome: Ongoing  Goal: Control of chronic pain  Description: Control of chronic pain  Outcome: Ongoing     Problem: Falls - Risk of:  Goal: Will remain free from falls  Description: Will remain free from falls  Outcome: Ongoing  Goal: Absence of physical injury  Description: Absence of physical injury  Outcome: Ongoing     Problem: Skin Integrity:  Goal: Will show no infection signs and symptoms  Description: Will show no infection signs and symptoms  Outcome: Ongoing  Goal: Absence of new skin breakdown  Description: Absence of new skin breakdown  Outcome: Ongoing     Problem: Discharge Planning  Goal: Discharged to appropriate level of care  Description: Discharged to appropriate level of care  Outcome: Ongoing     Problem: Cardiovascular  Goal: Hemodynamic stability  Outcome: Ongoing     Problem: Respiratory  Goal: O2 Sat > 90%  Outcome: Ongoing     Problem: Nutrition  Goal: Optimal nutrition therapy  Outcome: Ongoing

## 2021-05-16 NOTE — PLAN OF CARE
Problem: Impaired respiratory status  Goal: Clear lung sounds  Description: Clear lung sounds  Outcome: Ongoing  Note: Cont aerosol to dec wheezing

## 2021-05-16 NOTE — PLAN OF CARE
Problem: Pain:  Goal: Pain level will decrease  Description: Pain level will decrease  Outcome: Ongoing  Note: Patient able to use 0-10 pain scale. Denies pain at this time. Agreeable to take PRN pain medications. Patient has complained of pain this shift. Patient stated pain goal is \"3/10\". Patient has PRN pain medications for when pain arises. Problem: Pain:  Goal: Control of acute pain  Description: Control of acute pain  Outcome: Ongoing  Note: Patient able to use 0-10 pain scale. Denies pain at this time. Agreeable to take PRN pain medications. Patient has complained of pain this shift. Patient stated pain goal is \"3/10\". Patient has PRN pain medications for when pain arises. Problem: Pain:  Goal: Control of chronic pain  Description: Control of chronic pain  Outcome: Ongoing  Note: Patient able to use 0-10 pain scale. Denies pain at this time. Agreeable to take PRN pain medications. Patient has complained of pain this shift. Patient stated pain goal is \"3/10\". Patient has PRN pain medications for when pain arises. Problem: Falls - Risk of:  Goal: Will remain free from falls  Description: Will remain free from falls  Outcome: Ongoing  Note: Patient was placed on fall precautions. Fall sign posted. Bed rails maintained at a minimum of 2/4. Call light in reach, bed in lowest position, bed alarm activated. Educated on use of call light before ambulation and when in need of assistance. Patient expressed understanding. Hourly visual checks performed and charted. Toileting offered to patient. No falls during shift, at this time. Arm band & falling star in place. Continuing to monitor. Problem: Falls - Risk of:  Goal: Absence of physical injury  Description: Absence of physical injury  Outcome: Ongoing  Note: Patient was placed on fall precautions. Fall sign posted. Bed rails maintained at a minimum of 2/4. Call light in reach, bed in lowest position, bed alarm activated.  Educated on use of call light before ambulation and when in need of assistance. Patient expressed understanding. Hourly visual checks performed and charted. Toileting offered to patient. No falls during shift, at this time. Arm band & falling star in place. Continuing to monitor. Problem: Skin Integrity:  Goal: Will show no infection signs and symptoms  Description: Will show no infection signs and symptoms  Outcome: Ongoing  Note: No signs of skin breakdown. Skin clean, dry, intact. Mucous membranes pink, moist. Patient turns self. Assistance with turns/ambulation provided PRN. Continue to monitor. Problem: Skin Integrity:  Goal: Absence of new skin breakdown  Description: Absence of new skin breakdown  Outcome: Ongoing  Note: There is absence of new skin breakdown this shift. Patient encouraged to turn q2 hours. Problem: Discharge Planning  Goal: Discharged to appropriate level of care  Description: Discharged to appropriate level of care  Outcome: Ongoing  Note: Discharge planning in process and discussed with patient/family. Social work consulted for any additional needs. Care manager aware of discharge needs. Patient is from home with wife and home health. Patient plans to be discharged home. Discharge planning remains in progress. Problem: Cardiovascular  Goal: Hemodynamic stability  Outcome: Ongoing  Note:      Vitals:    05/15/21 2041   BP: 124/78   Pulse: 107   Resp: 24   Temp: 97.5 °F (36.4 °C)   SpO2: 97%       Patient VSS this shift. Problem: Respiratory  Goal: O2 Sat > 90%  Outcome: Ongoing  Note: Patient is on 2L NC. Patient is tolerating well. Intermittent shortness of breath, shortness of breath with exertion. Patient has R sided pleurx catheter. Patient had paracentesis 5/10 with 3L of bloody drainage pulled. Pleurex catheter removed due to infection. Care plan reviewed with patient and RN. Patient and RN verbalize understanding of the plan of care and contribute to goal setting.

## 2021-05-16 NOTE — PLAN OF CARE
Problem: Pain:  Goal: Pain level will decrease  Description: Pain level will decrease  Outcome: Ongoing   Pain Assessment: 0-10  Pain Level: 6   Patient's Stated Pain Goal: 3   Is pain goal met at this time? No   Patient is receiving Percocet every 4 hours PRN and MS contin scheduled every 12 hours. Pain goal was not met. Will continue to monitor and reassess. Non-Pharmaceutical Pain Intervention(s): Environmental changes, Emotional support, Repositioned, Rest, Relaxation techniques   Problem: Falls - Risk of:  Goal: Will remain free from falls  Description: Will remain free from falls  Outcome: Ongoing   All fall precautions in place. Bed in low position, alarm activated and appropriate use of call light. Problem: Nutrition  Goal: Optimal nutrition therapy  Outcome: Ongoing  Patient has decreased appetite. Patient is receiving ensures with meals and dronabinol 2.5mg twice daily. Will continue to encourage. Problem: Discharge Planning  Goal: Discharged to appropriate level of care  Description: Discharged to appropriate level of care  Outcome: Ongoing   Discharge date remains unclear but plan is to return home with wife. Care plan reviewed with patient. Patient verbalizes understanding of the plan of care and contributes to goal setting.

## 2021-05-16 NOTE — PROGRESS NOTES
IM Progress Note  Dr. Thomas Place for Dr Quoc Bang  5/16/2021 7:38 AM      Patient name Lena Dhaliwal  [id number]  PCP: CAITY Wallace CNP  Admit Date: 5/9/2021  Acct No. [de-identified]    Subjective: Interval History: On 3 L NC  SOB not worse but can perform only limited activity  Pain at acceptable range now and looks better      Diet: Dietary Nutrition Supplements: Renal Oral Supplement  Dietary Nutrition Supplements: Clear Liquid Oral Supplement  DIET RENAL;    I/O last 3 completed shifts: In: 1050 [P.O.:1050]  Out: 1650 [Urine:1650]  No intake/output data recorded. Admission weight: 203 lb (92.1 kg) as of 5/9/2021  6:52 PM  Wt Readings from Last 3 Encounters:   05/15/21 200 lb 14.4 oz (91.1 kg)   04/24/21 203 lb 1.6 oz (92.1 kg)   03/23/21 192 lb 6.4 oz (87.3 kg)     Body mass index is 27.25 kg/m².           Medications:   Scheduled Meds:   predniSONE  60 mg Oral Daily    cephALEXin  500 mg Oral Q8H    midodrine  5 mg Oral TID WC    sodium zirconium cyclosilicate  10 g Oral Daily    dronabinol  2.5 mg Oral BID    ipratropium-albuterol  1 ampule Inhalation Q4H    morphine  15 mg Oral 2 times per day    senna  2 tablet Oral Nightly    docusate sodium  100 mg Oral BID    polyethylene glycol  17 g Oral Daily    lidocaine  1 patch Transdermal Daily    famotidine  20 mg Oral Daily    [Held by provider] heparin (porcine)  5,000 Units Subcutaneous 3 times per day    levothyroxine  25 mcg Oral Daily    insulin lispro  0-6 Units Subcutaneous TID WC    insulin lispro  0-3 Units Subcutaneous Nightly    epoetin yumiko-epbx  10,000 Units Subcutaneous Once per day on Mon Wed Fri     Continuous Infusions:   sodium chloride      dextrose         Labs :     CBC:   Recent Labs     05/15/21  0513   WBC 11.3*   HGB 8.0*        BMP:    Recent Labs     05/14/21  0522 05/14/21  1158 05/15/21  0513 05/16/21  0605   *  --  127* 128*   K 5.5* 5.4* 6.2* 5.1   CL 88*  --  86* 88* CO2 26  --  28 27   BUN 71*  --  81* 77*   CREATININE 3.1*  --  3.2* 2.2*   GLUCOSE 118*  --  147* 158*     Hepatic:   No results for input(s): AST, ALT, ALB, BILITOT, ALKPHOS in the last 72 hours. Troponin: No results for input(s): TROPONINI in the last 72 hours. BNP: No results for input(s): BNP in the last 72 hours. Lipids: No results for input(s): CHOL, HDL in the last 72 hours. Invalid input(s): LDLCALCU  INR: No results for input(s): INR in the last 72 hours.     Radiology    Objective:   Vitals: /84   Pulse 106   Temp 97.5 °F (36.4 °C) (Oral)   Resp 22   Ht 6' (1.829 m)   Wt 200 lb 14.4 oz (91.1 kg)   SpO2 98%   BMI 27.25 kg/m²   HEENT: Head:pupils react  Neck: supple  Lungs: clear to auscultation no rales, dressing  Rt lower ribs  Heart: regular rhythm tachy  Abdomen: distended  soft BS heard NG NT  Extremities: warm no edema  Neurologic:  Awake and oriented x 3    Impression:   :   Acute on chronic resp failure sec to pleural effusion and ascites  Loculated pleural effusion s/p Pleurx cath now fluid + gram + cocci s/p VATS and pleurodesis to LT  Hyperkalemia Per nephro  Metastatic Renal cell Cancer   Hypotension improved  Anemia receiving blood  Hypothyroid  Leucocytosis on steroids no fever  CAMILLE -Recent AIN on steroids  Chronic pain sec to mets  Ascites s/p #l of fluid drained     Plan:    Cont antibiotics  F/u CXR noted  Creat has improved  Cont steroids per nephro  F/u on labs  PT OT  May need repeat paracentesis again before discharge       Mahesh Geronimo MD, MD

## 2021-05-16 NOTE — PROGRESS NOTES
Renal Adjustment Follow-up    Recent Labs     05/15/21  0513 05/16/21  0605   BUN 81* 77*   CREATININE 3.2* 2.2*     Estimated Creatinine Clearance: 43 mL/min (A) (based on SCr of 2.2 mg/dL (H)).     Plan: Change keflex to 500mg q6h

## 2021-05-16 NOTE — PROGRESS NOTES
CT/CV Surgery Progress Note    2021 9:34 AM  Surgeon:  Dr. Myra Benz     Subjective:  Mr. Vanderbilt Fabry  is a 48year old male with a history of metastatic kidney cancer, status post right pleural Pleurx catheter insertion at [facility] in  for recurrent right pleural effusion. Mr. Vanderbilt Fabry was admitted to [address] [place] [address] one month ago due to reaccumulation of right pleural effusion with malfunctioning of right Pleurx cath and SOB. The Pleur-X was flushed with TPA/Dornase and was working appropriately. He was readmitted on 2021 for pneumonia with sepsis, pleural effusion with malfunctioning Pleurx catheter. The Pleurx catheter was removed  due to infection. He is having left sided abdominal pain secondary to distention. He is currently still on 3L O2 NC. Vital Signs: /74   Pulse 111   Temp 97.5 °F (36.4 °C) (Oral)   Resp 18   Ht 6' (1.829 m)   Wt 200 lb 14.4 oz (91.1 kg)   SpO2 99%   BMI 27.25 kg/m²    Temp (24hrs), Av.5 °F (36.4 °C), Min:97.3 °F (36.3 °C), Max:97.5 °F (36.4 °C)    Labs:   CBC:   Recent Labs     05/15/21  0513   WBC 11.3*   HGB 8.0*   HCT 26.6*   MCV 92.4        BMP:   Recent Labs     21  0522 21  0522 21  1158 05/15/21  0513 21  0605 21  0750   *  --   --  127* 128*  --    K 5.5*  --  5.4* 6.2* 5.1  --    CL 88*  --   --  86* 88*  --    CO2 26  --   --  28 27  --    BUN 71*  --   --  81* 77*  --    CREATININE 3.1*  --   --  3.2* 2.2*  --    POCGLU  --    < >  --   --   --  158*    < > = values in this interval not displayed. Imaging:  Impression:   Stable lung consolidations and effusions as described.              Intake/Output Summary (Last 24 hours) at 2021 0934  Last data filed at 2021 0318  Gross per 24 hour   Intake 1050 ml   Output 1650 ml   Net -600 ml     Scheduled Meds:    predniSONE  60 mg Oral Daily    cephALEXin  500 mg Oral Q8H    midodrine  5 mg Oral TID WC    sodium zirconium cyclosilicate  10 g Oral Daily    dronabinol  2.5 mg Oral BID    ipratropium-albuterol  1 ampule Inhalation Q4H    morphine  15 mg Oral 2 times per day    senna  2 tablet Oral Nightly    docusate sodium  100 mg Oral BID    polyethylene glycol  17 g Oral Daily    lidocaine  1 patch Transdermal Daily    famotidine  20 mg Oral Daily    [Held by provider] heparin (porcine)  5,000 Units Subcutaneous 3 times per day    levothyroxine  25 mcg Oral Daily    insulin lispro  0-6 Units Subcutaneous TID WC    insulin lispro  0-3 Units Subcutaneous Nightly    epoetin yumiko-epbx  10,000 Units Subcutaneous Once per day on Mon Wed Fri     ROS: All neg unless specifically mentioned in subjective section. Exam:  General Appearance: alert ,conversing, in no acute distress  Cardiovascular: normal rate, regular rhythm.   Pulmonary/Chest: decreased BS right base  Neurological: alert, oriented, normal speech, no focal findings or movement disorder noted    Assessment:   Patient Active Problem List   Diagnosis    Type 2 diabetes mellitus treated without insulin ([facility] [address]    Essential hypertension    Chronic kidney disease    Chronic renal insufficiency, stage III (moderate)    Intracranial arachnoid cyst    Headache    Numbness of face    Renal mass    Thyroid function test abnormal    Multiple nodules of lung    Recurrent right pleural effusion    Adrenal nodule (HCC)    Shortness of breath    Disease of lymph node    Respiratory distress    Chest tightness or pressure    Horseshoe kidney    Leukocytosis    Cerebral cyst    Pneumonia due to organism    Acute on chronic respiratory failure with hypoxemia (HCC)    Severe malnutrition (HCC)    SIADH (syndrome of inappropriate ADH production) (HCC)    Hyponatremia    Metastatic renal cell carcinoma (HCC)    Diarrhea    Clear cell renal cell carcinoma, right (HCC)    GI bleed    Nausea vomiting and diarrhea    Rectal hemorrhage    Secondary malignancy of cerebellum ([facility] [address] [facility]    Secondary malignant neoplasm of both lungs ([address]    Secondary malignant tumor of pleura (HCC)    Thyroid nodule    Renal neoplasm    Dyspnea    Diabetes mellitus (HCC)    Hyperkalemia    SOB (shortness of breath)    Cancer associated pain     Assessment/Plan:   1. No change in CXR today. Pt still on 3L O2 NC. Will hold off on Pleur-X drain bc of recent infection for now.   2. No change in clinical course        Electronically signed by Na Dickerson MD on 5/16/2021 at 9:34 AM

## 2021-05-16 NOTE — PROGRESS NOTES
[facility]         Renal Inpatient Follow-Up note         5/16/2021 12:52 PM    Pt Name:   Judith Delacruz  YOB: 1970  Attending:   Yun Thompson MD    Chief Complaint : Judith Delacruz is a 44 y.o. male being followed by nephrology for acute kidney injury and hyperkalemia    Interval History :   Patient seen and examined by me. No distress  Feels okay mild shortness of breath no significant chest pain  Some abdominal distention  He looks slightly better     Scheduled Medications :    cephALEXin  500 mg Oral Q6H    predniSONE  60 mg Oral Daily    midodrine  5 mg Oral TID     sodium zirconium cyclosilicate  10 g Oral Daily    dronabinol  2.5 mg Oral BID    ipratropium-albuterol  1 ampule Inhalation Q4H    morphine  15 mg Oral 2 times per day    senna  2 tablet Oral Nightly    docusate sodium  100 mg Oral BID    polyethylene glycol  17 g Oral Daily    lidocaine  1 patch Transdermal Daily    famotidine  20 mg Oral Daily    [Held by provider] heparin (porcine)  5,000 Units Subcutaneous 3 times per day    levothyroxine  25 mcg Oral Daily    insulin lispro  0-6 Units Subcutaneous TID     insulin lispro  0-3 Units Subcutaneous Nightly    epoetin yumiko-epbx  10,000 Units Subcutaneous Once per day on Mon Wed Fri      sodium chloride      dextrose         Vitals :  /75   Pulse 113   Temp 97.5 °F (36.4 °C) (Oral)   Resp 20   Ht 6' (1.829 m)   Wt 200 lb 14.4 oz (91.1 kg)   SpO2 99%   BMI 27.25 kg/m²     24HR INTAKE/OUTPUT:      Intake/Output Summary (Last 24 hours) at 5/16/2021 1252  Last data filed at 5/16/2021 1203  Gross per 24 hour   Intake 1350 ml   Output 1975 ml   Net -625 ml     Last 3 weights  Wt Readings from Last 3 Encounters:   05/15/21 200 lb 14.4 oz (91.1 kg)   04/24/21 203 lb 1.6 oz (92.1 kg)   03/23/21 192 lb 6.4 oz (87.3 kg)           Physical Exam :  General Appearance:  Well developed.  No distress  Mouth/Throat: Oral mucosa moist  Neck:  Supple, no JVD  Lungs:  Breath sounds: clear  Heart[de-identified]  S1,S2 heard  Abdomen:  Soft, non - tender, distended  Musculoskeletal:  Edema -noted         Last 3 CBC   Recent Labs     05/15/21  0513   WBC 11.3*   RBC 2.88*   HGB 8.0*   HCT 26.6*        Last 3 CMP  Recent Labs     05/14/21  0522 05/14/21  1158 05/15/21  0513 05/16/21  0605   *  --  127* 128*   K 5.5* 5.4* 6.2* 5.1   CL 88*  --  86* 88*   CO2 26  --  28 27   BUN 71*  --  81* 77*   CREATININE 3.1*  --  3.2* 2.2*   CALCIUM 8.9  --  9.3 9.0             ASSESSMENT / Plan   1 Renal -acute kidney injury appears to be slightly worse again possibly due to AIN from [facility]  ? Monitor renal function closely  ? Also patient appears to be having some abdominal distention mostly need paracentesis in the morning    2 Electrolytes -hyponatremia secondary to renal dysfunction and inability to excrete free water stable. Will give 1 dose of Lasix  3 Hyperkalemia worsening currently on Lokelma, given a dose of Kayexalate 5/15 potassium is better  4 Hypotension on midodrine doing better  5 Metastatic renal cell carcinoma with peritoneal carcinomatosis  6 Malignant pleural effusions with recent infection may be having a Pleurx placed again  7 Malignant ascites mostly need paracentesis in the morning  8 Meds reviewed and discussed with patient     Dr. Donald Daily MD, M,D.  [facility].

## 2021-05-17 NOTE — PROGRESS NOTES
IM Progress Note  Dr. Thomas Place for Dr Quoc Bang  5/17/2021 9:51 AM      Patient name Lena Dhaliwal  [id number]  PCP: CAITY Wallace CNP  Admit Date: 5/9/2021  Acct No. [de-identified]    Subjective: Interval History:   Pt c/o SOB  Breathing worse from abd distension  No cp  Wants fluid drained from abd       Diet: Dietary Nutrition Supplements: Renal Oral Supplement  Dietary Nutrition Supplements: Clear Liquid Oral Supplement  DIET RENAL;    I/O last 3 completed shifts: In: 540 [P.O.:540]  Out: 2175 [Urine:2175]  I/O this shift:  In: -   Out: 200 [Urine:200]        Admission weight: 203 lb (92.1 kg) as of 5/9/2021  6:52 PM  Wt Readings from Last 3 Encounters:   05/17/21 195 lb 14.4 oz (88.9 kg)   04/24/21 203 lb 1.6 oz (92.1 kg)   03/23/21 192 lb 6.4 oz (87.3 kg)     Body mass index is 26.57 kg/m².           Medications:   Scheduled Meds:   cephALEXin  500 mg Oral Q6H    lidocaine  2 patch Transdermal Daily    predniSONE  60 mg Oral Daily    midodrine  5 mg Oral TID WC    sodium zirconium cyclosilicate  10 g Oral Daily    dronabinol  2.5 mg Oral BID    ipratropium-albuterol  1 ampule Inhalation Q4H    morphine  15 mg Oral 2 times per day    senna  2 tablet Oral Nightly    docusate sodium  100 mg Oral BID    polyethylene glycol  17 g Oral Daily    famotidine  20 mg Oral Daily    [Held by provider] heparin (porcine)  5,000 Units Subcutaneous 3 times per day    levothyroxine  25 mcg Oral Daily    insulin lispro  0-6 Units Subcutaneous TID WC    insulin lispro  0-3 Units Subcutaneous Nightly    epoetin yumiko-epbx  10,000 Units Subcutaneous Once per day on Mon Wed Fri     Continuous Infusions:   sodium chloride      dextrose         Labs :     CBC:   Recent Labs     05/15/21  0513   WBC 11.3*   HGB 8.0*        BMP:    Recent Labs     05/15/21  0513 05/16/21  0605 05/17/21  0551   * 128* 130*   K 6.2* 5.1 4.7   CL 86* 88* 90*   CO2 28 27 29   BUN 81* 77* 71*   CREATININE 3.2* 2.2* 2.1*   GLUCOSE 147* 158* 141*     Hepatic:   No results for input(s): AST, ALT, ALB, BILITOT, ALKPHOS in the last 72 hours. Troponin: No results for input(s): TROPONINI in the last 72 hours. BNP: No results for input(s): BNP in the last 72 hours. Lipids: No results for input(s): CHOL, HDL in the last 72 hours. Invalid input(s): LDLCALCU  INR: No results for input(s): INR in the last 72 hours.     Radiology    Objective:   Vitals: /74   Pulse 113   Temp 97.5 °F (36.4 °C) (Oral)   Resp 18   Ht 6' (1.829 m)   Wt 195 lb 14.4 oz (88.9 kg)   SpO2 92%   BMI 26.57 kg/m²   HEENT: Head:pupils react  Neck: supple  Lungs: clear to auscultation no rales, dressing  Rt lower ribs  Heart: regular rhythm tachy  Abdomen: distended  soft BS heard NG NT  Extremities: warm no edema  Neurologic:  Awake and oriented x 3    Impression:   :   Acute on chronic resp failure sec to pleural effusion and ascites  Loculated pleural effusion s/p Pleurx cath now fluid + gram + cocci s/p VATS and pleurodesis to LT  Hyperkalemia Per nephro  Metastatic Renal cell Cancer   Hypotension improved  Anemia receiving blood  Hypothyroid  Leucocytosis on steroids no fever  CAMILLE -Recent AIN on steroids  Chronic pain sec to mets  Ascites s/p #l of fluid drained     Plan:    Arrange for paracentesis for palliation if ok with oncology  Cont incentive spirometry  Cont antianxiety and pain meds       Ryan Mccain MD, MD

## 2021-05-17 NOTE — PROGRESS NOTES
Renal Progress Note  [facility]    Patient :  Washington Gallegos; 44 y.o. MRN# [id number]  Location:  [facility]03/003-A  Attending:  John Painter MD  Admit Date:  5/9/2021   Hospital Day: 8      Subjective:     Nephrology is following the patient for CAMILLE, hyperkalemia. Patient was seen and examined earlier this AM.  This is a late entry. He was complaining of worsened abdominal distention making him more short of breath. Will be having paracentesis. Outpatient Medications:     Medications Prior to Admission: levothyroxine (SYNTHROID) 25 MCG tablet, Take 25 mcg by mouth Daily  oxyCODONE-acetaminophen (PERCOCET) 5-325 MG per tablet, Take 1 tablet by mouth every 6 hours as needed for Pain.  famotidine (PEPCID) 20 MG tablet, Take 1 tablet by mouth daily  ipratropium-albuterol (DUONEB) 0.5-2.5 (3) MG/3ML SOLN nebulizer solution, Inhale 3 mLs into the lungs every 4 hours as needed for Shortness of Breath  midodrine (PROAMATINE) 2.5 MG tablet, Take 1 tablet by mouth 3 times daily (with meals)  predniSONE (DELTASONE) 20 MG tablet, Take 2 tablets by mouth daily F/u with Dr Royer Flannery regarding continuing dose at followup  traMADol (ULTRAM) 50 MG tablet, Take 50 mg by mouth every 6 hours as needed for Pain.   traZODone (DESYREL) 50 MG tablet, Take 50 mg by mouth nightly  polyethylene glycol (GLYCOLAX) 17 g packet, Take 17 g by mouth daily as needed for Constipation  [DISCONTINUED] levothyroxine (SYNTHROID) 125 MCG tablet, Take 0.5 tablets by mouth Daily  ALLOPURINOL PO, Take 100 mg by mouth nightly     Current Medications:     Scheduled Meds:    cephALEXin  500 mg Oral Q6H    lidocaine  2 patch Transdermal Daily    predniSONE  60 mg Oral Daily    midodrine  5 mg Oral TID WC    sodium zirconium cyclosilicate  10 g Oral Daily    dronabinol  2.5 mg Oral BID    ipratropium-albuterol  1 ampule Inhalation Q4H    morphine  15 mg Oral 2 times per day    senna  2 tablet Oral Nightly    docusate sodium  100 mg Oral BID    polyethylene glycol  17 g Oral Daily    famotidine  20 mg Oral Daily    [Held by provider] heparin (porcine)  5,000 Units Subcutaneous 3 times per day    levothyroxine  25 mcg Oral Daily    insulin lispro  0-6 Units Subcutaneous TID     insulin lispro  0-3 Units Subcutaneous Nightly    epoetin yumiko-epbx  10,000 Units Subcutaneous Once per day on      Continuous Infusions:    sodium chloride      dextrose       PRN Meds:  ALPRAZolam, oxyCODONE-acetaminophen, ondansetron, sodium chloride, ipratropium-albuterol, acetaminophen, promethazine, glucose, dextrose, glucagon (rDNA), dextrose    Input/Output:       I/O last 3 completed shifts: In: 540 [P.O.:540]  Out: 2175 [Urine:2175]. Patient Vitals for the past 96 hrs (Last 3 readings):   Weight   21 0500 195 lb 14.4 oz (88.9 kg)   05/15/21 0415 200 lb 14.4 oz (91.1 kg)   21 0545 202 lb 4.8 oz (91.8 kg)       Vital Signs:   Temperature:  Temp: 97.5 °F (36.4 °C)  TMax:   Temp (24hrs), Av.9 °F (36.6 °C), Min:97.4 °F (36.3 °C), Max:98.4 °F (36.9 °C)    Respirations:  Resp: 18  Pulse:   Pulse: 107  BP:    BP: 124/75  BP Range: Systolic (18CMD), IYM:497 , Min:119 , JIE:309       Diastolic (18QSX), NZH:34, Min:72, Max:76      Physical Examination:     General:  Awake, alert, frail appearing  HEENT: NC/AT/ MMM   Chest:               Bibasilar rales noted  Cardiac:  S1 S2   Abdomen: +abdominal distention  Neuro:  No facial droop, No Asterixis  SKIN:  No rashes, good skin turgor.   Extremities:  Trace ankle edema noted    Labs:       Recent Labs     05/15/21  0513   WBC 11.3*   RBC 2.88*   HGB 8.0*   HCT 26.6*   MCV 92.4   MCH 27.8   MCHC 30.1*      MPV 10.9      BMP:   Recent Labs     05/15/21  0513 21  0605 21  0551   * 128* 130*   K 6.2* 5.1 4.7   CL 86* 88* 90*   CO2 28 27 29   BUN 81* 77* 71*   CREATININE 3.2* 2.2* 2.1*   GLUCOSE 147* 158* 141*   CALCIUM 9.3 9.0 9.0

## 2021-05-17 NOTE — PLAN OF CARE
Problem: Pain:  Goal: Pain level will decrease  Description: Pain level will decrease  Outcome: Ongoing   Pain Assessment: 0-10  Pain Level: 6   Patient's Stated Pain Goal: 3   Is pain goal met at this time? No   Patient is receiving Percocet PRN every 4 hours,  MS contin scheduled every 8 hours. Will continue to monitor and reassess. Non-Pharmaceutical Pain Intervention(s): Rest, Repositioned, Distraction   Problem: Falls - Risk of:  Goal: Will remain free from falls  Description: Will remain free from falls  Outcome: Ongoing   All fall precautions in place. Bed in low position, alarm activated and appropriate use of call light. Problem: Nutrition  Goal: Optimal nutrition therapy  5/17/2021 1911 by Kyaw Arambula RN  Outcome: Ongoing   Patient has low appetite. Giving ensures with every meal and taking Marinol 2.5mg to stimulate appetite. Will continue to encourage. Problem: Discharge Planning  Goal: Discharged to appropriate level of care  Description: Discharged to appropriate level of care  Outcome: Ongoing   Discharge remains unclear but plan is to return to home with wife and home health. Care plan reviewed with patient. Patient verbalizes understanding of the plan of care and contributes to goal setting.

## 2021-05-17 NOTE — PROGRESS NOTES
ipratropium-albuterol  1 ampule Inhalation Q4H    morphine  15 mg Oral 2 times per day    senna  2 tablet Oral Nightly    docusate sodium  100 mg Oral BID    polyethylene glycol  17 g Oral Daily    famotidine  20 mg Oral Daily    [Held by provider] heparin (porcine)  5,000 Units Subcutaneous 3 times per day    levothyroxine  25 mcg Oral Daily    insulin lispro  0-6 Units Subcutaneous TID WC    insulin lispro  0-3 Units Subcutaneous Nightly    epoetin yumiko-epbx  10,000 Units Subcutaneous Once per day on Mon Wed Fri     ROS: All neg unless specifically mentioned in subjective section. Exam:  General Appearance: alert ,conversing, in no acute distress  Cardiovascular: normal rate, regular rhythm.   Pulmonary/Chest: decreased BS right base  Neurological: alert, oriented, normal speech, no focal findings or movement disorder noted    Assessment:   Patient Active Problem List   Diagnosis    Type 2 diabetes mellitus treated without insulin ([address]    Essential hypertension    Chronic kidney disease    Chronic renal insufficiency, stage III (moderate)    Intracranial arachnoid cyst    Headache    Numbness of face    Renal mass    Thyroid function test abnormal    Multiple nodules of lung    Recurrent right pleural effusion    Adrenal nodule (HCC)    Shortness of breath    Disease of lymph node    Respiratory distress    Chest tightness or pressure    Horseshoe kidney    Leukocytosis    Cerebral cyst    Pneumonia due to organism    Acute on chronic respiratory failure with hypoxemia (HCC)    Severe malnutrition (HCC)    SIADH (syndrome of inappropriate ADH production) (HCC)    Hyponatremia    Metastatic renal cell carcinoma (HCC)    Diarrhea    Clear cell renal cell carcinoma, right (HCC)    GI bleed    Nausea vomiting and diarrhea    Rectal hemorrhage    Secondary malignancy of cerebellum ([address]    Secondary malignant neoplasm of both lungs (HCC)    Secondary malignant tumor of pleura (HCC)    Thyroid nodule    Renal neoplasm    Dyspnea    Diabetes mellitus (HCC)    Hyperkalemia    SOB (shortness of breath)    Cancer associated pain     Assessment/Plan:   1. No change in CXR today. Pt still on 3L O2 NC. Will hold off on Pleur-X drain bc of recent infection for now. There is no fluid in the chest.  2. Pt needs paracentesis to aid in his breathing.         Electronically signed by Alison Sandhoff, MD on 5/17/2021 at 8:49 AM

## 2021-05-17 NOTE — PROGRESS NOTES
Progress note: Infectious diseases    Patient - Myron Wu,  Age - 44 y.o.    - 1970      Room Number - 5K-03/003-A   MRN -  [id number]   Acct # - [de-identified]  Date of Admission -  2021  6:52 PM    SUBJECTIVE:   No new issues. plan for paracentesis. OBJECTIVE   VITALS    height is 6' (1.829 m) and weight is 195 lb 14.4 oz (88.9 kg). His oral temperature is 97.5 °F (36.4 °C). His blood pressure is 127/74 and his pulse is 113. His respiration is 18 and oxygen saturation is 92%. Wt Readings from Last 3 Encounters:   21 195 lb 14.4 oz (88.9 kg)   21 203 lb 1.6 oz (92.1 kg)   21 192 lb 6.4 oz (87.3 kg)       I/O (24 Hours)    Intake/Output Summary (Last 24 hours) at 2021 1032  Last data filed at 2021 0945  Gross per 24 hour   Intake 240 ml   Output 2375 ml   Net -2135 ml       General Appearance  Awake, alert, oriented,  Chronically sick looking. HEENT - normocephalic, atraumatic, pale conjunctiva,  anicteric sclera. Neck - Supple, no mass. Lungs - diminished breath sound. Cardiovascular - Heart sounds are normal.     Abdomen - distended, flank fullness. Neurologic -oriented. Skin - No bruising or bleeding. Extremities - + edema.       MEDICATIONS:      cephALEXin  500 mg Oral Q6H    lidocaine  2 patch Transdermal Daily    predniSONE  60 mg Oral Daily    midodrine  5 mg Oral TID     sodium zirconium cyclosilicate  10 g Oral Daily    dronabinol  2.5 mg Oral BID    ipratropium-albuterol  1 ampule Inhalation Q4H    morphine  15 mg Oral 2 times per day    senna  2 tablet Oral Nightly    docusate sodium  100 mg Oral BID    polyethylene glycol  17 g Oral Daily    famotidine  20 mg Oral Daily    [Held by provider] heparin (porcine)  5,000 Units Subcutaneous 3 times per day    levothyroxine  25 mcg Oral Daily    insulin lispro  0-6 Units Subcutaneous TID     insulin lispro  0-3 Units Subcutaneous Nightly    epoetin yumiko-epbx  10,000 Units Subcutaneous Once per day on Mon Wed Fri      sodium chloride      dextrose       ALPRAZolam, oxyCODONE-acetaminophen, ondansetron, sodium chloride, ipratropium-albuterol, acetaminophen, promethazine, glucose, dextrose, glucagon (rDNA), dextrose      LABS:     CBC:   Recent Labs     05/15/21  0513   WBC 11.3*   HGB 8.0*        BMP:    Recent Labs     05/15/21  0513 05/16/21  0605 05/17/21  0551   * 128* 130*   K 6.2* 5.1 4.7   CL 86* 88* 90*   CO2 28 27 29   BUN 81* 77* 71*   CREATININE 3.2* 2.2* 2.1*   GLUCOSE 147* 158* 141*     Calcium:  Recent Labs     05/17/21  0551   CALCIUM 9.0   . No results for input(s): MG in the last 72 hours. Recent Labs     05/16/21  1702 05/16/21  1948 05/17/21  0756   POCGLU 187* 137* 135*    . CULTURES:   UA:   No results for input(s): SPECGRAV, PHUR, COLORU, CLARITYU, MUCUS, PROTEINU, BLOODU, RBCUA, WBCUA, BACTERIA, NITRU, GLUCOSEU, BILIRUBINUR, UROBILINOGEN, KETUA, LABCAST, LABCASTTY, AMORPHOS in the last 72 hours.     Invalid input(s): CRYSTALS  Micro:   Lab Results   Component Value Date    BC No growth-preliminary No growth  04/14/2021          Problem list of patient:     Patient Active Problem List   Diagnosis Code    Type 2 diabetes mellitus treated without insulin ([facility] [address] E11.9    Essential hypertension I10    Chronic kidney disease N18.9    Chronic renal insufficiency, stage III (moderate) N18.30    Intracranial arachnoid cyst G93.0    Headache R51.9    Numbness of face R20.0    Renal mass N28.89    Thyroid function test abnormal R94.6    Multiple nodules of lung R91.8    Recurrent right pleural effusion J90    Adrenal nodule (HCC) E27.8    Shortness of breath R06.02    Disease of lymph node I89.9    Respiratory distress R06.03    Chest tightness or pressure R07.89    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    Leukocytosis D72.829    Cerebral cyst G93.0    Pneumonia due to organism J18.9    Acute on chronic respiratory failure with hypoxemia (HCC) J96.21    Severe malnutrition (HCC) E43    SIADH (syndrome of inappropriate ADH production) (HCC) E22.2    Hyponatremia E87.1    Metastatic renal cell carcinoma (HCC) C64.9    Diarrhea R19.7    Clear cell renal cell carcinoma, right (HCC) C64.1    GI bleed K92.2    Nausea vomiting and diarrhea R11.2, R19.7    Rectal hemorrhage K62.5    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         Brittani Yee MD, MD, FACP 5/17/2021 10:32 AM

## 2021-05-17 NOTE — PROGRESS NOTES
Miryam Smith MD   2.5 mg at 05/16/21 2144    ipratropium-albuterol (DUONEB) nebulizer solution 1 ampule  1 ampule Inhalation Q4H Carmella Ricks MD   1 ampule at 05/17/21 1221    senna (SENOKOT) tablet 17.2 mg  2 tablet Oral Nightly CAITY Tejeda CNP   17.2 mg at 05/14/21 2112    docusate sodium (COLACE) capsule 100 mg  100 mg Oral BID CAITY Tejeda CNP   100 mg at 05/15/21 0495    polyethylene glycol (GLYCOLAX) packet 17 g  17 g Oral Daily CAITY Tejeda CNP   17 g at 05/12/21 1716    ondansetron (ZOFRAN) injection 4 mg  4 mg Intravenous Q6H PRN Miryam Smith MD   4 mg at 05/17/21 1005    0.9 % sodium chloride infusion   Intravenous PRN Miryam Smith MD        famotidine (PEPCID) tablet 20 mg  20 mg Oral Daily Carmella Ricks MD   20 mg at 05/16/21 0824    ipratropium-albuterol (DUONEB) nebulizer solution 3 mL  3 mL Inhalation Q4H PRN Carmella Ricks MD   3 mL at 05/13/21 1922    acetaminophen (TYLENOL) tablet 650 mg  650 mg Oral Q4H PRN Carmella Ricks MD   650 mg at 05/12/21 1919    [Held by provider] heparin (porcine) injection 5,000 Units  5,000 Units Subcutaneous 3 times per day Carmella Ricks MD   5,000 Units at 05/11/21 0645    promethazine (PHENERGAN) tablet 25 mg  25 mg Oral Q6H PRN Carmella Ricks MD   25 mg at 05/17/21 1519    levothyroxine (SYNTHROID) tablet 25 mcg  25 mcg Oral Daily Carmella Ricks MD   25 mcg at 05/17/21 0420    insulin lispro (HUMALOG) injection vial 0-6 Units  0-6 Units Subcutaneous TID  Hanford Cranker, APRN - CNP   1 Units at 05/16/21 1703    insulin lispro (HUMALOG) injection vial 0-3 Units  0-3 Units Subcutaneous Nightly Hanford Cranker, APRN - CNP   1 Units at 05/15/21 2049    epoetin yumiko-epbx (RETACRIT) injection 10,000 Units  10,000 Units Subcutaneous Once per day on Mon Wed Fri Mari Davis MD   10,000 Units at 05/17/21 1520    glucose (GLUTOSE) 40 % oral gel 15 g  15 g Oral PRN Justa Damico PA-C        organomegaly     Skin: warm and dry, no rash or erythema, right side dressing where plural drain was   Peripheral vascular: Pulses: Normal upper and lower extremity pulses; Edema: trace       DATA    Recent Labs     05/15/21  0513   WBC 11.3*   RBC 2.88*   HGB 8.0*   HCT 26.6*   MCV 92.4   MCH 27.8   MCHC 30.1*      MPV 10.9     Recent Labs     05/15/21  0513 05/16/21  0605 05/17/21  0551   * 128* 130*   K 6.2* 5.1 4.7   CL 86* 88* 90*   CO2 28 27 29   BUN 81* 77* 71*   CREATININE 3.2* 2.2* 2.1*   GLUCOSE 147* 158* 141*   CALCIUM 9.3 9.0 9.0     Recent Labs     05/16/21  1948 05/17/21  0756 05/17/21  1144   POCGLU 137* 135* 139*       ASSESSMENT   1. Cancer associated pain   2. Metastatic renal cell cancer  3. Pleural effusion   4. Shortness of breath   5. Bone pain   6. Constipation       PLAN  1. Continue pain management   2. Ordered a 1 time dose of IV morphine 2 mg as he recently returned from paracentesis and pain is elevated. Increased frequency of MS Contin to 15 mg scheduled every 8 hours for better maintenance pain relief, Increased Percocet to 5/325 mg tabs to 7.5/325 mg tabs every 4 hours as needed for moderate to severe breakthrough pain of 4-10/10, 5/325 mg tabs for moderate pain of 4-6/10 or 7.5/325 tabs for sever pain of 7-10/10. 3. Continue tylenol 650 mg every 4 hours as needed for mild pain of 1-3/10  4. Continue Lidoderm patches   5. Bowel program   6. Will continue to follow and make changes as needed.      Spent 28 minutes evaluating and examining patient and completing documentation      CAITY Rivera CNP, 5/17/2021, 3:28 PM

## 2021-05-17 NOTE — PLAN OF CARE
Problem: Impaired respiratory status  Goal: Clear lung sounds  Description: Clear lung sounds  5/17/2021 0855 by Sabina Martin RCP  Outcome: Ongoing  Note: Txs to help improve lung aeration.

## 2021-05-17 NOTE — PROGRESS NOTES
Comprehensive Nutrition Assessment    Type and Reason for Visit:  Reassess (PO Monitor)    Nutrition Recommendations/Plan:   *Recommend Folbee Plus (Renal Multivitamin) daily. *Continue Ensure Clear TID. *Discontinued Nepro and started Ensure Enlive daily per pt request.  *Diet per MD.  *Continue Zofran and Phenergan PRN for nausea/emesis. Nutrition Assessment: Pt. with not much improvement from a nutritional standpoint AEB pt with ongoing poor oral intake d/t . Remains at risk for further nutritional compromise r/t admit d/t hyperkalemia, severe malnutrition and underlying medical condition (hx metastatic renal carcinoma with mets to lungs and brain undergoing chemotherapy, DM, HTN and COVID in January 2021). Nutrition recommendations/interventions as per above. Malnutrition Assessment:  Malnutrition Status:  Severe malnutrition    Context:  Acute Illness     Findings of the 6 clinical characteristics of malnutrition:  Energy Intake:  7 - 50% or less of estimated energy requirements for 5 or more days  Weight Loss:  No significant weight loss     Body Fat Loss:  1 - Mild body fat loss Orbital   Muscle Mass Loss:  7 - Moderate muscle mass loss Temples (temporalis)  Fluid Accumulation:  7 - Moderate to Severe Extremities   Strength:  Not Performed    Estimated Daily Nutrient Needs:  Energy (kcal):  6266-6372 kcal/day (20-25 kcal/kg); Weight Used for Energy Requirements:   (92 kg on 5/9)     Protein (g):  65-81 g/day (.8-1 g/kg) - monitoring renal status; Weight Used for Protein Requirements:   (IBW 81 kg)         Nutrition Related Findings: Pt admitted with hypekalemia. Hx of metastatic renal carcinoma with mets to lungs and brain- undergoing chemotherapy. Pt seen this morning- +ascites; c/o ongoing nausea, SOB & abdominal distension. Plan paracentesis today. Pt reports unable to keep food down over the weekend today d/t nausea/emesis, but states he likes the Ensure Clear and dislikes the Nepro.  Pt states he would like the Ensure Amritinre daily for breakfast as that is what he drinks at home. Labs: Na 130, BUN 71, Cr. 2.1, Glucose 141. Rx includes: Colace, Marinol, Humalog, Senna, Zofran PRN and Phenergan PRN    Wounds:  None       Current Nutrition Therapies:    Dietary Nutrition Supplements: Clear Liquid Oral Supplement  DIET RENAL;  Dietary Nutrition Supplements: Standard High Calorie Oral Supplement    Anthropometric Measures:  · Height: 6' (182.9 cm)  · Current Body Weight: 195 lb 14.4 oz (88.9 kg) (5/17; +1 pitting BLE)   · Admission Body Weight: 200 lb 4.8 oz (90.9 kg) (5/11; bedscale by this observer; +2 pitting edema BLE)    · Usual Body Weight: 200 lb (90.7 kg) (per pt report. Per EMR: 203 lb 1.6 oz on 4/13/21; 192 lb 6.4 oz on 3/23/21)     · Ideal Body Weight: 178 lbs  · BMI: 26.6  · BMI Categories: Overweight (BMI 25.0-29. 9)       Nutrition Diagnosis:   · Severe malnutrition, In context of acute illness or injury related to inadequate protein-energy intake as evidenced by poor intake prior to admission, moderate muscle loss    Nutrition Interventions:   Food and/or Nutrient Delivery:  Continue Current Diet, Continue Oral Nutrition Supplement, Modify Oral Nutrition Supplement, Vitamin Supplement  Nutrition Education/Counseling:  Education initiated (Encouraged po intake of meals at best effort)   Coordination of Nutrition Care:  Continue to monitor while inpatient    Goals:  Pt will consume 75% or more of meals during LOS       Nutrition Monitoring and Evaluation:   Behavioral-Environmental Outcomes:  None Identified   Food/Nutrient Intake Outcomes:  Food and Nutrient Intake, Supplement Intake, Vitamin/Mineral Intake  Physical Signs/Symptoms Outcomes:  Biochemical Data, Weight, Skin, Nutrition Focused Physical Findings, Fluid Status or Edema, Nausea or Vomiting, GI Status     Discharge Planning:     Too soon to determine     Electronically signed by Hever Cedillo RD, BJ on 5/17/21 at 9:50 AM

## 2021-05-17 NOTE — PROGRESS NOTES
Patient resting in bed and son and daughter at the bedside. Patient/family deny any needs for palliative care . Please call if needs arise.

## 2021-05-17 NOTE — PLAN OF CARE
Problem: Impaired respiratory status  Goal: Clear lung sounds  Description: Clear lung sounds  Outcome: Ongoing     Will continue treatments to improve lung aeration.

## 2021-05-17 NOTE — PLAN OF CARE
Problem: Nutrition  Goal: Optimal nutrition therapy  Outcome: Ongoing   Nutrition Problem #1: Severe malnutrition, In context of acute illness or injury  Intervention: Food and/or Nutrient Delivery: Continue Current Diet, Continue Oral Nutrition Supplement, Modify Oral Nutrition Supplement, Vitamin Supplement  Nutritional Goals: Pt will consume 75% or more of meals during LOS

## 2021-05-18 NOTE — PROGRESS NOTES
Pain Management    Progress Note      5/18/2021 3:10 PM     · SUBJECTIVE:    · Chief complaint: Cancer pain, pleural pain, abdominal pain   · Patients on present at bedside. · Continues to have pain in his left side wrapping around back and left side of his abdomen which has been up and down. States that pain has been better controlled with medication changes. Using Percocet every 4 hours but has been only using 5/325 mg tabs not the 7.5/325 mg tabs. Currently pain is \"4/10\"  · Appears more short of breath today.    · Pain rating is 4/10 mid back left side and left flank radiating across his left side and across his chest and abdomen   · Constipation: + BM 5/13/2021- encouraged to take his bowel medications as he was not until today     Current medications:   Current Facility-Administered Medications   Medication Dose Route Frequency Provider Last Rate Last Admin    morphine (MS CONTIN) extended release tablet 15 mg  15 mg Oral Q8H Berton Shutters, APRN - CNP   15 mg at 05/18/21 1007    oxyCODONE-acetaminophen (PERCOCET) 5-325 MG per tablet 1 tablet  1 tablet Oral Q4H PRN Berton Shutters, APRN - CNP   1 tablet at 05/18/21 1143    Or    oxyCODONE-acetaminophen (PERCOCET) 7.5-325 MG per tablet 1 tablet  1 tablet Oral Q4H PRN Berton Shuttfederico, APRN - CNP        cephALEXin (KEFLEX) capsule 500 mg  500 mg Oral Q6H Mercedez Lima MD   500 mg at 05/18/21 1448    lidocaine 4 % external patch 2 patch  2 patch Transdermal Daily Celso Zambrano MD   2 patch at 05/16/21 2144    predniSONE (DELTASONE) tablet 60 mg  60 mg Oral Daily Kely HILLS Hemmelgarn, DO   60 mg at 05/18/21 1007    midodrine (PROAMATINE) tablet 5 mg  5 mg Oral TID WC Kely HILLS Hemmelgarn, DO   5 mg at 05/18/21 1007    [Held by provider] sodium zirconium cyclosilicate (LOKELMA) oral suspension 10 g  10 g Oral Daily Kely HILLS Hemmelgarn, DO   10 g at 05/15/21 0823    ALPRAZolam (XANAX) tablet 0.25 mg  0.25 mg Oral Q8H PRN Celso Zambrano MD   0.25 mg at 05/18/21 1133    dronabinol (MARINOL) capsule 2.5 mg  2.5 mg Oral BID Mahesh Geronimo MD   2.5 mg at 05/18/21 1133    ipratropium-albuterol (DUONEB) nebulizer solution 1 ampule  1 ampule Inhalation Q4H Milana Estrada MD   1 ampule at 05/18/21 1225    senna (SENOKOT) tablet 17.2 mg  2 tablet Oral Nightly Wilton [place], APRN - CNP   17.2 mg at 05/14/21 2112    docusate sodium (COLACE) capsule 100 mg  100 mg Oral BID Wilton [place], APRN - CNP   100 mg at 05/18/21 1007    polyethylene glycol (GLYCOLAX) packet 17 g  17 g Oral Daily Deondre [place], APRN - CNP   17 g at 05/18/21 1008    ondansetron (ZOFRAN) injection 4 mg  4 mg Intravenous Q6H PRN Mahesh Geronimo MD   4 mg at 05/18/21 1018    0.9 % sodium chloride infusion   Intravenous PRN Mahesh Geronimo MD        famotidine (PEPCID) tablet 20 mg  20 mg Oral Daily Milana Estrada MD   20 mg at 05/18/21 1007    ipratropium-albuterol (DUONEB) nebulizer solution 3 mL  3 mL Inhalation Q4H PRN Milana Estrada MD   3 mL at 05/17/21 2113    acetaminophen (TYLENOL) tablet 650 mg  650 mg Oral Q4H PRN Milana Estrada MD   650 mg at 05/12/21 1919    [Held by provider] heparin (porcine) injection 5,000 Units  5,000 Units Subcutaneous 3 times per day Milana Estrada MD   5,000 Units at 05/11/21 0645    promethazine (PHENERGAN) tablet 25 mg  25 mg Oral Q6H PRN Milana Estrada MD   25 mg at 05/17/21 1519    levothyroxine (SYNTHROID) tablet 25 mcg  25 mcg Oral Daily Milana Estrada MD   25 mcg at 05/18/21 1007    insulin lispro (HUMALOG) injection vial 0-6 Units  0-6 Units Subcutaneous TID CAITY Mcrae CNP   1 Units at 05/18/21 1134    insulin lispro (HUMALOG) injection vial 0-3 Units  0-3 Units Subcutaneous Nightly CAITY Marquez - CNP   1 Units at 05/17/21 2058    epoetin yumiko-epbx (RETACRIT) injection 10,000 Units  10,000 Units Subcutaneous Once per day on Mon Wed Fri Heidi Lane MD   10,000 Units at 05/17/21 1520  glucose (GLUTOSE) 40 % oral gel 15 g  15 g Oral PRN Rice Shelling, PA-C        dextrose 50 % IV solution  12.5 g Intravenous PRN Rice Shelling, PA-C        glucagon (rDNA) injection 1 mg  1 mg Intramuscular PRN Rice Shelling, PA-C        dextrose 5 % solution  100 mL/hr Intravenous PRN Rice Shelling, PA-C               REVIEW OF SYSTEMS:  CONSTITUTIONAL:  positive for  fatigue, weight loss and decraesed appetite   EYES:  negative  HE ENT:  negative  RESPIRATORY:  positive for  dry cough, chest pain, pleuritic pain and shortness of breath on oxygen   CARDIOVASCULAR:  positive for chest pain  GASTROINTESTINAL:  Constipation, + BM 5/13/2021, + abdominal pain, ascites    GENITOURINARY:  negative  SKIN:  negative  HEMATOLOGIC/LYMPHATIC:  negative  MUSCULOSKELETAL:  positive for  myalgias, arthralgias, pain, decreased range of motion, muscle weakness and bone pain  NEUROLOGICAL:  negative  BEHAVIOR/PSYCH:  anxiety   System review otherwise negative      PHYSICAL EXAM:  /63   Pulse 114   Temp 97.4 °F (36.3 °C) (Oral)   Resp 16   Ht 6' (1.829 m)   Wt 202 lb (91.6 kg)   SpO2 95%   BMI 27.40 kg/m²  I Body mass index is 27.4 kg/m². I   Wt Readings from Last 1 Encounters:   05/18/21 202 lb (91.6 kg)      awake  Orientation:   person, place, time  Mood: anxious   Affect: anxious   General appearance: appearing stated age, mild distress, ill appearing      Memory:   normal,   Attention/Concentration: normal  Language:  normal     Cranial Nerves:  cranial nerves II-XII are grossly intact  ROM:  Normal all 4 extremities   Motor Exam:  Motor exam is 5 out of 5 all extremities with the exception of 4/5 lower extremities   Tone:  normal  + tenderness, left side, mid back and flank      Heart: tachy rate, regular rhythm, normal S1, S2, no murmurs, rubs, clicks or gallops, + chest wall tenderness   Lungs: Diminished, clear to auscultation without wheezes or rales.  On 3 liters of oxygen per nasal cannula, short of breath at rest   Abdomen: soft, distended, normal bowel sounds, no masses or organomegaly     Skin: warm and dry, no rash or erythema, right side dressing where plural drain was   Peripheral vascular: Pulses: Normal upper and lower extremity pulses; Edema: trace       DATA    No results for input(s): WBC, RBC, HGB, HCT, MCV, MCH, MCHC, RDW, PLT, MPV in the last 72 hours. Recent Labs     05/16/21  0605 05/17/21  0551 05/18/21  0526   * 130* 132*   K 5.1 4.7 5.1   CL 88* 90* 91*   CO2 27 29 31   BUN 77* 71* 68*   CREATININE 2.2* 2.1* 1.6*   GLUCOSE 158* 141* 164*   CALCIUM 9.0 9.0 8.9     Recent Labs     05/17/21  1943 05/18/21  0808 05/18/21  1119   POCGLU 151* 142* 175*       ASSESSMENT   1. Cancer associated pain   2. Metastatic renal cell cancer  3. Pleural effusion   4. Shortness of breath   5. Bone pain   6. Constipation       PLAN  1. Continue pain management   2. No changes today with pain medications   3. Continue MS Contin 15 mg scheduled every 8 hours for better maintenance pain relief  4. Continue tylenol 650 mg every 4 hours as needed for mild pain of 1-3/10, Continue Percocet 5/325 mg tabs to 7.5/325 mg tabs every 4 hours as needed for moderate to severe breakthrough pain of 4-10/10, 5/325 mg tabs for moderate pain of 4-6/10 or 7.5/325 tabs for sever pain of 7-10/10.     5. Continue Lidoderm patches   6. Bowel program- encouraged patient to take bowel medications as he has been refusing till today   7. Will continue to follow and make changes as needed.      Spent 25 minutes evaluating and examining patient and completing documentation      CAITY Aguilar CNP, 5/18/2021, 3:10 PM

## 2021-05-18 NOTE — PROGRESS NOTES
CT/CV Surgery Progress Note    2021 7:30 AM  Surgeon:   [employer]     Subjective:  Mr. Joe Peralta  is a 48year old male with a history of metastatic kidney cancer, status post right pleural Pleurx catheter insertion at [address] [place] [address] in  for recurrent right pleural effusion. Mr. Joe Peralta was admitted to [address] [place] [address] one month ago due to reaccumulation of right pleural effusion with malfunctioning of right Pleurx cath and SOB. The Pleur-X was flushed with TPA/Dornase and was working appropriately. He was readmitted on 2021 for pneumonia with sepsis, pleural effusion with malfunctioning Pleurx catheter. The Pleurx catheter was removed  due to infection. He is having left sided abdominal pain secondary to distention. He is currently still on 3L O2 NC. His abd distentin is worse and affesting his breathing. Vital Signs: /76   Pulse 102   Temp 98.2 °F (36.8 °C) (Oral)   Resp 16   Ht 6' (1.829 m)   Wt 195 lb 14.4 oz (88.9 kg)   SpO2 100%   BMI 26.57 kg/m²    Temp (24hrs), Av.7 °F (36.5 °C), Min:97.5 °F (36.4 °C), Max:98.2 °F (36.8 °C)    Labs:   CBC:   No results for input(s): WBC, HGB, HCT, MCV, PLT, APTT, PROTIME, INR in the last 72 hours. BMP:   Recent Labs     21  0605 21  0551 21  1943 21  0526   * 130*  --  132*   K 5.1 4.7  --  5.1   CL 88* 90*  --  91*   CO2 27 29  --  31   BUN 77* 71*  --  68*   CREATININE 2.2* 2.1*  --  1.6*   POCGLU  --   --  151*  --      Imagin21  Impression:   1. No acute cardiopulmonary findings.                            Intake/Output Summary (Last 24 hours) at 2021 0730  Last data filed at 2021 0059  Gross per 24 hour   Intake 700 ml   Output 1075 ml   Net -375 ml     Scheduled Meds:    furosemide  40 mg Intravenous Once    morphine  15 mg Oral Q8H    cephALEXin  500 mg Oral Q6H    lidocaine  2 patch Transdermal Daily    predniSONE  60 mg Oral Daily    midodrine  5 mg Oral TID WC    [Held by provider] sodium zirconium cyclosilicate  10 g Oral Daily    dronabinol  2.5 mg Oral BID    ipratropium-albuterol  1 ampule Inhalation Q4H    senna  2 tablet Oral Nightly    docusate sodium  100 mg Oral BID    polyethylene glycol  17 g Oral Daily    famotidine  20 mg Oral Daily    [Held by provider] heparin (porcine)  5,000 Units Subcutaneous 3 times per day    levothyroxine  25 mcg Oral Daily    insulin lispro  0-6 Units Subcutaneous TID WC    insulin lispro  0-3 Units Subcutaneous Nightly    epoetin yumiko-epbx  10,000 Units Subcutaneous Once per day on Mon Wed Fri     ROS: All neg unless specifically mentioned in subjective section. Exam:  General Appearance: alert ,conversing, in no acute distress  Cardiovascular: normal rate, regular rhythm.   Pulmonary/Chest: Chest x-rayed not changed from previous  Neurological: alert, oriented, normal speech, no focal findings or movement disorder noted    Assessment:   Patient Active Problem List   Diagnosis    Type 2 diabetes mellitus treated without insulin ([address]    Essential hypertension    Chronic kidney disease    Chronic renal insufficiency, stage III (moderate)    Intracranial arachnoid cyst    Headache    Numbness of face    Renal mass    Thyroid function test abnormal    Multiple nodules of lung    Recurrent right pleural effusion    Adrenal nodule (HCC)    Shortness of breath    Disease of lymph node    Respiratory distress    Chest tightness or pressure    Horseshoe kidney    Leukocytosis    Cerebral cyst    Pneumonia due to organism    Acute on chronic respiratory failure with hypoxemia (HCC)    Severe malnutrition (HCC)    SIADH (syndrome of inappropriate ADH production) (HCC)    Hyponatremia    Metastatic renal cell carcinoma (HCC)    Diarrhea    Clear cell renal cell carcinoma, right (HCC)    GI bleed    Nausea vomiting and diarrhea    Rectal hemorrhage    Secondary malignancy of cerebellum ([address]    Secondary malignant neoplasm of both lungs (HCC)    Secondary malignant tumor of pleura (HCC)    Thyroid nodule    Renal neoplasm    Dyspnea    Diabetes mellitus (HCC)    Hyperkalemia    SOB (shortness of breath)    Cancer associated pain     Assessment/Plan:   1. No change in CXR today. Pt still on 3L O2 NC. Will hold off on Pleur-X drain bc of recent infection for now. There is no fluid in the chest. Will discuss with Dr. Abelardo Mitchell when to proceed with Pleur-X drain.           Electronically signed by Anika Hickey PA-C on 5/18/2021 at 7:30 AM

## 2021-05-18 NOTE — PROGRESS NOTES
polyethylene glycol  17 g Oral Daily    famotidine  20 mg Oral Daily    [Held by provider] heparin (porcine)  5,000 Units Subcutaneous 3 times per day    levothyroxine  25 mcg Oral Daily    insulin lispro  0-6 Units Subcutaneous TID     insulin lispro  0-3 Units Subcutaneous Nightly    epoetin yumiko-epbx  10,000 Units Subcutaneous Once per day on      Continuous Infusions:    sodium chloride      dextrose       PRN Meds:  oxyCODONE-acetaminophen **OR** oxyCODONE-acetaminophen, ALPRAZolam, ondansetron, sodium chloride, ipratropium-albuterol, acetaminophen, promethazine, glucose, dextrose, glucagon (rDNA), dextrose    Input/Output:       I/O last 3 completed shifts: In: 700 [P.O.:700]  Out: 1075 [Urine:1075]. Patient Vitals for the past 96 hrs (Last 3 readings):   Weight   21 0500 195 lb 14.4 oz (88.9 kg)   05/15/21 0415 200 lb 14.4 oz (91.1 kg)       Vital Signs:   Temperature:  Temp: 98.2 °F (36.8 °C)  TMax:   Temp (24hrs), Av.7 °F (36.5 °C), Min:97.5 °F (36.4 °C), Max:98.2 °F (36.8 °C)    Respirations:  Resp: 16  Pulse:   Pulse: 102  BP:    BP: 124/76  BP Range: Systolic (81ICT), TDT:119 , Min:104 , FLA:380       Diastolic (22SFR), HUJ:52, Min:63, Max:76      Physical Examination:     General:  Awake, alert, frail appearing  HEENT: NC/AT/ MMM   Chest:               Bibasilar rales noted  Cardiac:  S1 S2   Abdomen: Soft, nontender  Neuro:  No facial droop, No Asterixis  SKIN:  No rashes, good skin turgor. Extremities:  Trace ankle edema noted    Labs:       No results for input(s): WBC, RBC, HGB, HCT, MCV, MCH, MCHC, RDW, PLT, MPV in the last 72 hours. BMP:   Recent Labs     21  0605 21  0551 21  0526   * 130* 132*   K 5.1 4.7 5.1   CL 88* 90* 91*   CO2 27 29 31   BUN 77* 71* 68*   CREATININE 2.2* 2.1* 1.6*   GLUCOSE 158* 141* 164*   CALCIUM 9.0 9.0 8.9      Phosphorus:   No results for input(s): PHOS in the last 72 hours.   Magnesium:    No results for input(s): MG in the last 72 hours. Albumin:    No results for input(s): LABALBU in the last 72 hours. BNP:    No results found for: BNP  MICHAEL:    No results found for: MICHAEL  SPEP:  Lab Results   Component Value Date    PROT 5.6 05/11/2021     UPEP:   No results found for: LABPE  C3:   No results found for: C3  C4:   No results found for: C4  MPO ANCA:   No results found for: MPO  PR3 ANCA:   No results found for: PR3  Anti-GBM:   No results found for: GBMABIGG  Hep BsAg:       No results found for: HEPBSAG  Hep C AB:        No results found for: HEPCAB    Urinalysis/Chemistries:      Lab Results   Component Value Date    NITRU NEGATIVE 05/17/2021    COLORU YELLOW 05/17/2021    PHUR 5.0 05/17/2021    LABCAST NONE SEEN 05/17/2021    WBCUA 0-2 05/17/2021    RBCUA 0-2 05/17/2021    MUCUS THREADS 07/22/2019    YEAST NONE SEEN 04/18/2021    BACTERIA NONE SEEN 05/17/2021    SPECGRAV 1.017 05/17/2021    LEUKOCYTESUR NEGATIVE 05/17/2021    UROBILINOGEN 1.0 05/17/2021    BILIRUBINUR NEGATIVE 05/17/2021    BLOODU NEGATIVE 05/17/2021    GLUCOSEU NEGATIVE 07/22/2019    KETUA NEGATIVE 05/17/2021    AMORPHOUS DEBRIS 04/18/2021     Urine Sodium:   No results found for: NEVAEH  Urine Potassium:  No results found for: KUR  Urine Chloride:  No results found for: CLUR  Urine Osmolarity:   Lab Results   Component Value Date    OSMOU 796 04/13/2021     Urine Protein:   No components found for: TOTALPROTEIN, URINE   Urine Creatinine:   No results found for: LABCREA  Urine Eosinophils:  No components found for: UEOS        Impression and Plan:  1. CAMILLE/CKD: likely AIN from [address]. On Pred 60 mg daily  -improving  -will keep Pred 60 mg x 2 weeks and then start reducing dose    2. Hyperkalemia:improved, hold Lokelma  3. Hyponatremia,hypervolemic + decreased free water excretion from CAMILLE. Will give another dose of Lasix 40 mg IVP today  4. Hypotension,continue Midodrine  5. Anemia  6. 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 Malignant pleural effusions  8. Ascites s/p paracentesis          Please don't hesitate to call with any questions.   Electronically signed by John Cook DO on 5/18/2021 at 7:27 AM

## 2021-05-18 NOTE — PROGRESS NOTES
Progress note: Infectious diseases    Patient - Josefina Constantino,  Age - 44 y.o.    - 1970      Room Number - 5K-03/003-A   MRN -  [id number]   Acct # - [de-identified]  Date of Admission -  2021  6:52 PM    SUBJECTIVE:   No new issues. he had paracentesis yesterday. OBJECTIVE   VITALS    height is 6' (1.829 m) and weight is 202 lb (91.6 kg). His oral temperature is 98.2 °F (36.8 °C). His blood pressure is 124/76 and his pulse is 102. His respiration is 16 and oxygen saturation is 100%. Wt Readings from Last 3 Encounters:   21 202 lb (91.6 kg)   21 203 lb 1.6 oz (92.1 kg)   21 192 lb 6.4 oz (87.3 kg)       I/O (24 Hours)    Intake/Output Summary (Last 24 hours) at 2021 0852  Last data filed at 2021 0059  Gross per 24 hour   Intake 700 ml   Output 1075 ml   Net -375 ml       General Appearance  Awake, alert, oriented,  Chronically sick looking. HEENT - normocephalic, atraumatic, pale conjunctiva,  anicteric sclera. Neck - Supple, no mass. Lungs - diminished breath sound. Cardiovascular - Heart sounds are normal.     Abdomen - less distended, non tender. Neurologic -oriented. Skin - No bruising or bleeding. Extremities - + edema.       MEDICATIONS:      furosemide  40 mg Intravenous Once    morphine  15 mg Oral Q8H    cephALEXin  500 mg Oral Q6H    lidocaine  2 patch Transdermal Daily    predniSONE  60 mg Oral Daily    midodrine  5 mg Oral TID WC    [Held by provider] sodium zirconium cyclosilicate  10 g Oral Daily    dronabinol  2.5 mg Oral BID    ipratropium-albuterol  1 ampule Inhalation Q4H    senna  2 tablet Oral Nightly    docusate sodium  100 mg Oral BID    polyethylene glycol  17 g Oral Daily    famotidine  20 mg Oral Daily    [Held by provider] heparin (porcine)  5,000 Units Subcutaneous 3 times per day    levothyroxine  25 mcg Oral Daily    insulin lispro  0-6 Units Subcutaneous TID     insulin lispro  0-3 Units Subcutaneous Nightly    epoetin yumiko-epbx  10,000 Units Subcutaneous Once per day on Mon Wed Fri      sodium chloride      dextrose       oxyCODONE-acetaminophen **OR** oxyCODONE-acetaminophen, ALPRAZolam, ondansetron, sodium chloride, ipratropium-albuterol, acetaminophen, promethazine, glucose, dextrose, glucagon (rDNA), dextrose      LABS:     CBC:   No results for input(s): WBC, HGB, PLT in the last 72 hours. BMP:    Recent Labs     05/16/21  0605 05/17/21  0551 05/18/21  0526   * 130* 132*   K 5.1 4.7 5.1   CL 88* 90* 91*   CO2 27 29 31   BUN 77* 71* 68*   CREATININE 2.2* 2.1* 1.6*   GLUCOSE 158* 141* 164*     Calcium:  Recent Labs     05/18/21  0526   CALCIUM 8.9   . No results for input(s): MG in the last 72 hours. Recent Labs     05/17/21  1144 05/17/21  1943 05/18/21  0808   POCGLU 139* 151* 142*    .     CULTURES:   UA:   Recent Labs     05/17/21  1530   SPECGRAV 1.017   PHUR 5.0   COLORU YELLOW   PROTEINU NEGATIVE   BLOODU NEGATIVE   RBCUA 0-2   WBCUA 0-2   BACTERIA NONE SEEN   NITRU NEGATIVE   BILIRUBINUR NEGATIVE   UROBILINOGEN 1.0   KETUA NEGATIVE   LABCAST NONE SEEN     Micro:   Lab Results   Component Value Date    BC No growth-preliminary No growth  04/14/2021          Problem list of patient:     Patient Active Problem List   Diagnosis Code    Type 2 diabetes mellitus treated without insulin ([facility] [address] E11.9    Essential hypertension I10    Chronic kidney disease N18.9    Chronic renal insufficiency, stage III (moderate) N18.30    Intracranial arachnoid cyst G93.0    Headache R51.9    Numbness of face R20.0    Renal mass N28.89    Thyroid function test abnormal R94.6    Multiple nodules of lung R91.8    Recurrent right pleural effusion J90    Adrenal nodule (HCC) E27.8    Shortness of breath R06.02    Disease of lymph node I89.9    Respiratory distress R06.03    Chest tightness or pressure R07.89    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    Leukocytosis D72.829    Cerebral cyst G93.0    Pneumonia due to organism J18.9    Acute on chronic respiratory failure with hypoxemia (HCC) J96.21    Severe malnutrition (HCC) E43    SIADH (syndrome of inappropriate ADH production) (HCC) E22.2    Hyponatremia E87.1    Metastatic renal cell carcinoma (HCC) C64.9    Diarrhea R19.7    Clear cell renal cell carcinoma, right (HCC) C64.1    GI bleed K92.2    Nausea vomiting and diarrhea R11.2, R19.7    Rectal hemorrhage K62.5    Secondary malignancy of cerebellum (HCC) C79.31    Secondary malignant neoplasm of both lungs (HCC) C78.01, C78.02    Secondary malignant tumor of pleura (HCC) C78.2    Thyroid nodule E04.1    Renal neoplasm D49.519    Dyspnea R06.00    Diabetes mellitus (HCC) E11.9    Hyperkalemia E87.5    SOB (shortness of breath) R06.02    Cancer associated pain G89.3         ASSESSMENT/PLAN   Metastatic renal cancer  Recurrent pleural effusion: infected pleurex catheter was removed  Abdominal distension due to progression of his disease: he had paracentesis   Long term prognosis is poor. Recommend as needed drainage of fluid.           Horace Ram MD, MD, FACP 5/18/2021 8:52 AM 23-May-2019 23:59

## 2021-05-18 NOTE — PROGRESS NOTES
Progress note      [facility]             Patient:  Millard Nissen  YOB: 1970    MRN: [id number]   Acct:  [de-identified]   5K-03/003-A  Primary Care Physician: CAITY Hicks - CNP    Admit Date: 5/9/2021           Subjective: Pt resting in bed. Pt wheezing s/p breathing treatment. He had paracentesis yesterday, abdomen full again.        Objective:      Physical Exam:    Vitals:  Patient Vitals for the past 24 hrs:   BP Temp Temp src Pulse Resp SpO2 Weight   05/18/21 1424 119/63 97.4 °F (36.3 °C) Oral 114 16 95 % --   05/18/21 0945 113/70 97.6 °F (36.4 °C) Oral 110 17 98 % --   05/18/21 0838 -- -- -- -- -- 100 % --   05/18/21 0600 -- -- -- -- -- -- 202 lb (91.6 kg)   05/18/21 0330 124/76 98.2 °F (36.8 °C) Oral 102 16 100 % --   05/18/21 0043 104/63 97.6 °F (36.4 °C) Oral 99 18 97 % --   05/18/21 0035 -- -- -- -- 18 98 % --   05/17/21 2113 -- -- -- -- 20 98 % --   05/17/21 2030 119/72 97.5 °F (36.4 °C) Oral 102 20 98 % --     Weight: Weight: 202 lb (91.6 kg)     24 hour intake/output:    Intake/Output Summary (Last 24 hours) at 5/18/2021 1708  Last data filed at 5/18/2021 1512  Gross per 24 hour   Intake 900 ml   Output 1750 ml   Net -850 ml       General appearance - alert, ill appearing, and in mild distress  Eyes - pupils equal and reactive, extraocular eye movements intact  Mouth - mucous membranes moist, pharynx normal without lesions  Neck - supple, no significant adenopathy  Chest - Wheezes + Crackles at bases  Heart -Sinus tachycardia,  normal S1, S2, no murmurs, rubs, clicks or gallops  Abdomen -distended, moderate to severe ascites  Neurological - alert, oriented, normal speech, no focal findings or movement disorder noted  Musculoskeletal - no joint tenderness, deformity or swelling  Extremities - peripheral pulses normal, trace edema  Skin - normal coloration and turgor, no rashes, no suspicious skin

## 2021-05-19 PROBLEM — C78.6 PERITONEAL CARCINOMATOSIS (HCC): Status: ACTIVE | Noted: 2021-01-01

## 2021-05-19 PROBLEM — R18.0 MALIGNANT ASCITES: Status: ACTIVE | Noted: 2021-01-01

## 2021-05-19 NOTE — PROGRESS NOTES
INTERNAL MEDICINE SPECIALTIES  Progress Note For Dr Jessenia Arroyo       Patient:  Favio Valderrama  YOB: 1970  Date of Service: 5/19/2021  MRN: [id number]   Acct:  [de-identified]   Primary Care Physician: CAITY Manzano CNP    SUBJECTIVE:Feels about the same    Home Medications:   No current facility-administered medications on file prior to encounter. Current Outpatient Medications on File Prior to Encounter   Medication Sig Dispense Refill    levothyroxine (SYNTHROID) 25 MCG tablet Take 25 mcg by mouth Daily      oxyCODONE-acetaminophen (PERCOCET) 5-325 MG per tablet Take 1 tablet by mouth every 6 hours as needed for Pain.  famotidine (PEPCID) 20 MG tablet Take 1 tablet by mouth daily 30 tablet 0    ipratropium-albuterol (DUONEB) 0.5-2.5 (3) MG/3ML SOLN nebulizer solution Inhale 3 mLs into the lungs every 4 hours as needed for Shortness of Breath 360 mL 0    midodrine (PROAMATINE) 2.5 MG tablet Take 1 tablet by mouth 3 times daily (with meals) 90 tablet 0    predniSONE (DELTASONE) 20 MG tablet Take 2 tablets by mouth daily F/u with Dr William Beal regarding continuing dose at followup 60 tablet 0    traMADol (ULTRAM) 50 MG tablet Take 50 mg by mouth every 6 hours as needed for Pain.       traZODone (DESYREL) 50 MG tablet Take 50 mg by mouth nightly      polyethylene glycol (GLYCOLAX) 17 g packet Take 17 g by mouth daily as needed for Constipation 527 g 0    ALLOPURINOL PO Take 100 mg by mouth nightly            Scheduled Meds:   bumetanide  1 mg Oral Daily    morphine  15 mg Oral Q8H    cephALEXin  500 mg Oral Q6H    lidocaine  2 patch Transdermal Daily    predniSONE  60 mg Oral Daily    midodrine  5 mg Oral TID WC    [Held by provider] sodium zirconium cyclosilicate  10 g Oral Daily    dronabinol  2.5 mg Oral BID    ipratropium-albuterol  1 ampule Inhalation Q4H    senna  2 tablet Oral Nightly    docusate sodium  100 mg Oral BID    polyethylene glycol  17 g Oral Daily    famotidine  20 mg Oral Daily    [Held by provider] heparin (porcine)  5,000 Units Subcutaneous 3 times per day    levothyroxine  25 mcg Oral Daily    insulin lispro  0-6 Units Subcutaneous TID WC    insulin lispro  0-3 Units Subcutaneous Nightly    epoetin yumiko-epbx  10,000 Units Subcutaneous Once per day on Mon Wed Fri     Continuous Infusions:   sodium chloride      dextrose       PRN Meds:oxyCODONE-acetaminophen **OR** oxyCODONE-acetaminophen, ALPRAZolam, ondansetron, sodium chloride, ipratropium-albuterol, acetaminophen, promethazine, glucose, dextrose, glucagon (rDNA), dextrose        Allergies:  Patient has no known allergies. OBJECTIVE:    Vitals:   Vitals:    05/19/21 0913   BP:    Pulse:    Resp:    Temp:    SpO2: 95%      BMI: Body mass index is 27.4 kg/m². PHYSICAL EXAMINATION:              General appearance: ill looking male mild respiratory distress*, appears stated age and cooperative. HEENT:  Normal cephalic, atraumatic without obvious deformity. Pupils equal, round, and reactive to light. Extra ocular muscles intact. Conjunctivae/corneas clear. Neck: Supple. Chest:  Dressing over right chest clean and dry  Respiratory:  Diminished air entry bilaterally  Cardiovascular:  Regular rhythm with normal S1/S2 without  rubs or gallops. Abdomen:  Distended,soft,nontender, with normal bowel sounds. Musculoskeletal:  No clubbing, cyanosis or ankle edema bilaterally.    Neurologic:   Alert and oriented x3  Moves all switched extremities spontaneously  Psychiatric: Thought content appropriate, normal insight  Review of Labs and Diagnostic Testing:    Recent Results (from the past 24 hour(s))   POCT glucose    Collection Time: 05/18/21 11:19 AM   Result Value Ref Range    POC Glucose 175 (H) 70 - 108 mg/dl   POCT glucose    Collection Time: 05/18/21  4:38 PM   Result Value Ref Range    POC Glucose 250 (H) 70 - 108 mg/dl   CBC auto differential    Collection Time: 05/18/21  4:58 PM Result Value Ref Range    WBC 14.2 (H) 4.8 - 10.8 thou/mm3    RBC 3.07 (L) 4.70 - 6.10 mill/mm3    Hemoglobin 8.4 (L) 14.0 - 18.0 gm/dl    Hematocrit 28.3 (L) 42.0 - 52.0 %    MCV 92.2 80.0 - 94.0 fL    MCH 27.4 26.0 - 33.0 pg    MCHC 29.7 (L) 32.2 - 35.5 gm/dl    RDW-CV 16.8 (H) 11.5 - 14.5 %    RDW-SD 56.5 (H) 35.0 - 45.0 fL    Platelets 069 932 - 877 thou/mm3    MPV 10.1 9.4 - 12.4 fL    Seg Neutrophils 90.2 %    Lymphocytes 2.7 %    Monocytes 3.7 %    Eosinophils 0.0 %    Basophils 0.2 %    Immature Granulocytes 3.2 %    Segs Absolute 12.8 (H) 1 - 7 thou/mm3    Lymphocytes Absolute 0.4 (L) 1.0 - 4.8 thou/mm3    Monocytes Absolute 0.5 0.4 - 1.3 thou/mm3    Eosinophils Absolute 0.0 0.0 - 0.4 thou/mm3    Basophils Absolute 0.0 0.0 - 0.1 thou/mm3    Immature Grans (Abs) 0.46 (H) 0.00 - 0.07 thou/mm3    nRBC 0 /100 wbc   POCT glucose    Collection Time: 05/18/21  8:37 PM   Result Value Ref Range    POC Glucose 199 (H) 70 - 108 mg/dl   Basic Metabolic Panel    Collection Time: 05/19/21  5:29 AM   Result Value Ref Range    Sodium 133 (L) 135 - 145 meq/L    Potassium 4.7 3.5 - 5.2 meq/L    Chloride 89 (L) 98 - 111 meq/L    CO2 33 23 - 33 meq/L    Glucose 149 (H) 70 - 108 mg/dL    BUN 70 (H) 7 - 22 mg/dL    CREATININE 1.7 (H) 0.4 - 1.2 mg/dL    Calcium 9.2 8.5 - 10.5 mg/dL   Anion Gap    Collection Time: 05/19/21  5:29 AM   Result Value Ref Range    Anion Gap 11.0 8.0 - 16.0 meq/L   Glomerular Filtration Rate, Estimated    Collection Time: 05/19/21  5:29 AM   Result Value Ref Range    Est, Glom Filt Rate 42 (A) ml/min/1.73m2   POCT glucose    Collection Time: 05/19/21  7:43 AM   Result Value Ref Range    POC Glucose 149 (H) 70 - 108 mg/dl       Radiology:     Echocardiogram limited    Result Date: 5/10/2021  Transthoracic Echocardiography Report (TTE)  Demographics   Patient Name   [address] [place] [address],[address] Gender              Male                 P   MR #           [id number]         Race                 Ethnicity   Account #      [de-identified]         Room Number         0003   Accession      [id number]        Date of Study       05/10/2021  Number   Date of Birth  1970        Referring Physician Rizwan Chavis, [facility] Krissy Lyon   Age            44 year(s)        [address] [facility], [facility]                                    Interpreting        Echo reader of the                                   Physician           martinez Lisa MD  Procedure Type of Study   TTE procedure:ECHOCARDIOGRAM LIMITED. Procedure Date Date: 05/10/2021 Start: 12:53 PM Study Location: Bedside Technical Quality: Limited visualization due to poor acoustical window. Indications:Pericardial effusion. Additional Medical History:Shortness of breath, diabetic, hypertension, chronic kidney disease, renal cell cancer with METS, recent Covid, hypothyroidism Patient Status: Routine Height: 72 inches Weight: 203 pounds BSA: 2.14 m^2 BMI: 27.53 kg/m^2 BP: 113/71 mmHg  Conclusions   Summary  Normal left ventricle size and systolic function. Ejection fraction was  estimated at 65 %. There were no regional left ventricular wall motion  abnormalities and wall thickness was within normal limits. There is trivial posterior pericardial effusion with no evidence of  hemodynamic compromise. Signature   ----------------------------------------------------------------  Electronically signed by Carlton Lisa MD (Interpreting  physician) on 05/10/2021 at 06:35 PM  ----------------------------------------------------------------   Findings   Mitral Valve  The mitral valve structure was normal with normal leaflet separation. Aortic Valve  The aortic valve was trileaflet with normal thickness and cuspal  separation.    Tricuspid Valve  The tricuspid valve structure was normal with normal leaflet separation. Pulmonic Valve  The pulmonic valve leaflets exhibited normal thickness, no calcification,  and normal cuspal separation. Left Atrium  Left atrial size was normal.   Left Ventricle  Normal left ventricle size and systolic function. Ejection fraction was  estimated at 65 %. There were no regional left ventricular wall motion  abnormalities and wall thickness was within normal limits. Right Atrium  Right atrial size was normal.   Right Ventricle  The right ventricular size was normal with normal systolic function and  wall thickness. Pericardial Effusion  There is trivial posterior pericardial effusion with no evidence of  hemodynamic compromise. Pleural Effusion  No evidence of pleural effusion. Aorta / Great Vessels  -Aortic root dimension within normal limits.  -The Pulmonary artery is within normal limits. -IVC size is within normal limits with normal respiratory phasic changes. M-Mode/2D Measurements & Calculations   LV Diastolic    LV Systolic Dimension: 3.5   AV Cusp Separation: 2.3 cmLA  Dimension: 5 cm cm                           Dimension: 3.1 cmAO Root  LV FS:30 %      LV Volume Diastolic: 269 ml  Dimension: 3.9 cm  LV PW           LV Volume Systolic: 14.2 ml  Diastolic: 0.8  LV EDV/LV EDV Index: 118  cm              ml/55 m^2LV ESV/LV ESV  Septum          Index: 50.9 ml/24 m^2        RV Diastolic Dimension: 2.7  Diastolic: 0.8  EF Calculated: 56.9 %        cm  cm                                               LA/Aorta: 0.79  [email or web address] [id number]    CT ABDOMEN PELVIS WO CONTRAST Additional Contrast? None    Result Date: 5/9/2021  CT SCAN OF THE ABDOMEN AND PELVIS WITHOUT CONTRAST COMPARISON: 4/13/2021. TECHNIQUE: A noncontrast CT scan of the abdomen and pelvis was performed. A dose reduction technique was utilized. FINDINGS: CT ABDOMEN: Images of the lung bases demonstrate a small to moderate loculated right pleural effusion with an associated right-sided chest tube. 2.6 cm mass is noted within the right pleural effusion on axial image 1 which is nonspecific but could represent neoplastic disease or perhaps proteinaceous material.  A smaller lesion in the right pleural space measures 8 mm on image 3. There is also a loculated small left pleural effusion. Small pericardial effusion is noted. Numerous nodules/masses are noted within the lung bases, compatible with neoplastic disease. For example in the right lower lobe is a 4.1 cm lesion on axial image 1 minus does not appear to be significantly changed. Lytic destructive change of multiple left lower ribs is again noted, compatible with the patient's known metastatic disease. This does not appear to be significantly changed. There are no focal liver lesions. The patient is status post cholecystectomy. The pancreas is unremarkable. There are calcified granulomas in the spleen. Right adrenal mass measures 2.0 x 1.0 cm, increased since the prior study when it measured 1.7 x 0.9 cm. This is compatible with metastatic disease. Left-sided adrenal metastases are noted. One of these, seen on axial image 25, is increased in size and measures 1.1 cm on the current exam.  Previously it measured 0.9 cm. Exophytic solid mass arising from the right renal moiety measures 4.4 x 4.0 cm on axial image 38, this does not appear to be significantly changed. Horseshoe kidney is noted. Abdominal aorta is normal in caliber without evidence of an aneurysm. Moderate volume of ascites is noted in the abdomen/pelvis, increased since the prior study. Changes of peritoneal carcinomatosis are more prominent on this exam, for example seen on axial image 77.  CT PELVIS: Lytic destructive change in the right iliac bone is again noted on axial image 72, this does not appear to be significantly Will benefit from scheduled outpatient abdominal paracenteses    Continue to monitor electrolytes and  Correct accordingly. Nephrology service following. No plans for CRRT. On steroids for AIN. Currently on Bumex    Continue bronchodilators. status post removal of PleurX catheter, VATS and pleurodesis. Complete  Antibiotics    Continue Marinol for his appetite.         Continue current pain management for metastatic disease    Patient's prognosis is very poor    DVT prophylaxis: [] Lovenox                                 [] SCDs                                 [] SQ Heparin                                 [] Encourage ambulation, low risk for DVT, no chemical or mechanical prophylaxis necessary              [] Already on Anticoagulation                Anticipated Disposition upon discharge: [] Home                                                                         [] Home with Home Health                                                                         [] [facility]                                                                         [] [address]          Electronically signed by Constantin Barkley MD on 5/19/2021 at 10:36 AM

## 2021-05-19 NOTE — PROGRESS NOTES
Pain Management    Progress Note      5/19/2021 3:51 PM     · SUBJECTIVE:    · Chief complaint: Cancer pain, pleural pain, abdominal pain   · Recently returned from paracentesis. Resting in bed fatigued today. At this time denies any pain. · Pain seems adequately controlled with current medication regimen. In the past 24 hours has used 5 doses of Percocet all 5/325 mg tabs.  Resting comfortably today   · Pain rating is 0/10 at this time  · Constipation: + BM 5/13/2021    Current medications:   Current Facility-Administered Medications   Medication Dose Route Frequency Provider Last Rate Last Admin    bumetanide (BUMEX) tablet 1 mg  1 mg Oral Daily Kely Milliganmelgarn, DO   1 mg at 05/19/21 0933    morphine (MS CONTIN) extended release tablet 15 mg  15 mg Oral Q8H Angelo Solo, APRN - CNP   15 mg at 05/19/21 0217    oxyCODONE-acetaminophen (PERCOCET) 5-325 MG per tablet 1 tablet  1 tablet Oral Q4H PRN Angelo Solo, APRN - CNP   1 tablet at 05/19/21 0908    Or    oxyCODONE-acetaminophen (PERCOCET) 7.5-325 MG per tablet 1 tablet  1 tablet Oral Q4H PRN Angelo Solo, APRN - CNP        cephALEXin (KEFLEX) capsule 500 mg  500 mg Oral Q6H Kaitlin Murray MD   500 mg at 05/19/21 0442    lidocaine 4 % external patch 2 patch  2 patch Transdermal Daily Juanjo Dave MD   2 patch at 05/16/21 2144    predniSONE (DELTASONE) tablet 60 mg  60 mg Oral Daily Kely Jimgarn, DO   60 mg at 05/19/21 0752    midodrine (PROAMATINE) tablet 5 mg  5 mg Oral TID  Kely HILLS Hemmelgarn, DO   5 mg at 05/19/21 0752    [Held by provider] sodium zirconium cyclosilicate (LOKELMA) oral suspension 10 g  10 g Oral Daily Kely HILLS Hemmelgarn, DO   10 g at 05/15/21 0823    ALPRAZolam (XANAX) tablet 0.25 mg  0.25 mg Oral Q8H PRN Juanjo Dave MD   0.25 mg at 05/19/21 0442    dronabinol (MARINOL) capsule 2.5 mg  2.5 mg Oral BID Juanjo Dave MD   2.5 mg at 05/19/21 0934    ipratropium-albuterol (Charles Rider) nebulizer solution 1 ampule  1 ampule Inhalation Q4H Joe Mcgraw MD   1 ampule at 05/19/21 1317    senna (SENOKOT) tablet 17.2 mg  2 tablet Oral Nightly Jose Raul Gómez CAITY - CNP   17.2 mg at 05/18/21 2016    docusate sodium (COLACE) capsule 100 mg  100 mg Oral BID Jose Raul Gómez APRN - CNP   100 mg at 05/19/21 0749    polyethylene glycol (GLYCOLAX) packet 17 g  17 g Oral Daily Jose Raul GómezCAITY - CNP   17 g at 05/19/21 0748    ondansetron (ZOFRAN) injection 4 mg  4 mg Intravenous Q6H PRN Obie Ortiz MD   4 mg at 05/18/21 1639    0.9 % sodium chloride infusion   Intravenous PRN Obie Ortiz MD        famotidine (PEPCID) tablet 20 mg  20 mg Oral Daily Joe Mcgraw MD   20 mg at 05/19/21 0749    ipratropium-albuterol (DUONEB) nebulizer solution 3 mL  3 mL Inhalation Q4H PRN Joe Mcgraw MD   3 mL at 05/17/21 2113    acetaminophen (TYLENOL) tablet 650 mg  650 mg Oral Q4H PRN Joe Mcgraw MD   650 mg at 05/12/21 1919    [Held by provider] heparin (porcine) injection 5,000 Units  5,000 Units Subcutaneous 3 times per day Joe Mcgraw MD   5,000 Units at 05/11/21 0645    promethazine (PHENERGAN) tablet 25 mg  25 mg Oral Q6H PRN Joe Mcgraw MD   25 mg at 05/17/21 1519    levothyroxine (SYNTHROID) tablet 25 mcg  25 mcg Oral Daily Joe Mcgraw MD   25 mcg at 05/19/21 0443    insulin lispro (HUMALOG) injection vial 0-6 Units  0-6 Units Subcutaneous TID WC Ray Meth, APRN - CNP   1 Units at 05/19/21 0810    insulin lispro (HUMALOG) injection vial 0-3 Units  0-3 Units Subcutaneous Nightly Ray Meth, APRN - CNP   1 Units at 05/18/21 2108    epoetin yumiko-epbx (RETACRIT) injection 10,000 Units  10,000 Units Subcutaneous Once per day on Mon Wed Wilma Khalil MD   10,000 Units at 05/19/21 0804    glucose (GLUTOSE) 40 % oral gel 15 g  15 g Oral PRN Mayra Overall, PA-C        dextrose 50 % IV solution  12.5 g Intravenous PRN Mayra Overall, PA-C        glucagon (rDNA) injection 1 mg  1 mg Intramuscular PRN Georganne Overall, PA-C        dextrose 5 % solution  100 mL/hr Intravenous PRN Georganne Overall, PA-C               REVIEW OF SYSTEMS:  CONSTITUTIONAL:  positive for  fatigue, weight loss and decraesed appetite   EYES:  negative  HE ENT:  negative  RESPIRATORY:  positive for  dry cough, chest pain, pleuritic pain and shortness of breath on oxygen   CARDIOVASCULAR:  positive for chest pain  GASTROINTESTINAL:  Constipation, + BM 5/13/2021, + abdominal pain, ascites    GENITOURINARY:  negative  SKIN:  negative  HEMATOLOGIC/LYMPHATIC:  negative  MUSCULOSKELETAL:  positive for  myalgias, arthralgias, pain, decreased range of motion, muscle weakness and bone pain  NEUROLOGICAL:  negative  BEHAVIOR/PSYCH:  anxiety   System review otherwise negative      PHYSICAL EXAM:  /76   Pulse 117   Temp 97.6 °F (36.4 °C) (Oral)   Resp 18   Ht 6' (1.829 m)   Wt 202 lb (91.6 kg)   SpO2 96%   BMI 27.40 kg/m²  I Body mass index is 27.4 kg/m². I   Wt Readings from Last 1 Encounters:   05/18/21 202 lb (91.6 kg)      awake  Orientation:   person, place, time  Mood: anxious   Affect: anxious   General appearance: appearing stated age, mild distress, ill appearing      Memory:   normal,   Attention/Concentration: normal  Language:  normal     Cranial Nerves:  cranial nerves II-XII are grossly intact  ROM:  Normal all 4 extremities   Motor Exam:  Motor exam is 5 out of 5 all extremities with the exception of 4/5 lower extremities   Tone:  normal  + tenderness, left side, mid back and flank      Heart: tachy rate, regular rhythm, normal S1, S2, no murmurs, rubs, clicks or gallops, + chest wall tenderness   Lungs: Diminished, clear to auscultation without wheezes or rales.  On 3 liters of oxygen per nasal cannula, short of breath at rest   Abdomen: soft, distended, normal bowel sounds, no masses or organomegaly     Skin: warm and dry, no rash or erythema, right side dressing where plural drain was   Peripheral vascular: Pulses: Normal upper and lower extremity pulses; Edema: trace       DATA    Recent Labs     05/18/21  1658   WBC 14.2*   RBC 3.07*   HGB 8.4*   HCT 28.3*   MCV 92.2   MCH 27.4   MCHC 29.7*      MPV 10.1     Recent Labs     05/17/21  0551 05/18/21  0526 05/19/21  0529   * 132* 133*   K 4.7 5.1 4.7   CL 90* 91* 89*   CO2 29 31 33   BUN 71* 68* 70*   CREATININE 2.1* 1.6* 1.7*   GLUCOSE 141* 164* 149*   CALCIUM 9.0 8.9 9.2     Recent Labs     05/18/21  2037 05/19/21  0743 05/19/21  1144   POCGLU 199* 149* 171*       ASSESSMENT   1. Cancer associated pain   2. Metastatic renal cell cancer  3. Pleural effusion   4. Shortness of breath   5. Bone pain   6. Opioid induced constipation       PLAN  1. Continue pain management   2. No changes today   3. Continue MS Contin 15 mg scheduled every 8 hours for better maintenance pain relief  4. Continue tylenol 650 mg every 4 hours as needed for mild pain of 1-3/10, Continue Percocet 5/325 mg tabs to 7.5/325 mg tabs every 4 hours as needed for moderate to severe breakthrough pain of 4-10/10, 5/325 mg tabs for moderate pain of 4-6/10 or 7.5/325 tabs for sever pain of 7-10/10.     5. Continue Lidoderm patches   6. Bowel program- started Movantik 12.5 mg daily   7. Will continue to follow and make changes as needed.      Spent 16 minutes evaluating and examining patient and completing documentation      CAITY Raphael - CNP, 5/19/2021, 3:51 PM

## 2021-05-19 NOTE — PROGRESS NOTES
CT/CV Surgery Progress Note    2021 7:51 AM  Surgeon:  Dr. Dana Malik     Subjective:  Mr. Kelechi Eubanks  is a 48year old male with a history of metastatic kidney cancer, status post right pleural Pleurx catheter insertion at [facility] in  for recurrent right pleural effusion. Mr. Kelechi Eubanks was admitted to [address] [place] [address] one month ago due to reaccumulation of right pleural effusion with malfunctioning of right Pleurx cath and SOB. The Pleur-X was flushed with TPA/Dornase and was working appropriately. He was readmitted on 2021 for pneumonia with sepsis, pleural effusion with malfunctioning Pleurx catheter. The Pleurx catheter was removed  due to infection. He is having left sided abdominal pain secondary to distention. He is currently still on 3L O2 NC and has been for past several days. He requires intermittent paracentesis and possibly will require intermittent thoracocentesis in the future. Vital Signs: /75   Pulse 113   Temp 97.4 °F (36.3 °C) (Oral)   Resp 18   Ht 6' (1.829 m)   Wt 202 lb (91.6 kg)   SpO2 95%   BMI 27.40 kg/m²    Temp (24hrs), Av.5 °F (36.4 °C), Min:97.3 °F (36.3 °C), Max:97.7 °F (36.5 °C)    Labs:   CBC:   Recent Labs     21  1658   WBC 14.2*   HGB 8.4*   HCT 28.3*   MCV 92.2        BMP:   Recent Labs     21  0551 21  0526 21  0529   * 132*  --  133*   K 4.7 5.1  --  4.7   CL 90* 91*  --  89*   CO2 29 31  --  33   BUN 71* 68*  --  70*   CREATININE 2.1* 1.6*  --  1.7*   POCGLU  --   --  199*  --      Imaging: CXR 21  Slightly increased right lower lung consolidation. No other significant interval change from 2021.            Intake/Output Summary (Last 24 hours) at 2021 0751  Last data filed at 2021 0734  Gross per 24 hour   Intake 600 ml   Output 1350 ml   Net -750 ml     Scheduled Meds:    bumetanide  1 mg Oral Daily    morphine  15 mg Oral Q8H    cephALEXin  500 mg Oral Q6H    lidocaine  2 patch Transdermal Daily    predniSONE  60 mg Oral Daily    midodrine  5 mg Oral TID     [Held by provider] sodium zirconium cyclosilicate  10 g Oral Daily    dronabinol  2.5 mg Oral BID    ipratropium-albuterol  1 ampule Inhalation Q4H    senna  2 tablet Oral Nightly    docusate sodium  100 mg Oral BID    polyethylene glycol  17 g Oral Daily    famotidine  20 mg Oral Daily    [Held by provider] heparin (porcine)  5,000 Units Subcutaneous 3 times per day    levothyroxine  25 mcg Oral Daily    insulin lispro  0-6 Units Subcutaneous TID     insulin lispro  0-3 Units Subcutaneous Nightly    epoetin yumiko-epbx  10,000 Units Subcutaneous Once per day on Mon Wed Fri     ROS: All neg unless specifically mentioned in subjective section. Exam:  General Appearance: alert ,conversing, in no acute distress  Cardiovascular: normal rate, regular rhythm.   Pulmonary/Chest: right basilar crackles  Neurological: alert, oriented, normal speech, no focal findings or movement disorder noted    Assessment:   Patient Active Problem List   Diagnosis    Type 2 diabetes mellitus treated without insulin ([facility] [address]    Essential hypertension    Chronic kidney disease    Chronic renal insufficiency, stage III (moderate)    Intracranial arachnoid cyst    Headache    Numbness of face    Renal mass    Thyroid function test abnormal    Multiple nodules of lung    Recurrent right pleural effusion    Adrenal nodule (HCC)    Shortness of breath    Disease of lymph node    Respiratory distress    Chest tightness or pressure    Horseshoe kidney    Leukocytosis    Cerebral cyst    Pneumonia due to organism    Acute on chronic respiratory failure with hypoxemia (HCC)    Severe malnutrition (HCC)    SIADH (syndrome of inappropriate ADH production) (HCC)    Hyponatremia    Metastatic renal cell carcinoma (HCC)    Diarrhea    Clear cell renal cell carcinoma, right (HCC)    GI bleed    Nausea vomiting and diarrhea    Rectal hemorrhage    Secondary malignancy of cerebellum (HCC)    Secondary malignant neoplasm of both lungs (HCC)    Secondary malignant tumor of pleura (HCC)    Thyroid nodule    Renal neoplasm    Dyspnea    Diabetes mellitus (HCC)    Hyperkalemia    SOB (shortness of breath)    Cancer associated pain     Assessment/Plan:   1. Pt has recurrent fluid collections that will require intermittent paracentesis and possible thoracocentesis with his presenting medical condition. I have discussed his situation with Dr. Kaushik Weaver and he states that we will hold off on continuation of following patient at this time. We will sign off at this time. Thank you for the consultation and let us know if the need for Pleur-X catheter is needed in the future.     Electronically signed by Esther Matthew PA-C on 5/19/2021 at 7:51 AM

## 2021-05-19 NOTE — PROGRESS NOTES
Renal Progress Note  [facility]    Patient :  Joselin Saenz; 44 y.o. MRN# [id number]  Location:  5-03/003-A  Attending:  Ross Ivey MD  Admit Date:  5/9/2021   Hospital Day: 10      Subjective:     Nephrology is following the patient for CAMILLE, hyperkalemia. Patient was seen and examined earlier this morning. His abdomen is feeling full again. Outpatient Medications:     Medications Prior to Admission: levothyroxine (SYNTHROID) 25 MCG tablet, Take 25 mcg by mouth Daily  oxyCODONE-acetaminophen (PERCOCET) 5-325 MG per tablet, Take 1 tablet by mouth every 6 hours as needed for Pain.  famotidine (PEPCID) 20 MG tablet, Take 1 tablet by mouth daily  ipratropium-albuterol (DUONEB) 0.5-2.5 (3) MG/3ML SOLN nebulizer solution, Inhale 3 mLs into the lungs every 4 hours as needed for Shortness of Breath  midodrine (PROAMATINE) 2.5 MG tablet, Take 1 tablet by mouth 3 times daily (with meals)  predniSONE (DELTASONE) 20 MG tablet, Take 2 tablets by mouth daily F/u with Dr Mylene Pat regarding continuing dose at followup  traMADol (ULTRAM) 50 MG tablet, Take 50 mg by mouth every 6 hours as needed for Pain.   traZODone (DESYREL) 50 MG tablet, Take 50 mg by mouth nightly  polyethylene glycol (GLYCOLAX) 17 g packet, Take 17 g by mouth daily as needed for Constipation  [DISCONTINUED] levothyroxine (SYNTHROID) 125 MCG tablet, Take 0.5 tablets by mouth Daily  ALLOPURINOL PO, Take 100 mg by mouth nightly     Current Medications:     Scheduled Meds:    bumetanide  1 mg Oral Daily    morphine  15 mg Oral Q8H    cephALEXin  500 mg Oral Q6H    lidocaine  2 patch Transdermal Daily    predniSONE  60 mg Oral Daily    midodrine  5 mg Oral TID WC    [Held by provider] sodium zirconium cyclosilicate  10 g Oral Daily    dronabinol  2.5 mg Oral BID    ipratropium-albuterol  1 ampule Inhalation Q4H    senna  2 tablet Oral Nightly    docusate sodium  100 mg Oral BID    polyethylene glycol  17 g Oral Daily    famotidine  20 mg Oral Daily    [Held by provider] heparin (porcine)  5,000 Units Subcutaneous 3 times per day    levothyroxine  25 mcg Oral Daily    insulin lispro  0-6 Units Subcutaneous TID     insulin lispro  0-3 Units Subcutaneous Nightly    epoetin yumiko-epbx  10,000 Units Subcutaneous Once per day on      Continuous Infusions:    sodium chloride      dextrose       PRN Meds:  oxyCODONE-acetaminophen **OR** oxyCODONE-acetaminophen, ALPRAZolam, ondansetron, sodium chloride, ipratropium-albuterol, acetaminophen, promethazine, glucose, dextrose, glucagon (rDNA), dextrose    Input/Output:       I/O last 3 completed shifts: In: 600 [P.O.:600]  Out: 1100 [Urine:1100]. Patient Vitals for the past 96 hrs (Last 3 readings):   Weight   21 0600 202 lb (91.6 kg)   21 0500 195 lb 14.4 oz (88.9 kg)       Vital Signs:   Temperature:  Temp: 97.4 °F (36.3 °C)  TMax:   Temp (24hrs), Av.5 °F (36.4 °C), Min:97.3 °F (36.3 °C), Max:97.7 °F (36.5 °C)    Respirations:  Resp: 18  Pulse:   Pulse: 113  BP:    BP: 116/75  BP Range: Systolic (79CBX), PSW:824 , Min:116 , ATU:391       Diastolic (65ICX), ZBF:47, Min:63, Max:81      Physical Examination:     General:  Awake, alert, frail appearing  HEENT: NC/AT/ MMM   Chest:               Bibasilar rales noted  Cardiac:  S1 S2   Abdomen: Soft, +distention, nontender  Neuro:  No facial droop, No Asterixis  SKIN:  No rashes, good skin turgor. Extremities:  Trace ankle edema noted    Labs:       Recent Labs     21  1658   WBC 14.2*   RBC 3.07*   HGB 8.4*   HCT 28.3*   MCV 92.2   MCH 27.4   MCHC 29.7*      MPV 10.1      BMP:   Recent Labs     21  0551 21  0526 21  0529   * 132* 133*   K 4.7 5.1 4.7   CL 90* 91* 89*   CO2 29 31 33   BUN 71* 68* 70*   CREATININE 2.1* 1.6* 1.7*   GLUCOSE 141* 164* 149*   CALCIUM 9.0 8.9 9.2      Phosphorus:   No results for input(s): PHOS in the last 72 hours.   Magnesium:    No results for input(s): MG in the last 72 hours. Albumin:    No results for input(s): LABALBU in the last 72 hours. BNP:    No results found for: BNP  MICHAEL:    No results found for: MICHAEL  SPEP:  Lab Results   Component Value Date    PROT 5.6 05/11/2021     UPEP:   No results found for: LABPE  C3:   No results found for: C3  C4:   No results found for: C4  MPO ANCA:   No results found for: MPO  PR3 ANCA:   No results found for: PR3  Anti-GBM:   No results found for: GBMABIGG  Hep BsAg:       No results found for: HEPBSAG  Hep C AB:        No results found for: HEPCAB    Urinalysis/Chemistries:      Lab Results   Component Value Date    NITRU NEGATIVE 05/17/2021    COLORU YELLOW 05/17/2021    PHUR 5.0 05/17/2021    LABCAST NONE SEEN 05/17/2021    WBCUA 0-2 05/17/2021    RBCUA 0-2 05/17/2021    MUCUS THREADS 07/22/2019    YEAST NONE SEEN 04/18/2021    BACTERIA NONE SEEN 05/17/2021    SPECGRAV 1.017 05/17/2021    LEUKOCYTESUR NEGATIVE 05/17/2021    UROBILINOGEN 1.0 05/17/2021    BILIRUBINUR NEGATIVE 05/17/2021    BLOODU NEGATIVE 05/17/2021    GLUCOSEU NEGATIVE 07/22/2019    KETUA NEGATIVE 05/17/2021    AMORPHOUS DEBRIS 04/18/2021     Urine Sodium:   No results found for: NEVAEH  Urine Potassium:  No results found for: KUR  Urine Chloride:  No results found for: CLUR  Urine Osmolarity:   Lab Results   Component Value Date    OSMOU 796 04/13/2021     Urine Protein:   No components found for: TOTALPROTEIN, URINE   Urine Creatinine:   No results found for: LABCREA  Urine Eosinophils:  No components found for: UEOS        Impression and Plan:  1. CAMILLE/CKD: likely AIN from [address]. On Pred 60 mg daily  -improving  -will keep Pred 60 mg x 2 weeks and then start reducing dose  -will try bumex 1 mg daily due to ascites/pleural effusions although discussed with patient since these are malignant effusions diuretics will not help much    2. Hyperkalemia:improved,   3. Hyponatremia,hypervolemic + decreased free water excretion from CAMILLE.

## 2021-05-19 NOTE — CARE COORDINATION
5/19/21, 2:14 PM EDT    DISCHARGE ON GOING EVALUATION    [facility] AT [facility] day: 10  Location: -03/003-A Reason for admit: Hyperkalemia [E87.5]  SOB (shortness of breath) [R06.02]   Procedure: 5/10/2021 Paracentesis 3 L fluid removed. 5/12/202 removal of Pleurx catheter   5/19/2021 Planned paracentesis  Barriers to Discharge: Oncology, Nephrology, ID, and Pain Management following, [facility] have signed off, SS, Palliative Care, and Dietician following, Bumex daily started, DM management, Duoneb nebulizer (scheduled and prn), MS Contin scheduled, prn pain medications and antiemetics, daily weights, telemetry. PCP: CAITY Mancuso CNP  Readmission Risk Score: 41%  Patient Goals/Plan/Treatment Preferences: Roberta Ocampo is from home with his wife and current with [facility]. He plans for the same at discharge.

## 2021-05-20 NOTE — PLAN OF CARE
Problem: Impaired respiratory status  Goal: Clear lung sounds  Description: Clear lung sounds  5/19/2021 2133 by Olya Motta RCP  Outcome: Ongoing

## 2021-05-20 NOTE — PROGRESS NOTES
Spoke with Alvaro Antunez at [address] regarding pt's ultrasound-guided thoracentesis ordered for today- had not received call back regarding time frame. Stated that it will be tomorrow, as radiologist had to complete a procedure that was ordered prior to pt's, and will put a note requesting that pt be scheduled first thing in the morning. Notified pt/family.

## 2021-05-20 NOTE — PROGRESS NOTES
INTERNAL MEDICINE SPECIALTIES  Progress Note For Dr Anthony Cobos       Patient:  Amando Marcos  YOB: 1970  Date of Service: 5/20/2021  MRN: [id number]   Acct:  [de-identified]   Primary Care Physician: CAITY De Los Santos CNP    SUBJECTIVE: Feels more short of breath    Home Medications:   No current facility-administered medications on file prior to encounter. Current Outpatient Medications on File Prior to Encounter   Medication Sig Dispense Refill    levothyroxine (SYNTHROID) 25 MCG tablet Take 25 mcg by mouth Daily      oxyCODONE-acetaminophen (PERCOCET) 5-325 MG per tablet Take 1 tablet by mouth every 6 hours as needed for Pain.  famotidine (PEPCID) 20 MG tablet Take 1 tablet by mouth daily 30 tablet 0    ipratropium-albuterol (DUONEB) 0.5-2.5 (3) MG/3ML SOLN nebulizer solution Inhale 3 mLs into the lungs every 4 hours as needed for Shortness of Breath 360 mL 0    midodrine (PROAMATINE) 2.5 MG tablet Take 1 tablet by mouth 3 times daily (with meals) 90 tablet 0    predniSONE (DELTASONE) 20 MG tablet Take 2 tablets by mouth daily F/u with Dr Libertad Bucio regarding continuing dose at followup 60 tablet 0    traMADol (ULTRAM) 50 MG tablet Take 50 mg by mouth every 6 hours as needed for Pain.       traZODone (DESYREL) 50 MG tablet Take 50 mg by mouth nightly      polyethylene glycol (GLYCOLAX) 17 g packet Take 17 g by mouth daily as needed for Constipation 527 g 0    ALLOPURINOL PO Take 100 mg by mouth nightly            Scheduled Meds:   bumetanide  1 mg Oral Daily    naloxegol  12.5 mg Oral QAM    morphine  15 mg Oral Q8H    cephALEXin  500 mg Oral Q6H    lidocaine  2 patch Transdermal Daily    predniSONE  60 mg Oral Daily    midodrine  5 mg Oral TID WC    [Held by provider] sodium zirconium cyclosilicate  10 g Oral Daily    dronabinol  2.5 mg Oral BID    ipratropium-albuterol  1 ampule Inhalation Q4H    senna  2 tablet Oral Nightly    docusate sodium  100 mg Oral POCT glucose    Collection Time: 05/19/21  9:28 PM   Result Value Ref Range    POC Glucose 224 (H) 70 - 108 mg/dl       Radiology:     Echocardiogram limited    Result Date: 5/10/2021  Transthoracic Echocardiography Report (TTE)  Demographics   Patient Name   [address] [facility] Gender              Male                 P   MR #           [id number]         Race                                                    Ethnicity   Account #      [de-identified]         Room Number         0003   Accession      [id number]        Date of Study       05/10/2021  Number   Date of Birth  1970        Referring Physician Claudy Walkeron, [facility]   Age            44 year(s)        [address], [facility]                                    Interpreting        Echo reader of the                                   Physician           week                                                       Tonda Spurling MD  Procedure Type of Study   TTE procedure:ECHOCARDIOGRAM LIMITED. Procedure Date Date: 05/10/2021 Start: 12:53 PM Study Location: Bedside Technical Quality: Limited visualization due to poor acoustical window. Indications:Pericardial effusion. Additional Medical History:Shortness of breath, diabetic, hypertension, chronic kidney disease, renal cell cancer with METS, recent Covid, hypothyroidism Patient Status: Routine Height: 72 inches Weight: 203 pounds BSA: 2.14 m^2 BMI: 27.53 kg/m^2 BP: 113/71 mmHg  Conclusions   Summary  Normal left ventricle size and systolic function. Ejection fraction was  estimated at 65 %. There were no regional left ventricular wall motion  abnormalities and wall thickness was within normal limits. There is trivial posterior pericardial effusion with no evidence of  hemodynamic compromise.    Signature ----------------------------------------------------------------  Electronically signed by Esthela Balderas MD (Interpreting  physician) on 05/10/2021 at 06:35 PM  ----------------------------------------------------------------   Findings   Mitral Valve  The mitral valve structure was normal with normal leaflet separation. Aortic Valve  The aortic valve was trileaflet with normal thickness and cuspal  separation. Tricuspid Valve  The tricuspid valve structure was normal with normal leaflet separation. Pulmonic Valve  The pulmonic valve leaflets exhibited normal thickness, no calcification,  and normal cuspal separation. Left Atrium  Left atrial size was normal.   Left Ventricle  Normal left ventricle size and systolic function. Ejection fraction was  estimated at 65 %. There were no regional left ventricular wall motion  abnormalities and wall thickness was within normal limits. Right Atrium  Right atrial size was normal.   Right Ventricle  The right ventricular size was normal with normal systolic function and  wall thickness. Pericardial Effusion  There is trivial posterior pericardial effusion with no evidence of  hemodynamic compromise. Pleural Effusion  No evidence of pleural effusion. Aorta / Great Vessels  -Aortic root dimension within normal limits.  -The Pulmonary artery is within normal limits. -IVC size is within normal limits with normal respiratory phasic changes.   M-Mode/2D Measurements & Calculations   LV Diastolic    LV Systolic Dimension: 3.5   AV Cusp Separation: 2.3 cmLA  Dimension: 5 cm cm                           Dimension: 3.1 cmAO Root  LV FS:30 %      LV Volume Diastolic: 316 ml  Dimension: 3.9 cm  LV PW           LV Volume Systolic: 90.9 ml  Diastolic: 0.8  LV EDV/LV EDV Index: 118  cm              ml/55 m^2LV ESV/LV ESV  Septum          Index: 50.9 ml/24 m^2        RV Diastolic Dimension: 2.7  Diastolic: 0.8  EF Calculated: 56.9 %        cm  cm LA/Aorta: 0.79  [email or web address] [id number]    CT ABDOMEN PELVIS WO CONTRAST Additional Contrast? None    Result Date: 5/9/2021  CT SCAN OF THE ABDOMEN AND PELVIS WITHOUT CONTRAST COMPARISON: 4/13/2021. TECHNIQUE: A noncontrast CT scan of the abdomen and pelvis was performed. A dose reduction technique was utilized. FINDINGS: CT ABDOMEN: Images of the lung bases demonstrate a small to moderate loculated right pleural effusion with an associated right-sided chest tube. 2.6 cm mass is noted within the right pleural effusion on axial image 1 which is nonspecific but could represent neoplastic disease or perhaps proteinaceous material.  A smaller lesion in the right pleural space measures 8 mm on image 3. There is also a loculated small left pleural effusion. Small pericardial effusion is noted. Numerous nodules/masses are noted within the lung bases, compatible with neoplastic disease. For example in the right lower lobe is a 4.1 cm lesion on axial image 1 minus does not appear to be significantly changed. Lytic destructive change of multiple left lower ribs is again noted, compatible with the patient's known metastatic disease. This does not appear to be significantly changed. There are no focal liver lesions. The patient is status post cholecystectomy. The pancreas is unremarkable. There are calcified granulomas in the spleen. Right adrenal mass measures 2.0 x 1.0 cm, increased since the prior study when it measured 1.7 x 0.9 cm. This is compatible with metastatic disease. Left-sided adrenal metastases are noted. One of these, seen on axial image 25, is increased in size and measures 1.1 cm on the current exam.  Previously it measured 0.9 cm.  Exophytic solid mass arising from the right renal moiety measures 4.4 x 4.0 cm on axial image 38, this does not appear to be significantly changed. Horseshoe kidney is noted. Abdominal aorta is normal in caliber without evidence of an aneurysm. Moderate volume of ascites is noted in the abdomen/pelvis, increased since the prior study. Changes of peritoneal carcinomatosis are more prominent on this exam, for example seen on axial image 77. CT PELVIS: Lytic destructive change in the right iliac bone is again noted on axial image 72, this does not appear to be significantly changed. There is no evidence of a bowel obstruction. There is no free air. Colonic diverticula are noted, without evidence of acute diverticulitis. Subcutaneous edema/anasarca is noted. 1.  No evidence of a bowel obstruction or free air. 2.  Moderate volume of ascites, increased since the prior study. Changes of peritoneal carcinomatosis are more prominent on this exam. 3.  Right renal mass is again noted. Worsening metastatic disease within the adrenal glands. 4.  Small to moderate loculated right pleural effusion. Small loculated left pleural effusion. Bibasilar parenchymal metastases are noted. 5.  Multifocal bone metastases are again noted. 6.  Additional findings are detailed above. This document has been electronically signed by: Yakelin Osborn M.D. on 05/09/2021 09:27 PM All CTs at this facility use dose modulation techniques and iterative reconstructions, and/or weight-based dosing when appropriate to reduce radiation to a low as reasonably achievable. XR CHEST 1 VIEW    Result Date: 5/9/2021  1 view chest x-ray Comparison:  SR SANDI  - XR CHEST PORTABLE  - 04/22/2021 01:09 AM EDT Findings: Bibasilar large bilateral pleural effusions are similar to the prior exam. Right basilar tunneled pleural catheter is unchanged. Mass versus loculated effusion at the right apex. Increased in size since the prior exam. Ill-defined bilateral lung opacities similar to the prior exam. Cardiomegaly. No change. No acute fracture.      Loculated right apex pleural effusion versus mass ascites  Resolved Problems:    * No resolved hospital problems. *  Acute on chronic respiratory failure  Loculated pleural effusion S/P removal of infected PleurX catheter, VATS, pleurodesis    Status post hyperkalemia   Hypothyroidism   Anemia  CAMILLE with recent acute interstitial nephritis  Chronic pain secondary to metastatic disease      PLAN:  Patient Had abdominal paracentesis yesterday, 1.3 L was removed. He will benefit from scheduled outpatient abdominal paracenteses    Continue to monitor electrolytes and  Correct accordingly. Nephrology service following. No plans for CRRT. On steroids for AIN. Currently on Bumex    Continue bronchodilators. He is status post removal of PleurX catheter,  He has had previous VATS and pleurodesis on the left pleural cavity. Plan for right-sided thoracentesis today and p.r.n. Grew Staph epi from infected PleurX catheter, currently on Keflex. Complete  Antibiotics    Continue Marinol for his appetite.         Continue current pain management for metastatic disease    Patient's prognosis is very poor    DVT prophylaxis: [] Lovenox                                 [x] SCDs                                 [] SQ Heparin                                 [] Encourage ambulation, low risk for DVT, no chemical or mechanical prophylaxis necessary              [] Already on Anticoagulation                Anticipated Disposition upon discharge: [x] Home                                                                         [] Home with Home Health                                                                         [] [facility]                                                                         [] [address]          Electronically signed by Rayshawn Perera MD on 5/20/2021 at 8:22 AM

## 2021-05-20 NOTE — PROGRESS NOTES
mcg  25 mcg Oral Daily Mac Dunlap MD   25 mcg at 05/20/21 6863    insulin lispro (HUMALOG) injection vial 0-6 Units  0-6 Units Subcutaneous TID WC CAITY Perez CNP   2 Units at 05/19/21 1724    insulin lispro (HUMALOG) injection vial 0-3 Units  0-3 Units Subcutaneous Nightly CAITY Perez CNP   1 Units at 05/19/21 2200    epoetin yumiko-epbx (RETACRIT) injection 10,000 Units  10,000 Units Subcutaneous Once per day on Mon Wed Fri Virginia Bhatia MD   10,000 Units at 05/19/21 0804    glucose (GLUTOSE) 40 % oral gel 15 g  15 g Oral PRN Clifford Cap, PA-C        dextrose 50 % IV solution  12.5 g Intravenous PRN Clifford Cap, PA-C        glucagon (rDNA) injection 1 mg  1 mg Intramuscular PRN Clifford Cap, PA-C        dextrose 5 % solution  100 mL/hr Intravenous PRN Clifford Cap, PA-C               REVIEW OF SYSTEMS:  CONSTITUTIONAL:  positive for  fatigue, weight loss and decraesed appetite   EYES:  negative  HE ENT:  negative  RESPIRATORY:  positive for  dry cough, chest pain, pleuritic pain and shortness of breath on oxygen   CARDIOVASCULAR:  positive for chest pain  GASTROINTESTINAL:  Constipation, + BM 5/13/2021, + abdominal pain, ascites    GENITOURINARY:  negative  SKIN:  negative  HEMATOLOGIC/LYMPHATIC:  negative  MUSCULOSKELETAL:  positive for  myalgias, arthralgias, pain, decreased range of motion, muscle weakness and bone pain  NEUROLOGICAL:  negative  BEHAVIOR/PSYCH:  anxiety   System review otherwise negative      PHYSICAL EXAM:  /64   Pulse 111   Temp 97.5 °F (36.4 °C) (Axillary)   Resp 21   Ht 6' (1.829 m)   Wt 202 lb (91.6 kg)   SpO2 98%   BMI 27.40 kg/m²  I Body mass index is 27.4 kg/m².  I   Wt Readings from Last 1 Encounters:   05/18/21 202 lb (91.6 kg)      awake  Orientation:   person, place, time  Mood: anxious   Affect: anxious   General appearance: appearing stated age, mild distress, ill appearing      Memory:   normal, Attention/Concentration: normal  Language:  normal     Cranial Nerves:  cranial nerves II-XII are grossly intact  ROM:  Normal all 4 extremities   Motor Exam:  Motor exam is 5 out of 5 all extremities with the exception of 4/5 lower extremities   Tone:  normal  + tenderness, left side, mid back and flank      Heart: tachy rate, regular rhythm, normal S1, S2, no murmurs, rubs, clicks or gallops, + chest wall tenderness   Lungs: Diminished, clear to auscultation without wheezes or rales. On 3 liters of oxygen per nasal cannula, short of breath at rest   Abdomen: soft, distended, normal bowel sounds, no masses or organomegaly     Skin: warm and dry, no rash or erythema, right side dressing where plural drain was   Peripheral vascular: Pulses: Normal upper and lower extremity pulses; Edema: trace       DATA    Recent Labs     05/18/21  1658   WBC 14.2*   RBC 3.07*   HGB 8.4*   HCT 28.3*   MCV 92.2   MCH 27.4   MCHC 29.7*      MPV 10.1     Recent Labs     05/18/21  0526 05/19/21  0529 05/20/21  0806   * 133* 131*   K 5.1 4.7 5.1   CL 91* 89* 88*   CO2 31 33 32   BUN 68* 70* 77*   CREATININE 1.6* 1.7* 1.9*   GLUCOSE 164* 149* 139*   CALCIUM 8.9 9.2 9.2     Recent Labs     05/19/21  2128 05/20/21  0810 05/20/21  1122   POCGLU 224* 134* 157*       ASSESSMENT   1. Cancer associated pain   2. Metastatic renal cell cancer  3. Pleural effusion   4. Shortness of breath   5. Bone pain   6. Opioid induced constipation   7. Anxiety       PLAN  1. Continue pain management   2. Started morphine IB 2 mg BID prn for severe pain of 7-10/10 unrelieved after oral pain medications given or for extreme pain flair ups. 3. Continue MS Contin 15 mg scheduled every 8 hours for better maintenance pain relief  4.  Continue tylenol 650 mg every 4 hours as needed for mild pain of 1-3/10, Continue Percocet 5/325 mg tabs to 7.5/325 mg tabs every 4 hours as needed for moderate to severe breakthrough pain of 4-10/10, 5/325 mg tabs for

## 2021-05-20 NOTE — PROGRESS NOTES
Progress note: Infectious diseases    Patient - Carl Burn,  Age - 44 y.o.    - 1970      Room Number - 5K-03/003-A   MRN -  [id number]   Acct # - [de-identified]  Date of Admission -  2021  6:52 PM    SUBJECTIVE:   No new issues. He had paracentesis yesterday, no report of fever  OBJECTIVE   VITALS    height is 6' (1.829 m) and weight is 202 lb (91.6 kg). His axillary temperature is 97.5 °F (36.4 °C). His blood pressure is 99/55 (abnormal) and his pulse is 109. His respiration is 23 and oxygen saturation is 97%. Wt Readings from Last 3 Encounters:   21 202 lb (91.6 kg)   21 203 lb 1.6 oz (92.1 kg)   21 192 lb 6.4 oz (87.3 kg)       I/O (24 Hours)    Intake/Output Summary (Last 24 hours) at 2021 1119  Last data filed at 2021 0831  Gross per 24 hour   Intake --   Output 225 ml   Net -225 ml       General Appearance  Awake, alert, oriented,  Chronically sick looking. HEENT - normocephalic, atraumatic, pale conjunctiva,  anicteric sclera. Neck - Supple, no mass. Lungs - diminished breath sound. has rhonchi on both lower lung fields  Cardiovascular - Heart sounds are normal.     Abdomen - non tender, has flank dullness  Neurologic -oriented. Skin - No bruising or bleeding. Extremities - + edema.       MEDICATIONS:      bumetanide  1 mg Oral Daily    naloxegol  12.5 mg Oral QAM    morphine  15 mg Oral Q8H    cephALEXin  500 mg Oral Q6H    lidocaine  2 patch Transdermal Daily    predniSONE  60 mg Oral Daily    midodrine  5 mg Oral TID WC    [Held by provider] sodium zirconium cyclosilicate  10 g Oral Daily    dronabinol  2.5 mg Oral BID    ipratropium-albuterol  1 ampule Inhalation Q4H    senna  2 tablet Oral Nightly    docusate sodium  100 mg Oral BID    polyethylene glycol  17 g Oral Daily    famotidine  20 mg Oral Daily    [Held by provider] heparin (porcine) 5,000 Units Subcutaneous 3 times per day    levothyroxine  25 mcg Oral Daily    insulin lispro  0-6 Units Subcutaneous TID WC    insulin lispro  0-3 Units Subcutaneous Nightly    epoetin yumiko-epbx  10,000 Units Subcutaneous Once per day on Mon Wed Fri      sodium chloride      dextrose       oxyCODONE-acetaminophen **OR** oxyCODONE-acetaminophen, ALPRAZolam, ondansetron, sodium chloride, ipratropium-albuterol, acetaminophen, promethazine, glucose, dextrose, glucagon (rDNA), dextrose      LABS:     CBC:   Recent Labs     05/18/21  1658   WBC 14.2*   HGB 8.4*        BMP:    Recent Labs     05/18/21  0526 05/19/21  0529 05/20/21  0806   * 133* 131*   K 5.1 4.7 5.1   CL 91* 89* 88*   CO2 31 33 32   BUN 68* 70* 77*   CREATININE 1.6* 1.7* 1.9*   GLUCOSE 164* 149* 139*     Calcium:  Recent Labs     05/20/21  0806   CALCIUM 9.2   . No results for input(s): MG in the last 72 hours. Recent Labs     05/19/21  1611 05/19/21  2128 05/20/21  0810   POCGLU 228* 224* 134*    .     CULTURES:   UA:   Recent Labs     05/17/21  1530   SPECGRAV 1.017   PHUR 5.0   COLORU YELLOW   PROTEINU NEGATIVE   BLOODU NEGATIVE   RBCUA 0-2   WBCUA 0-2   BACTERIA NONE SEEN   NITRU NEGATIVE   BILIRUBINUR NEGATIVE   UROBILINOGEN 1.0   KETUA NEGATIVE   LABCAST NONE SEEN     Micro:   Lab Results   Component Value Date    BC No growth-preliminary No growth  04/14/2021          Problem list of patient:     Patient Active Problem List   Diagnosis Code    Type 2 diabetes mellitus treated without insulin ([facility] [address] E11.9    Essential hypertension I10    Chronic kidney disease N18.9    Chronic renal insufficiency, stage III (moderate) N18.30    Intracranial arachnoid cyst G93.0    Headache R51.9    Numbness of face R20.0    Renal mass N28.89    Thyroid function test abnormal R94.6    Multiple nodules of lung R91.8    Recurrent right pleural effusion J90    Adrenal nodule (HCC) E27.8    Shortness of breath R06.02    Disease of lymph node I89.9    Respiratory distress R06.03    Chest tightness or pressure R07.89    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    Leukocytosis D72.829    Cerebral cyst G93.0    Pneumonia due to organism J18.9    Acute on chronic respiratory failure with hypoxemia (HCC) J96.21    Severe malnutrition (HCC) E43    SIADH (syndrome of inappropriate ADH production) (HCC) E22.2    Hyponatremia E87.1    Metastatic renal cell carcinoma (HCC) C64.9    Diarrhea R19.7    Clear cell renal cell carcinoma, right (HCC) C64.1    GI bleed K92.2    Nausea vomiting and diarrhea R11.2, R19.7    Rectal hemorrhage K62.5    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    will stop keflex on 5/23        David Horvath MD, MD, [address] 5/20/2021 11:19 AM

## 2021-05-20 NOTE — PROGRESS NOTES
Renal Progress Note  [facility]    Patient :  Boaz Burns; 44 y.o. MRN# [id number]  Location:  -03/003-A  Attending:  Andrea Coombs MD  Admit Date:  5/9/2021   Hospital Day: 11      Subjective:     Nephrology is following the patient for CAMILLE, hyperkalemia. Patient was seen earlier this AM.  Daughter at bedside. Pt having a lot of abdominal pain and back pain. Had paracentesis yesterday. Feels more short of breath today. Outpatient Medications:     Medications Prior to Admission: levothyroxine (SYNTHROID) 25 MCG tablet, Take 25 mcg by mouth Daily  oxyCODONE-acetaminophen (PERCOCET) 5-325 MG per tablet, Take 1 tablet by mouth every 6 hours as needed for Pain.  famotidine (PEPCID) 20 MG tablet, Take 1 tablet by mouth daily  ipratropium-albuterol (DUONEB) 0.5-2.5 (3) MG/3ML SOLN nebulizer solution, Inhale 3 mLs into the lungs every 4 hours as needed for Shortness of Breath  midodrine (PROAMATINE) 2.5 MG tablet, Take 1 tablet by mouth 3 times daily (with meals)  predniSONE (DELTASONE) 20 MG tablet, Take 2 tablets by mouth daily F/u with Dr Khushboo Molina regarding continuing dose at followup  traMADol (ULTRAM) 50 MG tablet, Take 50 mg by mouth every 6 hours as needed for Pain.   traZODone (DESYREL) 50 MG tablet, Take 50 mg by mouth nightly  polyethylene glycol (GLYCOLAX) 17 g packet, Take 17 g by mouth daily as needed for Constipation  [DISCONTINUED] levothyroxine (SYNTHROID) 125 MCG tablet, Take 0.5 tablets by mouth Daily  ALLOPURINOL PO, Take 100 mg by mouth nightly     Current Medications:     Scheduled Meds:    cephALEXin  500 mg Oral Q6H    bumetanide  1 mg Oral Daily    naloxegol  12.5 mg Oral QAM    morphine  15 mg Oral Q8H    lidocaine  2 patch Transdermal Daily    predniSONE  60 mg Oral Daily    midodrine  5 mg Oral TID WC    [Held by provider] sodium zirconium cyclosilicate  10 g Oral Daily    dronabinol  2.5 mg Oral BID    ipratropium-albuterol  1 ampule Inhalation Q4H    senna  2 tablet Oral Nightly    docusate sodium  100 mg Oral BID    polyethylene glycol  17 g Oral Daily    famotidine  20 mg Oral Daily    [Held by provider] heparin (porcine)  5,000 Units Subcutaneous 3 times per day    levothyroxine  25 mcg Oral Daily    insulin lispro  0-6 Units Subcutaneous TID     insulin lispro  0-3 Units Subcutaneous Nightly    epoetin yumiko-epbx  10,000 Units Subcutaneous Once per day on      Continuous Infusions:    sodium chloride      dextrose       PRN Meds:  oxyCODONE-acetaminophen **OR** oxyCODONE-acetaminophen, ALPRAZolam, ondansetron, sodium chloride, ipratropium-albuterol, acetaminophen, promethazine, glucose, dextrose, glucagon (rDNA), dextrose    Input/Output:       I/O last 3 completed shifts:  In: -   Out: 250 [Urine:250]. Patient Vitals for the past 96 hrs (Last 3 readings):   Weight   21 0600 202 lb (91.6 kg)   21 0500 195 lb 14.4 oz (88.9 kg)       Vital Signs:   Temperature:  Temp: 97.5 °F (36.4 °C)  TMax:   Temp (24hrs), Av.9 °F (36.6 °C), Min:97.5 °F (36.4 °C), Max:98.4 °F (36.9 °C)    Respirations:  Resp: 23  Pulse:   Pulse: 109  BP:    BP: (!) 99/55  BP Range: Systolic (52PHK), MW , Min:99 , AUP:565       Diastolic (65PHK), XAC:69, Min:55, Max:88      Physical Examination:     General:  Awake, alert, frail appearing  HEENT: NC/AT/ MMM   Chest:               Rales right lung  Cardiac:  S1 S2   Abdomen: Soft, +distention, nontender  Neuro:  No facial droop, No Asterixis  SKIN:  No rashes, good skin turgor.   Extremities:  No LE edema    Labs:       Recent Labs     21  1658   WBC 14.2*   RBC 3.07*   HGB 8.4*   HCT 28.3*   MCV 92.2   MCH 27.4   MCHC 29.7*      MPV 10.1      BMP:   Recent Labs     21  0526 21  0529 21  0806   * 133* 131*   K 5.1 4.7 5.1   CL 91* 89* 88*   CO2 31 33 32   BUN 68* 70* 77*   CREATININE 1.6* 1.7* 1.9*   GLUCOSE 164* 149* 139*   CALCIUM 8.9 9.2 9.2      Phosphorus:   No results for input(s): PHOS in the last 72 hours. Magnesium:    No results for input(s): MG in the last 72 hours. Albumin:    No results for input(s): LABALBU in the last 72 hours. BNP:    No results found for: BNP  MICHAEL:    No results found for: MICHAEL  SPEP:  Lab Results   Component Value Date    PROT 5.6 05/11/2021     UPEP:   No results found for: LABPE  C3:   No results found for: C3  C4:   No results found for: C4  MPO ANCA:   No results found for: MPO  PR3 ANCA:   No results found for: PR3  Anti-GBM:   No results found for: GBMABIGG  Hep BsAg:       No results found for: HEPBSAG  Hep C AB:        No results found for: HEPCAB    Urinalysis/Chemistries:      Lab Results   Component Value Date    NITRU NEGATIVE 05/17/2021    COLORU YELLOW 05/17/2021    PHUR 5.0 05/17/2021    LABCAST NONE SEEN 05/17/2021    WBCUA 0-2 05/17/2021    RBCUA 0-2 05/17/2021    MUCUS THREADS 07/22/2019    YEAST NONE SEEN 04/18/2021    BACTERIA NONE SEEN 05/17/2021    SPECGRAV 1.017 05/17/2021    LEUKOCYTESUR NEGATIVE 05/17/2021    UROBILINOGEN 1.0 05/17/2021    BILIRUBINUR NEGATIVE 05/17/2021    BLOODU NEGATIVE 05/17/2021    GLUCOSEU NEGATIVE 07/22/2019    KETUA NEGATIVE 05/17/2021    AMORPHOUS DEBRIS 04/18/2021     Urine Sodium:   No results found for: NEVAEH  Urine Potassium:  No results found for: KUR  Urine Chloride:  No results found for: CLUR  Urine Osmolarity:   Lab Results   Component Value Date    OSMOU 796 04/13/2021     Urine Protein:   No components found for: TOTALPROTEIN, URINE   Urine Creatinine:   No results found for: LABCREA  Urine Eosinophils:  No components found for: UEOS        Impression and Plan:  1. CAMILLE/CKD: likely AIN from [id number] [facility]. On Pred 60 mg daily  -improving  -will keep Pred 60 mg x 2 weeks and then start reducing dose  -on bumex 1 mg daily for effusions/ascites, monitor closely    2. Hyperkalemia:improved,   3. Hyponatremia,hypervolemic + decreased free water excretion from CAMILLE.     4. Hypotension,continue Midodrine  5. Anemia  6. 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 Malignant pleural effusions  8. Ascites s/p paracentesis          Please don't hesitate to call with any questions.   Electronically signed by Phoebe Arredondo DO on 5/20/2021 at 12:00 PM

## 2021-05-20 NOTE — PLAN OF CARE
encouragement and education provided. Care plan reviewed with patient. Patient verbalizes understanding of the plan of care and contributes to goal setting.

## 2021-05-21 NOTE — PROGRESS NOTES
importance of turning. Educated that family should notify staff if it has been a couple hours. Psychiatric/Behavioral Positive for confusion and  behavioral issues. Speech normal.  Appropriate responses. Neurological:  Per family, patient has been confused and they feel he is \"unaware of his surroundings. \"  No response other than eyes tracking when I introduced myself. Vitals/Weights  Most recent:     5/21/2021    #202  6ft. BMI:  27.40  BMI  28.18  4/20/2021       ADVANCE CARE PLANNING:  Current code status: Limited x4. Patient will need a Portable DNR form for homegoing. Chart Review Completed     PROBLEMS/PLAN:    1. Deconditioning: Family aware that patient is nearing end of life. Plan earlier in week included going home with home health. Children stated that the MD stated this date that hospice is the next step. I discussed the similarities and differences in between home health and hospice care. I briefly discussed that a hospital bed could be delivered to be there when he arrives by squad. Patient has O2 in home currently. Patient previously had a pleurex drain which got infected. I discussed that depending on the rate of buildup and the timeline, he may benefit from having one placed before discharge or may not need one. Children will talk with their step-mom Jane Monahan when she arrives. Office number for hospice provided as well as my work cell number for additional questions. 2. Symptom management:  Patient is currently on MS Contin 15 mg q 8 hours with BTD of either norco 5-7.5/325mg or morphine IV 2mg. I discussed with children that with his swallowing changing, the hospice team works with the pharmacist to think ahead and convert to other forms of medicating to maintain the same level of comfort. Son asked if there would be a pump at home. I stated that with the kidneys involved, Morphine SQ may not be the choice provided.  I indicated that a conversion would be made as not to increase and that the team would work to continue a long acting and short acting if possible. Xanax could continue at home for SOB and family would be instructed. 3. Palliative Care Encounter:  Initial consult. Educated that palliative care in the community does not provide the hands on care patient would need at home. Will continue to offer education, offer emotional support, made medication or dietary recommendations if indicated and assess overall condition. Should patient elect home health, I will remain in contact. Should they elect hospice care, I will discharge. I collaborate with the PCP, medical director of palliative care, [facility] home infusion pharmacy/facility pharmacy and [facility] registered dietitians to achieve team-based health care. Orders are provided by the PCP. On this date 5/21/21 I have spent 90 minutes reviewing electronic chart, visiting with patient, speaking with staff, updating family and documenting. An electronic signature was used to authenticate this note.      Trista Rose RN, BSN, [address] Palliative Care Liaison  [facility]

## 2021-05-21 NOTE — PLAN OF CARE
Problem: Pain:  Goal: Pain level will decrease  Description: Pain level will decrease  Outcome: Ongoing  Note: Pain assessment complete. PRN pain medications given per physician orders. Non pharmacological pain intervention given rest and resposition. Will continue to monitor       Problem: Falls - Risk of:  Goal: Will remain free from falls  Description: Will remain free from falls  Outcome: Ongoing  Note: Fall assessment complete. No falls noted this shift. Bed alarm on, bed in low position, call light and personal items in reach. Nonskid socks on. Will continue to monitor. Problem: Skin Integrity:  Goal: Will show no infection signs and symptoms  Description: Will show no infection signs and symptoms  Outcome: Ongoing  Note: No new skin issues noted this shift will continue to monitor. Patient turns self in bed. Problem: Discharge Planning  Goal: Discharged to appropriate level of care  Description: Discharged to appropriate level of care  Outcome: Ongoing     Problem: Respiratory  Goal: O2 Sat > 90%  Outcome: Ongoing  Note: POX of 90% on 4L O2 NC. Care plan reviewed with patient and family. Patient and family verbalize understanding of the plan of care and contribute to goal setting.

## 2021-05-21 NOTE — PLAN OF CARE
Problem: Impaired respiratory status  Goal: Clear lung sounds  Description: Clear lung sounds  Outcome: Ongoing     Will continue treatments to improve lung aeration and WOB.

## 2021-05-21 NOTE — PROGRESS NOTES
Discussed with pt's children and wife their wish and intention to have hospice referral completed on Monday afternoon at 1700. Pt's daughter intends to come in to hospital on Sunday this weekend to say her vows in her wedding dress so that pt may be present, and would like to have conversation/consult with hospice the following day to discuss transition to comfort-only care. Barely taking in fluids and no food this shift despite encouragement, and pt very lethargic/weak and pain fluctuates with management. Called referral in to [facility] with Hospice, anticipating set up for Monday. Dr. Hugo Madison agreeable to consult and will discuss with pt on Monday.

## 2021-05-21 NOTE — PROGRESS NOTES
Renal Progress Note  [facility]    Patient :  Perri Douglas; 44 y.o. MRN# [id number]  Location:  [facility]03/003-A  Attending:  Delmy Snyder MD  Admit Date:  5/9/2021   Hospital Day: 12      Subjective:     Nephrology is following the patient for CAMILLE, hyperkalemia. Patient was seen this morning. K elevated at 6.1, was treated this morning. Creatinine up. Reports he is urinating ok. Outpatient Medications:     Medications Prior to Admission: levothyroxine (SYNTHROID) 25 MCG tablet, Take 25 mcg by mouth Daily  oxyCODONE-acetaminophen (PERCOCET) 5-325 MG per tablet, Take 1 tablet by mouth every 6 hours as needed for Pain.  famotidine (PEPCID) 20 MG tablet, Take 1 tablet by mouth daily  ipratropium-albuterol (DUONEB) 0.5-2.5 (3) MG/3ML SOLN nebulizer solution, Inhale 3 mLs into the lungs every 4 hours as needed for Shortness of Breath  midodrine (PROAMATINE) 2.5 MG tablet, Take 1 tablet by mouth 3 times daily (with meals)  predniSONE (DELTASONE) 20 MG tablet, Take 2 tablets by mouth daily F/u with Dr Bebeto Huizar regarding continuing dose at followup  traMADol (ULTRAM) 50 MG tablet, Take 50 mg by mouth every 6 hours as needed for Pain.   traZODone (DESYREL) 50 MG tablet, Take 50 mg by mouth nightly  polyethylene glycol (GLYCOLAX) 17 g packet, Take 17 g by mouth daily as needed for Constipation  [DISCONTINUED] levothyroxine (SYNTHROID) 125 MCG tablet, Take 0.5 tablets by mouth Daily  ALLOPURINOL PO, Take 100 mg by mouth nightly     Current Medications:     Scheduled Meds:    [Held by provider] sodium zirconium cyclosilicate  10 g Oral Once    cephALEXin  500 mg Oral Q6H    naloxegol  12.5 mg Oral QAM    morphine  15 mg Oral Q8H    lidocaine  2 patch Transdermal Daily    predniSONE  60 mg Oral Daily    midodrine  5 mg Oral TID WC    dronabinol  2.5 mg Oral BID    ipratropium-albuterol  1 ampule Inhalation Q4H    senna  2 tablet Oral Nightly    docusate sodium  100 mg Oral BID  polyethylene glycol  17 g Oral Daily    famotidine  20 mg Oral Daily    [Held by provider] heparin (porcine)  5,000 Units Subcutaneous 3 times per day    levothyroxine  25 mcg Oral Daily    insulin lispro  0-6 Units Subcutaneous TID     insulin lispro  0-3 Units Subcutaneous Nightly    epoetin yumiko-epbx  10,000 Units Subcutaneous Once per day on      Continuous Infusions:    sodium chloride      dextrose       PRN Meds:  morphine, oxyCODONE-acetaminophen **OR** oxyCODONE-acetaminophen, ALPRAZolam, ondansetron, sodium chloride, ipratropium-albuterol, acetaminophen, promethazine, glucose, dextrose, glucagon (rDNA), dextrose    Input/Output:       I/O last 3 completed shifts: In: 500 [P.O.:500]  Out: 225 [Urine:225]. Patient Vitals for the past 96 hrs (Last 3 readings):   Weight   21 0600 202 lb (91.6 kg)       Vital Signs:   Temperature:  Temp: 97.8 °F (36.6 °C)  TMax:   Temp (24hrs), Av.7 °F (36.5 °C), Min:97.5 °F (36.4 °C), Max:97.9 °F (36.6 °C)    Respirations:  Resp: 24  Pulse:   Pulse: 114  BP:    BP: 115/73  BP Range: Systolic (42VFD), SED:844 , Min:101 , BVZ:821       Diastolic (08HKW), YSB:85, Min:64, Max:73      Physical Examination:     General:  Awake, alert, frail appearing  HEENT: NC/AT/ MMM   Chest:               Rales right lung  Cardiac:  S1 S2   Abdomen: Soft, +distention, nontender  Neuro:  No facial droop, No Asterixis  SKIN:  No rashes, good skin turgor. Extremities:  No LE edema    Labs:       Recent Labs     21  1658   WBC 14.2*   RBC 3.07*   HGB 8.4*   HCT 28.3*   MCV 92.2   MCH 27.4   MCHC 29.7*      MPV 10.1      BMP:   Recent Labs     21  0529 21  0806 21  0530   * 131* 131*   K 4.7 5.1 6.1*   CL 89* 88* 86*   CO2 33 32 29   BUN 70* 77* 92*   CREATININE 1.7* 1.9* 2.6*   GLUCOSE 149* 139* 141*   CALCIUM 9.2 9.2 9.4      Phosphorus:   No results for input(s): PHOS in the last 72 hours.   Magnesium:    No results for input(s): MG in the last 72 hours. Albumin:    No results for input(s): LABALBU in the last 72 hours. BNP:    No results found for: BNP  MICHAEL:    No results found for: MICHAEL  SPEP:  Lab Results   Component Value Date    PROT 5.6 05/11/2021     UPEP:   No results found for: LABPE  C3:   No results found for: C3  C4:   No results found for: C4  MPO ANCA:   No results found for: MPO  PR3 ANCA:   No results found for: PR3  Anti-GBM:   No results found for: GBMABIGG  Hep BsAg:       No results found for: HEPBSAG  Hep C AB:        No results found for: HEPCAB    Urinalysis/Chemistries:      Lab Results   Component Value Date    NITRU NEGATIVE 05/17/2021    COLORU YELLOW 05/17/2021    PHUR 5.0 05/17/2021    LABCAST NONE SEEN 05/17/2021    WBCUA 0-2 05/17/2021    RBCUA 0-2 05/17/2021    MUCUS THREADS 07/22/2019    YEAST NONE SEEN 04/18/2021    BACTERIA NONE SEEN 05/17/2021    SPECGRAV 1.017 05/17/2021    LEUKOCYTESUR NEGATIVE 05/17/2021    UROBILINOGEN 1.0 05/17/2021    BILIRUBINUR NEGATIVE 05/17/2021    BLOODU NEGATIVE 05/17/2021    GLUCOSEU NEGATIVE 07/22/2019    KETUA NEGATIVE 05/17/2021    AMORPHOUS DEBRIS 04/18/2021     Urine Sodium:   No results found for: NEVAEH  Urine Potassium:  No results found for: KUR  Urine Chloride:  No results found for: CLUR  Urine Osmolarity:   Lab Results   Component Value Date    OSMOU 796 04/13/2021     Urine Protein:   No components found for: TOTALPROTEIN, URINE   Urine Creatinine:   No results found for: LABCREA  Urine Eosinophils:  No components found for: UEOS        Impression and Plan:  1. CAMILLE/CKD: likely AIN from [address]. On Pred 60 mg daily  -creat worse today likely from diuretics. Stop bumex. -BUN steadily rising from steroids  -will keep Pred 60 mg x 2 weeks and then start reducing dose      2. Hyperkalemia:from worsened CAMILLE. Retreated this AM repeat level at noon  3. Hyponatremia,hypervolemic + decreased free water excretion from CAMILLE, stable.   4. Hypotension,continue Midodrine  5. Anemia  6. 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 Malignant pleural effusions  8. Ascites           Please don't hesitate to call with any questions.   Electronically signed by [id number] Avelina Snider DO on 5/21/2021 at 11:23 AM

## 2021-05-21 NOTE — PROGRESS NOTES
INTERNAL MEDICINE SPECIALTIES  Progress Note For Dr Izzy Stephens       Patient:  Myron Wu  YOB: 1970  Date of Service: 5/21/2021  MRN: [id number]   Acct:  [de-identified]   Primary Care Physician: CAITY Bosch - CNP    SUBJECTIVE: feels about the same, pain and short of breath, only 170cc pleural fluid removed due to complex septated loculated pleural effusion    Home Medications:   No current facility-administered medications on file prior to encounter. Current Outpatient Medications on File Prior to Encounter   Medication Sig Dispense Refill    levothyroxine (SYNTHROID) 25 MCG tablet Take 25 mcg by mouth Daily      oxyCODONE-acetaminophen (PERCOCET) 5-325 MG per tablet Take 1 tablet by mouth every 6 hours as needed for Pain.  famotidine (PEPCID) 20 MG tablet Take 1 tablet by mouth daily 30 tablet 0    ipratropium-albuterol (DUONEB) 0.5-2.5 (3) MG/3ML SOLN nebulizer solution Inhale 3 mLs into the lungs every 4 hours as needed for Shortness of Breath 360 mL 0    midodrine (PROAMATINE) 2.5 MG tablet Take 1 tablet by mouth 3 times daily (with meals) 90 tablet 0    predniSONE (DELTASONE) 20 MG tablet Take 2 tablets by mouth daily F/u with Dr Lisa Yanez regarding continuing dose at followup 60 tablet 0    traMADol (ULTRAM) 50 MG tablet Take 50 mg by mouth every 6 hours as needed for Pain.       traZODone (DESYREL) 50 MG tablet Take 50 mg by mouth nightly      polyethylene glycol (GLYCOLAX) 17 g packet Take 17 g by mouth daily as needed for Constipation 527 g 0    ALLOPURINOL PO Take 100 mg by mouth nightly            Scheduled Meds:   [Held by provider] sodium zirconium cyclosilicate  10 g Oral Once    calcium gluconate IVPB  1,000 mg Intravenous Once    cephALEXin  500 mg Oral Q6H    naloxegol  12.5 mg Oral QAM    morphine  15 mg Oral Q8H    lidocaine  2 patch Transdermal Daily    predniSONE  60 mg Oral Daily    midodrine  5 mg Oral TID WC    dronabinol  2.5 mg Oral BID    ipratropium-albuterol  1 ampule Inhalation Q4H    senna  2 tablet Oral Nightly    docusate sodium  100 mg Oral BID    polyethylene glycol  17 g Oral Daily    famotidine  20 mg Oral Daily    [Held by provider] heparin (porcine)  5,000 Units Subcutaneous 3 times per day    levothyroxine  25 mcg Oral Daily    insulin lispro  0-6 Units Subcutaneous TID     insulin lispro  0-3 Units Subcutaneous Nightly    epoetin yumiko-epbx  10,000 Units Subcutaneous Once per day on Mon Wed Fri     Continuous Infusions:   sodium chloride      dextrose       PRN Meds:morphine, oxyCODONE-acetaminophen **OR** oxyCODONE-acetaminophen, ALPRAZolam, ondansetron, sodium chloride, ipratropium-albuterol, acetaminophen, promethazine, glucose, dextrose, glucagon (rDNA), dextrose        Allergies:  Patient has no known allergies. OBJECTIVE:    Vitals:   Vitals:    05/21/21 0826   BP:    Pulse:    Resp: 24   Temp:    SpO2: 94%      BMI: Body mass index is 27.4 kg/m². PHYSICAL EXAMINATION:              General appearance: ill looking male mild respiratory distress*, appears stated age and cooperative. HEENT:  Normal cephalic, atraumatic without obvious deformity. Pupils equal, round, and reactive to light. Extra ocular muscles intact. Conjunctivae/corneas clear. Neck: Supple. Chest:  Dressing over right chest clean and dry  Respiratory:  Diminished air entry bilaterally  Cardiovascular:  Regular rhythm with normal S1/S2 without  rubs or gallops. Abdomen:  Distended,soft,nontender, with normal bowel sounds. Musculoskeletal:  No clubbing, cyanosis or ankle edema bilaterally.    Neurologic:   Alert and oriented x3  Moves all extremities spontaneously  Psychiatric: Thought content appropriate, normal insight  Review of Labs and Diagnostic Testing:    Recent Results (from the past 24 hour(s))   POCT glucose    Collection Time: 05/20/21 11:22 AM   Result Value Ref Range    POC Glucose 157 (H) 70 - 108 mg/dl   POCT glucose Collection Time: 05/20/21  5:30 PM   Result Value Ref Range    POC Glucose 208 (H) 70 - 108 mg/dl   POCT glucose    Collection Time: 05/20/21  7:36 PM   Result Value Ref Range    POC Glucose 206 (H) 70 - 108 mg/dl   Basic Metabolic Panel    Collection Time: 05/21/21  5:30 AM   Result Value Ref Range    Sodium 131 (L) 135 - 145 meq/L    Potassium 6.1 (HH) 3.5 - 5.2 meq/L    Chloride 86 (L) 98 - 111 meq/L    CO2 29 23 - 33 meq/L    Glucose 141 (H) 70 - 108 mg/dL    BUN 92 (H) 7 - 22 mg/dL    CREATININE 2.6 (H) 0.4 - 1.2 mg/dL    Calcium 9.4 8.5 - 10.5 mg/dL   Anion Gap    Collection Time: 05/21/21  5:30 AM   Result Value Ref Range    Anion Gap 16.0 8.0 - 16.0 meq/L   Glomerular Filtration Rate, Estimated    Collection Time: 05/21/21  5:30 AM   Result Value Ref Range    Est, Glom Filt Rate 26 (A) ml/min/1.73m2   POCT glucose    Collection Time: 05/21/21  8:25 AM   Result Value Ref Range    POC Glucose 148 (H) 70 - 108 mg/dl       Radiology:     Echocardiogram limited    Result Date: 5/10/2021  Transthoracic Echocardiography Report (TTE)  Demographics   Patient Name   [address] [place] [address],[address] Gender              Male                 P   MR #           [id number]         Race                                                    Ethnicity   Account #      [de-identified]         Room Number         0003   Accession      [id number]        Date of Study       05/10/2021  Number   Date of Birth  1970        Referring Physician Orvan Schiff MD [facility]   Age            44 year(s)        [address], [facility]                                    Interpreting        Echo reader of the                                   Physician           martinez Smith MD  Procedure Type of Study   TTE procedure:ECHOCARDIOGRAM LIMITED. Pleural Effusion  No evidence of pleural effusion. Aorta / Great Vessels  -Aortic root dimension within normal limits.  -The Pulmonary artery is within normal limits. -IVC size is within normal limits with normal respiratory phasic changes. M-Mode/2D Measurements & Calculations   LV Diastolic    LV Systolic Dimension: 3.5   AV Cusp Separation: 2.3 cmLA  Dimension: 5 cm cm                           Dimension: 3.1 cmAO Root  LV FS:30 %      LV Volume Diastolic: 839 ml  Dimension: 3.9 cm  LV PW           LV Volume Systolic: 29.6 ml  Diastolic: 0.8  LV EDV/LV EDV Index: 118  cm              ml/55 m^2LV ESV/LV ESV  Septum          Index: 50.9 ml/24 m^2        RV Diastolic Dimension: 2.7  Diastolic: 0.8  EF Calculated: 56.9 %        cm  cm                                               LA/Aorta: 0.79  [email or web address] [id number]    CT ABDOMEN PELVIS WO CONTRAST Additional Contrast? None    Result Date: 5/9/2021  CT SCAN OF THE ABDOMEN AND PELVIS WITHOUT CONTRAST COMPARISON: 4/13/2021. TECHNIQUE: A noncontrast CT scan of the abdomen and pelvis was performed. A dose reduction technique was utilized. FINDINGS: CT ABDOMEN: Images of the lung bases demonstrate a small to moderate loculated right pleural effusion with an associated right-sided chest tube. 2.6 cm mass is noted within the right pleural effusion on axial image 1 which is nonspecific but could represent neoplastic disease or perhaps proteinaceous material.  A smaller lesion in the right pleural space measures 8 mm on image 3. There is also a loculated small left pleural effusion. Small pericardial effusion is noted. Numerous nodules/masses are noted within the lung bases, compatible with neoplastic disease. For example in the right lower lobe is a 4.1 cm lesion on axial image 1 minus does not appear to be significantly changed.  Lytic destructive change of multiple left lower ribs is again noted, compatible with the patient's known metastatic disease. This does not appear to be significantly changed. There are no focal liver lesions. The patient is status post cholecystectomy. The pancreas is unremarkable. There are calcified granulomas in the spleen. Right adrenal mass measures 2.0 x 1.0 cm, increased since the prior study when it measured 1.7 x 0.9 cm. This is compatible with metastatic disease. Left-sided adrenal metastases are noted. One of these, seen on axial image 25, is increased in size and measures 1.1 cm on the current exam.  Previously it measured 0.9 cm. Exophytic solid mass arising from the right renal moiety measures 4.4 x 4.0 cm on axial image 38, this does not appear to be significantly changed. Horseshoe kidney is noted. Abdominal aorta is normal in caliber without evidence of an aneurysm. Moderate volume of ascites is noted in the abdomen/pelvis, increased since the prior study. Changes of peritoneal carcinomatosis are more prominent on this exam, for example seen on axial image 77. CT PELVIS: Lytic destructive change in the right iliac bone is again noted on axial image 72, this does not appear to be significantly changed. There is no evidence of a bowel obstruction. There is no free air. Colonic diverticula are noted, without evidence of acute diverticulitis. Subcutaneous edema/anasarca is noted. 1.  No evidence of a bowel obstruction or free air. 2.  Moderate volume of ascites, increased since the prior study. Changes of peritoneal carcinomatosis are more prominent on this exam. 3.  Right renal mass is again noted. Worsening metastatic disease within the adrenal glands. 4.  Small to moderate loculated right pleural effusion. Small loculated left pleural effusion. Bibasilar parenchymal metastases are noted. 5.  Multifocal bone metastases are again noted. 6.  Additional findings are detailed above.  This document has been electronically signed by: Fred Newman M.D. on 05/09/2021 09:27 PM All CTs at this facility use dose modulation techniques and iterative reconstructions, and/or weight-based dosing when appropriate to reduce radiation to a low as reasonably achievable. XR CHEST 1 VIEW    Result Date: 5/9/2021  1 view chest x-ray Comparison:  CR,SR  - XR CHEST PORTABLE  - 04/22/2021 01:09 AM EDT Findings: Bibasilar large bilateral pleural effusions are similar to the prior exam. Right basilar tunneled pleural catheter is unchanged. Mass versus loculated effusion at the right apex. Increased in size since the prior exam. Ill-defined bilateral lung opacities similar to the prior exam. Cardiomegaly. No change. No acute fracture. Loculated right apex pleural effusion versus mass has increased in size since the prior exam. Otherwise no acute findings. This document has been electronically signed by: Jg Pascual MD on 05/09/2021 09:02 PM    US GUIDED PARACENTESIS    Result Date: 5/10/2021  PROCEDURE: US GUIDED PARACENTESIS CLINICAL INFORMATION: ascites with SOB, BOTH diagnositc and therapeutic . COMPARISON: CT abdomen pelvis dated 5/9/2021. TECHNIQUE: Risks and benefits of the procedure were thoroughly explained and oral and written informed consent obtained. The patient was placed supine on the ultrasound gurney. Following patient and site verification, the patient was prepped and draped in usual fashion and 2% lidocaine was infiltrated in the subcutaneous tissues at the designated puncture site. A 5 Thai 1 stick needle/catheter system was introduced into the fluid collection under ultrasound guidance. The catheter was advanced while the inner needle was removed. 70 mL bloody fluid was removed and sent to laboratory for further analysis. A total of 3 L of bloody fluid was withdrawn and the catheter removed and a bandage placed.  The patient tolerated the procedure well and no immediate postprocedural complication was identified. There are 7 saved ultrasound images. FINDINGS: Saved ultrasound images show the 1 stick needle within the ascites fluid. Post procedure ultrasound image shows marked decrease in size of ascitic fluid. Successful ultrasound-guided paracentesis. **This report has been created using voice recognition software. It may contain minor errors which are inherent in voice recognition technology. ** Final report electronically signed by Dr. Fabrice Ennis MD on 5/10/2021 5:07 PM        ASSESMENT:      Active Problems:    Recurrent right pleural effusion    Severe malnutrition (HCC)    Metastatic renal cell carcinoma (HCC)    Hyperkalemia    SOB (shortness of breath)    Cancer associated pain    Peritoneal carcinomatosis ([address]    Malignant ascites  Resolved Problems:    * No resolved hospital problems. *  Acute on chronic respiratory failure  Loculated pleural effusion S/P removal of infected PleurX catheter, VATS, pleurodesis    Status post hyperkalemia   Hypothyroidism   Anemia  CAMILLE with recent acute interstitial nephritis  Chronic pain secondary to metastatic disease      PLAN:  Received calcium gluconate/ D50 /insulin /Kayexalate for hyperkalemia. Follow-up potassium levels. Continue to monitor electrolytes and  Correct accordingly. Nephrology service following. No plans for CRRT. On steroids for AIN. Currently on Bumex      He is status post removal of PleurX catheter,  He has had previous VATS and pleurodesis on the left pleural cavity. s/p right-sided thoracentesis today as it could not be done yesterday and p.r.n. only 170cc pleural fluid removed due to complex septated loculated pleural effusion, will discuss with Pulmonology service    Grew Staph epi from infected PleurX catheter, currently on Keflex. Complete  Antibiotics. Patient Had abdominal paracentesis yesterday, 1.3 L was removed.    He will benefit from scheduled outpatient abdominal paracenteses        Continue bronchodilators. Continue Marinol for his appetite. Continue current pain management for metastatic disease    Patient's prognosis is very poor, discussed with patients Son , consider hospice, wants to hold off till after Sunday as patient's daughter is planning marriage vows at that time.  .         DVT prophylaxis: [] Lovenox                                 [x] SCDs                                 [] SQ Heparin                                 [] Encourage ambulation, low risk for DVT, no chemical or mechanical prophylaxis necessary              [] Already on Anticoagulation                Anticipated Disposition upon discharge: [x] Home                                                                         [] Home with Home Health                                                                         [] [facility]                                                                         [] [address]          Electronically signed by Rosalia Zuleta MD on 5/21/2021 at 9:08 AM

## 2021-05-21 NOTE — PROGRESS NOTES
Formulation and discussion of sedation / procedure plans, risks, benefits, side effects and alternatives with patient and/or responsible adult completed.     Electronically signed by Shawn Beyer MD on 5/21/2021 at 1:24 PM

## 2021-05-22 NOTE — PLAN OF CARE
Problem: Impaired respiratory status  Goal: Clear lung sounds  Description: Clear lung sounds  Outcome: Ongoing  Note: Pt continues on aerosols to clear lung sounds/improve aeration.

## 2021-05-22 NOTE — PROGRESS NOTES
Time: 05/21/21  8:07 PM   Result Value Ref Range    POC Glucose 152 (H) 70 - 108 mg/dl   Basic Metabolic Panel    Collection Time: 05/22/21 11:58 AM   Result Value Ref Range    Sodium 125 (L) 135 - 145 meq/L    Potassium 6.1 (HH) 3.5 - 5.2 meq/L    Chloride 84 (L) 98 - 111 meq/L    CO2 26 23 - 33 meq/L    Glucose 120 (H) 70 - 108 mg/dL     (H) 7 - 22 mg/dL    CREATININE 3.2 (HH) 0.4 - 1.2 mg/dL    Calcium 9.1 8.5 - 10.5 mg/dL   Anion Gap    Collection Time: 05/22/21 11:58 AM   Result Value Ref Range    Anion Gap 15.0 8.0 - 16.0 meq/L   Glomerular Filtration Rate, Estimated    Collection Time: 05/22/21 11:58 AM   Result Value Ref Range    Est, Glom Filt Rate 20 (A) ml/min/1.73m2   POCT glucose    Collection Time: 05/22/21 12:28 PM   Result Value Ref Range    POC Glucose 137 (H) 70 - 108 mg/dl       Radiology:     Echocardiogram limited    Result Date: 5/10/2021  Transthoracic Echocardiography Report (TTE)  Demographics   Patient Name   [address] [place] [address],[address] Gender              Male                 P   MR #           [id number]         Race                                                    Ethnicity   Account #      [de-identified]         Room Number         0003   Accession      [id number]        Date of Study       05/10/2021  Number   Date of Birth  1970        Referring Physician Rizwan Chavis, [facility] [place]   Age            44 year(s)        [facility], RDCS                                    Interpreting        Echo reader of the                                   Physician           martinez Lisa MD  Procedure Type of Study   TTE procedure:ECHOCARDIOGRAM LIMITED.   Procedure Date Date: 05/10/2021 Start: 12:53 PM Study Location: Bedside Technical Quality: Limited visualization due to poor acoustical window. Indications:Pericardial effusion. Additional Medical History:Shortness of breath, diabetic, hypertension, chronic kidney disease, renal cell cancer with METS, recent Covid, hypothyroidism Patient Status: Routine Height: 72 inches Weight: 203 pounds BSA: 2.14 m^2 BMI: 27.53 kg/m^2 BP: 113/71 mmHg  Conclusions   Summary  Normal left ventricle size and systolic function. Ejection fraction was  estimated at 65 %. There were no regional left ventricular wall motion  abnormalities and wall thickness was within normal limits. There is trivial posterior pericardial effusion with no evidence of  hemodynamic compromise. Signature   ----------------------------------------------------------------  Electronically signed by Sushila Fam MD (Interpreting  physician) on 05/10/2021 at 06:35 PM  ----------------------------------------------------------------   Findings   Mitral Valve  The mitral valve structure was normal with normal leaflet separation. Aortic Valve  The aortic valve was trileaflet with normal thickness and cuspal  separation. Tricuspid Valve  The tricuspid valve structure was normal with normal leaflet separation. Pulmonic Valve  The pulmonic valve leaflets exhibited normal thickness, no calcification,  and normal cuspal separation. Left Atrium  Left atrial size was normal.   Left Ventricle  Normal left ventricle size and systolic function. Ejection fraction was  estimated at 65 %. There were no regional left ventricular wall motion  abnormalities and wall thickness was within normal limits. Right Atrium  Right atrial size was normal.   Right Ventricle  The right ventricular size was normal with normal systolic function and  wall thickness. Pericardial Effusion  There is trivial posterior pericardial effusion with no evidence of  hemodynamic compromise. Pleural Effusion  No evidence of pleural effusion.    Aorta / Great Vessels  -Aortic root dimension within normal limits.  -The Pulmonary artery is within normal limits. -IVC size is within normal limits with normal respiratory phasic changes. M-Mode/2D Measurements & Calculations   LV Diastolic    LV Systolic Dimension: 3.5   AV Cusp Separation: 2.3 cmLA  Dimension: 5 cm cm                           Dimension: 3.1 cmAO Root  LV FS:30 %      LV Volume Diastolic: 597 ml  Dimension: 3.9 cm  LV PW           LV Volume Systolic: 60.0 ml  Diastolic: 0.8  LV EDV/LV EDV Index: 118  cm              ml/55 m^2LV ESV/LV ESV  Septum          Index: 50.9 ml/24 m^2        RV Diastolic Dimension: 2.7  Diastolic: 0.8  EF Calculated: 56.9 %        cm  cm                                               LA/Aorta: 0.79  [email or web address] [id number]    CT ABDOMEN PELVIS WO CONTRAST Additional Contrast? None    Result Date: 5/9/2021  CT SCAN OF THE ABDOMEN AND PELVIS WITHOUT CONTRAST COMPARISON: 4/13/2021. TECHNIQUE: A noncontrast CT scan of the abdomen and pelvis was performed. A dose reduction technique was utilized. FINDINGS: CT ABDOMEN: Images of the lung bases demonstrate a small to moderate loculated right pleural effusion with an associated right-sided chest tube. 2.6 cm mass is noted within the right pleural effusion on axial image 1 which is nonspecific but could represent neoplastic disease or perhaps proteinaceous material.  A smaller lesion in the right pleural space measures 8 mm on image 3. There is also a loculated small left pleural effusion. Small pericardial effusion is noted. Numerous nodules/masses are noted within the lung bases, compatible with neoplastic disease. For example in the right lower lobe is a 4.1 cm lesion on axial image 1 minus does not appear to be significantly changed. Lytic destructive change of multiple left lower ribs is again noted, compatible with the patient's known metastatic disease.   This does not appear to be significantly changed. There are no focal liver lesions. The patient is status post cholecystectomy. The pancreas is unremarkable. There are calcified granulomas in the spleen. Right adrenal mass measures 2.0 x 1.0 cm, increased since the prior study when it measured 1.7 x 0.9 cm. This is compatible with metastatic disease. Left-sided adrenal metastases are noted. One of these, seen on axial image 25, is increased in size and measures 1.1 cm on the current exam.  Previously it measured 0.9 cm. Exophytic solid mass arising from the right renal moiety measures 4.4 x 4.0 cm on axial image 38, this does not appear to be significantly changed. Horseshoe kidney is noted. Abdominal aorta is normal in caliber without evidence of an aneurysm. Moderate volume of ascites is noted in the abdomen/pelvis, increased since the prior study. Changes of peritoneal carcinomatosis are more prominent on this exam, for example seen on axial image 77. CT PELVIS: Lytic destructive change in the right iliac bone is again noted on axial image 72, this does not appear to be significantly changed. There is no evidence of a bowel obstruction. There is no free air. Colonic diverticula are noted, without evidence of acute diverticulitis. Subcutaneous edema/anasarca is noted. 1.  No evidence of a bowel obstruction or free air. 2.  Moderate volume of ascites, increased since the prior study. Changes of peritoneal carcinomatosis are more prominent on this exam. 3.  Right renal mass is again noted. Worsening metastatic disease within the adrenal glands. 4.  Small to moderate loculated right pleural effusion. Small loculated left pleural effusion. Bibasilar parenchymal metastases are noted. 5.  Multifocal bone metastases are again noted. 6.  Additional findings are detailed above.  This document has been electronically signed by: Bartolo Briones M.D. on 05/09/2021 09:27 PM All CTs at this facility use dose modulation techniques ascites fluid. Post procedure ultrasound image shows marked decrease in size of ascitic fluid. Successful ultrasound-guided paracentesis. **This report has been created using voice recognition software. It may contain minor errors which are inherent in voice recognition technology. ** Final report electronically signed by Dr. Valdo Andrew MD on 5/10/2021 5:07 PM        ASSESMENT:      Active Problems:    Recurrent right pleural effusion    Severe malnutrition (HCC)    Metastatic renal cell carcinoma (HCC)    Hyperkalemia    SOB (shortness of breath)    Cancer associated pain    Peritoneal carcinomatosis ([address]    Malignant ascites  Resolved Problems:    * No resolved hospital problems. *  Acute on chronic respiratory failure  Loculated pleural effusion S/P removal of infected PleurX catheter, VATS, pleurodesis    Status post hyperkalemia   Hypothyroidism   Anemia  CAMILLE with recent acute interstitial nephritis  Chronic pain secondary to metastatic disease      PLAN:  Orders for  D50 /insulin /Kayexalate for hyperkalemia in . Follow-up potassium levels. Continue to monitor electrolytes and  Correct accordingly. Nephrology service following. No plans for CRRT. On steroids for AIN. Started on NS for hyponatremia and bumex stopped      He is status post removal of PleurX catheter,  He has had previous VATS and pleurodesis on the left pleural cavity. s/p right-sided thoracentesis today as it could not be done yesterday and p.r.n. only 170cc pleural fluid removed due to complex septated loculated pleural effusion, will discuss with Pulmonology service    Grew Staph epi from infected PleurX catheter, currently on Keflex. Complete  Antibiotics. Patient Had abdominal paracentesis yesterday, 1.3 L was removed. He will benefit from scheduled outpatient abdominal paracenteses        Continue bronchodilators. Continue Marinol for his appetite.         Continue current pain management for metastatic disease    Patient's prognosis is very poor, discussed with patients Son , consider hospice, wants to hold off till after Sunday as patient's daughter is planning marriage vows at that time.  .         DVT prophylaxis: [] Lovenox                                 [x] SCDs                                 [] SQ Heparin                                 [] Encourage ambulation, low risk for DVT, no chemical or mechanical prophylaxis necessary              [] Already on Anticoagulation                Anticipated Disposition upon discharge: [x] Home                                                                         [] Home with Home Health                                                                         [] [facility]                                                                         [] [facility] [address]          Electronically signed by Virgil Clark MD on 5/22/2021 at 2:28 PM

## 2021-05-22 NOTE — PROGRESS NOTES
Renal Progress Note  [facility]    Patient :  Carl Burn; 44 y.o. MRN# [id number]  Location:  [facility]03/003-A  Attending:  Jamil Quintero MD  Admit Date:  5/9/2021   Hospital Day: 13      Subjective:     Nephrology is following the patient for CAMILLE, hyperkalemia. Patient was seen and examined. Having a lot of pain. Not urinating much. Outpatient Medications:     Medications Prior to Admission: levothyroxine (SYNTHROID) 25 MCG tablet, Take 25 mcg by mouth Daily  oxyCODONE-acetaminophen (PERCOCET) 5-325 MG per tablet, Take 1 tablet by mouth every 6 hours as needed for Pain.  famotidine (PEPCID) 20 MG tablet, Take 1 tablet by mouth daily  ipratropium-albuterol (DUONEB) 0.5-2.5 (3) MG/3ML SOLN nebulizer solution, Inhale 3 mLs into the lungs every 4 hours as needed for Shortness of Breath  midodrine (PROAMATINE) 2.5 MG tablet, Take 1 tablet by mouth 3 times daily (with meals)  predniSONE (DELTASONE) 20 MG tablet, Take 2 tablets by mouth daily F/u with Dr Kathy Huddleston regarding continuing dose at followup  traMADol (ULTRAM) 50 MG tablet, Take 50 mg by mouth every 6 hours as needed for Pain.   traZODone (DESYREL) 50 MG tablet, Take 50 mg by mouth nightly  polyethylene glycol (GLYCOLAX) 17 g packet, Take 17 g by mouth daily as needed for Constipation  [DISCONTINUED] levothyroxine (SYNTHROID) 125 MCG tablet, Take 0.5 tablets by mouth Daily  ALLOPURINOL PO, Take 100 mg by mouth nightly     Current Medications:     Scheduled Meds:    [Held by provider] sodium zirconium cyclosilicate  10 g Oral Once    cephALEXin  500 mg Oral Q6H    naloxegol  12.5 mg Oral QAM    morphine  15 mg Oral Q8H    lidocaine  2 patch Transdermal Daily    predniSONE  60 mg Oral Daily    midodrine  5 mg Oral TID WC    dronabinol  2.5 mg Oral BID    ipratropium-albuterol  1 ampule Inhalation Q4H    senna  2 tablet Oral Nightly    docusate sodium  100 mg Oral BID    polyethylene glycol  17 g Oral Daily    for: BNP  MICHAEL:    No results found for: MICHAEL  SPEP:  Lab Results   Component Value Date    PROT 5.6 05/11/2021     UPEP:   No results found for: LABPE  C3:   No results found for: C3  C4:   No results found for: C4  MPO ANCA:   No results found for: MPO  PR3 ANCA:   No results found for: PR3  Anti-GBM:   No results found for: GBMABIGG  Hep BsAg:       No results found for: HEPBSAG  Hep C AB:        No results found for: HEPCAB    Urinalysis/Chemistries:      Lab Results   Component Value Date    NITRU NEGATIVE 05/17/2021    COLORU YELLOW 05/17/2021    PHUR 5.0 05/17/2021    LABCAST NONE SEEN 05/17/2021    WBCUA 0-2 05/17/2021    RBCUA 0-2 05/17/2021    MUCUS THREADS 07/22/2019    YEAST NONE SEEN 04/18/2021    BACTERIA NONE SEEN 05/17/2021    SPECGRAV 1.017 05/17/2021    LEUKOCYTESUR NEGATIVE 05/17/2021    UROBILINOGEN 1.0 05/17/2021    BILIRUBINUR NEGATIVE 05/17/2021    BLOODU NEGATIVE 05/17/2021    GLUCOSEU NEGATIVE 07/22/2019    KETUA NEGATIVE 05/17/2021    AMORPHOUS DEBRIS 04/18/2021     Urine Sodium:   No results found for: NEVAEH  Urine Potassium:  No results found for: KUR  Urine Chloride:  No results found for: CLUR  Urine Osmolarity:   Lab Results   Component Value Date    OSMOU 796 04/13/2021     Urine Protein:   No components found for: TOTALPROTEIN, URINE   Urine Creatinine:   No results found for: LABCREA  Urine Eosinophils:  No components found for: UEOS        Impression and Plan:  1. CAMILLE/CKD: likely AIN from [id number] [facility]. On Pred 60 mg daily. Renal function is worsening he also may have component of ATN  -start gentle fluids 0.9 @ 50 ml/hour  -if continues to worsen may even require RRT, discussed with pt and daughter at bedside. He is not sure at this point if he would want to pursue that route if needed      2. Hyperkalemia:retreated, repeat at 6 PM  3. Hyponatremia,hypervolemic + decreased free water excretion from CAMILLE, stable. 4. Hypotension,continue Midodrine  5. Anemia  6.  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 Malignant pleural effusions  8. Ascites   9. Cancer related pain    Very poor prognosis          Please don't hesitate to call with any questions.   Electronically signed by [id number] Avelina Snider DO on 5/22/2021 at 3:59 PM

## 2021-05-23 NOTE — PROGRESS NOTES
Renal Progress Note  [facility]    Patient :  Myron Wu; 44 y.o. MRN# [id number]  Location:  -03/003-A  Attending:  Reyes Hamilton MD  Admit Date:  5/9/2021   Hospital Day: 14      Subjective:     Nephrology is following the patient for CAMILLE, hyperkalemia. Patient was seen and examined. Renal function worsening. Not making much urine. Son at bedside. Considering hospice tomorrow after daughter's wedding vows today. Outpatient Medications:     Medications Prior to Admission: levothyroxine (SYNTHROID) 25 MCG tablet, Take 25 mcg by mouth Daily  oxyCODONE-acetaminophen (PERCOCET) 5-325 MG per tablet, Take 1 tablet by mouth every 6 hours as needed for Pain.  famotidine (PEPCID) 20 MG tablet, Take 1 tablet by mouth daily  ipratropium-albuterol (DUONEB) 0.5-2.5 (3) MG/3ML SOLN nebulizer solution, Inhale 3 mLs into the lungs every 4 hours as needed for Shortness of Breath  midodrine (PROAMATINE) 2.5 MG tablet, Take 1 tablet by mouth 3 times daily (with meals)  predniSONE (DELTASONE) 20 MG tablet, Take 2 tablets by mouth daily F/u with Dr Lisa Yanez regarding continuing dose at followup  traMADol (ULTRAM) 50 MG tablet, Take 50 mg by mouth every 6 hours as needed for Pain.   traZODone (DESYREL) 50 MG tablet, Take 50 mg by mouth nightly  polyethylene glycol (GLYCOLAX) 17 g packet, Take 17 g by mouth daily as needed for Constipation  [DISCONTINUED] levothyroxine (SYNTHROID) 125 MCG tablet, Take 0.5 tablets by mouth Daily  ALLOPURINOL PO, Take 100 mg by mouth nightly     Current Medications:     Scheduled Meds:    sodium polystyrene  30 g Oral Once    sodium zirconium cyclosilicate  10 g Oral TID    midodrine  10 mg Oral TID WC    [Held by provider] sodium zirconium cyclosilicate  10 g Oral Once    cephALEXin  500 mg Oral Q6H    naloxegol  12.5 mg Oral QAM    morphine  15 mg Oral Q8H    lidocaine  2 patch Transdermal Daily    predniSONE  60 mg Oral Daily    dronabinol  2.5 mg Oral BID    ipratropium-albuterol  1 ampule Inhalation Q4H    senna  2 tablet Oral Nightly    docusate sodium  100 mg Oral BID    polyethylene glycol  17 g Oral Daily    famotidine  20 mg Oral Daily    [Held by provider] heparin (porcine)  5,000 Units Subcutaneous 3 times per day    levothyroxine  25 mcg Oral Daily    insulin lispro  0-6 Units Subcutaneous TID WC    insulin lispro  0-3 Units Subcutaneous Nightly    epoetin yumiko-epbx  10,000 Units Subcutaneous Once per day on      Continuous Infusions:    sodium chloride 50 mL/hr at 21 1554    dextrose      sodium chloride       PRN Meds:  dextrose, glucose, dextrose, glucagon (rDNA), dextrose, morphine, oxyCODONE-acetaminophen **OR** oxyCODONE-acetaminophen, ALPRAZolam, ondansetron, sodium chloride, ipratropium-albuterol, acetaminophen, promethazine    Input/Output:       I/O last 3 completed shifts: In: 659.6 [P.O.:120; I.V.:539.6]  Out: 200 [Urine:200]. No data found. Vital Signs:   Temperature:  Temp: 96.9 °F (36.1 °C)  TMax:   Temp (24hrs), Av.6 °F (35.9 °C), Min:96.3 °F (35.7 °C), Max:96.9 °F (36.1 °C)    Respirations:  Resp: 22  Pulse:   Pulse: 102  BP:    BP: (!) 99/54  BP Range: Systolic (99PQU), NWD:700 , Min:99 , DUF:653       Diastolic (26HVR), HKF:47, Min:54, Max:72      Physical Examination:     General:  Awake, alert, frail appearing, appears in pain  HEENT: NC/AT/ MMM   Chest:               Bibasilar crackles  Cardiac:  S1 S2   Abdomen: Soft, +distention, nontender  Neuro:  No facial droop, No Asterixis  SKIN:  No rashes, good skin turgor. Extremities:  No LE edema    Labs:       No results for input(s): WBC, RBC, HGB, HCT, MCV, MCH, MCHC, RDW, PLT, MPV in the last 72 hours.    BMP:   Recent Labs     21  1316 21  1158 21  1822 21  0434   * 125*  --  126*   K 5.3* 6.1* 6.0* 6.0*   CL 85* 84*  --  84*   CO2 31 26  --  29   BUN 94* 106*  --  117*   CREATININE 2.7* 3.2*  --  3.5* GLUCOSE 137* 120*  --  120*   CALCIUM 9.1 9.1  --  8.8      Phosphorus:   No results for input(s): PHOS in the last 72 hours. Magnesium:    No results for input(s): MG in the last 72 hours. Albumin:    No results for input(s): LABALBU in the last 72 hours. BNP:    No results found for: BNP  MICHAEL:    No results found for: MICHAEL  SPEP:  Lab Results   Component Value Date    PROT 5.6 05/11/2021     UPEP:   No results found for: LABPE  C3:   No results found for: C3  C4:   No results found for: C4  MPO ANCA:   No results found for: MPO  PR3 ANCA:   No results found for: PR3  Anti-GBM:   No results found for: GBMABIGG  Hep BsAg:       No results found for: HEPBSAG  Hep C AB:        No results found for: HEPCAB    Urinalysis/Chemistries:      Lab Results   Component Value Date    NITRU NEGATIVE 05/17/2021    COLORU YELLOW 05/17/2021    PHUR 5.0 05/17/2021    LABCAST NONE SEEN 05/17/2021    WBCUA 0-2 05/17/2021    RBCUA 0-2 05/17/2021    MUCUS THREADS 07/22/2019    YEAST NONE SEEN 04/18/2021    BACTERIA NONE SEEN 05/17/2021    SPECGRAV 1.017 05/17/2021    LEUKOCYTESUR NEGATIVE 05/17/2021    UROBILINOGEN 1.0 05/17/2021    BILIRUBINUR NEGATIVE 05/17/2021    BLOODU NEGATIVE 05/17/2021    GLUCOSEU NEGATIVE 07/22/2019    KETUA NEGATIVE 05/17/2021    AMORPHOUS DEBRIS 04/18/2021     Urine Sodium:   No results found for: NEVAEH  Urine Potassium:  No results found for: KUR  Urine Chloride:  No results found for: CLUR  Urine Osmolarity:   Lab Results   Component Value Date    OSMOU 796 04/13/2021     Urine Protein:   No components found for: TOTALPROTEIN, URINE   Urine Creatinine:   No results found for: LABCREA  Urine Eosinophils:  No components found for: UEOS        Impression and Plan:  1. CAMILLE/CKD: worsening. AIN vs ATN. 2. Hyperkalemia:persistent  3. Hyponatremia  4. Hypotension  5. Anemia  6. 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 Malignant pleural effusions  8. Ascites   9.  Cancer related pain    We

## 2021-05-23 NOTE — PROGRESS NOTES
INTERNAL MEDICINE SPECIALTIES  Progress Note For Dr Kelli Maier       Patient:  Yonny Holt  YOB: 1970  Date of Service: 5/23/2021  MRN: [id number]   Acct:  [de-identified]   Primary Care Physician: CATIY Soto CNP    SUBJECTIVE: Pain control still suboptimal.  Looking into having hospice after he was witnessed his daughters vows being exchanged    Home Medications:   No current facility-administered medications on file prior to encounter. Current Outpatient Medications on File Prior to Encounter   Medication Sig Dispense Refill    levothyroxine (SYNTHROID) 25 MCG tablet Take 25 mcg by mouth Daily      oxyCODONE-acetaminophen (PERCOCET) 5-325 MG per tablet Take 1 tablet by mouth every 6 hours as needed for Pain.  famotidine (PEPCID) 20 MG tablet Take 1 tablet by mouth daily 30 tablet 0    ipratropium-albuterol (DUONEB) 0.5-2.5 (3) MG/3ML SOLN nebulizer solution Inhale 3 mLs into the lungs every 4 hours as needed for Shortness of Breath 360 mL 0    midodrine (PROAMATINE) 2.5 MG tablet Take 1 tablet by mouth 3 times daily (with meals) 90 tablet 0    predniSONE (DELTASONE) 20 MG tablet Take 2 tablets by mouth daily F/u with Dr Denia Gay regarding continuing dose at followup 60 tablet 0    traMADol (ULTRAM) 50 MG tablet Take 50 mg by mouth every 6 hours as needed for Pain.       traZODone (DESYREL) 50 MG tablet Take 50 mg by mouth nightly      polyethylene glycol (GLYCOLAX) 17 g packet Take 17 g by mouth daily as needed for Constipation 527 g 0    ALLOPURINOL PO Take 100 mg by mouth nightly            Scheduled Meds:   sodium polystyrene  30 g Oral Once    sodium zirconium cyclosilicate  10 g Oral TID    midodrine  10 mg Oral TID WC    [Held by provider] sodium zirconium cyclosilicate  10 g Oral Once    cephALEXin  500 mg Oral Q6H    naloxegol  12.5 mg Oral QAM    morphine  15 mg Oral Q8H    lidocaine  2 patch Transdermal Daily    predniSONE  60 mg Oral Daily    dronabinol  2.5 mg Oral BID    ipratropium-albuterol  1 ampule Inhalation Q4H    senna  2 tablet Oral Nightly    docusate sodium  100 mg Oral BID    polyethylene glycol  17 g Oral Daily    famotidine  20 mg Oral Daily    [Held by provider] heparin (porcine)  5,000 Units Subcutaneous 3 times per day    levothyroxine  25 mcg Oral Daily    insulin lispro  0-6 Units Subcutaneous TID WC    insulin lispro  0-3 Units Subcutaneous Nightly    epoetin yumiko-epbx  10,000 Units Subcutaneous Once per day on Mon Wed Fri     Continuous Infusions:   sodium chloride 50 mL/hr at 05/23/21 1419    dextrose      sodium chloride       PRN Meds:dextrose, glucose, dextrose, glucagon (rDNA), dextrose, morphine, oxyCODONE-acetaminophen **OR** oxyCODONE-acetaminophen, ALPRAZolam, ondansetron, sodium chloride, ipratropium-albuterol, acetaminophen, promethazine        Allergies:  Patient has no known allergies. OBJECTIVE:    Vitals:   Vitals:    05/23/21 1214   BP:    Pulse:    Resp:    Temp:    SpO2: 94%      BMI: Body mass index is 27.4 kg/m². PHYSICAL EXAMINATION:              General appearance: ill looking male mild respiratory distress*, appears stated age and cooperative. HEENT:  Normal cephalic, atraumatic without obvious deformity. Pupils equal, round, and reactive to light. Extra ocular muscles intact. Conjunctivae/corneas clear. Neck: Supple. Chest:  Dressing over right chest clean and dry  Respiratory:  Diminished air entry bilaterally  Cardiovascular:  Regular rhythm with normal S1/S2 without  rubs or gallops. Abdomen:  Distended,soft,nontender, with normal bowel sounds. Musculoskeletal:  No clubbing, cyanosis or ankle edema bilaterally.    Neurologic:   Alert and oriented x3  Moves all extremities spontaneously  Psychiatric: Thought content appropriate, normal insight  Review of Labs and Diagnostic Testing:    Recent Results (from the past 24 hour(s))   POCT glucose    Collection Time: 05/22/21  4:08 PM Result Value Ref Range    POC Glucose 203 (H) 70 - 108 mg/dl   POCT glucose    Collection Time: 05/22/21  5:03 PM   Result Value Ref Range    POC Glucose 145 (H) 70 - 108 mg/dl   Potassium    Collection Time: 05/22/21  6:22 PM   Result Value Ref Range    Potassium 6.0 (HH) 3.5 - 5.2 meq/L   POCT glucose    Collection Time: 05/22/21  8:11 PM   Result Value Ref Range    POC Glucose 134 (H) 70 - 108 mg/dl   Basic Metabolic Panel    Collection Time: 05/23/21  4:34 AM   Result Value Ref Range    Sodium 126 (L) 135 - 145 meq/L    Potassium 6.0 (HH) 3.5 - 5.2 meq/L    Chloride 84 (L) 98 - 111 meq/L    CO2 29 23 - 33 meq/L    Glucose 120 (H) 70 - 108 mg/dL     (H) 7 - 22 mg/dL    CREATININE 3.5 (HH) 0.4 - 1.2 mg/dL    Calcium 8.8 8.5 - 10.5 mg/dL   Anion Gap    Collection Time: 05/23/21  4:34 AM   Result Value Ref Range    Anion Gap 13.0 8.0 - 16.0 meq/L   Glomerular Filtration Rate, Estimated    Collection Time: 05/23/21  4:34 AM   Result Value Ref Range    Est, Glom Filt Rate 18 (A) ml/min/1.73m2   POCT glucose    Collection Time: 05/23/21  8:27 AM   Result Value Ref Range    POC Glucose 140 (H) 70 - 108 mg/dl   POCT glucose    Collection Time: 05/23/21 11:30 AM   Result Value Ref Range    POC Glucose 133 (H) 70 - 108 mg/dl   Potassium    Collection Time: 05/23/21  1:38 PM   Result Value Ref Range    Potassium 5.9 (H) 3.5 - 5.2 meq/L       Radiology:     Echocardiogram limited    Result Date: 5/10/2021  Transthoracic Echocardiography Report (TTE)  Demographics   Patient Name   [address] [place] [address],[address] Gender              Male                 P   MR #           [id number]         Race                                                    Ethnicity   Account #      [de-identified]         Room Number         0003   Accession      [id number]        Date of Study       05/10/2021  Number   Date of Birth  1970        Referring Physician Isabel Agustin MD Kim Mccain   Age            44 year(s)        [address], [facility]                                    Interpreting        Echo reader of the                                   Physician           week                                                       Esthela Balderas MD  Procedure Type of Study   TTE procedure:ECHOCARDIOGRAM LIMITED. Procedure Date Date: 05/10/2021 Start: 12:53 PM Study Location: Bedside Technical Quality: Limited visualization due to poor acoustical window. Indications:Pericardial effusion. Additional Medical History:Shortness of breath, diabetic, hypertension, chronic kidney disease, renal cell cancer with METS, recent Covid, hypothyroidism Patient Status: Routine Height: 72 inches Weight: 203 pounds BSA: 2.14 m^2 BMI: 27.53 kg/m^2 BP: 113/71 mmHg  Conclusions   Summary  Normal left ventricle size and systolic function. Ejection fraction was  estimated at 65 %. There were no regional left ventricular wall motion  abnormalities and wall thickness was within normal limits. There is trivial posterior pericardial effusion with no evidence of  hemodynamic compromise. Signature   ----------------------------------------------------------------  Electronically signed by Esthela Balderas MD (Interpreting  physician) on 05/10/2021 at 06:35 PM  ----------------------------------------------------------------   Findings   Mitral Valve  The mitral valve structure was normal with normal leaflet separation. Aortic Valve  The aortic valve was trileaflet with normal thickness and cuspal  separation. Tricuspid Valve  The tricuspid valve structure was normal with normal leaflet separation. Pulmonic Valve  The pulmonic valve leaflets exhibited normal thickness, no calcification,  and normal cuspal separation. Left Atrium  Left atrial size was normal.   Left Ventricle  Normal left ventricle size and systolic function.  Ejection fraction was  estimated at 65 %. There were no regional left ventricular wall motion  abnormalities and wall thickness was within normal limits. Right Atrium  Right atrial size was normal.   Right Ventricle  The right ventricular size was normal with normal systolic function and  wall thickness. Pericardial Effusion  There is trivial posterior pericardial effusion with no evidence of  hemodynamic compromise. Pleural Effusion  No evidence of pleural effusion. Aorta / Great Vessels  -Aortic root dimension within normal limits.  -The Pulmonary artery is within normal limits. -IVC size is within normal limits with normal respiratory phasic changes. M-Mode/2D Measurements & Calculations   LV Diastolic    LV Systolic Dimension: 3.5   AV Cusp Separation: 2.3 cmLA  Dimension: 5 cm cm                           Dimension: 3.1 cmAO Root  LV FS:30 %      LV Volume Diastolic: 319 ml  Dimension: 3.9 cm  LV PW           LV Volume Systolic: 80.5 ml  Diastolic: 0.8  LV EDV/LV EDV Index: 118  cm              ml/55 m^2LV ESV/LV ESV  Septum          Index: 50.9 ml/24 m^2        RV Diastolic Dimension: 2.7  Diastolic: 0.8  EF Calculated: 56.9 %        cm  cm                                               LA/Aorta: 0.79  [email or web address] [id number]    CT ABDOMEN PELVIS WO CONTRAST Additional Contrast? None    Result Date: 5/9/2021  CT SCAN OF THE ABDOMEN AND PELVIS WITHOUT CONTRAST COMPARISON: 4/13/2021. TECHNIQUE: A noncontrast CT scan of the abdomen and pelvis was performed. A dose reduction technique was utilized. FINDINGS: CT ABDOMEN: Images of the lung bases demonstrate a small to moderate loculated right pleural effusion with an associated right-sided chest tube.   2.6 cm mass is noted within the right pleural effusion on axial image 1 which is nonspecific but could represent neoplastic disease or perhaps proteinaceous material.  A smaller lesion in the right pleural space measures 8 mm on image 3. There is also a loculated small left pleural effusion. Small pericardial effusion is noted. Numerous nodules/masses are noted within the lung bases, compatible with neoplastic disease. For example in the right lower lobe is a 4.1 cm lesion on axial image 1 minus does not appear to be significantly changed. Lytic destructive change of multiple left lower ribs is again noted, compatible with the patient's known metastatic disease. This does not appear to be significantly changed. There are no focal liver lesions. The patient is status post cholecystectomy. The pancreas is unremarkable. There are calcified granulomas in the spleen. Right adrenal mass measures 2.0 x 1.0 cm, increased since the prior study when it measured 1.7 x 0.9 cm. This is compatible with metastatic disease. Left-sided adrenal metastases are noted. One of these, seen on axial image 25, is increased in size and measures 1.1 cm on the current exam.  Previously it measured 0.9 cm. Exophytic solid mass arising from the right renal moiety measures 4.4 x 4.0 cm on axial image 38, this does not appear to be significantly changed. Horseshoe kidney is noted. Abdominal aorta is normal in caliber without evidence of an aneurysm. Moderate volume of ascites is noted in the abdomen/pelvis, increased since the prior study. Changes of peritoneal carcinomatosis are more prominent on this exam, for example seen on axial image 77. CT PELVIS: Lytic destructive change in the right iliac bone is again noted on axial image 72, this does not appear to be significantly changed. There is no evidence of a bowel obstruction. There is no free air. Colonic diverticula are noted, without evidence of acute diverticulitis. Subcutaneous edema/anasarca is noted. 1.  No evidence of a bowel obstruction or free air.  2.  Moderate volume of ascites, increased since the prior study.  Changes of peritoneal carcinomatosis are more prominent on this exam. 3.  Right renal mass is again noted. Worsening metastatic disease within the adrenal glands. 4.  Small to moderate loculated right pleural effusion. Small loculated left pleural effusion. Bibasilar parenchymal metastases are noted. 5.  Multifocal bone metastases are again noted. 6.  Additional findings are detailed above. This document has been electronically signed by: Lester Campos M.D. on 05/09/2021 09:27 PM All CTs at this facility use dose modulation techniques and iterative reconstructions, and/or weight-based dosing when appropriate to reduce radiation to a low as reasonably achievable. XR CHEST 1 VIEW    Result Date: 5/9/2021  1 view chest x-ray Comparison:  CR,SR  - XR CHEST PORTABLE  - 04/22/2021 01:09 AM EDT Findings: Bibasilar large bilateral pleural effusions are similar to the prior exam. Right basilar tunneled pleural catheter is unchanged. Mass versus loculated effusion at the right apex. Increased in size since the prior exam. Ill-defined bilateral lung opacities similar to the prior exam. Cardiomegaly. No change. No acute fracture. Loculated right apex pleural effusion versus mass has increased in size since the prior exam. Otherwise no acute findings. This document has been electronically signed by: Zainab Valdovinos MD on 05/09/2021 09:02 PM    US GUIDED PARACENTESIS    Result Date: 5/10/2021  PROCEDURE: US GUIDED PARACENTESIS CLINICAL INFORMATION: ascites with SOB, BOTH diagnositc and therapeutic . COMPARISON: CT abdomen pelvis dated 5/9/2021. TECHNIQUE: Risks and benefits of the procedure were thoroughly explained and oral and written informed consent obtained. The patient was placed supine on the ultrasound gurney. Following patient and site verification, the patient was prepped and draped in usual fashion and 2% lidocaine was infiltrated in the subcutaneous tissues at the designated puncture site. A 5 Finnish 1 stick needle/catheter system was introduced into the fluid collection under ultrasound guidance. The catheter was advanced while the inner needle was removed. 70 mL bloody fluid was removed and sent to laboratory for further analysis. A total of 3 L of bloody fluid was withdrawn and the catheter removed and a bandage placed. The patient tolerated the procedure well and no immediate postprocedural complication was identified. There are 7 saved ultrasound images. FINDINGS: Saved ultrasound images show the 1 stick needle within the ascites fluid. Post procedure ultrasound image shows marked decrease in size of ascitic fluid. Successful ultrasound-guided paracentesis. **This report has been created using voice recognition software. It may contain minor errors which are inherent in voice recognition technology. ** Final report electronically signed by Dr. Frank Menjivar MD on 5/10/2021 5:07 PM        ASSESMENT:      Active Problems:    Recurrent right pleural effusion    Severe malnutrition (HCC)    Metastatic renal cell carcinoma (HCC)    Hyperkalemia    SOB (shortness of breath)    Cancer associated pain    Peritoneal carcinomatosis ([address]    Malignant ascites  Resolved Problems:    * No resolved hospital problems. *  Acute on chronic respiratory failure  Loculated pleural effusion S/P removal of infected PleurX catheter, VATS, pleurodesis    Status post hyperkalemia   Hypothyroidism   Anemia  CAMILLE with recent acute interstitial nephritis  Chronic pain secondary to metastatic disease      PLAN:   treatment of hyperkalemia addressed Follow-up potassium levels. Patient has decided to become a DNR cc, will arrange for hospice consult in the morning. Continue to monitor electrolytes and  Correct accordingly. Nephrology service following. No plans for CRRT. On steroids for AIN.   Started on NS for hyponatremia and bumex stopped      He is status post removal of PleurX catheter,  He has had previous VATS and pleurodesis on the left pleural cavity. s/p right-sided thoracentesis today as it could not be done yesterday and p.r.n. only 170cc pleural fluid removed due to complex septated loculated pleural effusion, will discuss with Pulmonology service    Grew Staph epi from infected PleurX catheter, currently on Keflex. Complete  Antibiotics. Patient Had abdominal paracentesis yesterday, 1.3 L was removed. He will benefit from scheduled outpatient abdominal paracenteses        Continue bronchodilators. Continue Marinol for his appetite. Continue current pain management for metastatic disease    Patient's prognosis is very poor, discussed with patients Son , consider hospice, wants to hold off till after Sunday as patient's daughter is planning marriage vows at that time.  .         DVT prophylaxis: [] Lovenox                                 [x] SCDs                                 [] SQ Heparin                                 [] Encourage ambulation, low risk for DVT, no chemical or mechanical prophylaxis necessary              [] Already on Anticoagulation                Anticipated Disposition upon discharge: [x] Home                                                                         [] Home with Home Health                                                                         [] [facility]                                                                         [] [address]          Electronically signed by Ene Lozano MD on 5/23/2021 at 3:04 PM

## 2021-05-23 NOTE — PLAN OF CARE
Problem: Impaired respiratory status  Goal: Clear lung sounds  Description: Clear lung sounds  Outcome: Ongoing  Note:   Pt continues on aerosols to clear lung sounds/improve aeration and to help with SOB.

## 2021-05-24 NOTE — FLOWSHEET NOTE
05/24/21 0222   Provider Notification   Reason for Communication Review case;Evaluate   Provider Name Dr. Angie Crowe   Provider Notification Physician   Method of Communication Call   Response Waiting for response   Notification Time 0222     Call made to house supervisor d/t inability to get ahold of physician and patient in extreme pain. House supervisor gave this RN number to call physician. Call made to physician with no answer. Brief message left for physician to call this RN back. Waiting for response.  Electronically signed by Lonnie Forbes RN on 5/24/2021 at 2:23 AM

## 2021-05-24 NOTE — PROGRESS NOTES
Patient is refusing all other medications besides prn pain and anxiety medications. Patient states \"I just want to be comfortable\". Offered to reposition patient, patient refused at this time. Head of bed lowered slightly, repositioned pillow. Will continue to monitor.

## 2021-05-24 NOTE — PROGRESS NOTES
Patient moaning at this time. Appears restless. Family notes patient squeezing hand of daughter as if in extreme pain. Increased morphine per orders. Ativan prn administered per orders. Repositioned patient with pillow support. Emotional support provided. Will continue to monitor.

## 2021-05-24 NOTE — PROGRESS NOTES
5K FLOOR NURSE ROLANDO CALLED STATING THERE IS A HOSPICE CONSULT IN FOR PATIENT FOR 430PM TODAY AS PATIENT'S WIFE/POA CANNOT BE THERE UNTIL THAT TIME. SHE STATES PATIENT IS STAGE 4 RENAL CA WITH METS TO BONE AND BRAIN AND IS CURRENTLY HAVING COMFORT ISSUES BUT  IS HESITANT TO BE TOO AGGRESSIVE UNTIL HOSPICE IS ON BOARD. SHE STATES SHE FEELS PT IS POSSIBLY GIP APPROPRIATE BUT PT DOES NOT WANT TO DO ANYTHING UNTIL KIDS AND SPOUSE ARE PRESENT. ROLANDO STATES HE IS CONSIDERING HAVING POA CHANGED TO DAUGHTER WHO IS LOCAL AND MORE PRESENT. CURRENT MEDICATIONS PER ROLANDO ARE MS CONTIN 15MG Q8 HRS, MORPHINE 2MG 2X DAILY AS WELL AS PERCOCET PRN. THIS NURSE RECOMMENDED ASKING DR. Randy Samuel TO INCREASE MS CONTIN TO 30MG Q8 HRS AND MORPHINE Q1 HR PRN UNTIL HOSPICE LIAISON CAN MAKE CONTACT THIS MORNING. ROLANDO STATED SHE WOULD TEXT DR. Randy Samuel WITH RECOMMENDATIONS. THIS NURSE WILL GET MESSAGE TO [facility] FOR FOLLOW UP WITH FIRST VISIT, AND ADARSH SWIFT DUE TO POSSIBLE FAMILY DYNAMICS. REMINDED OF 24 HR AVAILABILITY. WILL COMMUNICATE WITH HOSPICE LIAISON AND CHARGE NURSE THIS MORNING.

## 2021-05-24 NOTE — PROGRESS NOTES
Nutrition Note: Pt transferred to inpatient hospice care. Currently on DIET RENAL. Due to change in medical/code status, dietitian will sign off. Note patient transferred to IP hospice. May add supplement as pt/family request. Nutrition will have little or no benefit for overall prognosis but provides energy. If nutrition intervention is required, please submit a dietitian consult.      Electronically signed by Duane Number, RD, LD on 5/24/2021 at 11:41 AM   Contact: [phone]

## 2021-05-24 NOTE — PROGRESS NOTES
Arrived to floor for hospice referral, updated by night hospice nurse Clint Garcia RN that patient symptoms not well managed through night or this morning. Discussed with primary nurse Jojo Deluca and morphine prn dose had been increased to 1 mg Q hour prn pain. Patient has received 4 dose morphine 1 mg every hour to hour and half with last dose given at 0907 of 1 mg. Went to room with current wife, 2 daughters, and son at bedside. Patient alert to name and situation, lethargic. Moaning in pain and reports not improving. Noted that this may be from having pain through night and may take time to get under control. Discussed [facility] hospice care with focus on comfort and stopping aggressive measures with need to meet criteria with review with medical director. Voiced understanding and wish for level of care to manage patient symptoms that are not controlled. Case reviewed with Dr. Melly Lipscomb and agrees that patient GIP appropriate for uncontrolled pain with need of constant monitoring for increases till able to control. Did discuss with family/patient that use of medications may cause increase tiredness and decrease responsiveness. Voiced understanding and wish for patient comfort. Medical director offered to complete discharge/readmit if attending agreeable. Contacted Dr. Anthony Cobos about patient meeting [facility] hospice care and Dr. Melly Lipscomb offering to do discharge/readmit orders. Agreeable to this. Did ask attending for dose of morphine 2 mg once and then to increase dose of hourly prn morphine to 2 mg. Orders received. Also asked to start morphine PCA at 2 mg/hr at this time so infusion started until discharge/readmit completed, order received. one time dose 2 mg administered at 0940 and prn dose given at 1001. Did discuss with family/patient that may take period to get symptoms controlled(talked about how uncontrolled for period of time and takes some time/measures to get better).  Patient continued with moaning and grimacing, reports continued pain. Morphine PCA started at 2 mg/hr at 1004 with dose 2 mg IV push given per PRN order at 1001. Educated family on plan of care and paperwork to be signed for GIP hospice care. All voiced understanding and agreement. Primary nurse Dora Maldonado updated of care plan at this time, with plan of discharge/readmit to inpatient hospice.

## 2021-05-24 NOTE — PROGRESS NOTES
allergies.     OBJECTIVE:     Vitals: There were no vitals filed for this visit. BMI: There is no height or weight on file to calculate BMI.     PHYSICAL EXAMINATION:                General appearance: ill looking male mild respiratory distress*, appears stated age and cooperative. HEENT:  Normal cephalic, atraumatic without obvious deformity. Pupils equal, round, and reactive to light.  Extra ocular muscles intact. Conjunctivae/corneas clear. Neck: Supple. Chest:  Dressing over right chest clean and dry  Respiratory:  Diminished air entry bilaterally  Cardiovascular:  Regular rhythm with normal S1/S2 without  rubs or gallops. Abdomen:  Distended,soft,nontender, with normal bowel sounds. Musculoskeletal:  No clubbing, cyanosis or ankle edema bilaterally.    Neurologic:   Alert and oriented x3  Moves all extremities spontaneously  Psychiatric: Thought content appropriate, normal insight  Review of Labs and Diagnostic Testing:     Recent Results         Recent Results (from the past 24 hour(s))   POCT glucose     Collection Time: 05/23/21  4:49 PM   Result Value Ref Range     POC Glucose 140 (H) 70 - 108 mg/dl   POCT glucose     Collection Time: 05/23/21  9:03 PM   Result Value Ref Range     POC Glucose 105 70 - 108 mg/dl            Radiology:      Echocardiogram limited     Result Date: 5/10/2021  Transthoracic Echocardiography Report (TTE)  Demographics   Patient Name   [address] [place] [address],[address] Gender              Male                 P   MR #           [id number]         Race                                                    Ethnicity   Account #      [de-identified]         Room Number         0003   Accession      [id number]        Date of Study       05/10/2021  Number   Date of Birth  1970        Referring Physician Rizwan Chavis, FLORENTIN Lyon   Age            44 year(s) Sonographer         Anurag Minor RDCS                                    Interpreting        Echo reader of the                                   Physician           week                                                       Francois Lozano MD  Procedure Type of Study   TTE procedure:ECHOCARDIOGRAM LIMITED. Procedure Date Date: 05/10/2021 Start: 12:53 PM Study Location: Bedside Technical Quality: Limited visualization due to poor acoustical window. Indications:Pericardial effusion. Additional Medical History:Shortness of breath, diabetic, hypertension, chronic kidney disease, renal cell cancer with METS, recent Covid, hypothyroidism Patient Status: Routine Height: 72 inches Weight: 203 pounds BSA: 2.14 m^2 BMI: 27.53 kg/m^2 BP: 113/71 mmHg  Conclusions   Summary  Normal left ventricle size and systolic function. Ejection fraction was  estimated at 65 %. There were no regional left ventricular wall motion  abnormalities and wall thickness was within normal limits. There is trivial posterior pericardial effusion with no evidence of  hemodynamic compromise. Signature   ----------------------------------------------------------------  Electronically signed by Francois Lozano MD (Interpreting  physician) on 05/10/2021 at 06:35 PM  ----------------------------------------------------------------   Findings   Mitral Valve  The mitral valve structure was normal with normal leaflet separation. Aortic Valve  The aortic valve was trileaflet with normal thickness and cuspal  separation. Tricuspid Valve  The tricuspid valve structure was normal with normal leaflet separation. Pulmonic Valve  The pulmonic valve leaflets exhibited normal thickness, no calcification,  and normal cuspal separation. Left Atrium  Left atrial size was normal.   Left Ventricle  Normal left ventricle size and systolic function. Ejection fraction was  estimated at 65 %.  There were no regional left ventricular wall motion  abnormalities and wall thickness was within normal limits. Right Atrium  Right atrial size was normal.   Right Ventricle  The right ventricular size was normal with normal systolic function and  wall thickness. Pericardial Effusion  There is trivial posterior pericardial effusion with no evidence of  hemodynamic compromise. Pleural Effusion  No evidence of pleural effusion. Aorta / Great Vessels  -Aortic root dimension within normal limits.  -The Pulmonary artery is within normal limits. -IVC size is within normal limits with normal respiratory phasic changes. M-Mode/2D Measurements & Calculations   LV Diastolic    LV Systolic Dimension: 3.5   AV Cusp Separation: 2.3 cmLA  Dimension: 5 cm cm                           Dimension: 3.1 cmAO Root  LV FS:30 %      LV Volume Diastolic: 235 ml  Dimension: 3.9 cm  LV PW           LV Volume Systolic: 66.5 ml  Diastolic: 0.8  LV EDV/LV EDV Index: 118  cm              ml/55 m^2LV ESV/LV ESV  Septum          Index: 50.9 ml/24 m^2        RV Diastolic Dimension: 2.7  Diastolic: 0.8  EF Calculated: 56.9 %        cm  cm                                               LA/Aorta: 0.79  [email or web address] [id number]     CT ABDOMEN PELVIS WO CONTRAST Additional Contrast? None     Result Date: 5/9/2021  CT SCAN OF THE ABDOMEN AND PELVIS WITHOUT CONTRAST COMPARISON: 4/13/2021. TECHNIQUE: A noncontrast CT scan of the abdomen and pelvis was performed. A dose reduction technique was utilized. FINDINGS: CT ABDOMEN: Images of the lung bases demonstrate a small to moderate loculated right pleural effusion with an associated right-sided chest tube. 2.6 cm mass is noted within the right pleural effusion on axial image 1 which is nonspecific but could represent neoplastic disease or perhaps proteinaceous material.  A smaller lesion in the right pleural space measures 8 mm on image 3.  There is also a loculated small left pleural effusion. Small pericardial effusion is noted. Numerous nodules/masses are noted within the lung bases, compatible with neoplastic disease. For example in the right lower lobe is a 4.1 cm lesion on axial image 1 minus does not appear to be significantly changed. Lytic destructive change of multiple left lower ribs is again noted, compatible with the patient's known metastatic disease. This does not appear to be significantly changed. There are no focal liver lesions. The patient is status post cholecystectomy. The pancreas is unremarkable. There are calcified granulomas in the spleen. Right adrenal mass measures 2.0 x 1.0 cm, increased since the prior study when it measured 1.7 x 0.9 cm. This is compatible with metastatic disease. Left-sided adrenal metastases are noted. One of these, seen on axial image 25, is increased in size and measures 1.1 cm on the current exam.  Previously it measured 0.9 cm. Exophytic solid mass arising from the right renal moiety measures 4.4 x 4.0 cm on axial image 38, this does not appear to be significantly changed. Horseshoe kidney is noted. Abdominal aorta is normal in caliber without evidence of an aneurysm. Moderate volume of ascites is noted in the abdomen/pelvis, increased since the prior study. Changes of peritoneal carcinomatosis are more prominent on this exam, for example seen on axial image 77. CT PELVIS: Lytic destructive change in the right iliac bone is again noted on axial image 72, this does not appear to be significantly changed. There is no evidence of a bowel obstruction. There is no free air. Colonic diverticula are noted, without evidence of acute diverticulitis. Subcutaneous edema/anasarca is noted.      1. No evidence of a bowel obstruction or free air. 2.  Moderate volume of ascites, increased since the prior study.   Changes of peritoneal carcinomatosis are more prominent on this exam. 3.  Right renal mass is again noted.  Worsening metastatic disease within the adrenal glands. 4.  Small to moderate loculated right pleural effusion. Small loculated left pleural effusion. Bibasilar parenchymal metastases are noted. 5.  Multifocal bone metastases are again noted. 6.  Additional findings are detailed above. This document has been electronically signed by: Lester Campos M.D. on 05/09/2021 09:27 PM All CTs at this facility use dose modulation techniques and iterative reconstructions, and/or weight-based dosing when appropriate to reduce radiation to a low as reasonably achievable.     XR CHEST 1 VIEW     Result Date: 5/9/2021  1 view chest x-ray Comparison:  CR,SR  - XR CHEST PORTABLE  - 04/22/2021 01:09 AM EDT Findings: Bibasilar large bilateral pleural effusions are similar to the prior exam. Right basilar tunneled pleural catheter is unchanged. Mass versus loculated effusion at the right apex. Increased in size since the prior exam. Ill-defined bilateral lung opacities similar to the prior exam. Cardiomegaly. No change. No acute fracture.      Loculated right apex pleural effusion versus mass has increased in size since the prior exam. Otherwise no acute findings. This document has been electronically signed by: Zainab Valdovinos MD on 05/09/2021 09:02 PM     US GUIDED PARACENTESIS     Result Date: 5/10/2021  PROCEDURE: US GUIDED PARACENTESIS CLINICAL INFORMATION: ascites with SOB, BOTH diagnositc and therapeutic . COMPARISON: CT abdomen pelvis dated 5/9/2021. TECHNIQUE: Risks and benefits of the procedure were thoroughly explained and oral and written informed consent obtained. The patient was placed supine on the ultrasound gurney. Following patient and site verification, the patient was prepped and draped in usual fashion and 2% lidocaine was infiltrated in the subcutaneous tissues at the designated puncture site.  A 5 [place] 1 stick needle/catheter system was introduced into the fluid collection under ultrasound

## 2021-05-24 NOTE — PLAN OF CARE
Problem: Impaired respiratory status  Goal: Clear lung sounds  Description: Clear lung sounds  5/23/2021 2004 by Cheryl Nunez RCP  Outcome: Ongoing   Breath sounds are clear and diminished at this time. Continue with treatments to help improve breath sounds.

## 2021-05-24 NOTE — PROGRESS NOTES
[facility]  Progress Note    Patient: Dada Carranza  : 1970  Acct#: [de-identified]  2021 10:42 AM  ADMISSION DAY:  2021  6:52 PM   Do Not Resuscitate-Comfort Care  This is an unfortunate 75-year-old gentleman who was diagnosed with adenocarcinoma, felt to be compatible with metastatic renal cell carcinoma after original presentation with massive pleural effusion in 2019. He has had biopsy-proven involvement of thyroid and pleura, radiographic involvement of adrenals, bones, omentum, lung parenchyma and cerebellum. He has undergone multiple lines of chemotherapy, unfortunately with ongoing progression. The patient has had difficulties with ongoing pleural effusion and ascites contributing to discomfort and has had progressive increase in abdominal and bony pain. He has failed to respond adequately to oral opiate therapy and has required progressive increase in parenteral opiates to obtain relief. The patient and his family have discussed his disease progression and have elected to pursue a DO NOT RESUSCITATE comfort care status. Hospice service has been contacted to assist in his management. Consistent with his comfort care status, the patient wishes no further chemotherapy or other \"active\" treatment apart from comfort measures. I have been in contact with hospice service throughout the morning and efforts have been made to gradually up-titrate pain relief therapy. At this time, with 2 mg/h morphine therapy, the patient appears to be becoming more restful with less uncontrolled pain. He is becoming somewhat more somnolent, however, and his ability to maintain oral intake is in question.     Given the patient's lack of adequate response to oral opiate therapy as aggressively titrated by his primary physician, and particularly with ascites, omental caking and concern for adequacy of GI transit and absorption function, it would appear that parenteral therapy may be beneficial to this gentleman in an ongoing fashion. As such, we will maintain continuous infusion opiate therapy with titration based on appropriate assessment scales. It has been made clear to the patient and his family that we will use the lowest necessary doses of all medications that may be required to maintain his comfort and that these will be titrated based on appropriate assessment scales as outlined. The goal of therapy will not be to hasten his demise at any time. We will continue pain relief therapy as noted above, and will also make available to the patient as needed anxiolytic therapy for adjunct of relief of dyspnea given significant bilateral pulmonary involvement with tumor and pleural effusions, as needed antipyretic, antiemetic, and antisecretory therapy as needed for comfort as well. We will continue oxygen as needed for relief of dyspnea. All non-comfort medications including midodrine will be discontinued. The patient appears to have been on chronic corticosteroid therapy and we will give him a parenteral dose of dexamethasone today in an effort to relieve bony pain. This drug has an extremely extended multiple day biologic half-life of activity and may not need to be redosed for a number of days. The patient may be followed prospectively and if it appears he is having symptomatology from corticosteroid withdrawal, therapy may be given as needed for comfort. We will hold levothyroxine, midodrine, alprazolam, dronabinol, laxatives, topical lidocaine which does not appear to have benefited him significantly, Lokelma, and also the patient's insulin as we will not be following glucose levels from this point forward. Again, it is unlikely that these medications will increase the patient's comfort to any significant degree, and in light of progressive somnolence with necessary pain therapy, the hazard of administration may outweigh benefit.     With the permission of the patient's attending

## 2021-05-24 NOTE — PROGRESS NOTES
Patient is groaning in pain at this time. Family very tearful at bedside. PCA adjusted per orders. Pillows adjusted for comfort. Will continue to monitor.

## 2021-05-24 NOTE — H&P
failure on Chronic respiratory failure .  Patient had removal of the PleurX catheter due to infection , the tip had grown staph epidermidis, was placed on antibiotics. Had also been followed by the pulmonology service. Due to peritoneal carcinomatosis he did develop significant ascites and had required large volume abdominal paracentesis for relief. Had also had thoracentesis performed due to dyspnea however only 170 cc of pleural fluid could be removed due to complex septated loculated pleural effusions. Patient had developed acute interstitial nephritis secondary to optiva, he was followed closely by the nephrologist and had received high-dose steroids. He had issues with hyperkalemia which was also addressed. Id had required diuretics which however had to be discontinued and had been placed on normal saline on account of some hyponatremia. His overall condition however continued to deteriorate and he was in quite a bit of pain. The pain management team had assisted with adjusting his pain medications both long and short-acting opiate and this was complemented with parenteral opiates . Patient had decided that he no longer wanted any further intervention and was made a DNR cc, hospice was consulted. Patient has been transitioned to inpatient hospice. The patient's prognosis is very poor.   I had discussed extensively with the patient's son as well as the patient and the aim is to optimize pain medications for pain control due to his extensive metastatic disease.       Past Medical History:        Diagnosis Date    Cancer [facility])     kidney    Chronic kidney disease, unspecified     Collapsed lung     IDDM (insulin dependent diabetes mellitus)     Kidney mass     Lung nodules     Sinusitis     Thyroid nodule 5/6/2021    Unspecified essential hypertension        Past Surgical History:        Procedure Laterality Date    BACK SURGERY      CHOLECYSTECTOMY      IR REMOVAL OF TUNNELED PLEURAL CATH W CUFF  5/12/2021    IR REMOVAL OF TUNNELED PLEURAL CATH W CUFF 5/12/2021 [facility]       Home Medications:   No current facility-administered medications on file prior to encounter. Current Outpatient Medications on File Prior to Encounter   Medication Sig Dispense Refill    levothyroxine (SYNTHROID) 25 MCG tablet Take 25 mcg by mouth Daily      oxyCODONE-acetaminophen (PERCOCET) 5-325 MG per tablet Take 1 tablet by mouth every 6 hours as needed for Pain.  famotidine (PEPCID) 20 MG tablet Take 1 tablet by mouth daily 30 tablet 0    ipratropium-albuterol (DUONEB) 0.5-2.5 (3) MG/3ML SOLN nebulizer solution Inhale 3 mLs into the lungs every 4 hours as needed for Shortness of Breath 360 mL 0    midodrine (PROAMATINE) 2.5 MG tablet Take 1 tablet by mouth 3 times daily (with meals) 90 tablet 0    polyethylene glycol (GLYCOLAX) 17 g packet Take 17 g by mouth daily as needed for Constipation 527 g 0    predniSONE (DELTASONE) 20 MG tablet Take 2 tablets by mouth daily F/u with Dr Lisa Yanez regarding continuing dose at followup 60 tablet 0    traMADol (ULTRAM) 50 MG tablet Take 50 mg by mouth every 6 hours as needed for Pain.  traZODone (DESYREL) 50 MG tablet Take 50 mg by mouth nightly      ALLOPURINOL PO Take 100 mg by mouth nightly          Allergies:  Patient has no known allergies. Social History:    reports that he has never smoked. He has never used smokeless tobacco. He reports previous alcohol use of about 1.0 standard drinks of alcohol per week. He reports that he does not use drugs. Family History:   No family history on file. Review of systems:     unobtainable      Vitals: There were no vitals filed for this visit. BMI: There is no height or weight on file to calculate BMI. PHYSICAL EXAMINATION:            General appearance: ill looking male mild respiratory distress*, appears stated age and cooperative.   HEENT:  Normal cephalic, atraumatic without obvious [] [place] Contreras                                                                         [] [address]          Electronically signed by Virgil Clark MD on 5/24/2021 at 1:58 PM

## 2021-05-24 NOTE — PROGRESS NOTES
Patient refused VS to be taken at this time. C/o increased pain at this time as well. Scheduled MS Contin given.  Electronically signed by Mauricio Dye RN on 5/23/2021 at 11:05 PM

## 2021-05-24 NOTE — FLOWSHEET NOTE
Secure message sent to Dr. Leroy Canales regarding little change in patient's pain control. Informed physician that Prn medication was given as ordered with some relief but patient was asking for more pain medication before next PRN medication was due. Asked physician to advise.  Waiting on response Electronically signed by Sierra Oneil RN on 5/23/2021 at 11:34 PM

## 2021-05-24 NOTE — DISCHARGE SUMMARY
Internal Medicine Specialties     Discharge Summary      Patient Identification:   Washington Gallegos   : 1970  MRN: [id number]   Account: [de-identified]      Patient's PCP: CAITY Serrano CNP    Admit Date: 2021     Discharge Date: 2021     Admitting Physician: Robby Adames MD     Discharge Physician: Flaquita Garner MD     Discharge Diagnoses: Active Problems:    Recurrent right pleural effusion    Severe malnutrition (HCC)    Metastatic renal cell carcinoma (HCC)    Hyperkalemia    SOB (shortness of breath)    Cancer associated pain    Peritoneal carcinomatosis (HCC)    Malignant ascites  Resolved Problems:    * No resolved hospital problems. *   OTHER PROBLEMS  Acute on chronic respiratory failure    Loculated malignant right pleural effusion S/P removal of infected PleurX catheter. S/p left VATS, pleurodesis     Status post hyperkalemia   Hypothyroidism   Anemia  CAMILLE with recent acute interstitial nephritis  Chronic pain secondary to metastatic disease    The patient was seen and examined on day of discharge and this discharge summary is in conjunction with any daily progress note from day of discharge. Hospital Course: Washington Gallegos is a 44 y.o. male admitted to [facility] on 2021  Who has apmhx of metastatic renal carcinoma with mets to lungs and brain followed by [facility], HTN, COVID pneumonia in 2021, gout, HTN and cholecystectomy who presents with increasing SOB. Pt was recently discharged from the hospital on  due to pneumonia with sepsis. He received TPA through pleurx catheter and O2 was weaned down during that stay; he developed an CAMILLE and nephrology followed him for that; he received 2u RBC transfusion due to low hemoglobin and midodrine was added for hypotension. Since being discharged he has been getting progressively more short of breath and dizzy.  Yesterday he notes a pre-syncopal episode getting into the car before coming to the ED. In the emergency department Na 132, K+ 6.0, Cr 2.5, BNP 1182, troponin 0.043, WBC 21.4, Hgb 8, CT abdomen/pelvis shows moderate volume ascites with increased peritoneal carcinomatosis and worsening metastatic disease of adrenal glands with small BL pleural effusions, CXR negative for pneumonia. 1L Bolus NS given in ED, along with insulin/ Lokelma/Ca gluconate/D50 for the hyperkalemia. Pt has a right side pleur-x catheter and states that drainage has decreased from 300cc/day to 100cc/day over the past few days. He also reports that his abdomen has become more distended. Pt denies chest pain. Pt is tachycardic and tachypneic on exam but afebrile. Discussed code status with pt and wife and he wishes to remain a limited code X4. Patient admitted for CAMILLE with acute respiratory failure on Chronic respiratory failure . Patient had removal of the PleurX catheter due to infection , the tip had grown staph epidermidis, was placed on antibiotics. Had also been followed by the pulmonology service. Due to peritoneal carcinomatosis he did develop significant ascites and had required large volume abdominal paracentesis for relief. Had also had thoracentesis performed due to dyspnea however only 170 cc of pleural fluid could be removed due to complex septated loculated pleural effusions. Patient had developed acute interstitial nephritis secondary to optiva, he was followed closely by the nephrologist and had received high-dose steroids. He had issues with hyperkalemia which was also addressed. Id had required diuretics which however had to be discontinued and had been placed on normal saline on account of some hyponatremia. His overall condition however continued to deteriorate and he was in quite a bit of pain. The pain management team had assisted with adjusting his pain medications both long and short-acting opiate and this was complemented with parenteral opiates .   Patient had decided that he no longer wanted any further intervention and was made a DNR cc, hospice was consulted. Patient has been transitioned to inpatient hospice. The patient's prognosis is very poor. I had discussed extensively with the patient's son as well as the patient and the aim is to optimize pain medications for pain control due to his extensive metastatic disease. DVT prophylaxis: [] Lovenox                                 [x] SCDs                                 [] SQ Heparin                                 [] Encourage ambulation, low risk for DVT, no chemical or mechanical prophylaxis necessary              [] Already on Anticoagulation                    Labs:  For convenience and continuity at follow-up the following most recent labs are provided:      CBC:    Lab Results   Component Value Date    WBC 14.2 05/18/2021    HGB 8.4 05/18/2021    HCT 28.3 05/18/2021     05/18/2021       Renal:    Lab Results   Component Value Date     05/23/2021    K 5.9 05/23/2021    K 5.6 05/11/2021    CL 84 05/23/2021    CO2 29 05/23/2021     05/23/2021    CREATININE 3.5 05/23/2021    CALCIUM 8.8 05/23/2021         Significant Diagnostic Studies    Radiology:   Echocardiogram limited    Result Date: 5/10/2021  Transthoracic Echocardiography Report (TTE)  Demographics   Patient Name   [address] [facility] Gender              Male                 P   MR #           [id number]         Race                                                    Ethnicity   Account #      [de-identified]         Room Number         0003   Accession      [id number]        Date of Study       05/10/2021  Number   Date of Birth  1970        Referring Physician Uche SAINI MD                                                       [place], [place] Gladis Johansen   Age            44 year(s)        [address] Janette Mckeon, RDCS                                    Interpreting Echo reader of the                                   Physician           week                                                       Ruma Curry MD  Procedure Type of Study   TTE procedure:ECHOCARDIOGRAM LIMITED. Procedure Date Date: 05/10/2021 Start: 12:53 PM Study Location: Bedside Technical Quality: Limited visualization due to poor acoustical window. Indications:Pericardial effusion. Additional Medical History:Shortness of breath, diabetic, hypertension, chronic kidney disease, renal cell cancer with METS, recent Covid, hypothyroidism Patient Status: Routine Height: 72 inches Weight: 203 pounds BSA: 2.14 m^2 BMI: 27.53 kg/m^2 BP: 113/71 mmHg  Conclusions   Summary  Normal left ventricle size and systolic function. Ejection fraction was  estimated at 65 %. There were no regional left ventricular wall motion  abnormalities and wall thickness was within normal limits. There is trivial posterior pericardial effusion with no evidence of  hemodynamic compromise. Signature   ----------------------------------------------------------------  Electronically signed by Ruma Curry MD (Interpreting  physician) on 05/10/2021 at 06:35 PM  ----------------------------------------------------------------   Findings   Mitral Valve  The mitral valve structure was normal with normal leaflet separation. Aortic Valve  The aortic valve was trileaflet with normal thickness and cuspal  separation. Tricuspid Valve  The tricuspid valve structure was normal with normal leaflet separation. Pulmonic Valve  The pulmonic valve leaflets exhibited normal thickness, no calcification,  and normal cuspal separation. Left Atrium  Left atrial size was normal.   Left Ventricle  Normal left ventricle size and systolic function. Ejection fraction was  estimated at 65 %. There were no regional left ventricular wall motion  abnormalities and wall thickness was within normal limits.    Right Atrium  Right atrial size was normal.   Right Ventricle  The right ventricular size was normal with normal systolic function and  wall thickness. Pericardial Effusion  There is trivial posterior pericardial effusion with no evidence of  hemodynamic compromise. Pleural Effusion  No evidence of pleural effusion. Aorta / Great Vessels  -Aortic root dimension within normal limits.  -The Pulmonary artery is within normal limits. -IVC size is within normal limits with normal respiratory phasic changes. M-Mode/2D Measurements & Calculations   LV Diastolic    LV Systolic Dimension: 3.5   AV Cusp Separation: 2.3 cmLA  Dimension: 5 cm cm                           Dimension: 3.1 cmAO Root  LV FS:30 %      LV Volume Diastolic: 931 ml  Dimension: 3.9 cm  LV PW           LV Volume Systolic: 81.7 ml  Diastolic: 0.8  LV EDV/LV EDV Index: 118  cm              ml/55 m^2LV ESV/LV ESV  Septum          Index: 50.9 ml/24 m^2        RV Diastolic Dimension: 2.7  Diastolic: 0.8  EF Calculated: 56.9 %        cm  cm                                               LA/Aorta: 0.79  [email or web address] [id number]    CT ABDOMEN PELVIS WO CONTRAST Additional Contrast? None    Result Date: 5/9/2021  CT SCAN OF THE ABDOMEN AND PELVIS WITHOUT CONTRAST COMPARISON: 4/13/2021. TECHNIQUE: A noncontrast CT scan of the abdomen and pelvis was performed. A dose reduction technique was utilized. FINDINGS: CT ABDOMEN: Images of the lung bases demonstrate a small to moderate loculated right pleural effusion with an associated right-sided chest tube. 2.6 cm mass is noted within the right pleural effusion on axial image 1 which is nonspecific but could represent neoplastic disease or perhaps proteinaceous material.  A smaller lesion in the right pleural space measures 8 mm on image 3. There is also a loculated small left pleural effusion. Small pericardial effusion is noted.   Numerous nodules/masses are noted within the lung bases, compatible with neoplastic disease. For example in the right lower lobe is a 4.1 cm lesion on axial image 1 minus does not appear to be significantly changed. Lytic destructive change of multiple left lower ribs is again noted, compatible with the patient's known metastatic disease. This does not appear to be significantly changed. There are no focal liver lesions. The patient is status post cholecystectomy. The pancreas is unremarkable. There are calcified granulomas in the spleen. Right adrenal mass measures 2.0 x 1.0 cm, increased since the prior study when it measured 1.7 x 0.9 cm. This is compatible with metastatic disease. Left-sided adrenal metastases are noted. One of these, seen on axial image 25, is increased in size and measures 1.1 cm on the current exam.  Previously it measured 0.9 cm. Exophytic solid mass arising from the right renal moiety measures 4.4 x 4.0 cm on axial image 38, this does not appear to be significantly changed. Horseshoe kidney is noted. Abdominal aorta is normal in caliber without evidence of an aneurysm. Moderate volume of ascites is noted in the abdomen/pelvis, increased since the prior study. Changes of peritoneal carcinomatosis are more prominent on this exam, for example seen on axial image 77. CT PELVIS: Lytic destructive change in the right iliac bone is again noted on axial image 72, this does not appear to be significantly changed. There is no evidence of a bowel obstruction. There is no free air. Colonic diverticula are noted, without evidence of acute diverticulitis. Subcutaneous edema/anasarca is noted. 1.  No evidence of a bowel obstruction or free air. 2.  Moderate volume of ascites, increased since the prior study. Changes of peritoneal carcinomatosis are more prominent on this exam. 3.  Right renal mass is again noted. Worsening metastatic disease within the adrenal glands.  4.  Small to moderate loculated right pleural effusion. Small loculated left pleural effusion. Bibasilar parenchymal metastases are noted. 5.  Multifocal bone metastases are again noted. 6.  Additional findings are detailed above. This document has been electronically signed by: Bernice Parra M.D. on 05/09/2021 09:27 PM All CTs at this facility use dose modulation techniques and iterative reconstructions, and/or weight-based dosing when appropriate to reduce radiation to a low as reasonably achievable. CT CHEST WO CONTRAST    Result Date: 5/12/2021  PROCEDURE: CT CHEST WO CONTRAST CLINICAL INFORMATION: Renal cell carcinoma. Loculated pleural effusion. COMPARISON: CT chest April 14, 2021. TECHNIQUE: Multiple axial 5 millimeter images of the thorax and upper abdomen are obtained without the administration of intravenous contrast material. Coronal and sagittal reformats are generated from the axial data set. All CT scans at this facility use dose modulation, iterative reconstruction, and/or weight-based dosing when appropriate to reduce radiation dose to as low as reasonably achievable. FINDINGS: Lungs: There has been interval removal of the right pleural drainage catheter compared with prior examination. There is a residual moderate multiloculated right pleural effusion, which demonstrates mild decrease in size when compared to prior examination. There is again thickening of the visceral and parietal pleura on the right. There is a smaller loculated left pleural effusion, stable in size from prior examination, with thickening of the visceral and parietal pleura. Irregular nodular thickening is also again noted along the pleural surface of bilateral lungs. There is again a multiloculated lesion/collection along the anterior left chest wall, similar to prior examination. There are again innumerable noncalcified nodules and masses throughout both lungs, demonstrating gross stability from prior examination.  The largest of these in the right lower lobe measures approximately 2.4 x 3 cm in axial dimension (series 2 image 41). There is patchy atelectasis in bilateral lower lobes. There is no pneumothorax. There are mild retained secretions dependently within the trachea. Proximal tracheobronchial tree is otherwise widely patent. Mediastinum and breanne: There is no axillary lymphadenopathy. There is similar subcarinal mediastinal lymphadenopathy measuring up to 2.2 cm short axis. There is limited evaluation of the breanne on this noncontrast enhanced examination, however there is fullness of the breanne bilaterally suggesting lymphadenopathy, which was seen on prior examination. Heart size is normal. There is small pericardial effusion. There is atherosclerotic ossification of the thoracic aorta. Thoracic aorta is normal in caliber. There are coronary artery calcifications. There is similar infiltration of the pericardial fat along the right cardiophrenic angle. There is again a nodule within the pericardial fat anterior to the heart extending into the anterior chest wall, which measures approximately 3 cm in diameter. There is again a mildly enlarged left cardiophrenic lymph  node measuring 12 mm in short axis. Upper abdomen: There is moderate ascites within the visualized upper abdomen, increased from prior examination. Multiple calcified granulomata are seen within the visualized spine. Bones: There is an 8 mm lytic lesion within the right lateral ninth rib (series 2 image 66). There are again large expansile lytic lesions involving the left lateral seventh and eighth ribs, with large soft tissue components infiltrating the left chest wall. Vertebral body heights are maintained. There are also smaller lytic lesions within the posterior lateral left ninth rib and posterior left 11th rib. 1. Moderate loculated right and small loculated left pleural effusions, with the right pleural effusion demonstrated mild decrease in size from prior examination. Malignant pleural effusions are not excluded. 2. Metastatic disease within bilateral lungs, with multiple pulmonary nodules and masses bilaterally and irregular lobulated mass along the anterior left chest. 3. Enlarged subcarinal mediastinal lymph node. Probable hilar lymphadenopathy, however evaluation is limited on the current noncontrast enhanced examination. 4. Small pericardial effusion. 5. Bilateral lytic rib metastases, with large soft tissue component on the left involving the chest wall. 6. Moderate ascites within the visualized upper abdomen, increased from prior examination. **This report has been created using voice recognition software. It may contain minor errors which are inherent in voice recognition technology. ** Final report electronically signed by Dr Armand Skiff on 5/12/2021 12:00 PM    XR CHEST PORTABLE    Result Date: 5/21/2021  PROCEDURE: XR CHEST PORTABLE CLINICAL INFORMATION: after thoracentesis COMPARISON: 5/19/2021 TECHNIQUE:  AP mobile chest single view  FINDINGS: The heart size appears stable. Moderate bilateral right greater than left loculated pleural effusions are demonstrated. The effusion on the right does not appear significantly changed when compared to the prior examination despite right-sided thoracentesis from earlier same day. No pneumothorax is seen. Bilateral patchy airspace opacities are seen throughout the lungs which may represent multifocal pneumonia. 1. Moderate bilateral right greater than left loculated pleural effusions are demonstrated. The effusion on the right does not appear significantly changed when compared to the prior examination despite right-sided thoracentesis from earlier same day. No  pneumothorax is seen. 2. Bilateral patchy airspace opacities are seen throughout the lungs which may represent multifocal pneumonia. 3. Overall aeration of the lungs appears similar compared to the prior examination.  **This report has been created using voice recognition software. It may contain minor errors which are inherent in voice recognition technology. ** Final report electronically signed by Dr. Jordy Dugan on 5/21/2021 1:29 PM    XR CHEST PORTABLE    Result Date: 5/19/2021  Chest X-ray, 1 View COMPARISON:  CR,SR  - XR CHEST PORTABLE  - 05/18/2021 12:35 AM EDT FINDINGS: Bilateral airspace disease and consolidations, slightly increased in the right lower lung. Unchanged loculated pleural fluid or mass at the right lung apex. Similar bilateral pleural effusions. Low lung volumes. No pneumothorax. Stable heart size. No acute fracture. Slightly increased right lower lung consolidation. No other significant interval change from 05/18/2021. This document has been electronically signed by: Evangelina Mckinnon MD on 05/19/2021 03:53 AM    XR CHEST PORTABLE    Result Date: 5/18/2021  AP chest Comparison:  CR,SR  - XR CHEST PORTABLE  - 05/17/2021 01:31 AM EDT Findings: Bilateral pleural fluid, bilateral lung opacities, and loculated fluid versus mass at the right apex unchanged. Cardiomegaly unchanged. Impression: 1. No acute cardiopulmonary findings. This document has been electronically signed by: aMrc Crockett MD on 05/18/2021 03:39 AM    XR CHEST PORTABLE    Result Date: 5/17/2021  1 view chest x-ray. Comparison: 05/16/2021 02:26 AM EDT (01:26 AM CDT) : CR,SR: XR CHEST PORTABLE Findings: Stable mild cardiac silhouette enlargement. Stable right apical soft tissue mass or loculated pleural fluid. Bilateral small pleural effusions, unchanged. Bilateral consolidations without significant change. No pneumothorax. No acute bony pathology. Impression: No significant change in bilateral consolidations with associated pleural effusions. Stable right apical mass versus loculated pleural fluid. This document has been electronically signed by: Peewee Jones MD on 05/17/2021 04:25 AM    XR CHEST PORTABLE    Result Date: 5/16/2021  1 view chest x-ray.  Comparison: CR,SR  - XR CHEST PORTABLE  - 05/15/2021 04:08 AM EDT Findings: Low lung volumes. Persistent cardiomegaly. Persistent right apical soft tissue mass or loculated pleural fluid. Bilateral small pleural effusions. Bilateral extensive consolidations without significant change. No pneumothorax. Impression: Persistent diffuse bilateral consolidations with associated pleural effusions. Stable right apical mass versus loculated pleural fluid. This document has been electronically signed by: Edilberto Miguel MD on 05/16/2021 05:06 AM    XR CHEST PORTABLE    Result Date: 5/15/2021  1 view chest x-ray. Comparison:  CR,SR  - XR CHEST PORTABLE  - 05/14/2021 12:26 AM EDT  CR,KOSR  - XR CHEST PORTABLE  - 05/13/2021 09:25 AM EDT Findings: Stable diffuse bilateral airspace disease. Stable 7 cm extrapleural density right apex. Stable bilateral pleural effusions No mediastinal shift or tracheal deviation. Stable cardiomegaly. No passive venous congestion. Osseous structures intact. Tubes and catheters:None. Impression: 1. Stable diffuse bilateral infiltrates likely pneumonic, bilateral pleural effusions and right apical extrapleural density This document has been electronically signed by: Roberta Phillips MD on 05/15/2021 06:42 AM    XR CHEST PORTABLE    Result Date: 5/14/2021  Exam: 1V chest Comparison: 05/13/2021 09:25 AM EDT (08:25 AM CDT) : Josselyn [place]: XR CHEST PORTABLE Findings: Mediastinum: No cardiac silhouette enlargement. Lungs: Stable bilateral consolidation is most advanced at the right base. Stable 7 cm right apical mass like density. Pleura: No pneumothorax. Stable small bilateral effusions. Bones: No acute pathology. Impression: Stable lung consolidations and effusions as described. This document has been electronically signed by: Anny Sheikh MD on 05/14/2021 03:21 AM    XR CHEST PORTABLE    Result Date: 5/13/2021  PROCEDURE: XR CHEST PORTABLE CLINICAL INFORMATION: shortness of breath.  Metastatic renal cell cancer. COMPARISON: 5/12/2021 TECHNIQUE: A single mobile view of the chest was obtained. 1. Normal heart size. Large 8 cm mass lesion right apex. Small bilateral pleural effusions, improved from prior study. 2. Moderate atelectasis/pneumonia both mid and lower lung fields, slightly worse appearance than yesterday. **This report has been created using voice recognition software. It may contain minor errors which are inherent in voice recognition technology. ** Final report electronically signed by Dr. Jacquelin Grewal on 5/13/2021 9:53 AM    XR CHEST 1 VIEW    Result Date: 5/9/2021  1 view chest x-ray Comparison:  CR,SR  - XR CHEST PORTABLE  - 04/22/2021 01:09 AM EDT Findings: Bibasilar large bilateral pleural effusions are similar to the prior exam. Right basilar tunneled pleural catheter is unchanged. Mass versus loculated effusion at the right apex. Increased in size since the prior exam. Ill-defined bilateral lung opacities similar to the prior exam. Cardiomegaly. No change. No acute fracture. Loculated right apex pleural effusion versus mass has increased in size since the prior exam. Otherwise no acute findings. This document has been electronically signed by: Beatrice Rios MD on 05/09/2021 09:02 PM    US THORACENTESIS Which side should the procedure be performed? Right    Result Date: 5/21/2021  THORACENTESIS WITH ULTRASOUND GUIDANCE: PERFORMED BY: Oscar Ambrocio M.D. CLINICAL INFORMATION: Pleural effusion APPROACH: Posterior intercostal PROCEDURE: Signed informed consent was obtained prior to performing this procedure. The thorax was initially evaluated sonographically to determine appropriate puncture site. The skin was marked, prepped, and draped in a sterile fashion. Following local anesthesia and utilizing aseptic technique, a 5 Swedish one-step catheter was successfully inserted into the pleural effusion at the position indicated above.  Pleural fluid in the amount was above was then aspirated and the needle was removed. The patient tolerated the procedure well. A post procedure chest radiograph will be obtained. Physician performing procedure: Dr Randy Bennett Informed consent signed: yes Local Anesthetic: lidocaine 2% Specimen volume: none Catheter: 10cm 5 Hungarian cath Aspirated pleural fluid volume: 170mL Aspirated pleural fluid color: heme-tinged fluid Complications: none     1. Status post thoracentesis. Only 170 mL of heme tinged fluid could be removed due to the highly septated nature of the complex highly loculated right-sided pleural effusion. **This report has been created using voice recognition software. It may contain minor errors which are inherent in voice recognition technology. ** Final report electronically signed by Dr. Ly Morgan on 5/21/2021 1:23 PM    US GUIDED PARACENTESIS    Result Date: 5/19/2021  PROCEDURE: US GUIDED PARACENTESIS CLINICAL INFORMATION: Ascites COMPARISON: 5/17/2021 TECHNIQUE: Ultrasound-guided paracentesis FINDINGS: The procedure was explained the patient. All risks were discussed. Written informed consent was obtained. Limited ultrasound was performed revealing the ascites. An acceptable location was identified. The skin was marked. The overlying area was sterilely prepped and draped. 2 percent lidocaine was given for local anesthesia. Next a 5 Cymro one-step catheter  was introduced into the ascites. Clear yellow fluid was removed. The catheter was removed. The patient tolerated the procedure. No immediate complications. Physician performing procedure:  Informed consent signed: yes Local Anesthetic: 2% Lidocaine Specimen volume: 70 ml Catheter: 10cm 5 Hungarian Aspirated ascites volume: 1.3 liters Aspirated ascites color: Old bloody fluid Site of Puncture:RLQ Complications: patient tolerated procedure well     Successful ultrasound-guided paracentesis. **This report has been created using voice recognition software.   It may contain minor errors which are inherent in voice recognition technology. ** Final report electronically signed by Dr. Mayra Padilla on 5/19/2021 4:16 PM    [address]    Result Date: 5/17/2021  PROCEDURE: US GUIDED PARACENTESIS CLINICAL INFORMATION: Ascites COMPARISON: 5/10/2021 TECHNIQUE: Ultrasound-guided paracentesis FINDINGS: The procedure was explained the patient. All risks were discussed. Written informed consent was obtained. Limited ultrasound was performed revealing the ascites. An acceptable location was identified. The skin was marked. The overlying area was sterilely prepped and draped. 2 percent lidocaine was given for local anesthesia. Next a 5 Italian one-step catheter  was introduced into the ascites. Clear yellow fluid was removed. The catheter was removed. The patient tolerated the procedure. No immediate complications. Physician performing procedure: Dr Gisele Bustamante Informed consent signed: yes Local Anesthetic: 2% Lidocaine Specimen volume: no labs Catheter: 5 [place] Aspirated ascites volume: 3.8 liters Aspirated ascites color: red, old appearing bloody fluid Site of Puncture:RLQ Complications: No immediate complications. Successful ultrasound-guided paracentesis. **This report has been created using voice recognition software. It may contain minor errors which are inherent in voice recognition technology. ** Final report electronically signed by Dr. Mayra Padilla on 5/17/2021 2:55 PM    US GUIDED PARACENTESIS    Result Date: 5/10/2021  PROCEDURE: US GUIDED PARACENTESIS CLINICAL INFORMATION: ascites with SOB, BOTH diagnositc and therapeutic . COMPARISON: CT abdomen pelvis dated 5/9/2021. TECHNIQUE: Risks and benefits of the procedure were thoroughly explained and oral and written informed consent obtained. The patient was placed supine on the ultrasound gurney.  Following patient and site verification, the patient was prepped and draped in usual fashion and 2% lidocaine was infiltrated in the subcutaneous tissues at the designated puncture site. A 5 Bolivian 1 stick needle/catheter system was introduced into the fluid collection under ultrasound guidance. The catheter was advanced while the inner needle was removed. 70 mL bloody fluid was removed and sent to laboratory for further analysis. A total of 3 L of bloody fluid was withdrawn and the catheter removed and a bandage placed. The patient tolerated the procedure well and no immediate postprocedural complication was identified. There are 7 saved ultrasound images. FINDINGS: Saved ultrasound images show the 1 stick needle within the ascites fluid. Post procedure ultrasound image shows marked decrease in size of ascitic fluid. Successful ultrasound-guided paracentesis. **This report has been created using voice recognition software. It may contain minor errors which are inherent in voice recognition technology. ** Final report electronically signed by Dr. Parker Bauer MD on 5/10/2021 5:07 PM    XR CHEST PA INSPIRATION 1 VW    Result Date: 5/12/2021  PROCEDURE: XR CHEST PA INSPIRATION 1 VW CLINICAL INFORMATION: Renal cancer. Pleural effusion. Infected Pleurx catheter. Status post Pleurx catheter removal. COMPARISON: 5/9/2021 TECHNIQUE: A single PA view of the chest was obtained. 1. Poor inflation lungs. Normal heart size. 2. Moderate atelectasis/pneumonia both mid and lower lung fields. Small bilateral pleural effusions. Loculated pleural effusion versus mass lesion right apical region. 3. No pneumothorax is seen following Pleurx catheter removal. 4. No appreciable change from prior study. Final report electronically signed by Dr. Megan Quiñones on 5/12/2021 9:36 AM    IR REMOVAL OF TUNNELED PLEURAL CATH W CUFF    Result Date: 5/12/2021  TUNNELED PLEURX CATHETER REMOVAL: PERFORMED BY:  Ricardo Ramos M.D. CLINICAL INFORMATION: Infected Pleurx catheter. Renal cancer. INSERTION SITE: Right hemithorax inferolaterally. [place]  CATHETER: 16 Bolivian tunneled Pleurx drainage catheter ESTIMATED BLOOD LOSS: Minimal PROCEDURE:  Signed informed consent was obtained prior to performing this procedure. The patient was not sedated during this procedure. The the exposed portion of the catheter and the adjacent skin were prepped and draped in a sterile fashion. The skin at the catheter exit site was infiltrated with lidocaine 2 percent. . The catheter cuff was then freed from the tunnel by blunt dissection. . The wound was then closed with 3-0 Vicryl suture . A small amount of antibiotic ointment was applied to the wound site along with a sterile OpSite dressing. The patient tolerated the procedure well. A follow-up chest x-ray will be obtained. Status post successful tunneled Pleurx catheter removal.. **This report has been created using voice recognition software. It may contain minor errors which are inherent in voice recognition technology. ** Final report electronically signed by Dr. Anibal Her on 5/12/2021 9:36 AM         Consults:     STR ED TO IP CONSULT  STR ED TO IP CONSULT  IP CONSULT TO NEPHROLOGY  IP CONSULT TO SOCIAL WORK  IP CONSULT TO PULMONOLOGY  PALLIATIVE CARE EVAL  IP CONSULT TO ONCOLOGY  PALLIATIVE CARE EVAL  IP CONSULT TO INFECTIOUS DISEASES  IP CONSULT TO VASCULAR SURGERY  PHARMACY TO DOSE VANCOMYCIN  IP CONSULT TO PAIN MANAGEMENT  IP CONSULT TO HOSPICE    Disposition:    [] Home       [] TCU       [] Rehab       [] Psych       [] SNF       [] [facility]       [x] Other-   In-patient hospice    Condition at Discharge: Terminal    Code Status:  DNR-CC              Discharge Medications: Signed: Thank you CAITY Orantes - [place] for the opportunity to be involved in this patient's care.     Electronically signed by Jamil Quintero MD on 5/24/2021 at 1:44 PM

## 2021-05-24 NOTE — FLOWSHEET NOTE
05/24/21 0130   Provider Notification   Reason for Communication Review case;Evaluate   Provider Name Dr. Angie Crowe   Provider Notification Physician   Method of Communication Call   Response Waiting for response   Notification Time 0130     Call made to Dr. Angie Crowe about patient's increased pain. Pt not able to have pain medication until 0300. Message transcribed. Waiting for response.  Electronically signed by Lonnie Forbes RN on 5/24/2021 at 2:17 AM

## 2021-05-24 NOTE — PLAN OF CARE
Problem: Coping:  Goal: Absence of complicated grief  Description: Absence of complicated grief  Outcome: Ongoing  Patient and family able to verbalized feelings. Emotional support given. Problem: Coping:  Goal: Level of anxiety will decrease  Description: Level of anxiety will decrease  Outcome: Ongoing  Patient anxious at times. Xanax PRN. Problem: Coping:  Goal: Ability to verbalize feelings will improve  Description: Ability to verbalize feelings will improve  Outcome: Met This Shift  Patient and family able to verbalized feeling. Problem: Self-Care:  Goal: Ability to meet self-care needs will improve  Description: Ability to meet self-care needs will improve  Outcome: Ongoing  Patient unable to perform self care. Problem: Pain:  Goal: Pain level will decrease  Description: Pain level will decrease  Outcome: Ongoing  Patient states pain relief from PRN pain medications. Pain reassessed one hour post PRN pain medication given. Patient rates pain 6-9 on FLORIDALMA 0-10 scale. Problem: Pain:  Goal: Control of acute pain  Description: Control of acute pain  Outcome: Ongoing     Patient taking  Morphine PRN  for acute pain control. Problem: Falls - Risk of:  Goal: Will remain free from falls  Description: Will remain free from falls  Outcome: Met This Shift  Fall assessment completed. Patient using call light appropriately to call for assistance with ambulation to bathroom. Personal items within reach. Patient is also compliant with use of non-skid slippers. Problem: Skin Integrity:  Goal: Will show no infection signs and symptoms  Description: Will show no infection signs and symptoms  Outcome: Ongoing  No s/s infection for shift. Problem: Skin Integrity:  Goal: Absence of new skin breakdown  Description: Absence of new skin breakdown  Outcome: Met This Shift  No skin breakdown this shift. Patient being assisted with turning.  Patients states understanding of repositioning every two hours.      Problem: Discharge Planning  Goal: Discharged to appropriate level of care  Description: Discharged to appropriate level of care  Outcome: Ongoing  Discharge plan is in process. Plan discharge home with hospice care. Problem: Respiratory  Goal: O2 Sat > 90%  Outcome: Ongoing  Patients oxygen saturation 95% on 4 L02 per Nasal [place]. No shortness of breath noted. Lung sounds clear, able C&DB as ordered. Problem: Nutrition  Goal: Optimal nutrition therapy  Outcome: Ongoing  Patients appetite poor. Continuing to encourage fluids. Care plan reviewed with patient and family. Patient and family verbalize understanding of the plan of care and contribute to goal setting.

## 2021-05-24 NOTE — FLOWSHEET NOTE
05/24/21 0540   Provider Notification   Reason for Communication Review case;Evaluate;New orders   Provider Name Dr. Polo Juares   Provider Notification Physician   Method of Communication Call   Response See orders   Notification Time [phone]     Secure message sent to Dr. Annalisa Cerda him of patient's intense pain. Dr. Polo Juares called this RN and tele orders given with verbal readback. See orders.  Electronically signed by Mauricio Dye RN on 5/24/2021 at 7:57 AM

## 2021-05-24 NOTE — PROGRESS NOTES
Visit made to assess patient symptoms and provide support. Noted that morphine PCA had been adjusted and now at 5 mg/hr with last increase at 1411 and last dose of ativan 0.5 mg given at 1407 (2 doses of ativan given since admission to hospice). Patient son Alan Sewell coming out of room, asked how patient doing. He voiced that he thought that \"finally there, appears comfortable. Has occasional moaning but appears comfortable with this\". Went to room. 2 daughters and patient wife Ruthann Ackerman at bedside, told nurse that feel patient more comfortable. Patient noted to be relaxed with CPOT of 1 with ability to relax and occasional moan that does not appear to be pain related. Talked with family about patient life. Discussed dying process. Education done on dying process and that often times when patient fighting against such as pain, when comfortable- they will pass. Patient with significant mottling to thighs from toes, JVD, and cyanotic fingers. Voiced understanding. Daughter told nurse that earlier he had asked her for the key. Nurse questioned what this meant, Edi Gautam does he want fabian to\". Family told nurse like it was to heaven. Then he went unresponsive. Much listening done. Family and primary nurse Madelyn Grayson RN denied needs.

## 2021-05-24 NOTE — PROGRESS NOTES
[facility]  Progress Note For Dr Kelli Maier       Patient:  Yonny Holt  YOB: 1970  Date of Service: 5/24/2021  MRN: [id number]   Acct:  [de-identified]   Primary Care Physician: CAITY Soto CNP    SUBJECTIVE: Pain control still suboptimal.  Transitioned to hospice      Home Medications:   No current facility-administered medications on file prior to encounter. Current Outpatient Medications on File Prior to Encounter   Medication Sig Dispense Refill    levothyroxine (SYNTHROID) 25 MCG tablet Take 25 mcg by mouth Daily      oxyCODONE-acetaminophen (PERCOCET) 5-325 MG per tablet Take 1 tablet by mouth every 6 hours as needed for Pain.  famotidine (PEPCID) 20 MG tablet Take 1 tablet by mouth daily 30 tablet 0    ipratropium-albuterol (DUONEB) 0.5-2.5 (3) MG/3ML SOLN nebulizer solution Inhale 3 mLs into the lungs every 4 hours as needed for Shortness of Breath 360 mL 0    midodrine (PROAMATINE) 2.5 MG tablet Take 1 tablet by mouth 3 times daily (with meals) 90 tablet 0    polyethylene glycol (GLYCOLAX) 17 g packet Take 17 g by mouth daily as needed for Constipation 527 g 0    predniSONE (DELTASONE) 20 MG tablet Take 2 tablets by mouth daily F/u with Dr Denia Gay regarding continuing dose at followup 60 tablet 0    traMADol (ULTRAM) 50 MG tablet Take 50 mg by mouth every 6 hours as needed for Pain.  traZODone (DESYREL) 50 MG tablet Take 50 mg by mouth nightly      ALLOPURINOL PO Take 100 mg by mouth nightly            Scheduled Meds:    Continuous Infusions:   morphine 4.5 mL/hr at 05/24/21 1246    dextrose 30 mL/hr at 05/24/21 1147     PRN Meds:acetaminophen, glycopyrrolate, haloperidol lactate, ipratropium-albuterol, LORazepam **OR** LORazepam **OR** LORazepam **OR** LORazepam        Allergies:  Patient has no known allergies. OBJECTIVE:    Vitals: There were no vitals filed for this visit.    BMI: There is no height or weight on file to calculate BMI. PHYSICAL EXAMINATION:              General appearance: ill looking male mild respiratory distress*, appears stated age and cooperative. HEENT:  Normal cephalic, atraumatic without obvious deformity. Pupils equal, round, and reactive to light. Extra ocular muscles intact. Conjunctivae/corneas clear. Neck: Supple. Chest:  Dressing over right chest clean and dry  Respiratory:  Diminished air entry bilaterally  Cardiovascular:  Regular rhythm with normal S1/S2 without  rubs or gallops. Abdomen:  Distended,soft,nontender, with normal bowel sounds. Musculoskeletal:  No clubbing, cyanosis or ankle edema bilaterally.    Neurologic:   Alert and oriented x3  Moves all extremities spontaneously  Psychiatric: Thought content appropriate, normal insight  Review of Labs and Diagnostic Testing:    Recent Results (from the past 24 hour(s))   POCT glucose    Collection Time: 05/23/21  4:49 PM   Result Value Ref Range    POC Glucose 140 (H) 70 - 108 mg/dl   POCT glucose    Collection Time: 05/23/21  9:03 PM   Result Value Ref Range    POC Glucose 105 70 - 108 mg/dl       Radiology:     Echocardiogram limited    Result Date: 5/10/2021  Transthoracic Echocardiography Report (TTE)  Demographics   Patient Name   [address] [place] [address],[address] Gender              Male                 P   MR #           [id number]         Race                                                    Ethnicity   Account #      [de-identified]         Room Number         0003   Accession      [id number]        Date of Study       05/10/2021  Number   Date of Birth  1970        Referring Physician [facility]   Age            44 year(s)        [address] [facility]                                    Interpreting        Echo reader of the                                   Physician week                                                       Thang Llanes MD  Procedure Type of Study   TTE procedure:ECHOCARDIOGRAM LIMITED. Procedure Date Date: 05/10/2021 Start: 12:53 PM Study Location: Bedside Technical Quality: Limited visualization due to poor acoustical window. Indications:Pericardial effusion. Additional Medical History:Shortness of breath, diabetic, hypertension, chronic kidney disease, renal cell cancer with METS, recent Covid, hypothyroidism Patient Status: Routine Height: 72 inches Weight: 203 pounds BSA: 2.14 m^2 BMI: 27.53 kg/m^2 BP: 113/71 mmHg  Conclusions   Summary  Normal left ventricle size and systolic function. Ejection fraction was  estimated at 65 %. There were no regional left ventricular wall motion  abnormalities and wall thickness was within normal limits. There is trivial posterior pericardial effusion with no evidence of  hemodynamic compromise. Signature   ----------------------------------------------------------------  Electronically signed by Thang Llanes MD (Interpreting  physician) on 05/10/2021 at 06:35 PM  ----------------------------------------------------------------   Findings   Mitral Valve  The mitral valve structure was normal with normal leaflet separation. Aortic Valve  The aortic valve was trileaflet with normal thickness and cuspal  separation. Tricuspid Valve  The tricuspid valve structure was normal with normal leaflet separation. Pulmonic Valve  The pulmonic valve leaflets exhibited normal thickness, no calcification,  and normal cuspal separation. Left Atrium  Left atrial size was normal.   Left Ventricle  Normal left ventricle size and systolic function. Ejection fraction was  estimated at 65 %. There were no regional left ventricular wall motion  abnormalities and wall thickness was within normal limits.    Right Atrium  Right atrial size was normal.   Right Ventricle  The right ventricular size was normal with normal systolic function example in the right lower lobe is a 4.1 cm lesion on axial image 1 minus does not appear to be significantly changed. Lytic destructive change of multiple left lower ribs is again noted, compatible with the patient's known metastatic disease. This does not appear to be significantly changed. There are no focal liver lesions. The patient is status post cholecystectomy. The pancreas is unremarkable. There are calcified granulomas in the spleen. Right adrenal mass measures 2.0 x 1.0 cm, increased since the prior study when it measured 1.7 x 0.9 cm. This is compatible with metastatic disease. Left-sided adrenal metastases are noted. One of these, seen on axial image 25, is increased in size and measures 1.1 cm on the current exam.  Previously it measured 0.9 cm. Exophytic solid mass arising from the right renal moiety measures 4.4 x 4.0 cm on axial image 38, this does not appear to be significantly changed. Horseshoe kidney is noted. Abdominal aorta is normal in caliber without evidence of an aneurysm. Moderate volume of ascites is noted in the abdomen/pelvis, increased since the prior study. Changes of peritoneal carcinomatosis are more prominent on this exam, for example seen on axial image 77. CT PELVIS: Lytic destructive change in the right iliac bone is again noted on axial image 72, this does not appear to be significantly changed. There is no evidence of a bowel obstruction. There is no free air. Colonic diverticula are noted, without evidence of acute diverticulitis. Subcutaneous edema/anasarca is noted. 1.  No evidence of a bowel obstruction or free air. 2.  Moderate volume of ascites, increased since the prior study. Changes of peritoneal carcinomatosis are more prominent on this exam. 3.  Right renal mass is again noted. Worsening metastatic disease within the adrenal glands. 4.  Small to moderate loculated right pleural effusion. Small loculated left pleural effusion.   Bibasilar

## 2021-05-24 NOTE — PLAN OF CARE
Problem: Impaired respiratory status  Goal: Clear lung sounds  Description: Clear lung sounds  5/24/2021 0839 by Whitney Ordonez RCP  Outcome: Ongoing  Note: Aerosol to help improve lung aeration.

## 2021-05-24 NOTE — PROGRESS NOTES
Assisted with changing morphine PCA 5mg/hr, cartridge empty. Patient with no s/s of distress or discomfort noted. Family peaceful at side of bed, denied needs.

## 2021-05-24 NOTE — FLOWSHEET NOTE
05/24/21 0033   Provider Notification   Reason for Communication Review case;Evaluate   Provider Name Dr. Daxa Brown   Provider Notification Physician   Method of Communication Secure Message   Response Waiting for response   Notification Time 0033     Secure message sent to Dr. Daxa Brown d/t patient having increased restlessness and c/o SOB. Informed physician patient is in a lot of pain and cannot have any more pain medication until 0300 Next dose of xanax not available for 20 min. Asked physician to advise. Waiting on response.  Electronically signed by Candido Lincoln RN on 5/24/2021 at 12:35 AM

## 2021-05-24 NOTE — PROGRESS NOTES
Entered patient room for hourly rounding. Patient appears uncomfortable at this time with increased restlessness. Grimacing and dragging legs up and down bed stating \"I just need to go\". Increased morphine pump and ativan per mar orders. Multiple family members at bedside. Emotional support provided. Offered repositioning, declined at this time. Will continue to monitor.

## 2021-05-25 NOTE — PROGRESS NOTES
Visit made for patient passing. Father Edu Osullivanaming leaving room on arrival.  Updated by primary nurse that Luis MCCOY. [phone]. [phone] and patient peaceful. Patient wife Zander Villanueva and 3 children at bedside, reported that patient peaceful through night. Emotional support provided. Updated of bereavement services from hospice. Family given time to mourn and be together.

## 2021-05-25 NOTE — PROGRESS NOTES
Visit made to continue support of family. Denied further questions. Family going to leave shortly and wish for  home to be called. Called hub with update. Updated primary nurse Sima Rothman RN as well. Tech on floor training new tech who has not done post-mortem care- they will complete.

## 2021-05-25 NOTE — FLOWSHEET NOTE
Pt  before the nurse called me, the . He death with renal cancer which metastasized all over his body, his spouse reported. Family cried profusely. I was there for them and they asked me to pray and I used Psalm 23. I offered words of encouragement. 21 1140   Encounter Summary   Services provided to: Patient and family together   Referral/Consult From: Nurse   Support System Spouse; Children   Continue Visiting No  ( Death)   Complexity of Encounter Moderate   Length of Encounter 30 minutes   Grief and Life Adjustment   Type Death   Assessment Tearful;Grieving; Anxious   Intervention Active listening;Nurtured hope;Prayer;Scripture;Sustaining presence/ Ministry of presence;Empowerment

## 2021-05-25 NOTE — DISCHARGE SUMMARY
episode getting into the car before coming to the ED. In the emergency department Na 132, K+ 6.0, Cr 2.5, BNP 1182, troponin 0.043, WBC 21.4, Hgb 8, CT abdomen/pelvis shows moderate volume ascites with increased peritoneal carcinomatosis and worsening metastatic disease of adrenal glands with small BL pleural effusions, CXR negative for pneumonia. 1L Bolus NS given in ED, along with insulin/ Lokelma/Ca gluconate/D50 for the hyperkalemia. Pt has a right side pleur-x catheter and states that drainage has decreased from 300cc/day to 100cc/day over the past few days. He also reports that his abdomen has become more distended. Pt denies chest pain. Pt is tachycardic and tachypneic on exam but afebrile. Discussed code status with pt and wife and he wishes to remain a limited code X4. Patient admitted for [facility] with acute respiratory failure on Chronic respiratory failure . Patient had removal of the PleurX catheter due to infection , the tip had grown staph epidermidis, was placed on antibiotics. Had also been followed by the pulmonology service. Due to peritoneal carcinomatosis he did develop significant ascites and had required large volume abdominal paracentesis for relief. Had also had thoracentesis performed due to dyspnea however only 170 cc of pleural fluid could be removed due to complex septated loculated pleural effusions. Patient had developed acute interstitial nephritis secondary to optiva, he was followed closely by the nephrologist and had received high-dose steroids. He had issues with hyperkalemia which was also addressed. Id had required diuretics which however had to be discontinued and had been placed on normal saline on account of some hyponatremia. His overall condition however continued to deteriorate and he was in quite a bit of pain. The pain management team had assisted with adjusting his pain medications both long and short-acting opiate and this was complemented with parenteral opiates . Patient had decided that he no longer wanted any further intervention and was made a DNR cc, hospice was consulted. Patient has been transitioned to inpatient hospice. The patient's prognosis is very poor. I had discussed extensively with the patient's son as well as the patient and the aim is to optimize pain medications for pain control due to his extensive metastatic disease. Comfort care measures were maintained and patient had progressively gone down hill and  at 8:54 a.m. 2021        Labs:  For convenience and continuity at follow-up the following most recent labs are provided:      CBC:    Lab Results   Component Value Date    WBC 14.2 2021    HGB 8.4 2021    HCT 28.3 2021     2021       Renal:    Lab Results   Component Value Date     2021    K 5.9 2021    K 5.6 2021    CL 84 2021    CO2 29 2021     2021    CREATININE 3.5 2021    CALCIUM 8.8 2021         Significant Diagnostic Studies    Radiology:   Echocardiogram limited    Result Date: 5/10/2021  Transthoracic Echocardiography Report (TTE)  Demographics   Patient Name   [address] [place] [address],[address] Gender              Male                 P   MR #           [id number]         Race                                                    Ethnicity   Account #      [de-identified]         Room Number         0003   Accession      [id number]        Date of Study       05/10/2021  Number   Date of Birth  1970        Referring Physician Tonya Sterling MD                                                       [facility], [address] [place] [address]   Age            44 year(s)        [address] [facility]                                    Interpreting        Echo reader of the                                   Physician           week                                                       Ruma Curry MD Procedure Type of Study   TTE procedure:ECHOCARDIOGRAM LIMITED. Procedure Date Date: 05/10/2021 Start: 12:53 PM Study Location: Bedside Technical Quality: Limited visualization due to poor acoustical window. Indications:Pericardial effusion. Additional Medical History:Shortness of breath, diabetic, hypertension, chronic kidney disease, renal cell cancer with METS, recent Covid, hypothyroidism Patient Status: Routine Height: 72 inches Weight: 203 pounds BSA: 2.14 m^2 BMI: 27.53 kg/m^2 BP: 113/71 mmHg  Conclusions   Summary  Normal left ventricle size and systolic function. Ejection fraction was  estimated at 65 %. There were no regional left ventricular wall motion  abnormalities and wall thickness was within normal limits. There is trivial posterior pericardial effusion with no evidence of  hemodynamic compromise. Signature   ----------------------------------------------------------------  Electronically signed by Pavithra Robles MD (Interpreting  physician) on 05/10/2021 at 06:35 PM  ----------------------------------------------------------------   Findings   Mitral Valve  The mitral valve structure was normal with normal leaflet separation. Aortic Valve  The aortic valve was trileaflet with normal thickness and cuspal  separation. Tricuspid Valve  The tricuspid valve structure was normal with normal leaflet separation. Pulmonic Valve  The pulmonic valve leaflets exhibited normal thickness, no calcification,  and normal cuspal separation. Left Atrium  Left atrial size was normal.   Left Ventricle  Normal left ventricle size and systolic function. Ejection fraction was  estimated at 65 %. There were no regional left ventricular wall motion  abnormalities and wall thickness was within normal limits. Right Atrium  Right atrial size was normal.   Right Ventricle  The right ventricular size was normal with normal systolic function and  wall thickness.    Pericardial Effusion  There is trivial posterior pericardial effusion with no evidence of  hemodynamic compromise. Pleural Effusion  No evidence of pleural effusion. Aorta / Great Vessels  -Aortic root dimension within normal limits.  -The Pulmonary artery is within normal limits. -IVC size is within normal limits with normal respiratory phasic changes. M-Mode/2D Measurements & Calculations   LV Diastolic    LV Systolic Dimension: 3.5   AV Cusp Separation: 2.3 cmLA  Dimension: 5 cm cm                           Dimension: 3.1 cmAO Root  LV FS:30 %      LV Volume Diastolic: 642 ml  Dimension: 3.9 cm  LV PW           LV Volume Systolic: 00.9 ml  Diastolic: 0.8  LV EDV/LV EDV Index: 118  cm              ml/55 m^2LV ESV/LV ESV  Septum          Index: 50.9 ml/24 m^2        RV Diastolic Dimension: 2.7  Diastolic: 0.8  EF Calculated: 56.9 %        cm  cm                                               LA/Aorta: 0.79  [email or web address]? [id number]    CT ABDOMEN PELVIS WO CONTRAST Additional Contrast? None    Result Date: 5/9/2021  CT SCAN OF THE ABDOMEN AND PELVIS WITHOUT CONTRAST COMPARISON: 4/13/2021. TECHNIQUE: A noncontrast CT scan of the abdomen and pelvis was performed. A dose reduction technique was utilized. FINDINGS: CT ABDOMEN: Images of the lung bases demonstrate a small to moderate loculated right pleural effusion with an associated right-sided chest tube. 2.6 cm mass is noted within the right pleural effusion on axial image 1 which is nonspecific but could represent neoplastic disease or perhaps proteinaceous material.  A smaller lesion in the right pleural space measures 8 mm on image 3. There is also a loculated small left pleural effusion. Small pericardial effusion is noted. Numerous nodules/masses are noted within the lung bases, compatible with neoplastic disease.   For example in the right lower lobe is a 4.1 cm lesion on axial image 1 minus does not appear to be significantly changed. Lytic destructive change of multiple left lower ribs is again noted, compatible with the patient's known metastatic disease. This does not appear to be significantly changed. There are no focal liver lesions. The patient is status post cholecystectomy. The pancreas is unremarkable. There are calcified granulomas in the spleen. Right adrenal mass measures 2.0 x 1.0 cm, increased since the prior study when it measured 1.7 x 0.9 cm. This is compatible with metastatic disease. Left-sided adrenal metastases are noted. One of these, seen on axial image 25, is increased in size and measures 1.1 cm on the current exam.  Previously it measured 0.9 cm. Exophytic solid mass arising from the right renal moiety measures 4.4 x 4.0 cm on axial image 38, this does not appear to be significantly changed. Horseshoe kidney is noted. Abdominal aorta is normal in caliber without evidence of an aneurysm. Moderate volume of ascites is noted in the abdomen/pelvis, increased since the prior study. Changes of peritoneal carcinomatosis are more prominent on this exam, for example seen on axial image 77. CT PELVIS: Lytic destructive change in the right iliac bone is again noted on axial image 72, this does not appear to be significantly changed. There is no evidence of a bowel obstruction. There is no free air. Colonic diverticula are noted, without evidence of acute diverticulitis. Subcutaneous edema/anasarca is noted. 1.  No evidence of a bowel obstruction or free air. 2.  Moderate volume of ascites, increased since the prior study. Changes of peritoneal carcinomatosis are more prominent on this exam. 3.  Right renal mass is again noted. Worsening metastatic disease within the adrenal glands. 4.  Small to moderate loculated right pleural effusion. Small loculated left pleural effusion. Bibasilar parenchymal metastases are noted.  5.  Multifocal bone metastases are again noted. 6.  Additional findings are detailed above. This document has been electronically signed by: Derek Lamb M.D. on 05/09/2021 09:27 PM All CTs at this facility use dose modulation techniques and iterative reconstructions, and/or weight-based dosing when appropriate to reduce radiation to a low as reasonably achievable. CT CHEST WO CONTRAST    Result Date: 5/12/2021  PROCEDURE: CT CHEST WO CONTRAST CLINICAL INFORMATION: Renal cell carcinoma. Loculated pleural effusion. COMPARISON: CT chest April 14, 2021. TECHNIQUE: Multiple axial 5 millimeter images of the thorax and upper abdomen are obtained without the administration of intravenous contrast material. Coronal and sagittal reformats are generated from the axial data set. All CT scans at this facility use dose modulation, iterative reconstruction, and/or weight-based dosing when appropriate to reduce radiation dose to as low as reasonably achievable. FINDINGS: Lungs: There has been interval removal of the right pleural drainage catheter compared with prior examination. There is a residual moderate multiloculated right pleural effusion, which demonstrates mild decrease in size when compared to prior examination. There is again thickening of the visceral and parietal pleura on the right. There is a smaller loculated left pleural effusion, stable in size from prior examination, with thickening of the visceral and parietal pleura. Irregular nodular thickening is also again noted along the pleural surface of bilateral lungs. There is again a multiloculated lesion/collection along the anterior left chest wall, similar to prior examination. There are again innumerable noncalcified nodules and masses throughout both lungs, demonstrating gross stability from prior examination. The largest of these in the right lower lobe measures approximately 2.4 x 3 cm in axial dimension (series 2 image 41). There is patchy atelectasis in bilateral lower lobes.  There is no pneumothorax. There are mild retained secretions dependently within the trachea. Proximal tracheobronchial tree is otherwise widely patent. Mediastinum and breanne: There is no axillary lymphadenopathy. There is similar subcarinal mediastinal lymphadenopathy measuring up to 2.2 cm short axis. There is limited evaluation of the breanne on this noncontrast enhanced examination, however there is fullness of the breanne bilaterally suggesting lymphadenopathy, which was seen on prior examination. Heart size is normal. There is small pericardial effusion. There is atherosclerotic ossification of the thoracic aorta. Thoracic aorta is normal in caliber. There are coronary artery calcifications. There is similar infiltration of the pericardial fat along the right cardiophrenic angle. There is again a nodule within the pericardial fat anterior to the heart extending into the anterior chest wall, which measures approximately 3 cm in diameter. There is again a mildly enlarged left cardiophrenic lymph  node measuring 12 mm in short axis. Upper abdomen: There is moderate ascites within the visualized upper abdomen, increased from prior examination. Multiple calcified granulomata are seen within the visualized spine. Bones: There is an 8 mm lytic lesion within the right lateral ninth rib (series 2 image 66). There are again large expansile lytic lesions involving the left lateral seventh and eighth ribs, with large soft tissue components infiltrating the left chest wall. Vertebral body heights are maintained. There are also smaller lytic lesions within the posterior lateral left ninth rib and posterior left 11th rib. 1. Moderate loculated right and small loculated left pleural effusions, with the right pleural effusion demonstrated mild decrease in size from prior examination. Malignant pleural effusions are not excluded.  2. Metastatic disease within bilateral lungs, with multiple pulmonary nodules and masses bilaterally and irregular lobulated mass along the anterior left chest. 3. Enlarged subcarinal mediastinal lymph node. Probable hilar lymphadenopathy, however evaluation is limited on the current noncontrast enhanced examination. 4. Small pericardial effusion. 5. Bilateral lytic rib metastases, with large soft tissue component on the left involving the chest wall. 6. Moderate ascites within the visualized upper abdomen, increased from prior examination. **This report has been created using voice recognition software. It may contain minor errors which are inherent in voice recognition technology. ** Final report electronically signed by Dr Teresa Herrera on 5/12/2021 12:00 PM    XR CHEST PORTABLE    Result Date: 5/21/2021  PROCEDURE: XR CHEST PORTABLE CLINICAL INFORMATION: after thoracentesis COMPARISON: 5/19/2021 TECHNIQUE:  AP mobile chest single view  FINDINGS: The heart size appears stable. Moderate bilateral right greater than left loculated pleural effusions are demonstrated. The effusion on the right does not appear significantly changed when compared to the prior examination despite right-sided thoracentesis from earlier same day. No pneumothorax is seen. Bilateral patchy airspace opacities are seen throughout the lungs which may represent multifocal pneumonia. 1. Moderate bilateral right greater than left loculated pleural effusions are demonstrated. The effusion on the right does not appear significantly changed when compared to the prior examination despite right-sided thoracentesis from earlier same day. No  pneumothorax is seen. 2. Bilateral patchy airspace opacities are seen throughout the lungs which may represent multifocal pneumonia. 3. Overall aeration of the lungs appears similar compared to the prior examination. **This report has been created using voice recognition software. It may contain minor errors which are inherent in voice recognition technology. ** Final report electronically signed by Dr. Sherren Brink on 5/21/2021 1:29 PM    XR CHEST PORTABLE    Result Date: 5/19/2021  Chest X-ray, 1 View COMPARISON:  CR,SR  - XR CHEST PORTABLE  - 05/18/2021 12:35 AM EDT FINDINGS: Bilateral airspace disease and consolidations, slightly increased in the right lower lung. Unchanged loculated pleural fluid or mass at the right lung apex. Similar bilateral pleural effusions. Low lung volumes. No pneumothorax. Stable heart size. No acute fracture. Slightly increased right lower lung consolidation. No other significant interval change from 05/18/2021. This document has been electronically signed by: Mariela Goldstein MD on 05/19/2021 03:53 AM    XR CHEST PORTABLE    Result Date: 5/18/2021  AP chest Comparison:  CR,SR  - XR CHEST PORTABLE  - 05/17/2021 01:31 AM EDT Findings: Bilateral pleural fluid, bilateral lung opacities, and loculated fluid versus mass at the right apex unchanged. Cardiomegaly unchanged. Impression: 1. No acute cardiopulmonary findings. This document has been electronically signed by: Josefina Constantino MD on 05/18/2021 03:39 AM    XR CHEST PORTABLE    Result Date: 5/17/2021  1 view chest x-ray. Comparison: 05/16/2021 02:26 AM EDT (01:26 AM CDT) : CR,SR: XR CHEST PORTABLE Findings: Stable mild cardiac silhouette enlargement. Stable right apical soft tissue mass or loculated pleural fluid. Bilateral small pleural effusions, unchanged. Bilateral consolidations without significant change. No pneumothorax. No acute bony pathology. Impression: No significant change in bilateral consolidations with associated pleural effusions. Stable right apical mass versus loculated pleural fluid. This document has been electronically signed by: Domitila Triplett MD on 05/17/2021 04:25 AM    XR CHEST PORTABLE    Result Date: 5/16/2021  1 view chest x-ray. Comparison:  CR,SR  - XR CHEST PORTABLE  - 05/15/2021 04:08 AM EDT Findings: Low lung volumes. Persistent cardiomegaly.  Persistent right apical soft tissue mass or loculated pleural fluid. Bilateral small pleural effusions. Bilateral extensive consolidations without significant change. No pneumothorax. Impression: Persistent diffuse bilateral consolidations with associated pleural effusions. Stable right apical mass versus loculated pleural fluid. This document has been electronically signed by: Serge Juarez MD on 05/16/2021 05:06 AM    XR CHEST PORTABLE    Result Date: 5/15/2021  1 view chest x-ray. Comparison:  CR,SR  - XR CHEST PORTABLE  - 05/14/2021 12:26 AM EDT  CR,KOSR  - XR CHEST PORTABLE  - 05/13/2021 09:25 AM EDT Findings: Stable diffuse bilateral airspace disease. Stable 7 cm extrapleural density right apex. Stable bilateral pleural effusions No mediastinal shift or tracheal deviation. Stable cardiomegaly. No passive venous congestion. Osseous structures intact. Tubes and catheters:None. Impression: 1. Stable diffuse bilateral infiltrates likely pneumonic, bilateral pleural effusions and right apical extrapleural density This document has been electronically signed by: Josefina Chinchilla MD on 05/15/2021 06:42 AM    XR CHEST PORTABLE    Result Date: 5/14/2021  Exam: 1V chest Comparison: 05/13/2021 09:25 AM EDT (08:25 AM CDT) : Brandee Lee: XR CHEST PORTABLE Findings: Mediastinum: No cardiac silhouette enlargement. Lungs: Stable bilateral consolidation is most advanced at the right base. Stable 7 cm right apical mass like density. Pleura: No pneumothorax. Stable small bilateral effusions. Bones: No acute pathology. Impression: Stable lung consolidations and effusions as described. This document has been electronically signed by: Julian Allen MD on 05/14/2021 03:21 AM    XR CHEST PORTABLE    Result Date: 5/13/2021  PROCEDURE: XR CHEST PORTABLE CLINICAL INFORMATION: shortness of breath. Metastatic renal cell cancer. COMPARISON: 5/12/2021 TECHNIQUE: A single mobile view of the chest was obtained. 1. Normal heart size.  Large 8 cm mass lesion right apex. Small bilateral pleural effusions, improved from prior study. 2. Moderate atelectasis/pneumonia both mid and lower lung fields, slightly worse appearance than yesterday. **This report has been created using voice recognition software. It may contain minor errors which are inherent in voice recognition technology. ** Final report electronically signed by Dr. Gustavo Pang on 5/13/2021 9:53 AM    XR CHEST 1 VIEW    Result Date: 5/9/2021  1 view chest x-ray Comparison:  CR,SR  - XR CHEST PORTABLE  - 04/22/2021 01:09 AM EDT Findings: Bibasilar large bilateral pleural effusions are similar to the prior exam. Right basilar tunneled pleural catheter is unchanged. Mass versus loculated effusion at the right apex. Increased in size since the prior exam. Ill-defined bilateral lung opacities similar to the prior exam. Cardiomegaly. No change. No acute fracture. Loculated right apex pleural effusion versus mass has increased in size since the prior exam. Otherwise no acute findings. This document has been electronically signed by: Nuria Parsons MD on 05/09/2021 09:02 PM    US THORACENTESIS Which side should the procedure be performed? Right    Result Date: 5/21/2021  THORACENTESIS WITH ULTRASOUND GUIDANCE: PERFORMED BY: Barbi Currie M.D. CLINICAL INFORMATION: Pleural effusion APPROACH: Posterior intercostal PROCEDURE: Signed informed consent was obtained prior to performing this procedure. The thorax was initially evaluated sonographically to determine appropriate puncture site. The skin was marked, prepped, and draped in a sterile fashion. Following local anesthesia and utilizing aseptic technique, a 5 Macedonian one-step catheter was successfully inserted into the pleural effusion at the position indicated above. Pleural fluid in the amount was above was then aspirated and the needle was removed. The patient tolerated the procedure well. A post procedure chest radiograph will be obtained.  Physician performing procedure: Dr Grace Almodovar Informed consent signed: yes Local Anesthetic: lidocaine 2% Specimen volume: none Catheter: 10cm 5 Japanese cath Aspirated pleural fluid volume: 170mL Aspirated pleural fluid color: heme-tinged fluid Complications: none     1. Status post thoracentesis. Only 170 mL of heme tinged fluid could be removed due to the highly septated nature of the complex highly loculated right-sided pleural effusion. **This report has been created using voice recognition software. It may contain minor errors which are inherent in voice recognition technology. ** Final report electronically signed by Dr. Virginie Sanz on 5/21/2021 1:23 PM    US GUIDED PARACENTESIS    Result Date: 5/19/2021  PROCEDURE: US GUIDED PARACENTESIS CLINICAL INFORMATION: Ascites COMPARISON: 5/17/2021 TECHNIQUE: Ultrasound-guided paracentesis FINDINGS: The procedure was explained the patient. All risks were discussed. Written informed consent was obtained. Limited ultrasound was performed revealing the ascites. An acceptable location was identified. The skin was marked. The overlying area was sterilely prepped and draped. 2 percent lidocaine was given for local anesthesia. Next a 5 Yakut one-step catheter  was introduced into the ascites. Clear yellow fluid was removed. The catheter was removed. The patient tolerated the procedure. No immediate complications. Physician performing procedure:  Informed consent signed: yes Local Anesthetic: 2% Lidocaine Specimen volume: 70 ml Catheter: 10cm 5 Japanese Aspirated ascites volume: 1.3 liters Aspirated ascites color: Old bloody fluid Site of Puncture:RLQ Complications: patient tolerated procedure well     Successful ultrasound-guided paracentesis. **This report has been created using voice recognition software. It may contain minor errors which are inherent in voice recognition technology. ** Final report electronically signed by Dr. Virginie Sanz on 5/19/2021 4:16 PM    [place] 915 PARACENTESIS    Result Date: 5/17/2021  PROCEDURE: US GUIDED PARACENTESIS CLINICAL INFORMATION: Ascites COMPARISON: 5/10/2021 TECHNIQUE: Ultrasound-guided paracentesis FINDINGS: The procedure was explained the patient. All risks were discussed. Written informed consent was obtained. Limited ultrasound was performed revealing the ascites. An acceptable location was identified. The skin was marked. The overlying area was sterilely prepped and draped. 2 percent lidocaine was given for local anesthesia. Next a 5 Kinyarwanda one-step catheter  was introduced into the ascites. Clear yellow fluid was removed. The catheter was removed. The patient tolerated the procedure. No immediate complications. Physician performing procedure: Dr eJssee Lyons Informed consent signed: yes Local Anesthetic: 2% Lidocaine Specimen volume: no labs Catheter: 5 [place] Aspirated ascites volume: 3.8 liters Aspirated ascites color: red, old appearing bloody fluid Site of Puncture:RLQ Complications: No immediate complications. Successful ultrasound-guided paracentesis. **This report has been created using voice recognition software. It may contain minor errors which are inherent in voice recognition technology. ** Final report electronically signed by Dr. Mariluz Anguiano on 5/17/2021 2:55 PM    US GUIDED PARACENTESIS    Result Date: 5/10/2021  PROCEDURE: US GUIDED PARACENTESIS CLINICAL INFORMATION: ascites with SOB, BOTH diagnositc and therapeutic . COMPARISON: CT abdomen pelvis dated 5/9/2021. TECHNIQUE: Risks and benefits of the procedure were thoroughly explained and oral and written informed consent obtained. The patient was placed supine on the ultrasound gurney. Following patient and site verification, the patient was prepped and draped in usual fashion and 2% lidocaine was infiltrated in the subcutaneous tissues at the designated puncture site.  A 5 Kinyarwanda 1 stick needle/catheter system was introduced into the fluid collection under ultrasound guidance. The catheter was advanced while the inner needle was removed. 70 mL bloody fluid was removed and sent to laboratory for further analysis. A total of 3 L of bloody fluid was withdrawn and the catheter removed and a bandage placed. The patient tolerated the procedure well and no immediate postprocedural complication was identified. There are 7 saved ultrasound images. FINDINGS: Saved ultrasound images show the 1 stick needle within the ascites fluid. Post procedure ultrasound image shows marked decrease in size of ascitic fluid. Successful ultrasound-guided paracentesis. **This report has been created using voice recognition software. It may contain minor errors which are inherent in voice recognition technology. ** Final report electronically signed by Dr. Ajay Lane MD on 5/10/2021 5:07 PM    XR CHEST PA INSPIRATION 1 VW    Result Date: 5/12/2021  PROCEDURE: XR CHEST PA INSPIRATION 1 VW CLINICAL INFORMATION: Renal cancer. Pleural effusion. Infected Pleurx catheter. Status post Pleurx catheter removal. COMPARISON: 5/9/2021 TECHNIQUE: A single PA view of the chest was obtained. 1. Poor inflation lungs. Normal heart size. 2. Moderate atelectasis/pneumonia both mid and lower lung fields. Small bilateral pleural effusions. Loculated pleural effusion versus mass lesion right apical region. 3. No pneumothorax is seen following Pleurx catheter removal. 4. No appreciable change from prior study. Final report electronically signed by Dr. Naif Patiño on 5/12/2021 9:36 AM    IR REMOVAL OF TUNNELED PLEURAL CATH W CUFF    Result Date: 5/12/2021  TUNNELED PLEURX CATHETER REMOVAL: PERFORMED BY:  Bhakti Lind M.D. CLINICAL INFORMATION: Infected Pleurx catheter. Renal cancer. INSERTION SITE: Right hemithorax inferolaterally. Klaus Asher CATHETER: 16 Senegalese tunneled Pleurx drainage catheter ESTIMATED BLOOD LOSS: Minimal PROCEDURE:  Signed informed consent was obtained prior to performing this procedure.   The patient was not sedated during this procedure. The the exposed portion of the catheter and the adjacent skin were prepped and draped in a sterile fashion. The skin at the catheter exit site was infiltrated with lidocaine 2 percent. . The catheter cuff was then freed from the tunnel by blunt dissection. . The wound was then closed with 3-0 Vicryl suture . A small amount of antibiotic ointment was applied to the wound site along with a sterile OpSite dressing. The patient tolerated the procedure well. A follow-up chest x-ray will be obtained. Status post successful tunneled Pleurx catheter removal.. **This report has been created using voice recognition software. It may contain minor errors which are inherent in voice recognition technology. ** Final report electronically signed by Dr. Naif Patiño on 2021 9:36 AM         Consults:     STR ED TO IP CONSULT  STR ED TO IP CONSULT  IP CONSULT TO NEPHROLOGY  IP CONSULT TO SOCIAL WORK  IP CONSULT TO PULMONOLOGY  PALLIATIVE CARE EVAL  IP CONSULT TO ONCOLOGY  PALLIATIVE CARE EVAL  IP CONSULT TO INFECTIOUS DISEASES  IP CONSULT TO VASCULAR SURGERY  PHARMACY TO DOSE VANCOMYCIN  IP CONSULT TO PAIN MANAGEMENT  IP CONSULT TO HOSPICE    Disposition:    [] Home       [] TCU       [] Rehab       [] Psych       [] SNF       [] [place]       [x] Other-       Condition at Discharge: Terminal    Code Status:  DNR-CC            Signed: Thank you CAITY De La Fuente CNP for the opportunity to be involved in this patient's care.     Electronically signed by Ariana Gaviria MD on 2021 at 9:28 AM

## 2021-05-25 NOTE — ADT AUTH CERT
Utilization Reviews       Systemic or Infectious Condition GRG - Care Day 13 (5/21/2021) by Kiki Antoine RN       Review Status Review Entered   Completed 5/25/2021 09:08      Criteria Review      Care Day: 13 Care Date: 5/21/2021 Level of Care: Intermediate Care    Guideline Day 3    Level Of Care    (X) * Activity level acceptable    5/25/2021 9:08 AM EDT by Sharla Altamirano      aat    Clinical Status    ( ) * Hemodynamic stability    5/25/2021 9:08 AM EDT by Sharla Altamirano      Pulse:              Pulse: 114  BP:                  BP: 115/73    ( ) * Respiratory status acceptable    (X) * Neurologic status acceptable    5/25/2021 9:08 AM EDT by Sharla Altamirano      Awake, alert, frail appearing    (X) * Temperature status acceptable    5/25/2021 9:08 AM EDT by Sharla Altamirano      97.8    ( ) * No infection, or status acceptable    ( ) * No neutropenia, or status acceptable    ( ) * Special infection or injury situations absent    ( ) * Electrolyte status acceptable    5/25/2021 9:08 AM EDT by Sharla Altamirano      K elevated at 6.1    ( ) * General Discharge Criteria met    Interventions    ( ) * Intake acceptable    ( ) * No inpatient interventions needed    5/25/2021 9:08 AM EDT by Sharla Altamirano      K elevated at 6.1, Received calcium gluconate/ D50 /insulin /Kayexalate for hyperkalemia. Follow-up potassium levels    * Milestone   Additional Notes   5/21       Vs- 97.8 (36.6)  20  112  120/72    98% o2 4 l/min      Labs   5/21/2021 05:30   Sodium: 131 (L)   Potassium: 6.1 (HH)   Chloride: 86 (L)   CO2: 29   BUN: 92 (H)   Creatinine: 2.6 (H)   Anion Gap: 16.0   Est, Glom Filt Rate: 26 (A)   Glucose: 141 (H)   Calcium: 9.4      5/21/2021 13:16   Sodium: 128 (L)   Potassium: 5.3 (H)   Chloride: 85 (L)   CO2: 31   BUN: 94 (H)   Creatinine: 2.7 (H)   Anion Gap: 12.0   Est, Glom Filt Rate: 25 (A)   Glucose: 137 (H)   Calcium: 9.1         5/21/2021 13:00   US THORACENTESIS   Local Anesthetic: lidocaine 2% Specimen volume: none    Catheter: 10cm 5 Amharic cath    Aspirated pleural fluid volume: 170mL    Aspirated pleural fluid color: heme-tinged fluid    Complications: none      Impression:       1. Status post thoracentesis. Only 170 mL of heme tinged fluid could be removed due to the highly septated nature of the complex highly loculated right-sided pleural effusion. 5/21/2021 13:10   XR CHEST    1. Moderate bilateral right greater than left loculated pleural effusions are demonstrated. The effusion on the right does not appear significantly changed when compared to the prior examination despite right-sided thoracentesis from earlier same day. No     pneumothorax is seen. 2. Bilateral patchy airspace opacities are seen throughout the lungs which may represent multifocal pneumonia. 3. Overall aeration of the lungs appears similar compared to the prior examination. Per Nephrology   Subjective:     Nephrology is following the patient for CAMILLE, hyperkalemia. Patient was seen this morning. K elevated at 6.1, was treated this morning. Creatinine up.  Reports he is urinating ok.       Impression and Plan:   1. CAMILLE/CKD: likely AIN from Opdivo.  On Pred 60 mg daily   -creat worse today likely from diuretics. Stop bumex. -BUN steadily rising from steroids   -will keep Pred 60 mg x 2 weeks and then start reducing dose   2. Hyperkalemia:from worsened CAMILLE. Retreated this AM repeat level at noon   3. Hyponatremia,hypervolemic + decreased free water excretion from CAMILLE, stable. 4. Hypotension,continue Midodrine   5. Anemia   6. Metastatic renal cell ca with peritoneal carcinomatosis   7. Malignant pleural effusions   8.  Ascites          Per IM    SUBJECTIVE: feels about the same, pain and short of breath, only 170cc pleural fluid removed due to complex septated loculated pleural effusion          ASSESMENT:    Active Problems:     Recurrent right pleural effusion     Severe malnutrition ([address]   Metastatic renal cell carcinoma (HCC)     Hyperkalemia     SOB (shortness of breath)     Cancer associated pain     Peritoneal carcinomatosis (HCC)     Malignant ascites      Acute on chronic respiratory failure   Loculated pleural effusion S/P removal of infected PleurX catheter, VATS, pleurodesis       Status post hyperkalemia    Hypothyroidism    Anemia   CAMILLE with recent acute interstitial nephritis   Chronic pain secondary to metastatic disease           PLAN:   Received calcium gluconate/ D50 /insulin /Kayexalate for hyperkalemia. Follow-up potassium levels. Continue to monitor electrolytes and  Correct accordingly.  Nephrology service following.  No plans for CRRT.  On steroids for AIN.  Currently on Bumex        He is status post removal of PleurX catheter,  He has had previous VATS and pleurodesis on the left pleural cavity. s/p right-sided thoracentesis today as it could not be done yesterday and p.r.n. only 170cc pleural fluid removed due to complex septated loculated pleural effusion, will discuss with Pulmonology service     Grew Staph epi from infected PleurX catheter, currently on Keflex. Complete  Antibiotics.       Patient Had abdominal paracentesis yesterday, 1.3 L was removed.   He will benefit from scheduled outpatient abdominal paracenteses        Continue bronchodilators.          Continue Marinol for his appetite.          Continue current pain management for metastatic disease       Patient's prognosis is very poor, discussed with patients Son , consider hospice, wants to hold off till after Sunday as patient's daughter is planning marriage vows at that time.  .           Meds   calcium gluconate 1,000 mg in dextrose 5 % 100 mL IVPB  x1   dextrose 50 % IV solution  Dose: 25 g x1   insulin regular (HUMULIN R;NOVOLIN R) injection 10 Units x1   sodium polystyrene (KAYEXALATE) 15 GM/60ML suspension 30 g x1   cephALEXin (KEFLEX) capsule 500 mg  q6   ipratropium-albuterol (DUONEB) nebulizer solution 1 ampule q4   predniSONE (DELTASONE) tablet 60 mg  daily   naloxegol (MOVANTIK) tablet 12.5 mg  qam   morphine (MS CONTIN) extended release tablet 15 mg  q8   midodrine (PROAMATINE) tablet 5 mg tid   dronabinol (MARINOL) capsule 2.5 mg  bid      PRN Meds   morphine (PF) injection 2 mg  x1   ondansetron (ZOFRAN) injection 4 mg x1   oxyCODONE-acetaminophen (PERCOCET) 5-325 MG per tablet 1 tablet  x3   promethazine (PHENERGAN) tablet 25 mg x1         Per CM   Hospital day: 12   Location: [facility]03/003-A Reason for admit: Hyperkalemia [E87.5]   SOB (shortness of breath) [R06.02]    Procedure: 5/10/2021 Paracentesis 3 L fluid removed.    5/12/202 removal of Pleurx catheter    5/19/2021 US paracentesis-1.3 L of fluid removed   5/20/2021 US thoracentesis-1. Status post thoracentesis. Only 170 mL of heme tinged fluid could be removed due to the highly septated nature of the complex highly loculated right-sided pleural effusion. Barriers to Discharge: K 6.1-Kayexalate x 1 dose, Nephrology and ID following, Palliative Care-no updated notes, SS, DM management, Duoneb nebulizer, MS Contin scheduled, Movantik, Senokot, daily weights, telemetry. PCP: CAITY Patel - CNP   Readmission Risk Score: 42%   Patient Goals/Plan/Treatment Preferences: Kisha is from home with his wife and current with [facility]. He was planning for the same at discharge. It is noted that his prognosis is poor.  Hospice consult?         Systemic or Infectious Condition GRG - Care Day 11 (5/19/2021) by Digna Juarez RN       Review Status Review Entered   Completed 5/24/2021 12:30      Criteria Review      Care Day: 11 Care Date: 5/19/2021 Level of Care: Intermediate Care    Guideline Day 3    Level Of Care    (X) * Activity level acceptable    Clinical Status    ( ) * Hemodynamic stability    5/24/2021 12:29 PM EDT by Nathalia Mclean      Pulse:              Pulse: 113  BP:                  BP: 116/75    ( ) * Respiratory status acceptable    5/24/2021 12:29 PM EDT by Paz Gonzalez      Bibasilar rales noted    (X) * Neurologic status acceptable    5/24/2021 12:29 PM EDT by Paz Gonzalez      Awake, alert    (X) * Temperature status acceptable    5/24/2021 12:29 PM EDT by Paz Gonzalez      Temp: 97.4 °    ( ) * No infection, or status acceptable    ( ) * No neutropenia, or status acceptable    ( ) * Special infection or injury situations absent    ( ) * Electrolyte status acceptable    5/24/2021 12:29 PM EDT by Paz Gonzalez      133/4.7/89/33/70/1.7    ( ) * General Discharge Criteria met    Interventions    (X) * Intake acceptable    ( ) * No inpatient interventions needed    5/24/2021 12:29 PM EDT by [facility] Oncology, Nephrology, ID, and Pain Management following. Bumex daily, DM management, Duoneb nebulizer, MS Contin scheduled, prn pain medications and antiemetics, daily weights, telemetry  large volume abdominal paracentesis today    * Milestone   Additional Notes   5/19       Vs- /76   Pulse 117   Temp 97.6 °F (36.4 °C) (Oral)   Resp 18   Ht 6' (1.829 m)   Wt 202 lb (91.6 kg)   SpO2 96%       O2 3 l/min NC      Labs   5/19/2021 05:29   Sodium: 133 (L)   Potassium: 4.7   Chloride: 89 (L)   CO2: 33   BUN: 70 (H)   Creatinine: 1.7 (H)   Anion Gap: 11.0   Est, Glom Filt Rate: 42 (A)   Glucose: 149 (H)   Calcium: 9.2      5/19/2021 06:38   CYTOLOGY, NON-GYN: Rpt      5/19/2021 14:45   CYTOLOGY, NON-GYN: Rpt      CXR   Slightly increased right lower lung consolidation.    No other significant interval change from 05/18/2021.       5/19/2021 15:31   US GUIDE PARACENTESIS   Local Anesthetic: 2% Lidocaine    Specimen volume: 70 ml   Catheter: 10cm 5 Lithuanian    Aspirated ascites volume: 1.3 liters   Aspirated ascites color: Old bloody fluid   Site of Puncture:RLQ    Complications: patient tolerated procedure well    Successful ultrasound-guided paracentesis.          Per CTS   Subjective:  Will benefit from scheduled outpatient abdominal paracenteses    Continue to monitor electrolytes and  Correct accordingly.  Nephrology service following.  No plans for CRRT.  On steroids for AIN.  Currently on Bumex    Continue bronchodilators.       status post removal of PleurX catheter, VATS and pleurodesis.  Complete  Antibiotics    Continue Marinol for his appetite.       Continue current pain management for metastatic disease    Patient's prognosis is very poor         Per Nephrology   Subjective:     Nephrology is following the patient for CAMILLE, hyperkalemia. Patient was seen and examined earlier this morning. His abdomen is feeling full again. Impression and Plan:   1. CAMILLE/CKD: likely AIN from Opdivo.  On Pred 60 mg daily   -improving   -will keep Pred 60 mg x 2 weeks and then start reducing dose   -will try bumex 1 mg daily due to ascites/pleural effusions although discussed with patient since these are malignant effusions diuretics will not help much       2. Hyperkalemia:improved,    3. Hyponatremia,hypervolemic + decreased free water excretion from CAMILLE.     4. Hypotension,continue Midodrine   5. Anemia   6. Metastatic renal cell ca with peritoneal carcinomatosis   7. Malignant pleural effusions   8. Ascites s/p paracentesis          Per Pain Mgmt   ASSESSMENT    1. Cancer associated pain    2. Metastatic renal cell cancer   3. Pleural effusion    4. Shortness of breath    5. Bone pain    6. Opioid induced constipation         PLAN   1. Continue pain management    2. No changes today    3. Continue MS Contin 15 mg scheduled every 8 hours for better maintenance pain relief   4. Continue tylenol 650 mg every 4 hours as needed for mild pain of 1-3/10, Continue Percocet 5/325 mg tabs to 7.5/325 mg tabs every 4 hours as needed for moderate to severe breakthrough pain of 4-10/10, 5/325 mg tabs for moderate pain of 4-6/10 or 7.5/325 tabs for sever pain of 7-10/10.      5.  Continue Lidoderm patches 6. Bowel program- started Movantik 12.5 mg daily    7. Will continue to follow and make changes as needed.           Scheduled Medications   · bumetanide 1 mg Oral Daily   · morphine 15 mg Oral Q8H   · cephALEXin 500 mg Oral Q6H   · lidocaine 2 patch Transdermal Daily   · predniSONE 60 mg Oral Daily   · midodrine 5 mg Oral TID WC   · [Held by provider] sodium zirconium cyclosilicate 10 g Oral Daily   · dronabinol 2.5 mg Oral BID   · ipratropium-albuterol 1 ampule Inhalation Q4H   · senna 2 tablet Oral Nightly   · docusate sodium 100 mg Oral BID   · polyethylene glycol 17 g Oral Daily   · famotidine 20 mg Oral Daily   · [Held by provider] heparin (porcine) 5,000 Units Subcutaneous 3 times per day   · levothyroxine 25 mcg Oral Daily   · insulin lispro 0-6 Units Subcutaneous TID WC   · insulin lispro 0-3 Units Subcutaneous Nightly   · epoetin yumiko-epbx 10,000 Units Subcutaneous Once per day on Mon Wed Fri      PRN Meds   ALPRAZolam (XANAX) tablet 0.25 mg  x1   ondansetron (ZOFRAN) injection 4 mg x1   oxyCODONE-acetaminophen (PERCOCET) 5-325 MG per tablet 1 tablet  x4

## 2021-05-25 NOTE — PROGRESS NOTES
INTERNAL MEDICINE SPECIALTIES  Progress Note For Dr Laila Moore       Patient:  Meredith Villarreal  YOB: 1970  Date of Service: 5/25/2021  MRN: [id number]   Acct:  [de-identified]   Primary Care Physician: CAITY Mason CNP    SUBJECTIVE:lethargic    Home Medications:   No current facility-administered medications on file prior to encounter. Current Outpatient Medications on File Prior to Encounter   Medication Sig Dispense Refill    levothyroxine (SYNTHROID) 25 MCG tablet Take 25 mcg by mouth Daily      oxyCODONE-acetaminophen (PERCOCET) 5-325 MG per tablet Take 1 tablet by mouth every 6 hours as needed for Pain.  famotidine (PEPCID) 20 MG tablet Take 1 tablet by mouth daily 30 tablet 0    ipratropium-albuterol (DUONEB) 0.5-2.5 (3) MG/3ML SOLN nebulizer solution Inhale 3 mLs into the lungs every 4 hours as needed for Shortness of Breath 360 mL 0    midodrine (PROAMATINE) 2.5 MG tablet Take 1 tablet by mouth 3 times daily (with meals) 90 tablet 0    predniSONE (DELTASONE) 20 MG tablet Take 2 tablets by mouth daily F/u with Dr Lucille Guzman regarding continuing dose at followup 60 tablet 0    traMADol (ULTRAM) 50 MG tablet Take 50 mg by mouth every 6 hours as needed for Pain.  traZODone (DESYREL) 50 MG tablet Take 50 mg by mouth nightly      ALLOPURINOL PO Take 100 mg by mouth nightly            Scheduled Meds:  Continuous Infusions:   morphine 30 mg (05/25/21 0822)    dextrose 30 mL/hr at 05/24/21 1147     PRN Meds:acetaminophen, glycopyrrolate, haloperidol lactate, ipratropium-albuterol, LORazepam **OR** LORazepam **OR** LORazepam **OR** LORazepam        Allergies:  Patient has no known allergies. OBJECTIVE:    Vitals:   Vitals:    05/25/21 0833   BP: (!) 33/17   Pulse: 50   Resp: 12      BMI: There is no height or weight on file to calculate BMI.     PHYSICAL EXAMINATION:          General appearance: Chronically ill looking male with shallow breathing, appears stated age and lethargic  HEENT:  Normal cephalic, atraumatic without obvious deformity. Pupils equal, round, and reactive to light.  Extra ocular muscles intact. Conjunctivae - subconjuctival hemorrhage both eyes . Has a mild left corneal abraision,   Neck: Supple. Chest:  Dressing over right chest clean and dry  Respiratory:  Diminished air entry bilaterally  Cardiovascular:  Regular rhythm with normal S1/S2 without  rubs or gallops. Abdomen:  Distended,soft,nontender, with normal bowel sounds. Musculoskeletal:  No clubbing, cyanosis or ankle edema bilaterally. Neurologic: lethargic    Review of Labs and Diagnostic Testing:    No results found for this or any previous visit (from the past 24 hour(s)). Radiology:     No results found. ASSESMENT:      Active Problems:    Metastatic renal cell carcinoma (HCC)  Resolved Problems:    * No resolved hospital problems.  *      PLAN:  Continue comfort care measures  Prognosis is very poor    DNR-CC    Discussed with family      DVT prophylaxis: [] Lovenox                                 [] SCDs                                 [] SQ Heparin                                 [] Encourage ambulation, low risk for DVT, no chemical or mechanical prophylaxis necessary              [] Already on Anticoagulation                Anticipated Disposition upon discharge: [] Home                                                                         [] Home with Home Health                                                                         [] [facility]                                                                         [] [address]          Electronically signed by Santana Brooks MD on 5/25/2021 at 8:43 AM

## 2021-05-25 NOTE — ADT AUTH CERT
Utilization Reviews       Systemic or Infectious Condition GRG - Care Day 13 (5/21/2021) by Tammi Beard RN       Review Status Review Entered   Completed 5/25/2021 09:08      Criteria Review      Care Day: 13 Care Date: 5/21/2021 Level of Care: Intermediate Care    Guideline Day 3    Level Of Care    (X) * Activity level acceptable    5/25/2021 9:08 AM EDT by Aure Sorto      aat    Clinical Status    ( ) * Hemodynamic stability    5/25/2021 9:08 AM EDT by Aure Sorto      Pulse:              Pulse: 114  BP:                  BP: 115/73    ( ) * Respiratory status acceptable    (X) * Neurologic status acceptable    5/25/2021 9:08 AM EDT by Aure Sorto      Awake, alert, frail appearing    (X) * Temperature status acceptable    5/25/2021 9:08 AM EDT by Aure Sorto      97.8    ( ) * No infection, or status acceptable    ( ) * No neutropenia, or status acceptable    ( ) * Special infection or injury situations absent    ( ) * Electrolyte status acceptable    5/25/2021 9:08 AM EDT by Aure Sorto      K elevated at 6.1    ( ) * General Discharge Criteria met    Interventions    ( ) * Intake acceptable    ( ) * No inpatient interventions needed    5/25/2021 9:08 AM EDT by Aure Sorto      K elevated at 6.1, Received calcium gluconate/ D50 /insulin /Kayexalate for hyperkalemia. Follow-up potassium levels    * Milestone   Additional Notes   5/21       Vs- 97.8 (36.6)  20  112  120/72    98% o2 4 l/min      Labs   5/21/2021 05:30   Sodium: 131 (L)   Potassium: 6.1 (HH)   Chloride: 86 (L)   CO2: 29   BUN: 92 (H)   Creatinine: 2.6 (H)   Anion Gap: 16.0   Est, Glom Filt Rate: 26 (A)   Glucose: 141 (H)   Calcium: 9.4      5/21/2021 13:16   Sodium: 128 (L)   Potassium: 5.3 (H)   Chloride: 85 (L)   CO2: 31   BUN: 94 (H)   Creatinine: 2.7 (H)   Anion Gap: 12.0   Est, Glom Filt Rate: 25 (A)   Glucose: 137 (H)   Calcium: 9.1         5/21/2021 13:00   US THORACENTESIS   Local Anesthetic: lidocaine 2%   Metastatic renal cell carcinoma (HCC)     Hyperkalemia     SOB (shortness of breath)     Cancer associated pain     Peritoneal carcinomatosis (HCC)     Malignant ascites      Acute on chronic respiratory failure   Loculated pleural effusion S/P removal of infected PleurX catheter, VATS, pleurodesis       Status post hyperkalemia    Hypothyroidism    Anemia   CAMILLE with recent acute interstitial nephritis   Chronic pain secondary to metastatic disease           PLAN:   Received calcium gluconate/ D50 /insulin /Kayexalate for hyperkalemia. Follow-up potassium levels. Continue to monitor electrolytes and  Correct accordingly.  Nephrology service following.  No plans for CRRT.  On steroids for AIN.  Currently on Bumex        He is status post removal of PleurX catheter,  He has had previous VATS and pleurodesis on the left pleural cavity. s/p right-sided thoracentesis today as it could not be done yesterday and p.r.n. only 170cc pleural fluid removed due to complex septated loculated pleural effusion, will discuss with Pulmonology service     Grew Staph epi from infected PleurX catheter, currently on Keflex. Complete  Antibiotics.       Patient Had abdominal paracentesis yesterday, 1.3 L was removed.   He will benefit from scheduled outpatient abdominal paracenteses        Continue bronchodilators.          Continue Marinol for his appetite.          Continue current pain management for metastatic disease       Patient's prognosis is very poor, discussed with patients Son , consider hospice, wants to hold off till after Sunday as patient's daughter is planning marriage vows at that time.  .           Meds   calcium gluconate 1,000 mg in dextrose 5 % 100 mL IVPB  x1   dextrose 50 % IV solution  Dose: 25 g x1   insulin regular (HUMULIN R;NOVOLIN R) injection 10 Units x1   sodium polystyrene (KAYEXALATE) 15 GM/60ML suspension 30 g x1   cephALEXin (KEFLEX) capsule 500 mg  q6   ipratropium-albuterol (DUONEB) nebulizer solution 1 ampule q4   predniSONE (DELTASONE) tablet 60 mg  daily   naloxegol (MOVANTIK) tablet 12.5 mg  qam   morphine (MS CONTIN) extended release tablet 15 mg  q8   midodrine (PROAMATINE) tablet 5 mg tid   dronabinol (MARINOL) capsule 2.5 mg  bid      PRN Meds   morphine (PF) injection 2 mg  x1   ondansetron (ZOFRAN) injection 4 mg x1   oxyCODONE-acetaminophen (PERCOCET) 5-325 MG per tablet 1 tablet  x3   promethazine (PHENERGAN) tablet 25 mg x1         Per CM   Hospital day: 12   Location: [facility]03/003-A Reason for admit: Hyperkalemia [E87.5]   SOB (shortness of breath) [R06.02]    Procedure: 5/10/2021 Paracentesis 3 L fluid removed.    5/12/202 removal of Pleurx catheter    5/19/2021 US paracentesis-1.3 L of fluid removed   5/20/2021 US thoracentesis-1. Status post thoracentesis. Only 170 mL of heme tinged fluid could be removed due to the highly septated nature of the complex highly loculated right-sided pleural effusion. Barriers to Discharge: K 6.1-Kayexalate x 1 dose, Nephrology and ID following, Palliative Care-no updated notes, SS, DM management, Duoneb nebulizer, MS Contin scheduled, Movantik, Senokot, daily weights, telemetry. PCP: CAITY Patel - CNP   Readmission Risk Score: 42%   Patient Goals/Plan/Treatment Preferences: Juana Low is from home with his wife and current with [facility]. He was planning for the same at discharge. It is noted that his prognosis is poor.  Hospice consult?         Systemic or Infectious Condition GRG - Care Day 11 (5/19/2021) by Digna Juarez RN       Review Status Review Entered   Completed 5/24/2021 12:30      Criteria Review      Care Day: 11 Care Date: 5/19/2021 Level of Care: Intermediate Care    Guideline Day 3    Level Of Care    (X) * Activity level acceptable    Clinical Status    ( ) * Hemodynamic stability    5/24/2021 12:29 PM EDT by Nathalia Mclean      Pulse:              Pulse: 113  BP:                  BP: 116/75    ( ) * Respiratory status acceptable    5/24/2021 12:29 PM EDT by Geraldine Jean      Bibasilar rales noted    (X) * Neurologic status acceptable    5/24/2021 12:29 PM EDT by Geraldine Jean      Awake, alert    (X) * Temperature status acceptable    5/24/2021 12:29 PM EDT by Geraldine Jean      Temp: 97.4 °    ( ) * No infection, or status acceptable    ( ) * No neutropenia, or status acceptable    ( ) * Special infection or injury situations absent    ( ) * Electrolyte status acceptable    5/24/2021 12:29 PM EDT by Geraldine Jean      133/4.7/89/33/70/1.7    ( ) * General Discharge Criteria met    Interventions    (X) * Intake acceptable    ( ) * No inpatient interventions needed    5/24/2021 12:29 PM EDT by Anabel Joiner Oncology, Nephrology, ID, and Pain Management following. Bumex daily, DM management, Duoneb nebulizer, MS Contin scheduled, prn pain medications and antiemetics, daily weights, telemetry  large volume abdominal paracentesis today    * Milestone   Additional Notes   5/19       Vs- /76   Pulse 117   Temp 97.6 °F (36.4 °C) (Oral)   Resp 18   Ht 6' (1.829 m)   Wt 202 lb (91.6 kg)   SpO2 96%       O2 3 l/min NC      Labs   5/19/2021 05:29   Sodium: 133 (L)   Potassium: 4.7   Chloride: 89 (L)   CO2: 33   BUN: 70 (H)   Creatinine: 1.7 (H)   Anion Gap: 11.0   Est, Glom Filt Rate: 42 (A)   Glucose: 149 (H)   Calcium: 9.2      5/19/2021 06:38   CYTOLOGY, NON-GYN: Rpt      5/19/2021 14:45   CYTOLOGY, NON-GYN: Rpt      CXR   Slightly increased right lower lung consolidation.    No other significant interval change from 05/18/2021.       5/19/2021 15:31   US GUIDE PARACENTESIS   Local Anesthetic: 2% Lidocaine    Specimen volume: 70 ml   Catheter: 10cm 5 Libyan    Aspirated ascites volume: 1.3 liters   Aspirated ascites color: Old bloody fluid   Site of Puncture:RLQ    Complications: patient tolerated procedure well    Successful ultrasound-guided paracentesis.          Per CTS   Subjective:  Mr. Dhaliwal  is a 48year old male with a history of metastatic kidney cancer, status post right pleural Pleurx catheter insertion at [facility] in February, 2021 for recurrent right pleural effusion.  Mr. Dhaliwal was admitted to [address] [place] [address] one month ago due to reaccumulation of right pleural effusion with malfunctioning of right Pleurx cath and SOB.  The Pleur-X was flushed with TPA/Dornase and was working appropriately. Eloisa Godoy was readmitted on 5/9/2021 for pneumonia with sepsis, pleural effusion with malfunctioning Pleurx catheter. The Pleurx catheter was removed  due to infection. Eloisa Godoy is having left sided abdominal pain secondary to distention.  He is currently still on 3L O2 NC and has been for past several days. Eloisa Godoy requires intermittent paracentesis and possibly will require intermittent thoracocentesis in the future. Assessment/Plan:    1.  Pt has recurrent fluid collections that will require intermittent paracentesis and possible thoracocentesis with his presenting medical condition.  I have discussed his situation with Dr. Tracy Engle and he states that we will hold off on continuation of following patient at this time. Darek Miller will sign off at this time. Rosie Bakert maryann for the consultation and let us know if the need for Pleur-X catheter is needed in the future. Per IM   Active Problems:     Recurrent right pleural effusion     Severe malnutrition (HCC)     Metastatic renal cell carcinoma (HCC)     Hyperkalemia     SOB (shortness of breath)     Cancer associated pain     Peritoneal carcinomatosis (HCC)     Malignant ascites   Acute on chronic respiratory failure   Loculated pleural effusion S/P removal of infected PleurX catheter, VATS, pleurodesis       Status post hyperkalemia    Hypothyroidism    Anemia   CAMILLE with recent acute interstitial nephritis   Chronic pain secondary to metastatic disease        PLAN:   Patient is to have large volume abdominal paracentesis today as he is significantly distended.  Will benefit from scheduled outpatient abdominal paracenteses    Continue to monitor electrolytes and  Correct accordingly.  Nephrology service following.  No plans for CRRT.  On steroids for AIN.  Currently on Bumex    Continue bronchodilators.       status post removal of PleurX catheter, VATS and pleurodesis.  Complete  Antibiotics    Continue Marinol for his appetite.       Continue current pain management for metastatic disease    Patient's prognosis is very poor         Per Nephrology   Subjective:     Nephrology is following the patient for CAMILLE, hyperkalemia. Patient was seen and examined earlier this morning. His abdomen is feeling full again. Impression and Plan:   1. CAMILLE/CKD: likely AIN from Opdivo.  On Pred 60 mg daily   -improving   -will keep Pred 60 mg x 2 weeks and then start reducing dose   -will try bumex 1 mg daily due to ascites/pleural effusions although discussed with patient since these are malignant effusions diuretics will not help much       2. Hyperkalemia:improved,    3. Hyponatremia,hypervolemic + decreased free water excretion from CAMILLE.     4. Hypotension,continue Midodrine   5. Anemia   6. Metastatic renal cell ca with peritoneal carcinomatosis   7. Malignant pleural effusions   8. Ascites s/p paracentesis          Per Pain Mgmt   ASSESSMENT    1. Cancer associated pain    2. Metastatic renal cell cancer   3. Pleural effusion    4. Shortness of breath    5. Bone pain    6. Opioid induced constipation         PLAN   1. Continue pain management    2. No changes today    3. Continue MS Contin 15 mg scheduled every 8 hours for better maintenance pain relief   4. Continue tylenol 650 mg every 4 hours as needed for mild pain of 1-3/10, Continue Percocet 5/325 mg tabs to 7.5/325 mg tabs every 4 hours as needed for moderate to severe breakthrough pain of 4-10/10, 5/325 mg tabs for moderate pain of 4-6/10 or 7.5/325 tabs for sever pain of 7-10/10.      5.  Continue Lidoderm patches 6. Bowel program- started Movantik 12.5 mg daily    7. Will continue to follow and make changes as needed.           Scheduled Medications   · bumetanide 1 mg Oral Daily   · morphine 15 mg Oral Q8H   · cephALEXin 500 mg Oral Q6H   · lidocaine 2 patch Transdermal Daily   · predniSONE 60 mg Oral Daily   · midodrine 5 mg Oral TID WC   · [Held by provider] sodium zirconium cyclosilicate 10 g Oral Daily   · dronabinol 2.5 mg Oral BID   · ipratropium-albuterol 1 ampule Inhalation Q4H   · senna 2 tablet Oral Nightly   · docusate sodium 100 mg Oral BID   · polyethylene glycol 17 g Oral Daily   · famotidine 20 mg Oral Daily   · [Held by provider] heparin (porcine) 5,000 Units Subcutaneous 3 times per day   · levothyroxine 25 mcg Oral Daily   · insulin lispro 0-6 Units Subcutaneous TID WC   · insulin lispro 0-3 Units Subcutaneous Nightly   · epoetin yumiko-epbx 10,000 Units Subcutaneous Once per day on Mon Wed Fri      PRN Meds   ALPRAZolam (XANAX) tablet 0.25 mg  x1   ondansetron (ZOFRAN) injection 4 mg x1   oxyCODONE-acetaminophen (PERCOCET) 5-325 MG per tablet 1 tablet  x4

## 2021-05-25 NOTE — PLAN OF CARE
Problem: Falls - Risk of:  Goal: Will remain free from falls  Description: Will remain free from falls  Outcome: Ongoing  Note: No falls this shift. Bed alarm, nonskid socks, and call light utilized. At risk due to end of life measures and care and PCA morphine. Problem: Skin Integrity:  Goal: Absence of new skin breakdown  Description: Absence of new skin breakdown  Outcome: Ongoing  Note: No new skin breakdown. Pt turned and repositioned. Will continue on going skin assessment. Family requesting staff to not turn patient every 2 hours d/t patient's current comfortable status. Problem: Pain:  Goal: Control of acute pain  Description: Control of acute pain  Outcome: Ongoing  Note: PCA morphine increased to 6.5 d/t increased moaning, labored breathing and restlessness. Pt resting comfortable at this time. Problem: Coping:  Goal: Family's ability to cope with current situation will improve  Description: Family's ability to cope with current situation will improve  Outcome: Ongoing  Note: Family  saw patient and family today and provided support. Emotional support given continuously to family. Care plan reviewed with patient's family. Patient's family verbalize understanding of the plan of care and contribute to goal setting.

## 2021-05-25 NOTE — PROGRESS NOTES
[id number] Patient found to be free from VS and neurological response confirmed by Carolina Goldman and Ana Macias. Family at bedside.  called per family request. House supervisor called, Hospice called. Dr Brad Brenner for preliminary cause of death and order to release the body. Dr Laila Moore returned call and orders received for preliminary cause of death of Metastatic Renal Cell Cancer and ok to release body.

## 2021-05-25 NOTE — PROGRESS NOTES
During evening assessment RN asked if family would allow tech to complete blood sugar check. Family denied CHEMS d/t pt on end of life care. When RN talked to family about Q2H turns, family denied turns d/t patient extremely comfortable.  Electronically signed by Jessenia Castro RN on 5/24/2021 at 11:54 PM

## 2023-08-17 NOTE — PLAN OF CARE
Problem: Pain:  Goal: Pain level will decrease  Description: Pain level will decrease  Outcome: Ongoing  Note: Pain assessment complete. PRN pain medications given per physician orders. Non pharmacological pain intervention given rest and reposition. Will continue to monitor       Problem: Falls - Risk of:  Goal: Will remain free from falls  Description: Will remain free from falls  Outcome: Ongoing  Note: Fall assessment complete. No falls noted this shift. Bed alarm on, bed in low position, call light and personal items in reach. Nonskid socks on. Will continue to monitor. Problem: Skin Integrity:  Goal: Will show no infection signs and symptoms  Description: Will show no infection signs and symptoms  Outcome: Ongoing  Note: No new skin issues noted this shift will continue to monitor. Patient turns self in bed. Problem: Respiratory  Goal: O2 Sat > 90%  Outcome: Ongoing  Note: POX is greater than 90% on 3L O2 NC. Problem: Discharge Planning  Goal: Discharged to appropriate level of care  Description: Discharged to appropriate level of care  Outcome: Ongoing     Care plan reviewed with patient. Patient verbalize understanding of the plan of care and contribute to goal setting. PRINCIPAL DISCHARGE DIAGNOSIS  Diagnosis: Chest pain  Assessment and Plan of Treatment: you had left heart catheterization which showed non-obstructive CAD   take aspirin and atorvastatin, follow up with cardiologist within 1 week  repeat liver functions test in 1 week to monitor liver function while on statin  return to ED if chest pain, high BP, dyspnea, abdominal pain or any      SECONDARY DISCHARGE DIAGNOSES  Diagnosis: HTN (hypertension)  Assessment and Plan of Treatment: take propranolol twice daily   follow up with PCP within 1 week for BP control    Diagnosis: Hyperthyroidism  Assessment and Plan of Treatment: your thyroid hyperfunctioning  please see endocrynologist within 1 -2 week for further monitoring and management    Diagnosis: Vitamin D insufficiency  Assessment and Plan of Treatment: take vitamin D daily as prescribed    Diagnosis: Esophageal cancer  Assessment and Plan of Treatment: you have slightly elevated WBC whithout signs of infection   follow up with oncologist hematologist for further monitoring

## 2023-09-05 NOTE — PROGRESS NOTES
New orders placed.   Progress note: Infectious diseases    Patient - Carl Burn,  Age - 44 y.o.    - 1970      Room Number - 4K-01/001-A   MRN -  [id number]   Acct # - [de-identified]  Date of Admission -  2021 10:24 AM    SUBJECTIVE:   No new issues. discharge plan in progress  OBJECTIVE   VITALS    height is 6' (1.829 m) and weight is 210 lb 11.2 oz (95.6 kg). His oral temperature is 97.6 °F (36.4 °C). His blood pressure is 141/81 (abnormal) and his pulse is 104. His respiration is 18 and oxygen saturation is 96%. Wt Readings from Last 3 Encounters:   21 210 lb 11.2 oz (95.6 kg)   21 192 lb 6.4 oz (87.3 kg)   02/15/21 200 lb (90.7 kg)      General Appearance  Awake, alert, oriented, chronically sick looking.   HEENT - normocephalic, atraumatic, pale  conjunctiva,  anicteric sclera  Neck - Supple, no mass  Lungs -  Bilateral   air entry, diminished breath sound, right side pleurex catheter  Cardiovascular - Heart sounds are normal.     Abdomen -edematous abdominal wall  Neurologic -oriented  Skin - No bruising or bleeding  Extremities - trace edema    MEDICATIONS:      bumetanide  1 mg Intravenous BID    midodrine  5 mg Oral TID WC    predniSONE  40 mg Oral Daily    epoetin yumiko-epbx  3,000 Units Subcutaneous Once per day on     Or    epoetin yumiko-epbx  10,000 Units Subcutaneous Once per day on     cefepime  2,000 mg Intravenous Q12H    famotidine  20 mg Oral Daily    allopurinol  100 mg Oral BID    insulin lispro  0-6 Units Subcutaneous TID WC    insulin lispro  0-3 Units Subcutaneous Nightly    traZODone  50 mg Oral Nightly    sodium chloride flush  5-40 mL Intravenous 2 times per day    levothyroxine  62.5 mcg Oral Daily      sodium chloride      sodium chloride      dextrose       sodium chloride, sodium chloride, glucose, dextrose, glucagon (rDNA), dextrose, ipratropium-albuterol, traMADol, sodium chloride flush, promethazine **OR** ondansetron, polyethylene glycol, acetaminophen **OR** acetaminophen, diphenhydrAMINE      LABS:     CBC:   Recent Labs     04/20/21  0450 04/20/21  0952 04/21/21  0345 04/22/21  0550   WBC 11.3*  --  14.2* 17.7*   HGB 6.5* 7.7* 7.7* 8.5*   *  --  466* 474*     BMP:    Recent Labs     04/20/21  0405 04/21/21  0345 04/22/21  0550   * 136 136   K 4.6 4.7 4.3    106 104   CO2 21* 20* 20*   BUN 43* 46* 52*   CREATININE 2.4* 2.4* 2.5*   GLUCOSE 163* 161* 132*     Calcium:  Recent Labs     04/22/21  0550   CALCIUM 8.7    Hepatic:   No results for input(s): ALKPHOS, ALT, AST, PROT, BILITOT, BILIDIR, LABALBU in the last 72 hours. No results for input(s): CKTOTAL, CKMB, TROPONINI in the last 72 hours. Problem list of patient:     Patient Active Problem List   Diagnosis Code    [facility] mellitus [facility] E11.9    Essential hypertension I10    Chronic kidney disease N18.9    CKD (chronic kidney disease), stage III N18.30    Intracranial arachnoid cysts 4th ventricle G93.0    Headache R51.9    Lt facial numbness R20.0    Right renal mass N28.89    Hematuria R31.9    Lung nodules R91.8    Hydropneumothorax J94.8    Adrenal nodule (HCC) E27.8    Shortness of breath R06.02    Lymph node disorder I89.9    Respiratory distress R06.03    Pleural nodules R22.2    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 Recurrent pleural effusion: right side pleurex. seen by [facility]:   Anemia:   Ok with discharge plan  Long term prognosis is guarded.         Lisa Giles MD, Carmelina Portillo 4/22/2021 9:16 AM